# Patient Record
Sex: FEMALE | Race: WHITE | NOT HISPANIC OR LATINO | Employment: OTHER | URBAN - METROPOLITAN AREA
[De-identification: names, ages, dates, MRNs, and addresses within clinical notes are randomized per-mention and may not be internally consistent; named-entity substitution may affect disease eponyms.]

---

## 2021-02-23 ENCOUNTER — COMMUNICATION - HEALTHEAST (OUTPATIENT)
Dept: SCHEDULING | Facility: CLINIC | Age: 66
End: 2021-02-23

## 2021-03-03 ENCOUNTER — OFFICE VISIT - HEALTHEAST (OUTPATIENT)
Dept: ONCOLOGY | Facility: HOSPITAL | Age: 66
End: 2021-03-03

## 2021-03-03 DIAGNOSIS — Z17.0 MALIGNANT NEOPLASM OF LOWER-OUTER QUADRANT OF RIGHT BREAST OF FEMALE, ESTROGEN RECEPTOR POSITIVE (H): ICD-10-CM

## 2021-03-03 DIAGNOSIS — C50.511 MALIGNANT NEOPLASM OF LOWER-OUTER QUADRANT OF RIGHT BREAST OF FEMALE, ESTROGEN RECEPTOR POSITIVE (H): ICD-10-CM

## 2021-03-08 ENCOUNTER — AMBULATORY - HEALTHEAST (OUTPATIENT)
Dept: CT IMAGING | Facility: HOSPITAL | Age: 66
End: 2021-03-08

## 2021-03-08 ENCOUNTER — COMMUNICATION - HEALTHEAST (OUTPATIENT)
Dept: ONCOLOGY | Facility: HOSPITAL | Age: 66
End: 2021-03-08

## 2021-03-08 DIAGNOSIS — Z11.59 ENCOUNTER FOR SCREENING FOR OTHER VIRAL DISEASES: ICD-10-CM

## 2021-03-15 ENCOUNTER — HOSPITAL ENCOUNTER (OUTPATIENT)
Dept: PET IMAGING | Facility: HOSPITAL | Age: 66
Discharge: HOME | End: 2021-03-15
Attending: INTERNAL MEDICINE

## 2021-03-15 DIAGNOSIS — C50.511 MALIGNANT NEOPLASM OF LOWER-OUTER QUADRANT OF RIGHT BREAST OF FEMALE, ESTROGEN RECEPTOR POSITIVE (H): ICD-10-CM

## 2021-03-15 DIAGNOSIS — Z17.0 MALIGNANT NEOPLASM OF LOWER-OUTER QUADRANT OF RIGHT BREAST OF FEMALE, ESTROGEN RECEPTOR POSITIVE (H): ICD-10-CM

## 2021-03-15 LAB — GLUCOSE BLDC GLUCOMTR-MCNC: 97 MG/DL (ref 70–139)

## 2021-03-16 ENCOUNTER — COMMUNICATION - HEALTHEAST (OUTPATIENT)
Dept: ONCOLOGY | Facility: HOSPITAL | Age: 66
End: 2021-03-16

## 2021-03-17 ENCOUNTER — COMMUNICATION - HEALTHEAST (OUTPATIENT)
Dept: ONCOLOGY | Facility: HOSPITAL | Age: 66
End: 2021-03-17

## 2021-03-18 ENCOUNTER — HOSPITAL ENCOUNTER (OUTPATIENT)
Dept: CT IMAGING | Facility: HOSPITAL | Age: 66
Discharge: HOME | End: 2021-03-18
Attending: INTERNAL MEDICINE

## 2021-03-18 ENCOUNTER — OFFICE VISIT - HEALTHEAST (OUTPATIENT)
Dept: ONCOLOGY | Facility: HOSPITAL | Age: 66
End: 2021-03-18

## 2021-03-18 DIAGNOSIS — Z17.0 MALIGNANT NEOPLASM OF LOWER-OUTER QUADRANT OF RIGHT BREAST OF FEMALE, ESTROGEN RECEPTOR POSITIVE (H): ICD-10-CM

## 2021-03-18 DIAGNOSIS — C50.511 MALIGNANT NEOPLASM OF LOWER-OUTER QUADRANT OF RIGHT BREAST OF FEMALE, ESTROGEN RECEPTOR POSITIVE (H): ICD-10-CM

## 2021-03-18 RX ORDER — DILTIAZEM HYDROCHLORIDE 60 MG/1
2 TABLET, FILM COATED ORAL 2 TIMES DAILY
Status: SHIPPED | COMMUNITY
Start: 2021-03-16

## 2021-03-18 RX ORDER — ZALEPLON 5 MG/1
5 CAPSULE ORAL
Status: SHIPPED | COMMUNITY
Start: 2021-03-05 | End: 2021-07-25

## 2021-03-19 ENCOUNTER — AMBULATORY - HEALTHEAST (OUTPATIENT)
Dept: ONCOLOGY | Facility: HOSPITAL | Age: 66
End: 2021-03-19

## 2021-03-21 ENCOUNTER — COMMUNICATION - HEALTHEAST (OUTPATIENT)
Dept: RADIATION ONCOLOGY | Facility: HOSPITAL | Age: 66
End: 2021-03-21

## 2021-03-22 ENCOUNTER — COMMUNICATION - HEALTHEAST (OUTPATIENT)
Dept: ONCOLOGY | Facility: HOSPITAL | Age: 66
End: 2021-03-22

## 2021-03-29 ENCOUNTER — RECORDS - HEALTHEAST (OUTPATIENT)
Dept: ADMINISTRATIVE | Facility: OTHER | Age: 66
End: 2021-03-29

## 2021-03-30 ENCOUNTER — AMBULATORY - HEALTHEAST (OUTPATIENT)
Dept: LAB | Facility: CLINIC | Age: 66
End: 2021-03-30

## 2021-03-30 DIAGNOSIS — Z11.59 ENCOUNTER FOR SCREENING FOR OTHER VIRAL DISEASES: ICD-10-CM

## 2021-03-31 LAB
SARS-COV-2 PCR COMMENT: NORMAL
SARS-COV-2 RNA SPEC QL NAA+PROBE: NEGATIVE
SARS-COV-2 VIRUS SPECIMEN SOURCE: NORMAL

## 2021-04-01 ENCOUNTER — HOSPITAL ENCOUNTER (OUTPATIENT)
Dept: CT IMAGING | Facility: HOSPITAL | Age: 66
Discharge: HOME | End: 2021-04-01
Attending: INTERNAL MEDICINE | Admitting: RADIOLOGY

## 2021-04-01 ENCOUNTER — COMMUNICATION - HEALTHEAST (OUTPATIENT)
Dept: SCHEDULING | Facility: CLINIC | Age: 66
End: 2021-04-01

## 2021-04-01 DIAGNOSIS — C50.919 BREAST CANCER (H): ICD-10-CM

## 2021-04-01 ASSESSMENT — MIFFLIN-ST. JEOR: SCORE: 1205.86

## 2021-04-02 LAB
CAP COMMENT: ABNORMAL
LAB AP CHARGES (HE HISTORICAL CONVERSION): ABNORMAL
LAB AP INITIAL CYTO EVAL (HE HISTORICAL CONVERSION): ABNORMAL
LAB MED GENERAL PATH INTERP (HE HISTORICAL CONVERSION): ABNORMAL
PATH REPORT.COMMENTS IMP SPEC: ABNORMAL
PATH REPORT.COMMENTS IMP SPEC: ABNORMAL
PATH REPORT.FINAL DX SPEC: ABNORMAL
PATH REPORT.MICROSCOPIC SPEC OTHER STN: ABNORMAL
PATH REPORT.MICROSCOPIC SPEC OTHER STN: ABNORMAL
PATH REPORT.RELEVANT HX SPEC: ABNORMAL
SPECIMEN DESCRIPTION: ABNORMAL

## 2021-06-05 VITALS — WEIGHT: 155 LBS | BODY MASS INDEX: 26.61 KG/M2

## 2021-06-05 VITALS — WEIGHT: 149 LBS | HEIGHT: 64 IN | BODY MASS INDEX: 25.44 KG/M2

## 2021-06-11 ENCOUNTER — RECORDS - HEALTHEAST (OUTPATIENT)
Dept: ADMINISTRATIVE | Facility: OTHER | Age: 66
End: 2021-06-11

## 2021-06-11 ENCOUNTER — RECORDS - HEALTHEAST (OUTPATIENT)
Dept: SCHEDULING | Facility: CLINIC | Age: 66
End: 2021-06-11

## 2021-06-11 DIAGNOSIS — C50.919 BREAST CANCER (H): ICD-10-CM

## 2021-06-14 ENCOUNTER — RECORDS - HEALTHEAST (OUTPATIENT)
Dept: SCHEDULING | Facility: CLINIC | Age: 66
End: 2021-06-14

## 2021-06-14 DIAGNOSIS — C50.919 BREAST CANCER (H): ICD-10-CM

## 2021-06-14 DIAGNOSIS — C50.111 MALIGNANT NEOPLASM OF CENTRAL PORTION OF RIGHT FEMALE BREAST (H): ICD-10-CM

## 2021-06-15 NOTE — PROGRESS NOTES
Jarrell Nicole is a 66 y.o. female who is being evaluated via a billable video visit.      How would you like to obtain your AVS? E-Mail (Inform patient AVS not encrypted with this option).  If dropped from the video visit, the video invitation should be resent by: Text to cell phone: [phone]  Will anyone else be joining your video visit? No

## 2021-06-15 NOTE — PROGRESS NOTES
"The patient has been notified of following:     \"This video visit will be conducted via a call between you and your physician/provider. We have found that certain health care needs can be provided without the need for an in-person physical exam.  This service lets us provide the care you need with a video conversation.  If a prescription is necessary we can send it directly to your pharmacy.  If lab work is needed we can place an order for that and you can then stop by our lab to have the test done at a later time.    Video visits are billed at different rates depending on your insurance coverage. Please reach out to your insurance provider with any questions.    If during the course of the call the physician/provider feels a video visit is not appropriate, you will not be charged for this service.\"    Patient has given verbal consent to a Video visit? Yes    Patient would like to receive their AVS by AVS Preference: Ismael.      Will anyone else be joining your video visit? No          Video-Visit Details    Start: 03/03/2021 01:27 pm  Stop: 03/03/2021 01:54 pm    Total visit Time: 50 minutes    Originating Location (pt. Location): [facility] Progress Note    Patient: Jarrell Nicole  MRN: [id number]  Date of Service: 3/3/2021        Reason for visit      1. Malignant neoplasm of lower-outer quadrant of right breast of female, estrogen receptor positive (H)        Assessment     1.  A very pleasant 66 years old woman postmenopausal with a new diagnosis of right-sided breast cancer.  This is ER positive WA positive HER-2/sung negative.  2.  Unclear stage as far as involvement of the bones are concerned.  This needs further clarification.  3.  Chronic pain syndrome/fibromyalgia.  4.  Stated history of multiple trauma to the bones as a child.  5.  History of some sort of accessory breast tissue in the right axillary or left axillary area which was surgically excised many years ago.    Plan     1.  " We need to get a PET scan to see if the bone lesions are hypermetabolic or not.  2.  If there are some lesions that are hypermetabolic and suitable for bone biopsy we will need to do that to confirm the presence of metastatic disease.  3.  If she has metastatic disease then we will start her treatment with ribociclib and letrozole.  If she does not have metastatic disease then we will have her see breast surgery and get her surgical excision done.  4.  Discussed with the patient in detail that at this point we do not have all the information that we need to give her the stage, prognosis, treatment plan etc.  5.  Follow-up with your regular doctor for other medical issues.    Clinical stage      Cancer Staging  Malignant neoplasm of lower-outer quadrant of right breast of female, estrogen receptor positive (H)  Staging form: Breast, AJCC 8th Edition  - Clinical: Stage IV (cTX, cM1, ER+, HI+, HER2-) - Signed by Obie Foster MD on 3/3/2021      History     Jarrell Nicole is a very pleasant 66 y.o. old female with a history of newly diagnosed breast cancer located on the right side measuring approximately 1 cm in size presenting with some chest pain type of an issue and last week of February 2021.  Patient came into the emergency room with a left precordial pain.  She had a CT scan of the chest done which actually showed a small lump in her right breast in the medial half of the breast as well as some axillary neuropathy.  She had a biopsy of the axillary neuropathy done which came back positive for adenocarcinoma of the breast ER positive HI positive HER-2/sung negative.    She also had a bone scan done which actually showed scattered osseous metastases.  The CT scan done during the same hospitalization showed subtle patchy osteosclerosis throughout the visualized bones which is new from 2016.  Quite suspicious for metastatic disease.    The patient currently is recuperating at home.  Still having some aches and  pains.  She is going be following up with her regular doctor.  We did a virtual visit today.      Past Medical History     Past Medical History:   Diagnosis Date     Breast cancer (H)    Hypertension      Review of Systems   Constitutional  Constitutional (WDL): Exceptions to WDL  Fatigue: Fatigue relieved by rest  Neurosensory  Neurosensory (WDL): All neurosensory elements are within defined limits  Cardiovascular  Cardiovascular (WDL): Exceptions to WDL  Pulmonary  Respiratory (WDL): Exceptions to WDL  Cough: Mild symptoms, nonprescription intervention indicated(grey phlegm)  Dyspnea: Shortness of breath with minimal exertion, limiting instrumental ADL(COVID)  Gastrointestinal  Gastrointestinal (WDL): Exceptions to WDL  Dry Mouth: Symptomatic (e.g., dry or thick saliva) without significant dietary alteration, unstimulated saliva flow >0.2 ml/min  Genitourinary  Genitourinary (WDL): All genitourinary elements are within defined limits  Integumentary  Integumentary (WDL): All integumentary elements are within defined limits  Patient Coping  Patient Coping: Accepting  Accompanied by       ECOG performance status and Distress Assessment      ECOG Performance:    ECOG Performance Status: 1    Distress Assessment  Distress Assessment Score: 6:     Pain Status  Currently in Pain: Yes        Vital Signs     There were no vitals filed for this visit.    Physical Exam   GENERAL: no acute distress. Cooperative in conversation.   HEENT: Facial symmetry preserved.  Oral mucosa is moist and intact.  NECK: No visible thyromegaly.  No deformity.  RESP: Regular respiratory rate. No stridor.  No coughing.  EXTREMITIES: No visible upper extremity edema.   NEURO: non focal. Alert and oriented x3.  Cranial nerves II through XI appear intact clinically.  PSYCH: within normal limits.   SKIN: Facial skin appears warm dry intact       Lab Results     Results for orders placed or performed during the hospital encounter of 02/22/21    Comprehensive Metabolic Panel   Result Value Ref Range    Sodium 141 136 - 145 mmol/L    Potassium 3.8 3.5 - 5.0 mmol/L    Chloride 111 (H) 98 - 107 mmol/L    CO2 20 (L) 22 - 31 mmol/L    Anion Gap, Calculation 10 5 - 18 mmol/L    Glucose 122 70 - 125 mg/dL    BUN 14 8 - 22 mg/dL    Creatinine 0.70 0.60 - 1.10 mg/dL    GFR MDRD Af Amer >60 >60 mL/min/1.73m2    GFR MDRD Non Af Amer >60 >60 mL/min/1.73m2    Bilirubin, Total 0.1 0.0 - 1.0 mg/dL    Calcium 8.2 (L) 8.5 - 10.5 mg/dL    Protein, Total 6.3 6.0 - 8.0 g/dL    Albumin 3.3 (L) 3.5 - 5.0 g/dL    Alkaline Phosphatase 95 45 - 120 U/L    AST 33 0 - 40 U/L    ALT 29 0 - 45 U/L   Lipase   Result Value Ref Range    Lipase 33 0 - 52 U/L   Troponin I   Result Value Ref Range    Troponin I <0.01 0.00 - 0.29 ng/mL   Magnesium   Result Value Ref Range    Magnesium 1.8 1.8 - 2.6 mg/dL   BNP(B-type Natriuretic Peptide)   Result Value Ref Range    BNP 39 0 - 106 pg/mL   D-dimer, Quantitative   Result Value Ref Range    D-Dimer, Quant 0.45 <=0.50 FEU ug/mL   HM1 (CBC with Diff)   Result Value Ref Range    WBC 7.8 4.0 - 11.0 thou/uL    RBC 3.61 (L) 3.80 - 5.40 mill/uL    Hemoglobin 11.0 (L) 12.0 - 16.0 g/dL    Hematocrit 32.5 (L) 35.0 - 47.0 %    MCV 90 80 - 100 fL    MCH 30.5 27.0 - 34.0 pg    MCHC 33.8 32.0 - 36.0 g/dL    RDW 13.2 11.0 - 14.5 %    Platelets 376 140 - 440 thou/uL    MPV 9.0 8.5 - 12.5 fL    Neutrophils % 48 (L) 50 - 70 %    Lymphocytes % 41 (H) 20 - 40 %    Monocytes % 8 2 - 10 %    Eosinophils % 2 0 - 6 %    Basophils % 1 0 - 2 %    Immature Granulocyte % 1 (H) <=0 %    Neutrophils Absolute 3.8 2.0 - 7.7 thou/uL    Lymphocytes Absolute 3.2 0.8 - 4.4 thou/uL    Monocytes Absolute 0.6 0.0 - 0.9 thou/uL    Eosinophils Absolute 0.1 0.0 - 0.4 thou/uL    Basophils Absolute 0.1 0.0 - 0.2 thou/uL    Immature Granulocyte Absolute 0.1 (H) <=0.0 thou/uL   Influenza A/B and SARS-CoV2 PCR Symptomatic    Specimen: Respiratory   Result Value Ref Range    SARS-CoV-2 PCR  Result Negative Negative, Invalid    Influenza A Negative Negative, Invalid    Influenza B Negative Negative, Invalid   Troponin I   Result Value Ref Range    Troponin I <0.01 0.00 - 0.29 ng/mL   Troponin I   Result Value Ref Range    Troponin I <0.01 0.00 - 0.29 ng/mL   CA 27.29, Breast Tumor Marker()   Result Value Ref Range    CA 27.29, Serum 167 (H) 0 - 39 U/mL   ECG 12 lead nursing unit performed   Result Value Ref Range    SYSTOLIC BLOOD PRESSURE      DIASTOLIC BLOOD PRESSURE      VENTRICULAR RATE 60 BPM    ATRIAL RATE 60 BPM    P-R INTERVAL 156 ms    QRS DURATION 76 ms    Q-T INTERVAL 468 ms    QTC CALCULATION (BEZET) 468 ms    P Axis 34 degrees    R AXIS 18 degrees    T AXIS 74 degrees    MUSE DIAGNOSIS       Normal sinus rhythm  Normal ECG  When compared with ECG of 22-FEB-2021 12:28,  No significant change was found  Confirmed by SEE ED PROVIDER NOTE FOR, ECG INTERPRETATION ([id number]),  SOULEYMANE CURRY ([id number]) on 2/23/2021 7:19:14 AM     ECG 12 lead nursing unit performed   Result Value Ref Range    SYSTOLIC BLOOD PRESSURE      DIASTOLIC BLOOD PRESSURE      VENTRICULAR RATE 75 BPM    ATRIAL RATE 75 BPM    P-R INTERVAL 144 ms    QRS DURATION 70 ms    Q-T INTERVAL 396 ms    QTC CALCULATION (BEZET) 442 ms    P Axis 64 degrees    R AXIS 73 degrees    T AXIS 95 degrees    MUSE DIAGNOSIS       Normal sinus rhythm  Nonspecific ST abnormality  Abnormal ECG    Confirmed by SEE ED PROVIDER NOTE FOR, ECG INTERPRETATION ([id number]),  SOULEYMANE CURRY ([id number]) on 2/23/2021 7:21:35 AM     Echo Complete   Result Value Ref Range    LV volume diastolic 54.7 46.0 - 106.0 cm3    LV volume systolic 16.3 14.0 - 42.0 cm3    HR 62 bpm    IVSd 1.3 (!) 0.6 - 0.9 cm    LVIDd 3.59 (!) 3.8 - 5.2 cm    LVIDs 2.39 2.2 - 3.5 cm    LVOT diam 2.1 cm    LV PWd 1.14 (!) 0.6 - 0.9 cm    MV E' lat aram 10.8 cm/s    MV E' med aram 8.27 cm/s    AO root 2.6 cm    LA size 2.5 cm    LA/AO root ratio 0.962 no units    MV decel time 218 ms    MV  peak A aram 71.3 cm/s    MV peak E aram 76.6 cm/s    BSA 1.66 m2    Hieght 64 in    Weight 2,160 lbs    /61 mmHg    IVS/PW ratio 1.1     LV FS 33.4 28.0 - 44.0 %    Echo LVEF calculated 70 55 - 75 %    LA volume 31.8 mL    LV mass 144.5 g    MV E/A Ratio 1.1     LVOT area 3.46 cm2    LV systolic volume index 9.8 8.0 - 24.0 cm3/m2    LV diastolic volume index 33.0 29.0 - 61.0 cm3/m2    LA volume index 19.2 mL/m2    LV mass index 87.1 g/m2    TAPSE 2.3 cm    MV med E/e' ratio 9.3     MV lat E/e' ratio 7.1     LA area 2 13.6 cm2    LA area 1 11.0 cm2    Height 64.0 in    Weight 135 lbs    MV Avg E/e' Ratio 8.0 cm/s    LA length 4.0 cm   Medical cytology   Result Value Ref Range    Case Report       Medical Cytology                                  Case: [id number]                                   Authorizing Provider:  Payton Dye, Collected:           02/24/2021 0930                                     DO                                                                           Ordering Location:     [facility]      Received:            02/24/2021 [address] [place] [address]                                                           Pathologist:           Travis Goldsmith MD                                                        Specimen:    Lymph Node, right axillary                                                                 Final Diagnosis       RIGHT AXILLARY LYMPH NODE, NEEDLE CORE BIOPSIES:      - MODERATELY DIFFERENTIATED ADENOCARCINOMA CONSISTENT WITH BREAST PRIMARY     - ESTROGEN RECEPTOR: POSITIVE (3+, GREATER THAN 90%)     - PROGRESTERONE RECEPTOR:  POSITIVE (3+, 80%)     - HER2/TRICE: NEGATIVE (0+)     MCRS    Comment       The clinical history of a right breast lesion and right axillary lymph adenopathy is reviewed. Cytology and histology demonstrate a moderately differentiated adenocarcinoma consistent with breast primary (invasive ductal  carcinoma, grade 2 of 3, score 6 of 9). The tumor cells are positive for CK7 (foregut), GATA3 (breast differentiation) and E-cadherin (ductal differentiation). Clinical correlation is suggested.     All controls stain appropriately.    Clinical Information       RIGHT BREAST LESION, RIGHT AXILLARY LYMPHADENOPATHY    Specimen Description       Biopsy was performed under Ultrasound guidance by Dr. Carlitos Pierson with 5 pass(es) from which:                 5 Air-dried smear(s)   1 cell/tissue block slides are prepared sent to [facility] for processing.     Assisted by:  JOSÉ MIGUEL Glynn    Specimen Processing      Initial Cytologic Evaluation       Site #1, Episode #1, # Passes 5: Diagnostic.  2/24/21    Special Stains       Note - Estrogen and Progesterone Receptor Immunoperoxidase Stains:  These stains were done using the following criteria:    Specimen fixative: Formalin-fixed paraffin-embedded sections    Detection system: Biotin-free multimer-based technology detection system ([employer])    Retrieval method: CC1 pretreatment; a adama-based buffer with a slightly basic PH used at an elevated                                       temperature (95+5 degrees C)    Clone:  Estrogen receptor-SP1, Rabbit monoclonal ([place])     Progesterone receptor-1E2, Rabbit monoclonal ([place])        Scoring method (CAP/ASCO guidelines):                                       Intensity of nuclear stain: strong vs weak vs absent                                       Percentage of cells stained:                                         Indeterminate (Internal control cells present; no immunoreactivity of either                                         tumor cells or internal controls)                                          Negative (Percentage of cells with nuclear positivity is less than 1%)                                         Positive (Percentage of cells with nuclear positivity is equal to or greater than  1%)    REFERENCES:  1) [place] QUIROGA, Hector DELGADO, Chris PRYOR, et al. Estrogen and progesterone receptor testing in breast cancer:      ASCO/CAP guideline update. Arch Pathol Lab Med doi: [email or web address]/arpa.[email or web address]-[email or web address].      * This assay has not been validated on decalcified tissues. Results should be interpreted with caution given       the possibility of false negativity on decalcified specimens.    Note - HER2/sung Immunoperoxidase Stain:  This stain was done using the following criteria:    Specimen fixative: Formalin-fixed paraffin-embedded sections    Detection system: Biotin-free multimer-based technology detection system ([employer])    Retrieval method: CC1 pretreatment; a adama-based buffer with a slightly basic PH used at an elevated     temperature (95 plus or minus 5 degrees C)    Clone:  4B5,  Rabbit monoclonal ([employer])    Scoring method (CAP/ASCO guidelines):     IHC 3+ - Positive (Circumferential membrane staining that is complete, intense, and     within greater than 10% of tumor cells)       IHC 2+ - Equivocal (Circumferential Membrane staining that is incomplete and/or     weak/moderate and within greater than 10% of tumor cells or complete and     circumferential membrane staining that is intense and less than or equal to 10% of tumor     cells)       IHC1+ - Negative (Incomplete membrane staining that is faint/barely perceptible     and within greater than 10% of tumor cells       IHC 0 - Negative (No staining is Observed or Membrane staining that is incomplete and     is faint/barely perceptible and within less than or equal to 10% of tumor cells)    REFERENCES:  1) Alva MANCUSO, Hector DELGADO, [place] QUIROGA, et al. HER2 testing in breast cancer: American Society of Clinical      Oncology/College of American Pathologists clinical practice guideline focused update.  Arch Pathol Lab      Med. 2018;142(11):3804-4759.     * HER2/sung stain performed using the [employer]'s FDA approved methodology.      **  This assay has not been validated on decalcified tissues. Results should be interpreted with caution given       the possibility of false negativity on decalcified specimens.    Charges       CPT: 53976, 26324, 77438, 12427 ×2, 89464 ×3  ICD-10: C50.919    cc:   Carlitos Pierson MD    General Path Interpretation Positive for malignant cells (!) Negative for malignant cells, Non-Diagnostic         Imaging Results     Echo Complete    Result Date: 2/23/2021    Normal left ventricular size with mild hypertrophy.   Left ventricle ejection fraction is normal. The calculated left ventricular ejection fraction is 70%.   Normal right ventricular size and systolic function.   No hemodynamically significant valvular heart abnormalities.   No previous study for comparison.      Ct Abdomen Pelvis Without Oral Without Iv Contrast    Result Date: 2/23/2021  EXAM: CT ABDOMEN PELVIS WO ORAL WO IV CONTRAST LOCATION: [facility] DATE/TIME: 2/23/2021 4:00 PM INDICATION: Right breast lesion, right axillary lymphadenopathy and rib lesions at today's earlier noncontrast CT chest 02/23/2021. COMPARISON: CT chest 02/23/2021. CTs abdomen pelvis 07/20/2020, 7/20/2020 and 10/27/2016 TECHNIQUE: CT scan of the abdomen and pelvis was performed without oral or IV contrast. Multiplanar reformats were obtained. Dose reduction techniques were used. CONTRAST: None. FINDINGS: LOWER CHEST: Visualized lungs are clear. No pleural effusion. Heart size normal with no pericardial effusion. Coronary artery calcification. HEPATOBILIARY: Liver is normal. Stable bile duct dilatation with no radiodense stone or mass consistent with reservoir effect from prior cholecystectomy. PANCREAS: Normal. SPLEEN: Relatively small spleen has decreased in size from 7 cm in 2016 to 5 cm today. ADRENAL GLANDS: Normal. KIDNEY/BLADDER: Kidneys, ureters and bladder are normal. BOWEL: Mild nonobstructive distention of the stomach with food. Normal appendix.  Colonic diverticulosis. Bowel is otherwise normal with no obstruction or inflammatory change. LYMPH NODES: No lymphadenopathy. VASCULATURE: Normal caliber abdominal aorta with moderate calcified atheromatous plaque.  PELVIC ORGANS: Hysterectomy. Pelvis otherwise unremarkable. MUSCULOSKELETAL: Subtle patchy osteosclerosis throughout the visualized bones in retrospect stable to slightly increased compared with 07/28/2020 but new compared with 10/27/2016. For example, a focus of osteosclerosis in the L4 vertebral body 2.0 x 2.2 x 1.5 cm (series 4 image 111 and sagittal image 70).     1.  Subtle patchy osteosclerosis throughout the visualized bones in retrospect is stable to slightly increased compared with 07/28/2020 but new compared with 10/27/2016. Findings are nonspecific and could be associated with metabolic bone disease (or its  treatment) and neoplasia. 2.  Relatively small spleen has decreased in size from 7 cm in 2016 to 5 cm today. 3.  Mild nonobstructive distention of the stomach with food. This could relate to recent meal or associated with gastroparesis. 4.  Abdomen and pelvis otherwise unremarkable.    Xr Chest 1 View Portable    Result Date: 2/22/2021  EXAM: XR CHEST 1 VIEW PORTABLE LOCATION: [facility] DATE/TIME: 2/22/2021 2:00 PM INDICATION: chest pain COMPARISON: 01/15/2010     The heart and pulmonary vasculature are normal, the lungs are clear    Xr Chest 2 Views    Result Date: 2/23/2021  EXAM: XR CHEST 2 VIEWS LOCATION: [facility] DATE/TIME: 2/23/2021 11:47 AM INDICATION: Dyspnea on exertion PE suspected, intermediate prob, neg D-dimer PE suspected, intermediate prob, neg D-dimer COMPARISON: 2/22/2021     The lungs are clear. There is no pleural effusion or pneumothorax. The cardiomediastinal silhouette is normal. The limited visualized portions of the upper abdomen are grossly normal.    Ct Chest Without Contrast    Result Date: 2/23/2021  EXAM: CT  CHEST WO CONTRAST LOCATION: [facility] DATE/TIME: 2/23/2021 11:55 AM INDICATION: Chest pain or SOB, pleurisy or effusion suspected Chest pain, recent Covid COMPARISON: PA and lateral views of the chest 02/23/2021; CT of the abdomen and pelvis which includes the lung bases 10/27/2016, 07/20/2020, 07/28/2020 TECHNIQUE: CT chest without IV contrast. Multiplanar reformats were obtained. Dose reduction techniques were used. CONTRAST: None. FINDINGS: LUNGS AND PLEURA: Mild mixed centrilobular and paraseptal distribution emphysema without a clear apical or basal zonal predominance. 2 to 3 mm peripheral nodule in the lateral right upper lobe (series 4, image 48). There are too peribronchovascular distribution nodules in the right lower lobe the larger of which measures 6 x 9 mm (series 4, image 137) and the smaller of which is 6 mm (series 4, image 147 which most likely represent reactive intrapulmonary lymph nodes. There is an additional enlarged lymph node in the right hilum measuring 12 mm (series 4, image 115). No pleural space abnormality. MEDIASTINUM: Cardiac chambers are normal in size. No pericardial effusion. Main pulmonary artery is normal caliber. Normal caliber thoracic aorta. A few sparse atheromatous calcifications are present in the arch, proximal great vessels and descending thoracic aorta. No enlarged mediastinal or left hilar lymph nodes. Esophagus is decompressed. CORONARY ARTERY CALCIFICATION: Severe left, moderate right. UPPER ABDOMEN: Prior cholecystectomy. No actionable findings in the imaged upper abdomen. MUSCULOSKELETAL: Small diffuse thoracic spine degenerative osteophytes. There are several subtle medullary space lucent lesions in the ribs the most conspicuous of which is in the left anterior fourth rib (series 4, image 87) which thins the overlying cortex. Another similar lesion is present within the right posterior sixth rib (series 4, image 71). There is an 11 mm  nodule in the right breast) series 4, image 81) multiple enlarged nodes in the left axilla the largest of which is 1 cm (series 4, image 55) there is also mild soft tissue stranding in the right axilla.     1.  Nodule in the right breast, enlarged right axillary lymph nodes and surrounding soft tissue stranding. The pattern is concerning for a breast cancer with regional rosa spread. Correlation with formal breast imaging/oncology assessment including mammogram and right breast/axilla ultrasound with biopsy is suggested. 2.  A few medullary space lucent lesions are present most notably the left anterior fourth and right posterior sixth ribs which are suspect for bone metastases. NOTE: ABNORMAL REPORT THE DICTATION ABOVE DESCRIBES AN ABNORMALITY FOR WHICH FOLLOW-UP IS NEEDED.     Nm Bone Scan Whole Body    Result Date: 2/24/2021  EXAM: NM BONE SCAN WHOLE BODY LOCATION: [facility] DATE/TIME: 2/24/2021 11:16 AM INDICATION: Bone lesions seen on CT. Follow-up CT abnormality. Question breast cancer. COMPARISON: CT chest and CT abdomen pelvis 02/23/2021 reviewed. TECHNIQUE: 24.5 mCi technetium-99m MDP, IV. Anterior and posterior delayed whole-body images at 3 hours with additional spot images of the skull. FINDINGS: Numerous scattered foci of abnormal uptake throughout the axial and proximal appendicular skeleton consistent with osseous metastases. Sites include calvarium, multiple sites in the spine, bilateral ribs, both scapulae, multiple sites in the bony pelvis and both proximal femurs. Additional degenerative type uptake both shoulders, wrists, knees and feet.     Multiple scattered osseous metastases.    Nm Lung Perfusion Scan    Result Date: 2/23/2021  EXAM: NM LUNG PERFUSION SCAN LOCATION: [facility] DATE/TIME: 2/23/2021 11:43 AM INDICATION: Dyspnea on exertion PE suspected, intermediate prob, neg D-dimer COMPARISON: Portable chest radiography to 02/22/2021  TECHNIQUE: 7.82 mCi technetium-99m MAA, IV. Standard lung perfusion imaging. FINDINGS: Normal perfusion to both lungs. No segmental perfusion defects. There are attenuation artifacts on the lateral views secondary to the patient's arms/chest wall     Normal lung perfusion scan.    Us Lymph Node Biopsy    Result Date: 2/24/2021  EXAM: 1. PERCUTANEOUS CORE BIOPSY RIGHT AXILLARY LYMPH NODE 2. ULTRASOUND GUIDANCE LOCATION: [facility] DATE/TIME: 2/24/2021 9:54 AM INDICATION: axillary lymphadenopathy. Breast nodule, indeterminant bone lesions. PROCEDURE: Informed consent obtained. Time out performed. The site was prepped and draped in sterile fashion. 10 mL of 1% lidocaine was infused into the local soft tissues. Under direct ultrasound guidance, multiple 18-gauge core biopsies were obtained. The tissue was felt to be adequate by pathology. RADIOLOGIC SUPERVISION AND INTERPRETATION: ULTRASOUND GUIDANCE: Images demonstrate the needle within the nodule.     Status post ultrasound-guided core biopsy of a mildly enlarged right axillary lymph node.    Total time spent was 40 minutes, more than half of it was in face-to-face counseling regarding disease state, treatment, side effects and management.      Obie Foster MD

## 2021-06-15 NOTE — TELEPHONE ENCOUNTER
Patient calls with concerns about getting a PET scan as she is allergic to contrast media when she had her CT scan.  Per Fely MENJIVAR, patient is OK to get the scan as it is a different kind of dye used for a PET than a CT scan.  Patient appreciative of the information.

## 2021-06-16 PROBLEM — R07.9 CHEST PAIN: Status: ACTIVE | Noted: 2021-02-22

## 2021-06-16 PROBLEM — C50.511 MALIGNANT NEOPLASM OF LOWER-OUTER QUADRANT OF RIGHT BREAST OF FEMALE, ESTROGEN RECEPTOR POSITIVE (H): Status: ACTIVE | Noted: 2021-03-03

## 2021-06-16 PROBLEM — Z17.0 MALIGNANT NEOPLASM OF LOWER-OUTER QUADRANT OF RIGHT BREAST OF FEMALE, ESTROGEN RECEPTOR POSITIVE (H): Status: ACTIVE | Noted: 2021-03-03

## 2021-06-16 NOTE — TELEPHONE ENCOUNTER
Patient called in to get the names of the medications that Dr Foster was referring too when they met virtually last week.  I let her know that I would call her back since he did not specifically name them in his note.      Per Dr Foster, he would have prescribed Ibrance, letrozole as well as zometa.  When I tried calling her back on 2 different occasions, her phone never rang and went to voicemail.  Her voicemail box was full so I could not leave a message.  Will attempt to try to reach her again today as well as tomorrow.    Rebekah Damico RN

## 2021-06-16 NOTE — H&P (VIEW-ONLY)
"The patient has been notified of following:     \"This telephone visit will be conducted via a call between you and your physician/provider. We have found that certain health care needs can be provided without the need for an in-person physical exam.  This service lets us provide the care you need with a telephone  conversation.  If a prescription is necessary we can send it directly to your pharmacy.  If lab work is needed we can place an order for that and you can then stop by our lab to have the test done at a later time.     telephone  visits are billed at different rates depending on your insurance coverage. Please reach out to your insurance provider with any questions.    If during the course of the call the physician/provider feels a  telephone  visit is not appropriate, you will not be charged for this service.\"    Patient has given verbal consent to a  telephone  visit? Yes    Patient would like to receive their AVS by AVS Preference: Ismael.      Will anyone else be joining your  telephone  visit? No          Telephone -Visit Details    Start: 03/03/2021 01:27 pm  Stop: 03/03/2021 01:54 pm    Total visit Time: 50 minutes    Originating Location (pt. Location): [facility] Progress Note    Patient: Jarrell Nicole  MRN: [id number]  Date of Service: 3/18/2021        Reason for visit      1. Malignant neoplasm of lower-outer quadrant of right breast of female, estrogen receptor positive (H)        Assessment     1.  A very pleasant 66 y.o.  woman postmenopausal with a new diagnosis of right-sided breast cancer.  This is ER positive MN positive HER-2/sung negative.  This diagnosis was shared with the patient on 3 March 2021.  The patient had no recollection of that event.  She actually denies that she ever had  axillary lymph node biopsy.  2.  PET scan clearly showing bony metastases.  3.  Chronic pain syndrome/fibromyalgia.  4.  Stated history of multiple trauma to the bones as a child.  5.  " History of some sort of accessory breast tissue in the right axillary or left axillary area which was surgically excised many years ago.    Plan     I spent over 40 minutes on the phone and then another 20 minutes on the chart to help take care of this patient.  I explained to the patient that she has a confirmed diagnosis of breast cancer.  Her PET scan is very much compatible with stage IV metastatic disease with bony metastases.  To be 100% certain she will need a biopsy of 1 of those bony lesions.  We had ordered a bone biopsy but the patient did not want to do that.    She seems to have some issues with memory and she is having some memory lapse.  It is quite possible that she may have a brain metastases.  She would need a brain MRI to confirm that.    The patient is unwilling to follow up with me or anybody in our clinic.  I would try to get in touch with her primary care physician Dr. Radha Wisdom at [facility].  I left a message with the answering service to have her call me back.    Clinical stage      Cancer Staging  Malignant neoplasm of lower-outer quadrant of right breast of female, estrogen receptor positive (H)  Staging form: Breast, AJCC 8th Edition  - Clinical: Stage IV (cTX, cM1, ER+, LA+, HER2-) - Signed by Obie Foster MD on 3/3/2021      History     Jarrell Nicole is a very pleasant 66 y.o. old female with a history of newly diagnosed breast cancer located on the right side measuring approximately 1 cm in size presenting with some chest pain type of an issue and last week of February 2021.  Patient came into the emergency room with a left precordial pain.  She had a CT scan of the chest done which actually showed a small lump in her right breast in the medial half of the breast as well as some axillary neuropathy.  She had a biopsy of the axillary neuropathy done which came back positive for adenocarcinoma of the breast ER positive LA positive HER-2/sung negative.    She also  had a bone scan done which actually showed scattered osseous metastases.  The CT scan done during the same hospitalization showed subtle patchy osteosclerosis throughout the visualized bones which is new from 2016.  Quite suspicious for metastatic disease.    I had a virtual visit with her on 3 March 2021.  I reviewed with her her right axillary lymph node biopsy which is confirmatory of breast cancer ER positive UT positive HER-2/sung negative.  I also had reviewed with her her bone scan finding as well as her CT scan finding.  I even tried to share the images of those things on that video platform.    The patient had been calling our clinic after the PET scan because she did not want to get a bone biopsy done.  In fact she has had some erratic behavior with the clinic staff and did not seem to remember any conversation that she had with me and for that matter with some other nurses in our clinic.    We did a phone visit today.      Past Medical History     Past Medical History:   Diagnosis Date     Breast cancer (H)    Hypertension      Review of Systems   Constitutional  Constitutional (WDL): Exceptions to WDL  Fatigue: Fatigue relieved by rest(sleep issues)  Neurosensory     Cardiovascular  Cardiovascular (WDL): Exceptions to WDL  Palpitations: Definition: A disorder characterized by inflammation of the muscle tissue of the heart.  Pulmonary  Respiratory (WDL): Exceptions to WDL(asthma)  Dyspnea: Shortness of breath with minimal exertion, limiting instrumental ADL  Gastrointestinal  Gastrointestinal (WDL): Exceptions to WDL  Dry Mouth: Symptomatic (e.g., dry or thick saliva) without significant dietary alteration, unstimulated saliva flow >0.2 ml/min  Genitourinary  Genitourinary (WDL): All genitourinary elements are within defined limits  Integumentary  Integumentary (WDL): All integumentary elements are within defined limits  Patient Coping  Patient Coping: Anger  Accompanied by       ECOG performance status  and Distress Assessment      ECOG Performance:    ECOG Performance Status: 1    Distress Assessment  Distress Assessment Score: Extreme distress:     Pain Status  Currently in Pain: Yes        Vital Signs     There were no vitals filed for this visit.    Physical Exam       Lab Results     Results for orders placed or performed during the hospital encounter of 03/15/21   POCT Glucose    Specimen: Capillary; Blood   Result Value Ref Range    Glucose 97 70 - 139 mg/dL         Imaging Results     Echo Complete    Result Date: 2/23/2021    Normal left ventricular size with mild hypertrophy.   Left ventricle ejection fraction is normal. The calculated left ventricular ejection fraction is 70%.   Normal right ventricular size and systolic function.   No hemodynamically significant valvular heart abnormalities.   No previous study for comparison.      Ct Abdomen Pelvis Without Oral Without Iv Contrast    Result Date: 2/23/2021  EXAM: CT ABDOMEN PELVIS WO ORAL WO IV CONTRAST LOCATION: [facility] DATE/TIME: 2/23/2021 4:00 PM INDICATION: Right breast lesion, right axillary lymphadenopathy and rib lesions at today's earlier noncontrast CT chest 02/23/2021. COMPARISON: CT chest 02/23/2021. CTs abdomen pelvis 07/20/2020, 7/20/2020 and 10/27/2016 TECHNIQUE: CT scan of the abdomen and pelvis was performed without oral or IV contrast. Multiplanar reformats were obtained. Dose reduction techniques were used. CONTRAST: None. FINDINGS: LOWER CHEST: Visualized lungs are clear. No pleural effusion. Heart size normal with no pericardial effusion. Coronary artery calcification. HEPATOBILIARY: Liver is normal. Stable bile duct dilatation with no radiodense stone or mass consistent with reservoir effect from prior cholecystectomy. PANCREAS: Normal. SPLEEN: Relatively small spleen has decreased in size from 7 cm in 2016 to 5 cm today. ADRENAL GLANDS: Normal. KIDNEY/BLADDER: Kidneys, ureters and bladder are normal.  BOWEL: Mild nonobstructive distention of the stomach with food. Normal appendix. Colonic diverticulosis. Bowel is otherwise normal with no obstruction or inflammatory change. LYMPH NODES: No lymphadenopathy. VASCULATURE: Normal caliber abdominal aorta with moderate calcified atheromatous plaque.  PELVIC ORGANS: Hysterectomy. Pelvis otherwise unremarkable. MUSCULOSKELETAL: Subtle patchy osteosclerosis throughout the visualized bones in retrospect stable to slightly increased compared with 07/28/2020 but new compared with 10/27/2016. For example, a focus of osteosclerosis in the L4 vertebral body 2.0 x 2.2 x 1.5 cm (series 4 image 111 and sagittal image 70).     1.  Subtle patchy osteosclerosis throughout the visualized bones in retrospect is stable to slightly increased compared with 07/28/2020 but new compared with 10/27/2016. Findings are nonspecific and could be associated with metabolic bone disease (or its  treatment) and neoplasia. 2.  Relatively small spleen has decreased in size from 7 cm in 2016 to 5 cm today. 3.  Mild nonobstructive distention of the stomach with food. This could relate to recent meal or associated with gastroparesis. 4.  Abdomen and pelvis otherwise unremarkable.    Xr Chest 1 View Portable    Result Date: 2/22/2021  EXAM: XR CHEST 1 VIEW PORTABLE LOCATION: [facility] DATE/TIME: 2/22/2021 2:00 PM INDICATION: chest pain COMPARISON: 01/15/2010     The heart and pulmonary vasculature are normal, the lungs are clear    Xr Chest 2 Views    Result Date: 2/23/2021  EXAM: XR CHEST 2 VIEWS LOCATION: [facility] DATE/TIME: 2/23/2021 11:47 AM INDICATION: Dyspnea on exertion PE suspected, intermediate prob, neg D-dimer PE suspected, intermediate prob, neg D-dimer COMPARISON: 2/22/2021     The lungs are clear. There is no pleural effusion or pneumothorax. The cardiomediastinal silhouette is normal. The limited visualized portions of the upper abdomen are  grossly normal.    Ct Chest Without Contrast    Result Date: 2/23/2021  EXAM: CT CHEST WO CONTRAST LOCATION: [facility] DATE/TIME: 2/23/2021 11:55 AM INDICATION: Chest pain or SOB, pleurisy or effusion suspected Chest pain, recent Covid COMPARISON: PA and lateral views of the chest 02/23/2021; CT of the abdomen and pelvis which includes the lung bases 10/27/2016, 07/20/2020, 07/28/2020 TECHNIQUE: CT chest without IV contrast. Multiplanar reformats were obtained. Dose reduction techniques were used. CONTRAST: None. FINDINGS: LUNGS AND PLEURA: Mild mixed centrilobular and paraseptal distribution emphysema without a clear apical or basal zonal predominance. 2 to 3 mm peripheral nodule in the lateral right upper lobe (series 4, image 48). There are too peribronchovascular distribution nodules in the right lower lobe the larger of which measures 6 x 9 mm (series 4, image 137) and the smaller of which is 6 mm (series 4, image 147 which most likely represent reactive intrapulmonary lymph nodes. There is an additional enlarged lymph node in the right hilum measuring 12 mm (series 4, image 115). No pleural space abnormality. MEDIASTINUM: Cardiac chambers are normal in size. No pericardial effusion. Main pulmonary artery is normal caliber. Normal caliber thoracic aorta. A few sparse atheromatous calcifications are present in the arch, proximal great vessels and descending thoracic aorta. No enlarged mediastinal or left hilar lymph nodes. Esophagus is decompressed. CORONARY ARTERY CALCIFICATION: Severe left, moderate right. UPPER ABDOMEN: Prior cholecystectomy. No actionable findings in the imaged upper abdomen. MUSCULOSKELETAL: Small diffuse thoracic spine degenerative osteophytes. There are several subtle medullary space lucent lesions in the ribs the most conspicuous of which is in the left anterior fourth rib (series 4, image 87) which thins the overlying cortex. Another similar lesion is present  within the right posterior sixth rib (series 4, image 71). There is an 11 mm nodule in the right breast) series 4, image 81) multiple enlarged nodes in the left axilla the largest of which is 1 cm (series 4, image 55) there is also mild soft tissue stranding in the right axilla.     1.  Nodule in the right breast, enlarged right axillary lymph nodes and surrounding soft tissue stranding. The pattern is concerning for a breast cancer with regional rosa spread. Correlation with formal breast imaging/oncology assessment including mammogram and right breast/axilla ultrasound with biopsy is suggested. 2.  A few medullary space lucent lesions are present most notably the left anterior fourth and right posterior sixth ribs which are suspect for bone metastases. NOTE: ABNORMAL REPORT THE DICTATION ABOVE DESCRIBES AN ABNORMALITY FOR WHICH FOLLOW-UP IS NEEDED.     Nm Bone Scan Whole Body    Result Date: 2/24/2021  EXAM: NM BONE SCAN WHOLE BODY LOCATION: [facility] DATE/TIME: 2/24/2021 11:16 AM INDICATION: Bone lesions seen on CT. Follow-up CT abnormality. Question breast cancer. COMPARISON: CT chest and CT abdomen pelvis 02/23/2021 reviewed. TECHNIQUE: 24.5 mCi technetium-99m MDP, IV. Anterior and posterior delayed whole-body images at 3 hours with additional spot images of the skull. FINDINGS: Numerous scattered foci of abnormal uptake throughout the axial and proximal appendicular skeleton consistent with osseous metastases. Sites include calvarium, multiple sites in the spine, bilateral ribs, both scapulae, multiple sites in the bony pelvis and both proximal femurs. Additional degenerative type uptake both shoulders, wrists, knees and feet.     Multiple scattered osseous metastases.    Nm Lung Perfusion Scan    Result Date: 2/23/2021  EXAM: NM LUNG PERFUSION SCAN LOCATION: [facility] DATE/TIME: 2/23/2021 11:43 AM INDICATION: Dyspnea on exertion PE suspected, intermediate  prob, neg D-dimer COMPARISON: Portable chest radiography to 02/22/2021 TECHNIQUE: 7.82 mCi technetium-99m MAA, IV. Standard lung perfusion imaging. FINDINGS: Normal perfusion to both lungs. No segmental perfusion defects. There are attenuation artifacts on the lateral views secondary to the patient's arms/chest wall     Normal lung perfusion scan.    Nm Pet Ct Skull To Mid Thigh    Result Date: 3/15/2021  EXAM: NM PET CT SKULL TO MID THIGH LOCATION: [facility] DATE/TIME: 3/15/2021 1:17 PM INDICATION: Initial treatment strategy for staging malignant neoplasm of lower-outer quadrant of right female breast, estrogen receptor positive COMPARISON: Whole-body bone scan from 02/24/2021 and CTs from 02/23/2021 are reviewed. TECHNIQUE: Serum glucose level 97 mg/dL. One hour post intravenous administration of 10.8 mCi F-18 FDG, PET imaging was performed from the skull base to the mid thighs utilizing attenuation correction with concurrent axial CT and PET/CT image fusion. Dose reduction techniques were used. FINDINGS: 1.4 x 1.0 cm moderately FDG avid (SUVmax 4.2) soft tissue nodule in the upper, inner right breast. FDG avid right axillary lymphadenopathy measuring up to 1.8 x 1.1 cm (SUVmax 3.7). FDG avid right hilar, subcarinal, lower right paratracheal, left supraclavicular, and bilateral cervical lymphadenopathy. Innumerable FDG avid metastases throughout the axial and proximal appendicular skeleton, a few examples of which include in the right scapula (SUVmax 4.3), left iliac wing (SUVmax 4.5), and lesser trochanter of the left femur (SUVmax 4.3). Emphysema. Calcified atherosclerosis, including marked three-vessel coronary disease and/or stents. Cholecystectomy. Hysterectomy. Pelvic phleboliths. Scattered colonic diverticula. Calcified injection granuloma left buttock. Moderate degenerative change throughout  the spine.     Findings consistent with right breast cancer with lymph node metastases in  the right axilla, mediastinum, and neck and extensive skeletal metastases    Us Lymph Node Biopsy    Result Date: 2/24/2021  EXAM: 1. PERCUTANEOUS CORE BIOPSY RIGHT AXILLARY LYMPH NODE 2. ULTRASOUND GUIDANCE LOCATION: [facility] DATE/TIME: 2/24/2021 9:54 AM INDICATION: axillary lymphadenopathy. Breast nodule, indeterminant bone lesions. PROCEDURE: Informed consent obtained. Time out performed. The site was prepped and draped in sterile fashion. 10 mL of 1% lidocaine was infused into the local soft tissues. Under direct ultrasound guidance, multiple 18-gauge core biopsies were obtained. The tissue was felt to be adequate by pathology. RADIOLOGIC SUPERVISION AND INTERPRETATION: ULTRASOUND GUIDANCE: Images demonstrate the needle within the nodule.     Status post ultrasound-guided core biopsy of a mildly enlarged right axillary lymph node.    Total time spent was 50 minutes  regarding diagnosis, disease state, treatment, side effects and management.  I also spent fair bit of time trying to contact with Dr. Radha Omalley at [facility].  Left message with her.  Also would try to contact her emergency .      Obie Foster MD

## 2021-06-16 NOTE — SEDATION DOCUMENTATION
Unable to log into computer while in procedure at CT see down time charting for all pt intra procedure information.

## 2021-06-16 NOTE — TELEPHONE ENCOUNTER
Patient calls in today with many concerns.  She had an appt on 3/18/21 for a biopsy and then a follow-up with Dr Foster on 3/24/21.  She was emotional and showed anger and sadness throughout our conversation.  She had many questions about what type of biopsy would be done.  She had many concerns about her experience in the hospital yesterday when she was here for a PET scan.  I let her know that I would pass along her concerns to my manager.  I had a very long discussion with her about the orders that were in, the schedule that she currently has in the office note from Dr Foster.  She was very appreciative of the listening ear.  I did talk with her for around 45 minutes trying to reexplain things to her and where she is at in her care.  I let her know that I would further discuss this with Dr Foster today and get back to her.    Dr Foster reviewed the PET scan results and would like her to be seen in the clinic this week to review those.  She may not need an additional biopsy.  When I called the patient back, she was again very emotional and very talkative.  She did verbalize understanding for the appointment to see Dr Foster on 3/18/2021 has been made.  She will arrive at 9:00 AM as this is when her ride will be dropping her off.  Patient does have a , Jordana who attends appointments with her.  She will be attending the visit with the patient on Thursday.  Patient was again very appreciative and will call back if she needs anything further.  This conversation was around 30 minutes for a total time spent with this patient on the phone of 1 hour and 15 minutes.  By the end of the conversation, she seemed to be more clear on where she is at with her diagnosis and the she will be getting the PET scan results on 3/18/21 from Dr Foster and that a plan of treatment would then be determined.    Rebekah Damico RN

## 2021-06-16 NOTE — PRE-PROCEDURE
Procedure Name: Computed tomography guided left iliac bone lesion biopsy with conscious sedation  Date/Time: 4/1/2021 7:53 AM    Verbal consent obtained?: Yes  Written consent obtained?: Yes  Risks and benefits: Risks, benefits and alternatives were discussed  Consent given by: patient  Expected level of sedation: moderate  ASA Class: Class 2- mild systemic disease, no acute problems, no functional limitations  Mallampati: Grade 1- soft palate, uvula, tonsillar pillars, and posterior pharyngeal wall visible  Patient states understanding of procedure being performed: Yes  Patient's understanding of procedure matches consent: Yes  Procedure consent matches procedure scheduled: Yes  Appropriately NPO: yes  Lungs: lungs clear with good breath sounds bilaterally  Heart: normal heart sounds and rate  History & Physical reviewed: History and physical reviewed and no updates needed  Statement of review: I have reviewed the lab findings, diagnostic data, medications, and the plan for sedation

## 2021-06-16 NOTE — TELEPHONE ENCOUNTER
"Follow-up call placed on 3/17/21 to patient after I received a call from patient relations.  Patient relations told me that she (WV) reviewed the message that I put in the patient's chart on 3/16/2021.  WV states that the conversation she (WV) had with the patient this morning (3/17/2021) was very different than the message that I put in the chart.  She states that the patient did not seem to remember anything that the patient and I discussed yesterday.     My first attempt to call at 3:26 PM today rang once and went to [employer]; however the voicemail box was not set up, and I could not leave a message to confirm or cancel her appointment with Dr. Foster on 3/18/21, as WV stated the patient did not recall the upcoming appointment. I reached her on my second attempt I let her know that I was a nurse calling from Dr. Foster's office.  She responded, \"What do you want?\"  I replied that I was calling back today after speaking with her yesterday to confirm that she is able to keep the appointment with Dr Foster tomorrow in office arriving at 9:00 AM for an appointment at 9:30 AM.  I normally would not have had her arrive at 9 AM but she stated that she had this ride lined up because she was previously scheduled for a biopsy, so I wanted to accommodate her ride situation. I normally would have told her 9:15 AM arrival for a 9:30 AM appointment.  Patient expressed her frustration and anger. She states that she thought she had breast cancer and \"now they are telling me I am scheduled for a bone biopsy.\"  I had talked with her about this on 3/16/2021.  I explained that we had planned on doing a bone biopsy to see if the breast cancer has spread to her bone.  This is based off of the right axillary lymph node biopsy that she had on 2/24/2021 when she was in the hospital as well as the whole-body bone scan that she had on 2/24/2021..  She said, \"I have never had a biopsy and nobody stuck me with a needle when I was in the " "hospital.\"  \"I have no holes in my skin from needles, nothing under my right arm.\"  She said she \"was stuck with a needle on 2 different occasions for IVs but that's it.\"  I told her that I was getting this information from her medical record.  She stated, \"well I have never had a biopsy.\"  She stated that she was interrupted every 2 hours by nurses while she was in the hospital.  She also states \"what, did they take me to a different planet that I do not remember or sedate me because I do not remember any biopsy.\"     She seemed to have no recollection of our conversation on 3/16/2021 as she stated that she was receiving lies about her cancer and care.  She said she will be \"lawyered up  and we  all better watch out.\"  She stated, \"Dr Foster is a third-year resident and not even an oncologist.  Why would I let someone care for me who does not even have the correct education.  It is like giving a , college material to try to do.\"  She asked if I knew where he graduated medical school from and I told her that I did not know.  She stated, \"that is because it does not exist.\"  I told her that Dr Foster is the medical director in this clinic and has been here for over 12 years.  I explained numerous times that the point of my call today is to confirm whether or not she plans to keep the appointment for tomorrow with him.  She discussed the same information repeatedly, not seeming to hear what I was saying.  I told her the visit will consist of getting her PET scan results and, per Dr Foster, may not need the biopsy that was previously scheduled.  She stated that \"I am not stepping foot in [facility].\"  I offered a virtual visit as she has previously done with Dr Foster and she was agreeable.  She stated that she should have gone to the [facility] for her care.  I let her know that if this is the route that she wants to go, we can help assist with that referral. I reiterated that the " "reason for my call was to confirm the appointment so she can get information about her cancer and her care.  She then apologized and said \"I am sorry for being nasty with you.\"  I said there was no need to apologize and that she is going through something very tough right now and I am trying to help her through this.  I told her that Dr Foster will be calling her tomorrow.  She verbalized understanding and stated that the conversation \"will be taped.\"    Rebekah Damico RN      "

## 2021-06-16 NOTE — TELEPHONE ENCOUNTER
I was able to get through to her today.  She wrote down the names of the medications and does have an appointment with Dr Blackman at [facility] on Thursday 3/25/21.    Rebekah Damico RN

## 2021-06-16 NOTE — TELEPHONE ENCOUNTER
"Received  call back request for \"severe pain\".  Called back no answer. Chart reviewed and per Dr Foster last note, patient does not wish to follow up with any one in cancer care.      IN light of above.I left a message would not refill pain medication and if her pain bad enough to require more pain medication, should go to ER  for evaluation.        "

## 2021-06-16 NOTE — PROGRESS NOTES
Jarrell Nicole is a 66 y.o. female who is being evaluated via a billable video visit.      How would you like to obtain your AVS? MyChart.  If dropped from the video visit, the video invitation should be resent by: Text to cell phone: [phone]  Will anyone else be joining your video visit? No

## 2021-06-16 NOTE — PROCEDURES
[facility] [place]    Procedure: Imaging Procedure Note    Date/Time: 4/1/2021 9:30 AM  Performed by: Enrrique Conte MD  Authorized by: Enrrique Conte MD       Universal Protocol    Site marked: Yes    Prior images obtained and reviewed: Yes    Required items: required blood products, implants, devices, and special equipment available    Patient identity confirmed: verbally with patient    Reevaluation: Patient was reevaluated immediately before administering moderate or deep sedation or anesthesia    Confirmation checklist: patient's identity using two indicators, relevant allergies, procedure was appropriate and matched the consent or emergent situation and correct equipment/implants were available    Time out: Immediately prior to procedure a time out was called to verify the correct patient, procedure, equipment, support staff and site/side marked as required    Universal Protocol: Joint Commission Universal Protocol was followed    Preparation: Patient was prepped and draped in the usual sterile fashion    ESBL (mL): 5    Anesthesia    Local anesthesia used?: Yes    Anesthesia: local infiltration    Local anesthetic: lidocaine 1% without epinephrine    Anesthetic total (mL): 10    Sedation    Patient sedation: Yes    Sedation: fentanyl and midazolam    Vital signs: Vital signs monitored during sedation    Post-procedure    Description of procedure: Utilizing computed tomography guidance, a [place] bone biopsy needle was placed into a sclerotic lesion in the left iliac crest. Five samples were obtained.    Patient tolerance: Patient tolerated the procedure well with no immediate complications   Length of time physician present for 1:1 monitoring during sedation: 45

## 2021-06-16 NOTE — TELEPHONE ENCOUNTER
Patient calls in today and leaves message on nurse triage line stating that she was in the emergency room yesterday due to the pain she is having from her cancer.  She tells me that she has cracked ribs and a broken clavicle.  She wants to know where she should go from here.  This information was given to Dr Foster.  He tells me that since she has not started her cancer treatment and he is going to be out for  deployment, that we will refer her back to Dr. Oakley's office for a referral for medical oncology.  I have faxed over CT head and CT chest abdomen pelvis reports as well as the ER visit notes to Dr. Oakley's office.  I have placed a call to Dr Oakley's office and had to leave message with the above information.  Jarrell is very comfortable with this plan and has also left a message with Dr Oakley's office.      Of note, patient states that she apologizes for the way she treated Dr Foster and his office over the last several encounters.    Rebekah Damico RN

## 2021-06-16 NOTE — PROGRESS NOTES
PET scan and pathology report from 2/24/21 have been faxed to patient's PCP office at [phone] per Dr Foster's request from the patient.    Rebekah Damico RN

## 2021-06-16 NOTE — PROGRESS NOTES
"The patient has been notified of following:     \"This telephone visit will be conducted via a call between you and your physician/provider. We have found that certain health care needs can be provided without the need for an in-person physical exam.  This service lets us provide the care you need with a telephone  conversation.  If a prescription is necessary we can send it directly to your pharmacy.  If lab work is needed we can place an order for that and you can then stop by our lab to have the test done at a later time.     telephone  visits are billed at different rates depending on your insurance coverage. Please reach out to your insurance provider with any questions.    If during the course of the call the physician/provider feels a  telephone  visit is not appropriate, you will not be charged for this service.\"    Patient has given verbal consent to a  telephone  visit? Yes    Patient would like to receive their AVS by AVS Preference: Ismael.      Will anyone else be joining your  telephone  visit? No          Telephone -Visit Details    Start: 03/03/2021 01:27 pm  Stop: 03/03/2021 01:54 pm    Total visit Time: 50 minutes    Originating Location (pt. Location): [facility] Progress Note    Patient: Jarrell Nicole  MRN: [id number]  Date of Service: 3/18/2021        Reason for visit      1. Malignant neoplasm of lower-outer quadrant of right breast of female, estrogen receptor positive (H)        Assessment     1.  A very pleasant 66 y.o.  woman postmenopausal with a new diagnosis of right-sided breast cancer.  This is ER positive LA positive HER-2/sung negative.  This diagnosis was shared with the patient on 3 March 2021.  The patient had no recollection of that event.  She actually denies that she ever had  axillary lymph node biopsy.  2.  PET scan clearly showing bony metastases.  3.  Chronic pain syndrome/fibromyalgia.  4.  Stated history of multiple trauma to the bones as a child.  5.  " History of some sort of accessory breast tissue in the right axillary or left axillary area which was surgically excised many years ago.    Plan     I spent over 40 minutes on the phone and then another 20 minutes on the chart to help take care of this patient.  I explained to the patient that she has a confirmed diagnosis of breast cancer.  Her PET scan is very much compatible with stage IV metastatic disease with bony metastases.  To be 100% certain she will need a biopsy of 1 of those bony lesions.  We had ordered a bone biopsy but the patient did not want to do that.    She seems to have some issues with memory and she is having some memory lapse.  It is quite possible that she may have a brain metastases.  She would need a brain MRI to confirm that.    The patient is unwilling to follow up with me or anybody in our clinic.  I would try to get in touch with her primary care physician Dr. Radha Wisdom at [facility].  I left a message with the answering service to have her call me back.    Clinical stage      Cancer Staging  Malignant neoplasm of lower-outer quadrant of right breast of female, estrogen receptor positive (H)  Staging form: Breast, AJCC 8th Edition  - Clinical: Stage IV (cTX, cM1, ER+, NC+, HER2-) - Signed by Obie Foster MD on 3/3/2021      History     Jarrell Nicole is a very pleasant 66 y.o. old female with a history of newly diagnosed breast cancer located on the right side measuring approximately 1 cm in size presenting with some chest pain type of an issue and last week of February 2021.  Patient came into the emergency room with a left precordial pain.  She had a CT scan of the chest done which actually showed a small lump in her right breast in the medial half of the breast as well as some axillary neuropathy.  She had a biopsy of the axillary neuropathy done which came back positive for adenocarcinoma of the breast ER positive NC positive HER-2/sung negative.    She also  had a bone scan done which actually showed scattered osseous metastases.  The CT scan done during the same hospitalization showed subtle patchy osteosclerosis throughout the visualized bones which is new from 2016.  Quite suspicious for metastatic disease.    I had a virtual visit with her on 3 March 2021.  I reviewed with her her right axillary lymph node biopsy which is confirmatory of breast cancer ER positive LA positive HER-2/sung negative.  I also had reviewed with her her bone scan finding as well as her CT scan finding.  I even tried to share the images of those things on that video platform.    The patient had been calling our clinic after the PET scan because she did not want to get a bone biopsy done.  In fact she has had some erratic behavior with the clinic staff and did not seem to remember any conversation that she had with me and for that matter with some other nurses in our clinic.    We did a phone visit today.      Past Medical History     Past Medical History:   Diagnosis Date     Breast cancer (H)    Hypertension      Review of Systems   Constitutional  Constitutional (WDL): Exceptions to WDL  Fatigue: Fatigue relieved by rest(sleep issues)  Neurosensory     Cardiovascular  Cardiovascular (WDL): Exceptions to WDL  Palpitations: Definition: A disorder characterized by inflammation of the muscle tissue of the heart.  Pulmonary  Respiratory (WDL): Exceptions to WDL(asthma)  Dyspnea: Shortness of breath with minimal exertion, limiting instrumental ADL  Gastrointestinal  Gastrointestinal (WDL): Exceptions to WDL  Dry Mouth: Symptomatic (e.g., dry or thick saliva) without significant dietary alteration, unstimulated saliva flow >0.2 ml/min  Genitourinary  Genitourinary (WDL): All genitourinary elements are within defined limits  Integumentary  Integumentary (WDL): All integumentary elements are within defined limits  Patient Coping  Patient Coping: Anger  Accompanied by       ECOG performance status  and Distress Assessment      ECOG Performance:    ECOG Performance Status: 1    Distress Assessment  Distress Assessment Score: Extreme distress:     Pain Status  Currently in Pain: Yes        Vital Signs     There were no vitals filed for this visit.    Physical Exam       Lab Results     Results for orders placed or performed during the hospital encounter of 03/15/21   POCT Glucose    Specimen: Capillary; Blood   Result Value Ref Range    Glucose 97 70 - 139 mg/dL         Imaging Results     Echo Complete    Result Date: 2/23/2021    Normal left ventricular size with mild hypertrophy.   Left ventricle ejection fraction is normal. The calculated left ventricular ejection fraction is 70%.   Normal right ventricular size and systolic function.   No hemodynamically significant valvular heart abnormalities.   No previous study for comparison.      Ct Abdomen Pelvis Without Oral Without Iv Contrast    Result Date: 2/23/2021  EXAM: CT ABDOMEN PELVIS WO ORAL WO IV CONTRAST LOCATION: [facility] DATE/TIME: 2/23/2021 4:00 PM INDICATION: Right breast lesion, right axillary lymphadenopathy and rib lesions at today's earlier noncontrast CT chest 02/23/2021. COMPARISON: CT chest 02/23/2021. CTs abdomen pelvis 07/20/2020, 7/20/2020 and 10/27/2016 TECHNIQUE: CT scan of the abdomen and pelvis was performed without oral or IV contrast. Multiplanar reformats were obtained. Dose reduction techniques were used. CONTRAST: None. FINDINGS: LOWER CHEST: Visualized lungs are clear. No pleural effusion. Heart size normal with no pericardial effusion. Coronary artery calcification. HEPATOBILIARY: Liver is normal. Stable bile duct dilatation with no radiodense stone or mass consistent with reservoir effect from prior cholecystectomy. PANCREAS: Normal. SPLEEN: Relatively small spleen has decreased in size from 7 cm in 2016 to 5 cm today. ADRENAL GLANDS: Normal. KIDNEY/BLADDER: Kidneys, ureters and bladder are normal.  BOWEL: Mild nonobstructive distention of the stomach with food. Normal appendix. Colonic diverticulosis. Bowel is otherwise normal with no obstruction or inflammatory change. LYMPH NODES: No lymphadenopathy. VASCULATURE: Normal caliber abdominal aorta with moderate calcified atheromatous plaque.  PELVIC ORGANS: Hysterectomy. Pelvis otherwise unremarkable. MUSCULOSKELETAL: Subtle patchy osteosclerosis throughout the visualized bones in retrospect stable to slightly increased compared with 07/28/2020 but new compared with 10/27/2016. For example, a focus of osteosclerosis in the L4 vertebral body 2.0 x 2.2 x 1.5 cm (series 4 image 111 and sagittal image 70).     1.  Subtle patchy osteosclerosis throughout the visualized bones in retrospect is stable to slightly increased compared with 07/28/2020 but new compared with 10/27/2016. Findings are nonspecific and could be associated with metabolic bone disease (or its  treatment) and neoplasia. 2.  Relatively small spleen has decreased in size from 7 cm in 2016 to 5 cm today. 3.  Mild nonobstructive distention of the stomach with food. This could relate to recent meal or associated with gastroparesis. 4.  Abdomen and pelvis otherwise unremarkable.    Xr Chest 1 View Portable    Result Date: 2/22/2021  EXAM: XR CHEST 1 VIEW PORTABLE LOCATION: [facility] DATE/TIME: 2/22/2021 2:00 PM INDICATION: chest pain COMPARISON: 01/15/2010     The heart and pulmonary vasculature are normal, the lungs are clear    Xr Chest 2 Views    Result Date: 2/23/2021  EXAM: XR CHEST 2 VIEWS LOCATION: [facility] DATE/TIME: 2/23/2021 11:47 AM INDICATION: Dyspnea on exertion PE suspected, intermediate prob, neg D-dimer PE suspected, intermediate prob, neg D-dimer COMPARISON: 2/22/2021     The lungs are clear. There is no pleural effusion or pneumothorax. The cardiomediastinal silhouette is normal. The limited visualized portions of the upper abdomen are  grossly normal.    Ct Chest Without Contrast    Result Date: 2/23/2021  EXAM: CT CHEST WO CONTRAST LOCATION: [facility] DATE/TIME: 2/23/2021 11:55 AM INDICATION: Chest pain or SOB, pleurisy or effusion suspected Chest pain, recent Covid COMPARISON: PA and lateral views of the chest 02/23/2021; CT of the abdomen and pelvis which includes the lung bases 10/27/2016, 07/20/2020, 07/28/2020 TECHNIQUE: CT chest without IV contrast. Multiplanar reformats were obtained. Dose reduction techniques were used. CONTRAST: None. FINDINGS: LUNGS AND PLEURA: Mild mixed centrilobular and paraseptal distribution emphysema without a clear apical or basal zonal predominance. 2 to 3 mm peripheral nodule in the lateral right upper lobe (series 4, image 48). There are too peribronchovascular distribution nodules in the right lower lobe the larger of which measures 6 x 9 mm (series 4, image 137) and the smaller of which is 6 mm (series 4, image 147 which most likely represent reactive intrapulmonary lymph nodes. There is an additional enlarged lymph node in the right hilum measuring 12 mm (series 4, image 115). No pleural space abnormality. MEDIASTINUM: Cardiac chambers are normal in size. No pericardial effusion. Main pulmonary artery is normal caliber. Normal caliber thoracic aorta. A few sparse atheromatous calcifications are present in the arch, proximal great vessels and descending thoracic aorta. No enlarged mediastinal or left hilar lymph nodes. Esophagus is decompressed. CORONARY ARTERY CALCIFICATION: Severe left, moderate right. UPPER ABDOMEN: Prior cholecystectomy. No actionable findings in the imaged upper abdomen. MUSCULOSKELETAL: Small diffuse thoracic spine degenerative osteophytes. There are several subtle medullary space lucent lesions in the ribs the most conspicuous of which is in the left anterior fourth rib (series 4, image 87) which thins the overlying cortex. Another similar lesion is present  within the right posterior sixth rib (series 4, image 71). There is an 11 mm nodule in the right breast) series 4, image 81) multiple enlarged nodes in the left axilla the largest of which is 1 cm (series 4, image 55) there is also mild soft tissue stranding in the right axilla.     1.  Nodule in the right breast, enlarged right axillary lymph nodes and surrounding soft tissue stranding. The pattern is concerning for a breast cancer with regional rosa spread. Correlation with formal breast imaging/oncology assessment including mammogram and right breast/axilla ultrasound with biopsy is suggested. 2.  A few medullary space lucent lesions are present most notably the left anterior fourth and right posterior sixth ribs which are suspect for bone metastases. NOTE: ABNORMAL REPORT THE DICTATION ABOVE DESCRIBES AN ABNORMALITY FOR WHICH FOLLOW-UP IS NEEDED.     Nm Bone Scan Whole Body    Result Date: 2/24/2021  EXAM: NM BONE SCAN WHOLE BODY LOCATION: [facility] DATE/TIME: 2/24/2021 11:16 AM INDICATION: Bone lesions seen on CT. Follow-up CT abnormality. Question breast cancer. COMPARISON: CT chest and CT abdomen pelvis 02/23/2021 reviewed. TECHNIQUE: 24.5 mCi technetium-99m MDP, IV. Anterior and posterior delayed whole-body images at 3 hours with additional spot images of the skull. FINDINGS: Numerous scattered foci of abnormal uptake throughout the axial and proximal appendicular skeleton consistent with osseous metastases. Sites include calvarium, multiple sites in the spine, bilateral ribs, both scapulae, multiple sites in the bony pelvis and both proximal femurs. Additional degenerative type uptake both shoulders, wrists, knees and feet.     Multiple scattered osseous metastases.    Nm Lung Perfusion Scan    Result Date: 2/23/2021  EXAM: NM LUNG PERFUSION SCAN LOCATION: [facility] DATE/TIME: 2/23/2021 11:43 AM INDICATION: Dyspnea on exertion PE suspected, intermediate  prob, neg D-dimer COMPARISON: Portable chest radiography to 02/22/2021 TECHNIQUE: 7.82 mCi technetium-99m MAA, IV. Standard lung perfusion imaging. FINDINGS: Normal perfusion to both lungs. No segmental perfusion defects. There are attenuation artifacts on the lateral views secondary to the patient's arms/chest wall     Normal lung perfusion scan.    Nm Pet Ct Skull To Mid Thigh    Result Date: 3/15/2021  EXAM: NM PET CT SKULL TO MID THIGH LOCATION: [facility] DATE/TIME: 3/15/2021 1:17 PM INDICATION: Initial treatment strategy for staging malignant neoplasm of lower-outer quadrant of right female breast, estrogen receptor positive COMPARISON: Whole-body bone scan from 02/24/2021 and CTs from 02/23/2021 are reviewed. TECHNIQUE: Serum glucose level 97 mg/dL. One hour post intravenous administration of 10.8 mCi F-18 FDG, PET imaging was performed from the skull base to the mid thighs utilizing attenuation correction with concurrent axial CT and PET/CT image fusion. Dose reduction techniques were used. FINDINGS: 1.4 x 1.0 cm moderately FDG avid (SUVmax 4.2) soft tissue nodule in the upper, inner right breast. FDG avid right axillary lymphadenopathy measuring up to 1.8 x 1.1 cm (SUVmax 3.7). FDG avid right hilar, subcarinal, lower right paratracheal, left supraclavicular, and bilateral cervical lymphadenopathy. Innumerable FDG avid metastases throughout the axial and proximal appendicular skeleton, a few examples of which include in the right scapula (SUVmax 4.3), left iliac wing (SUVmax 4.5), and lesser trochanter of the left femur (SUVmax 4.3). Emphysema. Calcified atherosclerosis, including marked three-vessel coronary disease and/or stents. Cholecystectomy. Hysterectomy. Pelvic phleboliths. Scattered colonic diverticula. Calcified injection granuloma left buttock. Moderate degenerative change throughout  the spine.     Findings consistent with right breast cancer with lymph node metastases in  the right axilla, mediastinum, and neck and extensive skeletal metastases    Us Lymph Node Biopsy    Result Date: 2/24/2021  EXAM: 1. PERCUTANEOUS CORE BIOPSY RIGHT AXILLARY LYMPH NODE 2. ULTRASOUND GUIDANCE LOCATION: [facility] DATE/TIME: 2/24/2021 9:54 AM INDICATION: axillary lymphadenopathy. Breast nodule, indeterminant bone lesions. PROCEDURE: Informed consent obtained. Time out performed. The site was prepped and draped in sterile fashion. 10 mL of 1% lidocaine was infused into the local soft tissues. Under direct ultrasound guidance, multiple 18-gauge core biopsies were obtained. The tissue was felt to be adequate by pathology. RADIOLOGIC SUPERVISION AND INTERPRETATION: ULTRASOUND GUIDANCE: Images demonstrate the needle within the nodule.     Status post ultrasound-guided core biopsy of a mildly enlarged right axillary lymph node.    Total time spent was 50 minutes  regarding diagnosis, disease state, treatment, side effects and management.  I also spent fair bit of time trying to contact with Dr. Radha Omalley at [facility].  Left message with her.  Also would try to contact her emergency .      Obie Foster MD

## 2021-07-04 ENCOUNTER — HEALTH MAINTENANCE LETTER (OUTPATIENT)
Age: 66
End: 2021-07-04

## 2021-07-04 NOTE — ADDENDUM NOTE
Addendum Note by Obie Price MD at 3/3/2021 12:45 PM     Author: Obie Price MD Service: -- Author Type: Physician    Filed: 3/3/2021  2:02 PM Encounter Date: 3/3/2021 Status: Signed    : Obie Price MD (Physician)    Addended by: OBIE PRICE on: 3/3/2021 02:02 PM        Modules accepted: Orders

## 2021-07-06 ENCOUNTER — RECORDS - HEALTHEAST (OUTPATIENT)
Dept: ADMINISTRATIVE | Facility: OTHER | Age: 66
End: 2021-07-06

## 2021-07-12 ENCOUNTER — HOSPITAL ENCOUNTER (EMERGENCY)
Dept: RADIOLOGY | Facility: HOSPITAL | Age: 66
DRG: 917 | End: 2021-07-12
Attending: EMERGENCY MEDICINE
Payer: COMMERCIAL

## 2021-07-12 ENCOUNTER — HOSPITAL ENCOUNTER (INPATIENT)
Facility: HOSPITAL | Age: 66
LOS: 6 days | Discharge: HOME | DRG: 917 | End: 2021-07-18
Attending: EMERGENCY MEDICINE | Admitting: INTERNAL MEDICINE
Payer: COMMERCIAL

## 2021-07-12 ENCOUNTER — HOSPITAL ENCOUNTER (EMERGENCY)
Dept: CT IMAGING | Facility: HOSPITAL | Age: 66
DRG: 917 | End: 2021-07-12
Attending: EMERGENCY MEDICINE
Payer: COMMERCIAL

## 2021-07-12 DIAGNOSIS — N17.9 ACUTE KIDNEY INJURY (H): ICD-10-CM

## 2021-07-12 DIAGNOSIS — K57.32 DIVERTICULITIS OF COLON: ICD-10-CM

## 2021-07-12 DIAGNOSIS — Z79.899 CHRONIC PRESCRIPTION BENZODIAZEPINE USE: ICD-10-CM

## 2021-07-12 DIAGNOSIS — C50.911 PRIMARY CANCER OF RIGHT BREAST (H): ICD-10-CM

## 2021-07-12 DIAGNOSIS — F11.221 OPIOID DEPENDENCE WITH INTOXICATION DELIRIUM (H): ICD-10-CM

## 2021-07-12 DIAGNOSIS — I25.10 CAD (CORONARY ARTERY DISEASE): ICD-10-CM

## 2021-07-12 DIAGNOSIS — R79.89 ELEVATED TROPONIN: ICD-10-CM

## 2021-07-12 DIAGNOSIS — E86.0 DEHYDRATION: ICD-10-CM

## 2021-07-12 DIAGNOSIS — K57.32 DIVERTICULITIS OF LARGE INTESTINE WITHOUT PERFORATION OR ABSCESS WITHOUT BLEEDING: ICD-10-CM

## 2021-07-12 DIAGNOSIS — G93.40 ENCEPHALOPATHY: Primary | ICD-10-CM

## 2021-07-12 PROBLEM — H25.811 COMBINED FORMS OF AGE-RELATED CATARACT OF RIGHT EYE: Status: ACTIVE | Noted: 2018-04-25

## 2021-07-12 PROBLEM — J45.909 ASTHMA: Status: ACTIVE | Noted: 2021-07-12

## 2021-07-12 LAB
ALBUMIN SERPL-MCNC: 4.4 G/DL (ref 3.5–5)
ALBUMIN UR-MCNC: 100 MG/DL
ALP SERPL-CCNC: 160 U/L (ref 45–120)
ALT SERPL W P-5'-P-CCNC: 21 U/L (ref 0–45)
AMMONIA PLAS-SCNC: 32 UMOL/L (ref 11–35)
AMPHETAMINES UR QL SCN: ABNORMAL
ANION GAP SERPL CALCULATED.3IONS-SCNC: 20 MMOL/L (ref 5–18)
APAP SERPL-MCNC: <3 UG/ML (ref 10–20)
APPEARANCE UR: ABNORMAL
AST SERPL W P-5'-P-CCNC: 31 U/L (ref 0–40)
BACTERIA #/AREA URNS HPF: ABNORMAL /HPF
BARBITURATES UR QL: ABNORMAL
BASOPHILS # BLD MANUAL: 0 10E3/UL (ref 0–0.2)
BASOPHILS NFR BLD MANUAL: 0 %
BENZODIAZ UR QL: ABNORMAL
BILIRUB SERPL-MCNC: 0.7 MG/DL (ref 0–1)
BILIRUB UR QL STRIP: NEGATIVE
BUN SERPL-MCNC: 39 MG/DL (ref 8–22)
CALCIUM SERPL-MCNC: 9.5 MG/DL (ref 8.5–10.5)
CANNABINOIDS UR QL SCN: ABNORMAL
CHLORIDE BLD-SCNC: 106 MMOL/L (ref 98–107)
CK SERPL-CCNC: 390 U/L (ref 30–190)
CO2 SERPL-SCNC: 17 MMOL/L (ref 22–31)
COCAINE UR QL: ABNORMAL
COLOR UR AUTO: YELLOW
CREAT SERPL-MCNC: 2.45 MG/DL (ref 0.6–1.1)
CREAT UR-MCNC: 323 MG/DL
EOSINOPHIL # BLD MANUAL: 0 10E3/UL (ref 0–0.7)
EOSINOPHIL NFR BLD MANUAL: 0 %
ERYTHROCYTE [DISTWIDTH] IN BLOOD BY AUTOMATED COUNT: 20.5 % (ref 10–15)
ETHANOL SERPL-MCNC: <10 MG/DL
GFR SERPL CREATININE-BSD FRML MDRD: 20 ML/MIN/1.73M2
GLUCOSE BLD-MCNC: 132 MG/DL (ref 70–125)
GLUCOSE UR STRIP-MCNC: 30 MG/DL
HCT VFR BLD AUTO: 37.4 % (ref 35–47)
HGB BLD-MCNC: 12.9 G/DL (ref 11.7–15.7)
HGB UR QL STRIP: ABNORMAL
HYALINE CASTS: 163 /LPF
KETONES UR STRIP-MCNC: ABNORMAL MG/DL
LACTATE SERPL-SCNC: 5 MMOL/L (ref 0.7–2)
LEUKOCYTE ESTERASE UR QL STRIP: NEGATIVE
LIPASE SERPL-CCNC: <9 U/L (ref 0–52)
LYMPHOCYTES # BLD MANUAL: 1.5 10E3/UL (ref 0.8–5.3)
LYMPHOCYTES NFR BLD MANUAL: 17 %
MAGNESIUM SERPL-MCNC: 2 MG/DL (ref 1.8–2.6)
MCH RBC QN AUTO: 33.1 PG (ref 26.5–33)
MCHC RBC AUTO-ENTMCNC: 34.5 G/DL (ref 31.5–36.5)
MCV RBC AUTO: 96 FL (ref 78–100)
METHADONE UR QL SCN: ABNORMAL
MONOCYTES # BLD MANUAL: 0.2 10E3/UL (ref 0–1.3)
MONOCYTES NFR BLD MANUAL: 2 %
MUCOUS THREADS #/AREA URNS LPF: PRESENT /LPF
NEUTROPHILS # BLD MANUAL: 7 10E3/UL (ref 1.6–8.3)
NEUTROPHILS NFR BLD MANUAL: 81 %
NITRATE UR QL: NEGATIVE
OPIATES UR QL SCN: ABNORMAL
OXYCODONE UR QL: ABNORMAL
PCP UR QL SCN: ABNORMAL
PH UR STRIP: 5.5 [PH] (ref 5–7)
PLAT MORPH BLD: NORMAL
PLATELET # BLD AUTO: 565 10E3/UL (ref 150–450)
POTASSIUM BLD-SCNC: 3.7 MMOL/L (ref 3.5–5)
PROT SERPL-MCNC: 8 G/DL (ref 6–8)
RBC # BLD AUTO: 3.9 10E6/UL (ref 3.8–5.2)
RBC MORPH BLD: NORMAL
RBC URINE: 4 /HPF
SALICYLATES SERPL-MCNC: <8 MG/DL (ref 2–25)
SARS-COV-2 RNA RESP QL NAA+PROBE: NEGATIVE
SODIUM SERPL-SCNC: 143 MMOL/L (ref 136–145)
SP GR UR STRIP: 1.02 (ref 1–1.03)
TROPONIN I SERPL-MCNC: 1.42 NG/ML (ref 0–0.29)
TSH SERPL DL<=0.005 MIU/L-ACNC: 0.96 UIU/ML (ref 0.3–5)
UROBILINOGEN UR STRIP-MCNC: <2 MG/DL
WBC # BLD AUTO: 8.7 10E3/UL (ref 4–11)
WBC URINE: 7 /HPF

## 2021-07-12 PROCEDURE — 80053 COMPREHEN METABOLIC PANEL: CPT | Performed by: EMERGENCY MEDICINE

## 2021-07-12 PROCEDURE — 80179 DRUG ASSAY SALICYLATE: CPT | Performed by: EMERGENCY MEDICINE

## 2021-07-12 PROCEDURE — 85027 COMPLETE CBC AUTOMATED: CPT | Performed by: EMERGENCY MEDICINE

## 2021-07-12 PROCEDURE — C9803 HOPD COVID-19 SPEC COLLECT: HCPCS

## 2021-07-12 PROCEDURE — 250N000013 HC RX MED GY IP 250 OP 250 PS 637: Performed by: EMERGENCY MEDICINE

## 2021-07-12 PROCEDURE — 84484 ASSAY OF TROPONIN QUANT: CPT | Performed by: EMERGENCY MEDICINE

## 2021-07-12 PROCEDURE — 83690 ASSAY OF LIPASE: CPT | Performed by: EMERGENCY MEDICINE

## 2021-07-12 PROCEDURE — 96374 THER/PROPH/DIAG INJ IV PUSH: CPT

## 2021-07-12 PROCEDURE — 83735 ASSAY OF MAGNESIUM: CPT | Performed by: EMERGENCY MEDICINE

## 2021-07-12 PROCEDURE — 82140 ASSAY OF AMMONIA: CPT | Performed by: EMERGENCY MEDICINE

## 2021-07-12 PROCEDURE — 74176 CT ABD & PELVIS W/O CONTRAST: CPT

## 2021-07-12 PROCEDURE — 83605 ASSAY OF LACTIC ACID: CPT | Performed by: EMERGENCY MEDICINE

## 2021-07-12 PROCEDURE — 36592 COLLECT BLOOD FROM PICC: CPT | Performed by: EMERGENCY MEDICINE

## 2021-07-12 PROCEDURE — 70450 CT HEAD/BRAIN W/O DYE: CPT

## 2021-07-12 PROCEDURE — 96376 TX/PRO/DX INJ SAME DRUG ADON: CPT

## 2021-07-12 PROCEDURE — 87635 SARS-COV-2 COVID-19 AMP PRB: CPT | Performed by: EMERGENCY MEDICINE

## 2021-07-12 PROCEDURE — 80143 DRUG ASSAY ACETAMINOPHEN: CPT | Performed by: EMERGENCY MEDICINE

## 2021-07-12 PROCEDURE — 96361 HYDRATE IV INFUSION ADD-ON: CPT

## 2021-07-12 PROCEDURE — 82550 ASSAY OF CK (CPK): CPT | Performed by: EMERGENCY MEDICINE

## 2021-07-12 PROCEDURE — 84443 ASSAY THYROID STIM HORMONE: CPT | Performed by: EMERGENCY MEDICINE

## 2021-07-12 PROCEDURE — 93005 ELECTROCARDIOGRAM TRACING: CPT | Mod: 76 | Performed by: EMERGENCY MEDICINE

## 2021-07-12 PROCEDURE — 258N000003 HC RX IP 258 OP 636: Performed by: EMERGENCY MEDICINE

## 2021-07-12 PROCEDURE — 71045 X-RAY EXAM CHEST 1 VIEW: CPT

## 2021-07-12 PROCEDURE — 210N000001 HC R&B IMCU HEART CARE

## 2021-07-12 PROCEDURE — 250N000011 HC RX IP 250 OP 636: Performed by: EMERGENCY MEDICINE

## 2021-07-12 PROCEDURE — 82077 ASSAY SPEC XCP UR&BREATH IA: CPT | Performed by: EMERGENCY MEDICINE

## 2021-07-12 PROCEDURE — 81001 URINALYSIS AUTO W/SCOPE: CPT | Performed by: EMERGENCY MEDICINE

## 2021-07-12 PROCEDURE — 87040 BLOOD CULTURE FOR BACTERIA: CPT | Performed by: EMERGENCY MEDICINE

## 2021-07-12 PROCEDURE — 80307 DRUG TEST PRSMV CHEM ANLYZR: CPT | Performed by: EMERGENCY MEDICINE

## 2021-07-12 PROCEDURE — 99285 EMERGENCY DEPT VISIT HI MDM: CPT | Mod: 25

## 2021-07-12 RX ORDER — FERROUS SULFATE 325(65) MG
325 TABLET ORAL
COMMUNITY
End: 2022-01-05

## 2021-07-12 RX ORDER — HEPARIN SODIUM 10000 [USP'U]/100ML
0-5000 INJECTION, SOLUTION INTRAVENOUS CONTINUOUS
Status: DISCONTINUED | OUTPATIENT
Start: 2021-07-12 | End: 2021-07-13

## 2021-07-12 RX ORDER — SODIUM CHLORIDE, SODIUM LACTATE, POTASSIUM CHLORIDE, CALCIUM CHLORIDE 600; 310; 30; 20 MG/100ML; MG/100ML; MG/100ML; MG/100ML
1000 INJECTION, SOLUTION INTRAVENOUS CONTINUOUS
Status: DISCONTINUED | OUTPATIENT
Start: 2021-07-12 | End: 2021-07-13

## 2021-07-12 RX ORDER — ASPIRIN 81 MG/1
162 TABLET, CHEWABLE ORAL ONCE
Status: COMPLETED | OUTPATIENT
Start: 2021-07-12 | End: 2021-07-12

## 2021-07-12 RX ORDER — CIPROFLOXACIN 500 MG/1
500 TABLET, FILM COATED ORAL ONCE
Status: COMPLETED | OUTPATIENT
Start: 2021-07-12 | End: 2021-07-12

## 2021-07-12 RX ORDER — METRONIDAZOLE 500 MG/1
500 TABLET ORAL ONCE
Status: COMPLETED | OUTPATIENT
Start: 2021-07-12 | End: 2021-07-12

## 2021-07-12 RX ORDER — CIPROFLOXACIN 2 MG/ML
400 INJECTION, SOLUTION INTRAVENOUS ONCE
Status: DISCONTINUED | OUTPATIENT
Start: 2021-07-12 | End: 2021-07-12

## 2021-07-12 RX ADMIN — METRONIDAZOLE 500 MG: 500 TABLET ORAL at 23:50

## 2021-07-12 RX ADMIN — CIPROFLOXACIN 500 MG: 500 TABLET, COATED ORAL at 23:50

## 2021-07-12 RX ADMIN — SODIUM CHLORIDE 1000 ML: 9 INJECTION, SOLUTION INTRAVENOUS at 19:35

## 2021-07-12 RX ADMIN — SODIUM CHLORIDE, POTASSIUM CHLORIDE, SODIUM LACTATE AND CALCIUM CHLORIDE 1000 ML: 600; 310; 30; 20 INJECTION, SOLUTION INTRAVENOUS at 23:50

## 2021-07-12 RX ADMIN — SODIUM CHLORIDE, POTASSIUM CHLORIDE, SODIUM LACTATE AND CALCIUM CHLORIDE 1000 ML: 600; 310; 30; 20 INJECTION, SOLUTION INTRAVENOUS at 23:01

## 2021-07-12 RX ADMIN — ASPIRIN 162 MG: 81 TABLET, CHEWABLE ORAL at 22:58

## 2021-07-12 RX ADMIN — HEPARIN SODIUM 800 UNITS/HR: 10000 INJECTION, SOLUTION INTRAVENOUS at 23:58

## 2021-07-13 ENCOUNTER — APPOINTMENT (OUTPATIENT)
Dept: CARDIOLOGY | Facility: HOSPITAL | Age: 66
DRG: 917 | End: 2021-07-13
Attending: INTERNAL MEDICINE
Payer: COMMERCIAL

## 2021-07-13 PROBLEM — G89.3 CHRONIC PAIN DUE TO NEOPLASM: Chronic | Status: ACTIVE | Noted: 2021-07-13

## 2021-07-13 PROBLEM — C50.511 MALIGNANT NEOPLASM OF LOWER-OUTER QUADRANT OF RIGHT BREAST OF FEMALE, ESTROGEN RECEPTOR POSITIVE (H): Chronic | Status: ACTIVE | Noted: 2021-03-03

## 2021-07-13 PROBLEM — C50.911 PRIMARY CANCER OF RIGHT BREAST (H): Chronic | Status: RESOLVED | Noted: 2021-03-05 | Resolved: 2021-07-13

## 2021-07-13 PROBLEM — H25.811 COMBINED FORMS OF AGE-RELATED CATARACT OF RIGHT EYE: Chronic | Status: ACTIVE | Noted: 2018-04-25

## 2021-07-13 PROBLEM — K57.32 DIVERTICULITIS OF COLON: Status: RESOLVED | Noted: 2021-07-12 | Resolved: 2021-07-13

## 2021-07-13 PROBLEM — F11.221: Status: ACTIVE | Noted: 2021-07-13

## 2021-07-13 PROBLEM — C50.911 PRIMARY CANCER OF RIGHT BREAST (H): Chronic | Status: ACTIVE | Noted: 2021-03-05

## 2021-07-13 PROBLEM — Z17.0 MALIGNANT NEOPLASM OF LOWER-OUTER QUADRANT OF RIGHT BREAST OF FEMALE, ESTROGEN RECEPTOR POSITIVE (H): Chronic | Status: ACTIVE | Noted: 2021-03-03

## 2021-07-13 PROBLEM — J45.909 ASTHMA: Chronic | Status: ACTIVE | Noted: 2021-07-12

## 2021-07-13 PROBLEM — F11.221: Chronic | Status: ACTIVE | Noted: 2021-07-13

## 2021-07-13 PROBLEM — M62.82 NON-TRAUMATIC RHABDOMYOLYSIS: Status: ACTIVE | Noted: 2021-07-13

## 2021-07-13 PROBLEM — G89.3 CHRONIC PAIN DUE TO NEOPLASM: Status: ACTIVE | Noted: 2021-07-13

## 2021-07-13 LAB
ALBUMIN SERPL-MCNC: 3.9 G/DL (ref 3.5–5)
ALP SERPL-CCNC: 119 U/L (ref 45–120)
ALT SERPL W P-5'-P-CCNC: 20 U/L (ref 0–45)
ANION GAP SERPL CALCULATED.3IONS-SCNC: 13 MMOL/L (ref 5–18)
AST SERPL W P-5'-P-CCNC: 31 U/L (ref 0–40)
ATRIAL RATE - MUSE: 72 BPM
ATRIAL RATE - MUSE: 89 BPM
ATRIAL RATE - MUSE: 94 BPM
BILIRUB SERPL-MCNC: 0.6 MG/DL (ref 0–1)
BNP SERPL-MCNC: 364 PG/ML (ref 0–106)
BUN SERPL-MCNC: 37 MG/DL (ref 8–22)
CALCIUM SERPL-MCNC: 8.1 MG/DL (ref 8.5–10.5)
CHLORIDE BLD-SCNC: 109 MMOL/L (ref 98–107)
CK SERPL-CCNC: 433 U/L (ref 30–190)
CO2 SERPL-SCNC: 18 MMOL/L (ref 22–31)
CREAT SERPL-MCNC: 1.16 MG/DL (ref 0.6–1.1)
DIASTOLIC BLOOD PRESSURE - MUSE: NORMAL MMHG
GFR SERPL CREATININE-BSD FRML MDRD: 50 ML/MIN/1.73M2
GLUCOSE BLD-MCNC: 143 MG/DL (ref 70–125)
INTERPRETATION ECG - MUSE: NORMAL
LACTATE SERPL-SCNC: 1.4 MMOL/L (ref 0.7–2)
MAGNESIUM SERPL-MCNC: 1.9 MG/DL (ref 1.8–2.6)
P AXIS - MUSE: 69 DEGREES
P AXIS - MUSE: 71 DEGREES
P AXIS - MUSE: NORMAL DEGREES
POTASSIUM BLD-SCNC: 2.9 MMOL/L (ref 3.5–5)
POTASSIUM BLD-SCNC: 2.9 MMOL/L (ref 3.5–5)
PR INTERVAL - MUSE: 132 MS
PR INTERVAL - MUSE: 134 MS
PR INTERVAL - MUSE: 86 MS
PROCALCITONIN SERPL-MCNC: 0.06 NG/ML (ref 0–0.49)
PROT SERPL-MCNC: 6.8 G/DL (ref 6–8)
QRS DURATION - MUSE: 66 MS
QRS DURATION - MUSE: 68 MS
QRS DURATION - MUSE: 70 MS
QT - MUSE: 376 MS
QT - MUSE: 394 MS
QT - MUSE: 450 MS
QTC - MUSE: 470 MS
QTC - MUSE: 476 MS
QTC - MUSE: 493 MS
R AXIS - MUSE: -23 DEGREES
R AXIS - MUSE: -23 DEGREES
R AXIS - MUSE: -40 DEGREES
SODIUM SERPL-SCNC: 140 MMOL/L (ref 136–145)
SYSTOLIC BLOOD PRESSURE - MUSE: NORMAL MMHG
T AXIS - MUSE: 12 DEGREES
T AXIS - MUSE: 30 DEGREES
T AXIS - MUSE: 48 DEGREES
TROPONIN I SERPL-MCNC: 0.42 NG/ML (ref 0–0.29)
TROPONIN I SERPL-MCNC: 0.66 NG/ML (ref 0–0.29)
UFH PPP CHRO-ACNC: 0.24 IU/ML
UFH PPP CHRO-ACNC: 0.29 IU/ML
VENTRICULAR RATE- MUSE: 72 BPM
VENTRICULAR RATE- MUSE: 88 BPM
VENTRICULAR RATE- MUSE: 94 BPM
VIT B12 SERPL-MCNC: 374 PG/ML (ref 213–816)

## 2021-07-13 PROCEDURE — 250N000013 HC RX MED GY IP 250 OP 250 PS 637: Performed by: INTERNAL MEDICINE

## 2021-07-13 PROCEDURE — 36592 COLLECT BLOOD FROM PICC: CPT | Performed by: EMERGENCY MEDICINE

## 2021-07-13 PROCEDURE — 84484 ASSAY OF TROPONIN QUANT: CPT | Performed by: INTERNAL MEDICINE

## 2021-07-13 PROCEDURE — 250N000011 HC RX IP 250 OP 636: Performed by: INTERNAL MEDICINE

## 2021-07-13 PROCEDURE — 36415 COLL VENOUS BLD VENIPUNCTURE: CPT | Performed by: INTERNAL MEDICINE

## 2021-07-13 PROCEDURE — 93010 ELECTROCARDIOGRAM REPORT: CPT | Performed by: GENERAL ACUTE CARE HOSPITAL

## 2021-07-13 PROCEDURE — 99221 1ST HOSP IP/OBS SF/LOW 40: CPT | Mod: 25 | Performed by: INTERNAL MEDICINE

## 2021-07-13 PROCEDURE — 93306 TTE W/DOPPLER COMPLETE: CPT | Mod: 26 | Performed by: INTERNAL MEDICINE

## 2021-07-13 PROCEDURE — 250N000011 HC RX IP 250 OP 636: Performed by: STUDENT IN AN ORGANIZED HEALTH CARE EDUCATION/TRAINING PROGRAM

## 2021-07-13 PROCEDURE — 82550 ASSAY OF CK (CPK): CPT | Performed by: INTERNAL MEDICINE

## 2021-07-13 PROCEDURE — 80053 COMPREHEN METABOLIC PANEL: CPT | Performed by: INTERNAL MEDICINE

## 2021-07-13 PROCEDURE — 250N000013 HC RX MED GY IP 250 OP 250 PS 637: Performed by: PAIN MEDICINE

## 2021-07-13 PROCEDURE — 250N000011 HC RX IP 250 OP 636: Performed by: PAIN MEDICINE

## 2021-07-13 PROCEDURE — 85520 HEPARIN ASSAY: CPT | Performed by: INTERNAL MEDICINE

## 2021-07-13 PROCEDURE — 255N000002 HC RX 255 OP 636: Performed by: INTERNAL MEDICINE

## 2021-07-13 PROCEDURE — 85520 HEPARIN ASSAY: CPT | Performed by: EMERGENCY MEDICINE

## 2021-07-13 PROCEDURE — 83880 ASSAY OF NATRIURETIC PEPTIDE: CPT | Performed by: INTERNAL MEDICINE

## 2021-07-13 PROCEDURE — 83605 ASSAY OF LACTIC ACID: CPT | Performed by: INTERNAL MEDICINE

## 2021-07-13 PROCEDURE — 82607 VITAMIN B-12: CPT | Performed by: INTERNAL MEDICINE

## 2021-07-13 PROCEDURE — 83735 ASSAY OF MAGNESIUM: CPT | Performed by: INTERNAL MEDICINE

## 2021-07-13 PROCEDURE — 210N000001 HC R&B IMCU HEART CARE

## 2021-07-13 PROCEDURE — 84132 ASSAY OF SERUM POTASSIUM: CPT | Performed by: INTERNAL MEDICINE

## 2021-07-13 PROCEDURE — 99223 1ST HOSP IP/OBS HIGH 75: CPT | Performed by: INTERNAL MEDICINE

## 2021-07-13 PROCEDURE — 84145 PROCALCITONIN (PCT): CPT | Performed by: INTERNAL MEDICINE

## 2021-07-13 RX ORDER — VENLAFAXINE 37.5 MG/1
37.5 TABLET ORAL EVERY MORNING
Status: DISCONTINUED | OUTPATIENT
Start: 2021-07-13 | End: 2021-07-18 | Stop reason: HOSPADM

## 2021-07-13 RX ORDER — NALOXONE HYDROCHLORIDE 0.4 MG/ML
0.2 INJECTION, SOLUTION INTRAMUSCULAR; INTRAVENOUS; SUBCUTANEOUS
Status: DISCONTINUED | OUTPATIENT
Start: 2021-07-13 | End: 2021-07-18 | Stop reason: HOSPADM

## 2021-07-13 RX ORDER — SODIUM CHLORIDE 9 MG/ML
INJECTION, SOLUTION INTRAVENOUS CONTINUOUS
Status: DISCONTINUED | OUTPATIENT
Start: 2021-07-13 | End: 2021-07-13

## 2021-07-13 RX ORDER — CYCLOBENZAPRINE HCL 10 MG
10 TABLET ORAL 3 TIMES DAILY PRN
Status: DISCONTINUED | OUTPATIENT
Start: 2021-07-13 | End: 2021-07-13

## 2021-07-13 RX ORDER — POTASSIUM CHLORIDE 1500 MG/1
40 TABLET, EXTENDED RELEASE ORAL ONCE
Status: COMPLETED | OUTPATIENT
Start: 2021-07-13 | End: 2021-07-13

## 2021-07-13 RX ORDER — AMLODIPINE BESYLATE 5 MG/1
5 TABLET ORAL DAILY
Status: DISCONTINUED | OUTPATIENT
Start: 2021-07-13 | End: 2021-07-18 | Stop reason: HOSPADM

## 2021-07-13 RX ORDER — HYDRALAZINE HYDROCHLORIDE 20 MG/ML
10 INJECTION INTRAMUSCULAR; INTRAVENOUS EVERY 4 HOURS PRN
Status: DISCONTINUED | OUTPATIENT
Start: 2021-07-13 | End: 2021-07-18 | Stop reason: HOSPADM

## 2021-07-13 RX ORDER — POLYETHYLENE GLYCOL 3350 17 G/17G
17 POWDER, FOR SOLUTION ORAL DAILY PRN
Status: DISCONTINUED | OUTPATIENT
Start: 2021-07-13 | End: 2021-07-18 | Stop reason: HOSPADM

## 2021-07-13 RX ORDER — HYDROMORPHONE HYDROCHLORIDE 1 MG/ML
0.5 INJECTION, SOLUTION INTRAMUSCULAR; INTRAVENOUS; SUBCUTANEOUS EVERY 6 HOURS PRN
Status: DISCONTINUED | OUTPATIENT
Start: 2021-07-13 | End: 2021-07-13 | Stop reason: SINTOL

## 2021-07-13 RX ORDER — ONDANSETRON 2 MG/ML
4 INJECTION INTRAMUSCULAR; INTRAVENOUS EVERY 6 HOURS PRN
Status: DISCONTINUED | OUTPATIENT
Start: 2021-07-13 | End: 2021-07-18 | Stop reason: HOSPADM

## 2021-07-13 RX ORDER — CLONAZEPAM 0.5 MG/1
1 TABLET ORAL 2 TIMES DAILY PRN
Status: DISCONTINUED | OUTPATIENT
Start: 2021-07-13 | End: 2021-07-14

## 2021-07-13 RX ORDER — NITROGLYCERIN 0.4 MG/1
0.4 TABLET SUBLINGUAL EVERY 5 MIN PRN
Status: DISCONTINUED | OUTPATIENT
Start: 2021-07-13 | End: 2021-07-18 | Stop reason: HOSPADM

## 2021-07-13 RX ORDER — HYDROMORPHONE HYDROCHLORIDE 1 MG/ML
0.5 INJECTION, SOLUTION INTRAMUSCULAR; INTRAVENOUS; SUBCUTANEOUS EVERY 4 HOURS PRN
Status: DISCONTINUED | OUTPATIENT
Start: 2021-07-13 | End: 2021-07-13

## 2021-07-13 RX ORDER — LANOLIN ALCOHOL/MO/W.PET/CERES
3 CREAM (GRAM) TOPICAL
Status: DISCONTINUED | OUTPATIENT
Start: 2021-07-13 | End: 2021-07-18 | Stop reason: HOSPADM

## 2021-07-13 RX ORDER — NALOXONE HYDROCHLORIDE 0.4 MG/ML
0.4 INJECTION, SOLUTION INTRAMUSCULAR; INTRAVENOUS; SUBCUTANEOUS
Status: DISCONTINUED | OUTPATIENT
Start: 2021-07-13 | End: 2021-07-18 | Stop reason: HOSPADM

## 2021-07-13 RX ORDER — CIPROFLOXACIN 2 MG/ML
400 INJECTION, SOLUTION INTRAVENOUS EVERY 24 HOURS
Status: DISCONTINUED | OUTPATIENT
Start: 2021-07-13 | End: 2021-07-14

## 2021-07-13 RX ORDER — PROCHLORPERAZINE 25 MG
12.5 SUPPOSITORY, RECTAL RECTAL EVERY 12 HOURS PRN
Status: DISCONTINUED | OUTPATIENT
Start: 2021-07-13 | End: 2021-07-14

## 2021-07-13 RX ORDER — LANOLIN ALCOHOL/MO/W.PET/CERES
3 CREAM (GRAM) TOPICAL
Status: DISCONTINUED | OUTPATIENT
Start: 2021-07-13 | End: 2021-07-13

## 2021-07-13 RX ORDER — BUDESONIDE AND FORMOTEROL FUMARATE DIHYDRATE 80; 4.5 UG/1; UG/1
2 AEROSOL RESPIRATORY (INHALATION) 2 TIMES DAILY
Status: DISCONTINUED | OUTPATIENT
Start: 2021-07-13 | End: 2021-07-18 | Stop reason: HOSPADM

## 2021-07-13 RX ORDER — PROCHLORPERAZINE MALEATE 5 MG
5 TABLET ORAL EVERY 6 HOURS PRN
Status: DISCONTINUED | OUTPATIENT
Start: 2021-07-13 | End: 2021-07-14

## 2021-07-13 RX ORDER — ONDANSETRON 4 MG/1
4 TABLET, ORALLY DISINTEGRATING ORAL EVERY 6 HOURS PRN
Status: DISCONTINUED | OUTPATIENT
Start: 2021-07-13 | End: 2021-07-18 | Stop reason: HOSPADM

## 2021-07-13 RX ORDER — CETIRIZINE HYDROCHLORIDE 10 MG/1
10 TABLET ORAL DAILY
Status: DISCONTINUED | OUTPATIENT
Start: 2021-07-13 | End: 2021-07-13

## 2021-07-13 RX ORDER — ALBUTEROL SULFATE 90 UG/1
1-2 AEROSOL, METERED RESPIRATORY (INHALATION) EVERY 4 HOURS PRN
Status: DISCONTINUED | OUTPATIENT
Start: 2021-07-13 | End: 2021-07-18 | Stop reason: HOSPADM

## 2021-07-13 RX ORDER — HYDROMORPHONE HYDROCHLORIDE 1 MG/ML
0.5 INJECTION, SOLUTION INTRAMUSCULAR; INTRAVENOUS; SUBCUTANEOUS ONCE
Status: COMPLETED | OUTPATIENT
Start: 2021-07-13 | End: 2021-07-13

## 2021-07-13 RX ORDER — LIDOCAINE 40 MG/G
CREAM TOPICAL
Status: DISCONTINUED | OUTPATIENT
Start: 2021-07-13 | End: 2021-07-18 | Stop reason: HOSPADM

## 2021-07-13 RX ORDER — BUPRENORPHINE 2 MG/1
2 TABLET SUBLINGUAL ONCE
Status: COMPLETED | OUTPATIENT
Start: 2021-07-13 | End: 2021-07-13

## 2021-07-13 RX ORDER — HYDROMORPHONE HYDROCHLORIDE 1 MG/ML
0.5 SOLUTION ORAL EVERY 6 HOURS PRN
Status: DISCONTINUED | OUTPATIENT
Start: 2021-07-13 | End: 2021-07-13

## 2021-07-13 RX ORDER — OLANZAPINE 10 MG/2ML
5 INJECTION, POWDER, FOR SOLUTION INTRAMUSCULAR DAILY PRN
Status: DISCONTINUED | OUTPATIENT
Start: 2021-07-13 | End: 2021-07-18 | Stop reason: HOSPADM

## 2021-07-13 RX ORDER — PROPRANOLOL HYDROCHLORIDE 10 MG/1
10 TABLET ORAL 3 TIMES DAILY PRN
Status: DISCONTINUED | OUTPATIENT
Start: 2021-07-13 | End: 2021-07-18 | Stop reason: HOSPADM

## 2021-07-13 RX ADMIN — HYDROMORPHONE HYDROCHLORIDE 0.5 MG: 1 INJECTION, SOLUTION INTRAMUSCULAR; INTRAVENOUS; SUBCUTANEOUS at 13:32

## 2021-07-13 RX ADMIN — POTASSIUM CHLORIDE 40 MEQ: 20 TABLET, EXTENDED RELEASE ORAL at 22:49

## 2021-07-13 RX ADMIN — BUPRENORPHINE 2 MG: 2 TABLET SUBLINGUAL at 21:14

## 2021-07-13 RX ADMIN — CLONAZEPAM 1 MG: 0.5 TABLET ORAL at 17:11

## 2021-07-13 RX ADMIN — POTASSIUM CHLORIDE 40 MEQ: 20 TABLET, EXTENDED RELEASE ORAL at 18:44

## 2021-07-13 RX ADMIN — ALBUTEROL SULFATE 2 PUFF: 90 AEROSOL, METERED RESPIRATORY (INHALATION) at 21:16

## 2021-07-13 RX ADMIN — BUDESONIDE AND FORMOTEROL FUMARATE DIHYDRATE 2 PUFF: 80; 4.5 AEROSOL RESPIRATORY (INHALATION) at 17:12

## 2021-07-13 RX ADMIN — VENLAFAXINE 37.5 MG: 37.5 TABLET ORAL at 17:11

## 2021-07-13 RX ADMIN — AMLODIPINE BESYLATE 5 MG: 5 TABLET ORAL at 17:12

## 2021-07-13 RX ADMIN — HYDRALAZINE HYDROCHLORIDE 10 MG: 20 INJECTION INTRAMUSCULAR; INTRAVENOUS at 18:44

## 2021-07-13 RX ADMIN — HUMAN ALBUMIN MICROSPHERES AND PERFLUTREN 2 ML: 10; .22 INJECTION, SOLUTION INTRAVENOUS at 14:45

## 2021-07-13 RX ADMIN — HYDROMORPHONE HYDROCHLORIDE 0.5 MG: 1 INJECTION, SOLUTION INTRAMUSCULAR; INTRAVENOUS; SUBCUTANEOUS at 23:06

## 2021-07-13 ASSESSMENT — ACTIVITIES OF DAILY LIVING (ADL)
TOILETING_ISSUES: NO
WHICH_OF_THE_ABOVE_FUNCTIONAL_RISKS_HAD_A_RECENT_ONSET_OR_CHANGE?: COGNITION
FALL_HISTORY_WITHIN_LAST_SIX_MONTHS: NO
WEAR_GLASSES_OR_BLIND: YES
DRESSING/BATHING_DIFFICULTY: NO
HEARING_DIFFICULTY_OR_DEAF: NO
DIFFICULTY_EATING/SWALLOWING: NO
WALKING_OR_CLIMBING_STAIRS_DIFFICULTY: YES
INTERPRETER_SERVICES_OFFERED_TO_THE_PATIENT: NO
DIFFICULTY_COMMUNICATING: YES
PATIENT_/_FAMILY_COMMUNICATION_STYLE: SPOKEN LANGUAGE (ENGLISH OR BILINGUAL)
CONCENTRATING,_REMEMBERING_OR_MAKING_DECISIONS_DIFFICULTY: YES

## 2021-07-13 NOTE — CONSULTS
Thank you, Dr. Reyna Nielson, for asking the [facility] team to see Jarrell TAD Nicole in consultation at [facility] to evaluate an elevated troponin level.    Assessment:  Principal Problem:    Encephalopathy  Active Problems:    Diverticulitis of large intestine without perforation or abscess without bleeding    Opioid dependence with intoxication delirium (H)    Malignant neoplasm of lower-outer quadrant of right breast of female, estrogen receptor positive (H)    Dehydration    Acute kidney injury (H)    Chronic pain due to neoplasm    Hypokalemia    Non-traumatic rhabdomyolysis    Pain medication agreement    Panic disorder without agoraphobia    Post traumatic stress disorder    Elevated troponin level not due to acute coronary syndrome      Plan:    1. Reasonable to continue amlodipine for hypertension.  It is not clear to me why she is on high-dose spironolactone.  2. The significant CPK elevation with minimal troponin elevation supports a diagnosis of rhabdomyolysis as noted by the hospital medicine team.  Given the unremarkable EKG and the normal echocardiogram I do not feel there is evidence of acute coronary syndrome.  Therefore, I recommend discontinuing the heparin infusion.  3. Initiate potassium and magnesium replacement, done.  Please note that her potassium was markedly depressed and she had U waves on her EKG.  4. I would not continue aspirin given the lack of evidence of atherosclerosis and her risk for bleeding complications from anticoagulation given her encephalopathy, history of mental health disorders, and drug abuse.    Current History:    The patient was brought to the emergency room after family found her in a confused state at her home.  She was said to be disheveled and there was urine and feces throughout the home.  She is felt to be encephalopathic per the evaluation of the emergency room and hospital medicine physicians.    Currently, Nia states  that she does not recall having having any chest, throat, jaw or arm discomfort and she has not been short of breath.    She is adopted and therefore does not know her previous family history.  She states her 4 children are in good health.    She has been diagnosed with breast cancer and is under the care of an unspecified oncologist.  She reports that the cancer has spread to her bones.    Her drug screen was positive for cannabinoids and opiates.    Past Medical History:  Past Medical History:   Diagnosis Date     Asthma      Breast cancer (H) 2021     Panic disorder without agoraphobia 2012     Post traumatic stress disorder 2010     Vitamin D deficiency 2010     No past medical history pertinent negatives.    Past Surgical History:  Past Surgical History:   Procedure Laterality Date     CT BIOPSY BONE  2021     US LYMPH NODE BIOPSY  2021       Family History:  History reviewed. No pertinent family history.    Social History:   reports that she quit smoking about 19 months ago. Her smoking use included cigarettes. She has a 15.00 pack-year smoking history. She has never used smokeless tobacco. She reports current alcohol use. She reports current drug use. Drug: Marijuana.  Social History     Socioeconomic History     Marital status:      Spouse name: Not on file     Number of children: Not on file     Years of education: Not on file     Highest education level: Not on file   Occupational History     Not on file   Tobacco Use     Smoking status: Former Smoker     Packs/day: 0.50     Years: 30.00     Pack years: 15.00     Types: Cigarettes     Quit date: 2019     Years since quittin.5     Smokeless tobacco: Never Used   Substance and Sexual Activity     Alcohol use: Yes     Drug use: Yes     Types: Marijuana     Sexual activity: Not on file   Other Topics Concern     Not on file   Social History Narrative     Not on file     Social Determinants of Health      Financial Resource Strain:      Difficulty of Paying Living Expenses:    Food Insecurity:      Worried About Running Out of Food in the Last Year:      Ran Out of Food in the Last Year:    Transportation Needs:      Lack of Transportation (Medical):      Lack of Transportation (Non-Medical):    Physical Activity:      Days of Exercise per Week:      Minutes of Exercise per Session:    Stress:      Feeling of Stress :    Social Connections:      Frequency of Communication with Friends and Family:      Frequency of Social Gatherings with Friends and Family:      Attends Judaism Services:      Active Member of Clubs or Organizations:      Attends Club or Organization Meetings:      Marital Status:    Intimate Partner Violence:      Fear of Current or Ex-Partner:      Emotionally Abused:      Physically Abused:      Sexually Abused:        Meds:    Current Facility-Administered Medications:      albuterol (PROAIR HFA/PROVENTIL HFA/VENTOLIN HFA) 108 (90 Base) MCG/ACT inhaler 1-2 puff, 1-2 puff, Inhalation, Q4H PRN, Bret Luque MD, MD     amLODIPine (NORVASC) tablet 5 mg, 5 mg, Oral, Daily, Bret Luque MD, MD, 5 mg at 07/13/21 1712     budesonide-formoterol (SYMBICORT) 80-4.5 MCG/ACT Inhaler 2 puff, 2 puff, Inhalation, BID, Bret Luque MD, MD, 2 puff at 07/13/21 1712     buprenorphine (SUBUTEX) sublingual tablet 2 mg, 2 mg, Sublingual, Once, Christina Padron NP     [Held by provider] cetirizine (zyrTEC) tablet 10 mg, 10 mg, Oral, Daily, Bret Luque MD, MD     ciprofloxacin (CIPRO) infusion 400 mg, 400 mg, Intravenous, Q24H, Bret Luque MD, MD     clonazePAM (klonoPIN) tablet 1 mg, 1 mg, Oral, BID PRN, Bret Luque MD, MD, 1 mg at 07/13/21 1711     heparin - BOLUS DOSE from infusion, 30 Units/kg, Intravenous, Once, Reyna Nielson MD     heparin 25,000 units in 0.45% NaCl 250 mL ANTICOAGULANT infusion, 0-5,000 Units/hr, Intravenous, Continuous, Bret Luque MD, MD, Last Rate:  9.5 mL/hr at 07/13/21 1620, 950 Units/hr at 07/13/21 1620     hydrALAZINE (APRESOLINE) injection 10 mg, 10 mg, Intravenous, Q4H PRN, Reyna Nielson MD     lidocaine (LMX4) kit, , Topical, Q1H PRN, Bret Luque MD, MD     lidocaine 1 % 0.1-1 mL, 0.1-1 mL, Other, Q1H PRN, Bret Luque MD, MD     melatonin tablet 3 mg, 3 mg, Oral, At Bedtime PRN, Reyna Nielson MD     metroNIDAZOLE (FLAGYL) infusion 500 mg, 500 mg, Intravenous, Q12H, Bret Luque MD, MD     naloxone (NARCAN) injection 0.2 mg, 0.2 mg, Intravenous, Q2 Min PRN **OR** naloxone (NARCAN) injection 0.4 mg, 0.4 mg, Intravenous, Q2 Min PRN **OR** naloxone (NARCAN) injection 0.2 mg, 0.2 mg, Intramuscular, Q2 Min PRN **OR** naloxone (NARCAN) injection 0.4 mg, 0.4 mg, Intramuscular, Q2 Min PRN, Bret Luque MD, MD     nitroGLYcerin (NITROSTAT) sublingual tablet 0.4 mg, 0.4 mg, Sublingual, Q5 Min PRN, Reyna Nielson MD     OLANZapine (zyPREXA) injection 5 mg, 5 mg, Intramuscular, Daily PRN, Reyna Nielson MD     ondansetron (ZOFRAN-ODT) ODT tab 4 mg, 4 mg, Oral, Q6H PRN **OR** ondansetron (ZOFRAN) injection 4 mg, 4 mg, Intravenous, Q6H PRN, Bret Luque MD, MD     Patient is already receiving anticoagulation with heparin, enoxaparin (LOVENOX), warfarin (COUMADIN)  or other anticoagulant medication, , Does not apply, Continuous PRN, Bret Luque MD, MD     polyethylene glycol (MIRALAX) powder 17 g, 17 g, Oral, Daily PRN, Bret Luque MD, MD     prochlorperazine (COMPAZINE) injection 5 mg, 5 mg, Intravenous, Q6H PRN **OR** prochlorperazine (COMPAZINE) tablet 5 mg, 5 mg, Oral, Q6H PRN **OR** prochlorperazine (COMPAZINE) suppository 12.5 mg, 12.5 mg, Rectal, Q12H PRN, Bret Luque MD, MD     propranolol (INDERAL) tablet 10 mg, 10 mg, Oral, TID PRN, Bret Luque MD, MD     sodium chloride (PF) 0.9% PF flush 3 mL, 3 mL, Intracatheter, Q8H, Bret Luque MD, MD     sodium chloride (PF) 0.9% PF flush 3 mL, 3 mL,  Intracatheter, q1 min prn, Brian Hernandez MD, MD     sodium chloride 0.9% infusion, , Intravenous, Continuous, Reyna Nielson MD     venlafaxine (EFFEXOR) tablet 37.5 mg, 37.5 mg, Oral, QAM, Brian Hernandez MD, MD, 37.5 mg at 21 1711    Allergies:  Codeine, Iodinated contrast media [diagnostic x-ray materials], Penicillins, and Tylenol [acetaminophen]    Review of Systems:  A 12 point comprehensive review of systems was negative except as noted in the current history.    Objective:    Vitals:    21 2145 21 0953   Weight: 68 kg (150 lb) 63.8 kg (140 lb 11.2 oz)      Weight change:    Body mass index is 24.15 kg/m .  BP (!) 199/91 (BP Location: Left arm)   Pulse 57   Temp 97.7  F (36.5  C) (Oral)   Resp 18   Wt 63.8 kg (140 lb 11.2 oz)   SpO2 99%   BMI 24.15 kg/m      Physical Exam:    The physical examination was interrupted by the patient's need to use the portable toilet.  I will return to reexamine the patient tomorrow.    General Appearance: Alert and not in distress   Chest: The spine is straight and the chest is symmetric   Skin: No xanthelasma   Neurologic: Mood and affect are appropriate; she has a poor recall of her medical history, for example, she cannot tell me the name or office address of her oncologist       Cardiographics:    EKG: Sinus rhythm with nonspecific ST segment abnormality and prominent U wave consistent with hypokalemia per my personal review.    Telemetry monitoring demonstrates sinus rhythm per my personal review.    Imaging:    Echocardiogram Complete    Result Date: 2021  [id number] [address]  Name: DANI HARRISON MRN: [id number] : 1955 Study Date: 2021 02:25 PM Age: 66 yrs Gender: Female Patient Location: [facility] Reason For Study: CAD Ordering Physician: BRIAN HERNANDEZ Referring Physician: SUN, XIAOGUANG Performed By: Leeanne Black  BSA: 1.7 m2 Height:  64 in Weight: 140 lb HR: 74 ______________________________________________________________________________ ______________________________________________________________________________ Interpretation Summary  1. Normal left ventricular size and systolic performance with a visually estimated ejection fraction of 60-65%. 2. No significant valvular heart disease is identified on this study. 3. Normal right ventricular size and systolic performance. ______________________________________________________________________________ Left ventricle: Normal left ventricular size and systolic performance with a visually estimated ejection fraction of 60-65%. There is normal regional wall motion. Left ventricular wall thickness is normal.  Assessment of LV Diastolic Function: The cumulative findings suggest normal diastolic filling [The septal e' velocity is > 7 cm/s & lateral e' velocity is > 10 cm/s. The average E/e' is < 14. The TR velocity cannot be determined due to insufficient tricuspid insufficiency signal. Left atrial volume index is greater than 34 mL/mÂ ].  Right ventricle: Normal right ventricular size and systolic performance.  Left atrium: The left atrium is of normal size.  Right atrium: The right atrium is of normal size.  IVC: The IVC is of normal caliber.  Aortic valve: The aortic valve is not well visualized, but suspected to be comprised of three cusps. No significant aortic stenosis or aortic insufficiency is detected on this study.  Mitral valve: The mitral valve appears morphologically normal. There is mild mitral insufficiency.  Tricuspid valve: The tricuspid valve is grossly morphologically normal. There is trace tricuspid insufficiency.  Pulmonic valve: The pulmonic valve is grossly morphologically normal.  Thoracic aorta: The aortic root and proximal ascending aorta are of normal dimension.  Pericardium: There is no significant pericardial effusion.   ______________________________________________________________________________ ______________________________________________________________________________ MMode/2D Measurements & Calculations IVSd: 0.96 cm LVIDd: 4.3 cm LVIDs: 2.8 cm LVPWd: 0.90 cm FS: 34.8 % LV mass(C)d: 125.8 grams LV mass(C)dI: 74.8 grams/m2 Ao root diam: 2.7 cm LA dimension: 3.1 cm asc Aorta Diam: 3.1 cm LA/Ao: 1.1 LVOT diam: 2.0 cm LVOT area: 3.1 cm2 LA Volume Indexed (AL/bp): 24.3 ml/m2  Time Measurements MM HR: 73.0 BPM  Doppler Measurements & Calculations MV E max aram: 60.0 cm/sec MV A max aram: 83.9 cm/sec MV E/A: 0.72 MV dec slope: 200.0 cm/sec2 MV dec time: 0.30 sec Ao V2 max: 142.0 cm/sec Ao max P.0 mmHg Ao V2 mean: 91.9 cm/sec Ao mean P.0 mmHg Ao V2 VTI: 27.1 cm ANITA(I,D): 3.0 cm2 ANITA(V,D): 2.9 cm2 LV V1 max P.7 mmHg LV V1 max: 129.0 cm/sec LV V1 VTI: 26.1 cm SV(LVOT): 82.0 ml SI(LVOT): 48.8 ml/m2 PA acc time: 0.13 sec ANITA Index (cm2/m2): 1.8 E/E' av.6 Lateral E/e': 5.8 Medial E/e': 7.5  ______________________________________________________________________________ Report approved by: Kaia Doyle 2021 04:04 PM       XR Chest Port 1 View    Result Date: 2021  EXAM: XR CHEST PORT 1 VIEW LOCATION: [facility] DATE/TIME: 2021 8:14 PM INDICATION: sob COMPARISON: Chest radiograph dated 2021     IMPRESSION: Negative chest.    CT Abdomen Pelvis w/o Contrast    Result Date: 2021  EXAM: CT ABDOMEN PELVIS W/O CONTRAST LOCATION: [facility] DATE/TIME: 2021 10:13 PM INDICATION: Abdominal pain. COMPARISON: 3/21/2021 TECHNIQUE: CT scan of the abdomen and pelvis was performed without IV contrast. Multiplanar reformats were obtained. Dose reduction techniques were used. CONTRAST: None. FINDINGS: LOWER CHEST: Normal. HEPATOBILIARY: Cholecystectomy. PANCREAS: Normal. SPLEEN: Normal. ADRENAL GLANDS: Normal. KIDNEYS/BLADDER: Calcifications near renal phill. The vascular and  unchanged. No hydronephrosis. BOWEL: Diverticulosis of the colon. There is very mild pericolonic soft tissue stranding adjacent to the low sigmoid colon that appears new suggests mild acute diverticulitis. There is no abscess, free perforation or obstruction. LYMPH NODES: Normal. VASCULATURE: Moderate atherosclerotic plaque. PELVIC ORGANS: Hysterectomy. No adnexal lesions. MUSCULOSKELETAL: Mixed sclerotic bone lesions most pronounced within the L4 metastases.     IMPRESSION: 1.  Minimal new soft tissue stranding about the distal sigmoid colon consistent with mild diverticulitis. 2.  Sclerotic bony metastases unchanged.     CT Head w/o Contrast    Result Date: 7/12/2021  EXAM: CT HEAD W/O CONTRAST LOCATION: [facility] DATE/TIME: 7/12/2021 10:13 PM INDICATION: Confusion COMPARISON: 03/21/2021 TECHNIQUE: Routine CT Head without IV contrast. Multiplanar reformats. Dose reduction techniques were used. FINDINGS: INTRACRANIAL CONTENTS: No intracranial hemorrhage, extraaxial collection, or mass effect.  No CT evidence of acute infarct. Normal parenchymal attenuation. Normal ventricles and sulci. VISUALIZED ORBITS/SINUSES/MASTOIDS: No intraorbital abnormality. No paranasal sinus mucosal disease. No middle ear or mastoid effusion. BONES/SOFT TISSUES: No acute abnormality.     IMPRESSION: 1.  Stable exam. No acute intracranial abnormality.       Lab Review:    Troponin I (ng/mL)   Date Value   07/13/2021 0.66 (HH)   07/12/2021 1.42 (HH)   03/21/2021 0.02     BNP (pg/mL)   Date Value   07/13/2021 364 (H)   02/22/2021 39     Hemoglobin (g/dL)   Date Value   07/12/2021 12.9   03/21/2021 12.2   02/22/2021 11.0 (L)     INR (no units)   Date Value   03/21/2021 1.05       Recent Results (from the past 24 hour(s))   SARS-COV2 (COVID-19) Virus RT-PCR    Collection Time: 07/12/21  7:44 PM    Specimen: Nasopharyngeal; Swab   Result Value Ref Range    SARS CoV2 PCR Negative Negative   ECG 12-LEAD WITH MUSE ([facility])     Collection Time: 07/12/21  7:46 PM   Result Value Ref Range    Systolic Blood Pressure  mmHg    Diastolic Blood Pressure  mmHg    Ventricular Rate 94 BPM    Atrial Rate 94 BPM    AL Interval 86 ms    QRS Duration 66 ms     ms    QTc 470 ms    P Axis  degrees    R AXIS -23 degrees    T Axis 30 degrees    Interpretation ECG Click View Image link to view waveform and result    Comprehensive metabolic panel    Collection Time: 07/12/21  7:59 PM   Result Value Ref Range    Sodium 143 136 - 145 mmol/L    Potassium 3.7 3.5 - 5.0 mmol/L    Chloride 106 98 - 107 mmol/L    Carbon Dioxide (CO2) 17 (L) 22 - 31 mmol/L    Anion Gap 20 (H) 5 - 18 mmol/L    Urea Nitrogen 39 (H) 8 - 22 mg/dL    Creatinine 2.45 (H) 0.60 - 1.10 mg/dL    Calcium 9.5 8.5 - 10.5 mg/dL    Glucose 132 (H) 70 - 125 mg/dL    Alkaline Phosphatase 160 (H) 45 - 120 U/L    AST 31 0 - 40 U/L    ALT 21 0 - 45 U/L    Protein Total 8.0 6.0 - 8.0 g/dL    Albumin 4.4 3.5 - 5.0 g/dL    Bilirubin Total 0.7 0.0 - 1.0 mg/dL    GFR Estimate 20 (L) >60 mL/min/1.73m2   Troponin I    Collection Time: 07/12/21  7:59 PM   Result Value Ref Range    Troponin I 1.42 (HH) 0.00 - 0.29 ng/mL   Lipase    Collection Time: 07/12/21  7:59 PM   Result Value Ref Range    Lipase <9 0 - 52 U/L   Magnesium    Collection Time: 07/12/21  7:59 PM   Result Value Ref Range    Magnesium 2.0 1.8 - 2.6 mg/dL   Ammonia    Collection Time: 07/12/21  7:59 PM   Result Value Ref Range    Ammonia 32 11 - 35 umol/L   TSH with free T4 reflex    Collection Time: 07/12/21  7:59 PM   Result Value Ref Range    TSH 0.96 0.30 - 5.00 uIU/mL   Ethyl Alcohol Level    Collection Time: 07/12/21  7:59 PM   Result Value Ref Range    Alcohol, Blood <10 None detected mg/dL   Acetaminophen level    Collection Time: 07/12/21  7:59 PM   Result Value Ref Range    Acetaminophen <3.0 (L) 10.0 - 20.0 ug/mL   Salicylate level    Collection Time: 07/12/21  7:59 PM   Result Value Ref Range    Salicylate <8 2 - 25 mg/dL    Lactic acid whole blood    Collection Time: 07/12/21  7:59 PM   Result Value Ref Range    Lactic Acid 5.0 (HH) 0.7 - 2.0 mmol/L   CBC with platelets and differential    Collection Time: 07/12/21  7:59 PM   Result Value Ref Range    WBC Count 8.7 4.0 - 11.0 10e3/uL    RBC Count 3.90 3.80 - 5.20 10e6/uL    Hemoglobin 12.9 11.7 - 15.7 g/dL    Hematocrit 37.4 35.0 - 47.0 %    MCV 96 78 - 100 fL    MCH 33.1 (H) 26.5 - 33.0 pg    MCHC 34.5 31.5 - 36.5 g/dL    RDW 20.5 (H) 10.0 - 15.0 %    Platelet Count 565 (H) 150 - 450 10e3/uL   Manual Differential    Collection Time: 07/12/21  7:59 PM   Result Value Ref Range    % Neutrophils 81 %    % Lymphocytes 17 %    % Monocytes 2 %    % Eosinophils 0 %    % Basophils 0 %    Absolute Neutrophils 7.0 1.6 - 8.3 10e3/uL    Absolute Lymphocytes 1.5 0.8 - 5.3 10e3/uL    Absolute Monocytes 0.2 0.0 - 1.3 10e3/uL    Absolute Eosinophils 0.0 0.0 - 0.7 10e3/uL    Absolute Basophils 0.0 0.0 - 0.2 10e3/uL    RBC Morphology Confirmed RBC Indices     Platelet Assessment  Automated Count Confirmed. Platelet morphology is normal.     Automated Count Confirmed. Platelet morphology is normal.   CK total    Collection Time: 07/12/21  7:59 PM   Result Value Ref Range     (H) 30 - 190 U/L   UA with Microscopic reflex to Culture    Collection Time: 07/12/21  8:06 PM    Specimen: Urine, Catheter   Result Value Ref Range    Color Urine Yellow Colorless, Straw, Light Yellow, Yellow    Appearance Urine Slightly Cloudy (A) Clear    Glucose Urine 30  (A) Negative mg/dL    Bilirubin Urine Negative Negative    Ketones Urine Trace (A) Negative mg/dL    Specific Gravity Urine 1.025 1.001 - 1.030    Blood Urine 0.2 mg/dL (A) Negative    pH Urine 5.5 5.0 - 7.0    Protein Albumin Urine 100  (A) Negative mg/dL    Urobilinogen Urine <2.0 <2.0 mg/dL    Nitrite Urine Negative Negative    Leukocyte Esterase Urine Negative Negative    Bacteria Urine Few (A) None Seen /HPF    Mucus Urine Present (A) None Seen /LPF     RBC Urine 4 (H) <=2 /HPF    WBC Urine 7 (H) <=5 /HPF    Hyaline Casts Urine 163 (H) <=2 /LPF   ECG 12-LEAD WITH MUSE ([facility])    Collection Time: 07/12/21 10:52 PM   Result Value Ref Range    Systolic Blood Pressure  mmHg    Diastolic Blood Pressure  mmHg    Ventricular Rate 88 BPM    Atrial Rate 89 BPM    WI Interval 132 ms    QRS Duration 68 ms     ms    QTc 476 ms    P Axis 69 degrees    R AXIS -40 degrees    T Axis 48 degrees    Interpretation ECG Click View Image link to view waveform and result    Drugs of Abuse 1+ Panel, Urine ([facility] Only)    Collection Time: 07/12/21 11:06 PM   Result Value Ref Range    Amphetamines Urine Screen Negative Screen Negative    Benzodiazepines Urine Screen Negative Screen Negative    Opiates Urine Screen Positive (A) Screen Negative    PCP Urine Screen Negative Screen Negative    Cannabinoids Urine Screen Positive (A) Screen Negative    Barbiturates Urine Screen Negative Screen Negative    Cocaine Urine Screen Negative Screen Negative    Methadone Urine Screen Negative Screen Negative    Oxycodone Urine Screen Negative Screen Negative    Creatinine Urine mg/dL 323 mg/dL   Heparin Unfractionated Anti Xa Level    Collection Time: 07/13/21  6:58 AM   Result Value Ref Range    Anti Xa Unfractionated Heparin 0.29 For Reference Range, See Comment IU/mL   Troponin I    Collection Time: 07/13/21 10:33 AM   Result Value Ref Range    Troponin I 0.66 (HH) 0.00 - 0.29 ng/mL   Comprehensive metabolic panel    Collection Time: 07/13/21 10:33 AM   Result Value Ref Range    Sodium 140 136 - 145 mmol/L    Potassium 2.9 (L) 3.5 - 5.0 mmol/L    Chloride 109 (H) 98 - 107 mmol/L    Carbon Dioxide (CO2) 18 (L) 22 - 31 mmol/L    Anion Gap 13 5 - 18 mmol/L    Urea Nitrogen 37 (H) 8 - 22 mg/dL    Creatinine 1.16 (H) 0.60 - 1.10 mg/dL    Calcium 8.1 (L) 8.5 - 10.5 mg/dL    Glucose 143 (H) 70 - 125 mg/dL    Alkaline Phosphatase 119 45 - 120 U/L    AST 31 0 - 40 U/L    ALT 20 0 - 45 U/L    Protein  Total 6.8 6.0 - 8.0 g/dL    Albumin 3.9 3.5 - 5.0 g/dL    Bilirubin Total 0.6 0.0 - 1.0 mg/dL    GFR Estimate 50 (L) >60 mL/min/1.73m2   CK total    Collection Time: 07/13/21 10:33 AM   Result Value Ref Range     (H) 30 - 190 U/L   Procalcitonin    Collection Time: 07/13/21 10:33 AM   Result Value Ref Range    Procalcitonin 0.06 0.00 - 0.49 ng/mL   Lactic acid whole blood    Collection Time: 07/13/21 10:33 AM   Result Value Ref Range    Lactic Acid 1.4 0.7 - 2.0 mmol/L   B-Type Natriuretic Peptide ([facility] Only)    Collection Time: 07/13/21 10:34 AM   Result Value Ref Range     (H) 0 - 106 pg/mL   ECG 12-LEAD WITH MUSE ([facility])    Collection Time: 07/13/21 11:32 AM   Result Value Ref Range    Systolic Blood Pressure  mmHg    Diastolic Blood Pressure  mmHg    Ventricular Rate 72 BPM    Atrial Rate 72 BPM    SC Interval 134 ms    QRS Duration 70 ms     ms    QTc 493 ms    P Axis 71 degrees    R AXIS -23 degrees    T Axis 12 degrees    Interpretation ECG Click View Image link to view waveform and result    Heparin Unfractionated Anti Xa Level    Collection Time: 07/13/21  2:54 PM   Result Value Ref Range    Anti Xa Unfractionated Heparin 0.24 For Reference Range, See Comment IU/mL           Much or all of the text in this note was generated through the use of the Dragon Dictate voice-to-text software. Errors in spelling or words which seem out of context are unintentional. Sound alike errors, in particular, may have escaped editing.

## 2021-07-13 NOTE — PHARMACY
Pharmacy Note - Admission Medication History    Pertinent Provider Information: Medication information was obtained from family and patient's preferred pharmacy as patient was quite confused while in the emergency department and registration process has not been completed. Last doses unknown. Recommend review of medications with patient at a later time if able.     ______________________________________________________________________    Prior To Admission (PTA) med list completed and updated in EMR.       Prior to Admission Medications   Prescriptions Last Dose Informant Patient Reported? Taking?   B,C/folic/zinc/copper ox/vit E (STRESS B-COMPLEX ORAL)   Yes Yes   Sig: [B,C/FOLIC/ZINC/COPPER OX/VIT E (STRESS B-COMPLEX ORAL)] Take 1 tablet by mouth daily. Stress supplement   HYDROmorphone (DILAUDID) 2 MG tablet   No Yes   Sig: [HYDROMORPHONE (DILAUDID) 2 MG TABLET] 0.5 tabs PO q6hr PRN pain. Do not drive.  Do not mix wih alcohol.   Patient taking differently: Take 0.5 mg by mouth every 6 hours as needed for pain    SYMBICORT 80-4.5 mcg/actuation inhaler   Yes Yes   Sig: Inhale 2 puffs into the lungs 2 times daily    albuterol (PROAIR HFA;PROVENTIL HFA;VENTOLIN HFA) 90 mcg/actuation inhaler   Yes Yes   Sig: Inhale 1-2 puffs into the lungs every 4 hours as needed for shortness of breath / dyspnea or wheezing    amLODIPine (NORVASC) 5 MG tablet   Yes Yes   Sig: [AMLODIPINE (NORVASC) 5 MG TABLET] Take 5 mg by mouth daily.   ascorbic acid, vitamin C, (VITAMIN C) 1000 MG tablet   Yes Yes   Sig: [ASCORBIC ACID, VITAMIN C, (VITAMIN C) 1000 MG TABLET] Take 1,000 mg by mouth daily.   cetirizine (ZYRTEC) 10 MG tablet   Yes Yes   Sig: [CETIRIZINE (ZYRTEC) 10 MG TABLET] Take 10 mg by mouth daily.   cholecalciferol, vitamin D3, 125 mcg (5,000 unit) capsule   Yes Yes   Sig: [CHOLECALCIFEROL, VITAMIN D3, 125 MCG (5,000 UNIT) CAPSULE] Take 5,000 Units by mouth daily.   clonazePAM (KLONOPIN) 1 MG tablet   Yes Yes   Sig: Take 1 mg by  mouth 2 times daily as needed for anxiety    cyclobenzaprine (FLEXERIL) 10 MG tablet   Yes Yes   Sig: Take 10 mg by mouth 3 times daily as needed for muscle spasms    ferrous sulfate (FEROSUL) 325 (65 Fe) MG tablet   Yes Yes   Sig: Take 325 mg by mouth daily (with breakfast)   fluticasone propionate (FLONASE) 50 mcg/actuation nasal spray   Yes Yes   Sig: [FLUTICASONE PROPIONATE (FLONASE) 50 MCG/ACTUATION NASAL SPRAY] 1 spray into each nostril daily.    ibuprofen (ADVIL,MOTRIN) 200 MG tablet   Yes Yes   Sig: Take 200 mg by mouth every 6 hours as needed for pain    melatonin 10 mg Tab   Yes Yes   Sig: Take 10 mg by mouth nightly as needed for sleep    polyethylene glycol (GLYCOLAX) 17 gram/dose powder   Yes Yes   Sig: Take 17 g by mouth daily as needed for constipation    propranoloL (INDERAL) 10 MG tablet   Yes Yes   Sig: Take 10 mg by mouth 3 times daily as needed (anxiety or palpitations)    spironolactone (ALDACTONE) 100 MG tablet   Yes Yes   Sig: [SPIRONOLACTONE (ALDACTONE) 100 MG TABLET] Take 100 mg by mouth daily.   venlafaxine (EFFEXOR) 37.5 MG tablet   Yes Yes   Sig: [VENLAFAXINE (EFFEXOR) 37.5 MG TABLET] Take 37.5 mg by mouth every morning.   vitamin E 100 UNIT capsule   Yes Yes   Sig: [VITAMIN E 100 UNIT CAPSULE] Take 500 Units by mouth 2 (two) times a week.   zaleplon (SONATA) 5 MG capsule   Yes Yes   Sig: Take 5 mg by mouth nightly as needed for sleep       Facility-Administered Medications: None       Information source(s): Family member and Patient's pharmacy  Method of interview communication: phone    Summary of Changes to PTA Med List  New: iron  Discontinued: celecoxib  Changed: none    Patient was asked about OTC/herbal products specifically.  PTA med list reflects this.    In the past week, patient estimated taking medication this percent of the time: unknown    Allergies were reviewed, assessed, and updated with the patient.      Patient did not bring any medications to the hospital and can't  retrieve from home. No multi-dose medications are available for use during hospital stay.     The information provided in this note is only as accurate as the sources available at the time of the update(s).    Thank you for the opportunity to participate in the care of this patient.    Kiley Jones JADEN  7/12/2021 9:46 PM

## 2021-07-13 NOTE — ED TRIAGE NOTES
"Patient presents via [facility], they were called by family because they could not reach pt for \"several days\". Family member arrived, house smelled strongly of urine and pt was moving around house without purpose, not redirectable.  Pt has multiple, various c/o's upon admit to ED.  Is oriented to self only.  "

## 2021-07-13 NOTE — PHARMACY-CONSULT NOTE
Fall Risk Consult with Recommendations    Meds evaluated for increasing risk of falls in this patient.  At this time, the patient is receiving the following mediations that may increase the patient's risk for falls hydromorphone.   hydromorphone dose adjusted.    Will continue to monitor as new medications are added.      Felicia Tolbert RPH  7/13/2021 1:56 PM

## 2021-07-13 NOTE — ED NOTES
Bed: JNED-10  Expected date: 7/12/21  Expected time:   Means of arrival: Ambulance  Comments:  66 F  Sera MART

## 2021-07-13 NOTE — TREATMENT PLAN
Called Lab regarding 1300 antix-a. Lab states they are short staffed and unable to draw  Lab. They will get to it as soon as possible

## 2021-07-13 NOTE — ED NOTES
Assisted pt. Up and to the commode. Pt had a bm. Changed her gown and depends and assisted her to clean herself up and to get back into bed. Pt. Seems somewhat confused. Not following all commands.

## 2021-07-13 NOTE — PROGRESS NOTES
[facility] ACUTE PAIN SERVICE    ([facility], [facility], [facility])   Consult Note    Date of Admission:  7/12/2021  Date of Consult: 07/13/21  Physician requesting consult: Dr. Nielson   Reason for consult: chronic pain, opioid dependence, unintentional overdose.      Assessment/Plan:     Jarrell Nicole is a 66 year old female who was admitted on 7/12/2021.   . I was asked to see the patient for chronic pain, opioid dependence, unintentional overdose last week.. Admitted for Acute encephalopathy, toxic metabolic, due to unintentional overdose of opiates, benzodiazepines, in the setting of infection/acute diverticulitis and RASHAWN . History of primary cancer of right breast, asthma, HTN, increased lipids. Pain began initially with breast cancer diagnosis, but patient is having a very difficult time with pain history discussion. The patient does no longer smoke and denies chemical dependency history.   Patient has intolerances to acetaminophen and codeine, per charting.   Is current on MS Contin and Dilaudid at home for pain from cancer. Has previously tried oxycodone. Utilizes gabapentin and clonazepam regularly.   Consult for psychiatry and palliative care too.     Per hospitalist note: Cannabis use.  Positive UDS on 7/13.  Patient was counseled on refraining from using cannabis, while taking prescription all opiates/benzos.    Patient seen laying in bed. Does not initially appear confused, but upon further questioning, has a very difficult time answering question. She does not remember that she is on Dilaudid. She initially tells me that she does NOT want to be on morphine, and two minutes later tells me that she needs her morphine and nothing else will help.   1:1 sitter present during interview.    Patient not sedated. Reports pain is 'getting up there'. Would recommend NOT restarting at MS contin at this time. Crcl currently 48. Unsure if patient is appropriate for long acting opioid,  specifically long actin morphine, or if it could possibly be accumulating chronically. Re: Dilaudid: med rec says she utilizes 1mg at a time, though chart review shows that she utilizes 2mg. Will keep at 1mg for now, and assess patient's mental status and physical symptoms after multiple doses.     Patient would like to restart gabapentin, says this is for hot flashes from menopause at 37 years old   shows that patient utilzes gabapentin 300mg BID, last filled 6/29/21, can see that she picked this up from pharmacy. However, gabapentin not on hospital admission med rec. Patient at first tells me the above (would like to restart), then in the next sentence tells me 'no I don't take that anymore'. Will hold off on this for now due to confusion (both by her and in what she has been taking).     Will start Subutex as pain management option for patient with history of overdose and chem dep history. Will give one dose today, at bedtime, since patient given IV Dilaudid this afternoon. Will ask chem dep to see her.     NO OPIOIDS to be started (other than one dose of Subutex ordered for tonight.)    PLAN:   1) Pain is consistent with chronic oncology pain. The patient's home MME was 108 mg daily.  2)Multimodal Medication Therapy  Topical: none, patient not interested in trying at this point  NSAID'S: Crcl = 48ml/min, none  Muscle Relaxants: none, does not describe any spasms.  Adjuvants: would like to start scheduled apap but I do see remote history of intolerance, but also does appear patient has received recently without adverse effect. Patient unable to give me more history.  Antidepressants/anxiolytics:Effexor  375mg qam  Opioids: Dilaudid oral 1mg q4hprn for  severe pain: discontinue  Initiate Subutex 2mg at bedtime tonight. PMT will reassess tomorrow and re-dose.    IV Pain medication: discontinue  3)Non-medication interventions  Pharmacy consult- appreciate recommendations   Acupuncture consult- as available Mon  and Friday     Integrative consult - called referral to [phone]   4)Constipation Prophylaxis  5) Follow up   -Opioid prescriber has been Nallely De Los Santos  -Discharge Recommendations - We recommend prescribing the following at the time of discharge: TBD  6/30 MSER 30mg 60 fo r30 days  6/29 gabapentin  6/29 Dilaudid 2mg 120 for 20 days   Chronic clonazepam         History of Present Illness (HPI):       Jarrell Nicole is a 66 year old old female .  The pain is reported to be acute on, chronic, located in the abdomen, and does not radiate.   Per [place]  review. The patient has a  opioid tolerance.  Review of medical record/Summary of labs and care everywhere. Looked at clinic note from 4/14. This indicated that  Radha Oakley MD   .    Past pain treatments have included pain clinic: no,      [place]  pulled from system on 07/13/21. Last refill on 6/30. This indicated chronic opioid use. 7* total number of prescribing providers noted. Most common prescriber isNallely De Los Santos MD.       Medical History  Patient Active Problem List    Diagnosis Date Noted     Chronic pain due to neoplasm 07/13/2021     Priority: Medium     Opioid dependence with intoxication delirium (H) 07/13/2021     Priority: Medium     Asthma 07/12/2021     Priority: Medium     Dehydration 07/12/2021     Priority: Medium     Encephalopathy 07/12/2021     Priority: Medium     Elevated troponin 07/12/2021     Priority: Medium     Acute kidney injury (H) 07/12/2021     Priority: Medium     Primary cancer of right breast (H) 03/05/2021     Priority: Medium     Malignant neoplasm of lower-outer quadrant of right breast of female, estrogen receptor positive (H) 03/03/2021     Priority: Medium     Breast mass      Priority: Medium     Axillary lymphadenopathy      Priority: Medium     Combined forms of age-related cataract of right eye 04/25/2018     Priority: Medium     Diverticulitis of large intestine without perforation or abscess without bleeding  10/27/2016     Priority: Medium     Hyperlipidemia 12/22/2014     Priority: Medium     Pain medication agreement 03/18/2014     Priority: Medium     Formatting of this note might be different from the original.  Discussed on 3/18/2014. Percocet 5-325 mg tablets - 60 tablets to last for 30 days. For severe right ankle pain.  Follows with Dr. Ermias regan. Irais Steele MD       Episodic mood disorder (H) 01/12/2012     Priority: Medium     Panic disorder without agoraphobia 01/12/2012     Priority: Medium     Backache 01/02/2011     Priority: Medium     Post traumatic stress disorder 12/30/2010     Priority: Medium     Hypertension 12/29/2010     Priority: Medium     Formatting of this note might be different from the original.  Post drug overdose       Vitamin D deficiency 03/06/2010     Priority: Medium     Routine general medical examination at a health care facility 06/24/2009     Priority: Medium     Formatting of this note might be different from the original.  Per admission screen, patient received appropriate Health Care Directive information and prefers to complete that document independently.  No follow up by HCD Specialist unless requested.          Surgical History  She  has a past surgical history that includes Us Lymph Node Biopsy (2/24/2021) and Ct Biopsy Bone (4/1/2021).     Past Surgical History:   Procedure Laterality Date     CT BIOPSY BONE  4/1/2021     US LYMPH NODE BIOPSY  2/24/2021       Allergies  Allergies   Allergen Reactions     Codeine Hives     Iodinated Contrast Media [Diagnostic X-Ray Materials] Unknown     Heart stopped and sick for multiple days after     Penicillins Swelling     Throat swelling as an infant     Tylenol [Acetaminophen] Other (See Comments)     Pt states gives her upset stomach for days.       Prior to Admission Medications   Medications Prior to Admission   Medication Sig Dispense Refill Last Dose     albuterol (PROAIR HFA;PROVENTIL HFA;VENTOLIN HFA) 90  mcg/actuation inhaler Inhale 1-2 puffs into the lungs every 4 hours as needed for shortness of breath / dyspnea or wheezing         amLODIPine (NORVASC) 5 MG tablet [AMLODIPINE (NORVASC) 5 MG TABLET] Take 5 mg by mouth daily.        ascorbic acid, vitamin C, (VITAMIN C) 1000 MG tablet [ASCORBIC ACID, VITAMIN C, (VITAMIN C) 1000 MG TABLET] Take 1,000 mg by mouth daily.        B,C/folic/zinc/copper ox/vit E (STRESS B-COMPLEX ORAL) [B,C/FOLIC/ZINC/COPPER OX/VIT E (STRESS B-COMPLEX ORAL)] Take 1 tablet by mouth daily. Stress supplement        cetirizine (ZYRTEC) 10 MG tablet [CETIRIZINE (ZYRTEC) 10 MG TABLET] Take 10 mg by mouth daily.        cholecalciferol, vitamin D3, 125 mcg (5,000 unit) capsule [CHOLECALCIFEROL, VITAMIN D3, 125 MCG (5,000 UNIT) CAPSULE] Take 5,000 Units by mouth daily.        clonazePAM (KLONOPIN) 1 MG tablet Take 1 mg by mouth 2 times daily as needed for anxiety         cyclobenzaprine (FLEXERIL) 10 MG tablet Take 10 mg by mouth 3 times daily as needed for muscle spasms         ferrous sulfate (FEROSUL) 325 (65 Fe) MG tablet Take 325 mg by mouth daily (with breakfast)        fluticasone propionate (FLONASE) 50 mcg/actuation nasal spray [FLUTICASONE PROPIONATE (FLONASE) 50 MCG/ACTUATION NASAL SPRAY] 1 spray into each nostril daily.         HYDROmorphone (DILAUDID) 2 MG tablet [HYDROMORPHONE (DILAUDID) 2 MG TABLET] 0.5 tabs PO q6hr PRN pain. Do not drive.  Do not mix wih alcohol. (Patient taking differently: Take 0.5 mg by mouth every 6 hours as needed for pain ) 10 tablet 0      ibuprofen (ADVIL,MOTRIN) 200 MG tablet Take 200 mg by mouth every 6 hours as needed for pain         melatonin 10 mg Tab Take 10 mg by mouth nightly as needed for sleep         polyethylene glycol (GLYCOLAX) 17 gram/dose powder Take 17 g by mouth daily as needed for constipation         propranoloL (INDERAL) 10 MG tablet Take 10 mg by mouth 3 times daily as needed (anxiety or palpitations)         spironolactone (ALDACTONE)  100 MG tablet [SPIRONOLACTONE (ALDACTONE) 100 MG TABLET] Take 100 mg by mouth daily.        SYMBICORT 80-4.5 mcg/actuation inhaler Inhale 2 puffs into the lungs 2 times daily         venlafaxine (EFFEXOR) 37.5 MG tablet [VENLAFAXINE (EFFEXOR) 37.5 MG TABLET] Take 37.5 mg by mouth every morning.        vitamin E 100 UNIT capsule [VITAMIN E 100 UNIT CAPSULE] Take 500 Units by mouth 2 (two) times a week.        zaleplon (SONATA) 5 MG capsule Take 5 mg by mouth nightly as needed for sleep           Social History  Reviewed, and she  reports that she quit smoking about 19 months ago. Her smoking use included cigarettes. She has a 15.00 pack-year smoking history. She has never used smokeless tobacco. She reports current alcohol use. She reports that she does not use drugs.  Social History     Tobacco Use     Smoking status: Former Smoker     Packs/day: 0.50     Years: 30.00     Pack years: 15.00     Types: Cigarettes     Quit date: 2019     Years since quittin.5     Smokeless tobacco: Never Used   Substance Use Topics     Alcohol use: Yes     Physical Exam:  BP (!) 187/86 (BP Location: Left arm)   Pulse 77   Temp 99  F (37.2  C) (Oral)   Resp 18   Wt 63.8 kg (140 lb 11.2 oz)   SpO2 98%   BMI 24.15 kg/m    Weight:   Weight change:   Body mass index is 24.15 kg/m .    Autumn Clarke, PharmD  Acute Care Pain Management Program  [facility] ([place], [place], [place])   With questions call [phone]  Preference if for [employer] Paging

## 2021-07-13 NOTE — ED PROVIDER NOTES
Emergency Department Patient Sign-out       Brief HPI:  This is a 66 year old female signed out to me by Dr. Sanchez .  See initial ED Provider note for details of the presentation.         Jarrell Nicole is a 66 year old female with a pertinent history of breast cancer, hyperlipidemia, hypertension, vitamin D deficiency, episodic mood disorder who presents to this ED via EMS for evaluation of altered mental status.    Family told nursing staff they had not heard from patient in 2 days, and patient could not recall who had come to her house after her daughter came. The patient's home smelled of urine, and patient appeared to be running around without purpose.    Patient found to have NSTEMI.  Started on heparin.  Head CT normal.    Exam:   Patient Vitals for the past 24 hrs:   BP Temp Temp src Pulse Resp SpO2 Weight   07/12/21 2230 (!) 167/85 -- -- 92 20 98 % --   07/12/21 2146 (!) 183/87 -- -- -- -- -- --   07/12/21 2145 -- -- -- 96 22 100 % 68 kg (150 lb)   07/12/21 2130 (!) 174/81 -- -- -- -- -- --   07/12/21 2100 (!) 195/86 -- -- 89 26 98 % --   07/12/21 2000 (!) 163/77 -- -- 87 18 99 % --   07/12/21 1921 (!) 180/87 98.7  F (37.1  C) Oral 97 16 100 % --           ED RESULTS:   Results for orders placed or performed during the hospital encounter of 07/12/21 (from the past 24 hour(s))   Asymptomatic COVID-19 Virus (Coronavirus) by PCR Nasopharyngeal     Status: Normal    Collection Time: 07/12/21  7:44 PM    Specimen: Nasopharyngeal; Swab    Narrative    The following orders were created for panel order Asymptomatic COVID-19 Virus (Coronavirus) by PCR Nasopharyngeal.  Procedure                               Abnormality         Status                     ---------                               -----------         ------                     SARS-COV2 (COVID-19) Vir...[[id number]]  Normal              Final result                 Please view results for these tests on the individual orders.   SARS-COV2  (COVID-19) Virus RT-PCR     Status: Normal    Collection Time: 07/12/21  7:44 PM    Specimen: Nasopharyngeal; Swab   Result Value Ref Range    SARS CoV2 PCR Negative Negative    Narrative    Testing was performed using the nina  SARS-CoV-2 & Influenza A/B Assay on the nina  [employer]  System.  This test should be ordered for the detection of SARS-COV-2 in individuals who meet SARS-CoV-2 clinical and/or epidemiological criteria. Test performance is unknown in asymptomatic patients.  This test is for in vitro diagnostic use under the FDA EUA for laboratories certified under CLIA to perform moderate and/or high complexity testing. This test has not been FDA cleared or approved.  A negative test does not rule out the presence of PCR inhibitors in the specimen or target RNA in concentration below the limit of detection for the assay. The possibility of a false negative should be considered if the patient's recent exposure or clinical presentation suggests COVID-19.  [facility] are certified under the Clinical Laboratory Improvement Amendments of 1988 (CLIA-88) as qualified to perform moderate and/or high complexity laboratory testing.   Comprehensive metabolic panel     Status: Abnormal    Collection Time: 07/12/21  7:59 PM   Result Value Ref Range    Sodium 143 136 - 145 mmol/L    Potassium 3.7 3.5 - 5.0 mmol/L    Chloride 106 98 - 107 mmol/L    Carbon Dioxide (CO2) 17 (L) 22 - 31 mmol/L    Anion Gap 20 (H) 5 - 18 mmol/L    Urea Nitrogen 39 (H) 8 - 22 mg/dL    Creatinine 2.45 (H) 0.60 - 1.10 mg/dL    Calcium 9.5 8.5 - 10.5 mg/dL    Glucose 132 (H) 70 - 125 mg/dL    Alkaline Phosphatase 160 (H) 45 - 120 U/L    AST 31 0 - 40 U/L    ALT 21 0 - 45 U/L    Protein Total 8.0 6.0 - 8.0 g/dL    Albumin 4.4 3.5 - 5.0 g/dL    Bilirubin Total 0.7 0.0 - 1.0 mg/dL    GFR Estimate 20 (L) >60 mL/min/1.73m2   Troponin I     Status: Abnormal    Collection Time: 07/12/21  7:59 PM   Result Value Ref Range    Troponin I  1.42 (HH) 0.00 - 0.29 ng/mL   Lipase     Status: Normal    Collection Time: 07/12/21  7:59 PM   Result Value Ref Range    Lipase <9 0 - 52 U/L   Magnesium     Status: Normal    Collection Time: 07/12/21  7:59 PM   Result Value Ref Range    Magnesium 2.0 1.8 - 2.6 mg/dL   Ammonia     Status: Normal    Collection Time: 07/12/21  7:59 PM   Result Value Ref Range    Ammonia 32 11 - 35 umol/L   TSH with free T4 reflex     Status: Normal    Collection Time: 07/12/21  7:59 PM   Result Value Ref Range    TSH 0.96 0.30 - 5.00 uIU/mL   Ethyl Alcohol Level     Status: Normal    Collection Time: 07/12/21  7:59 PM   Result Value Ref Range    Alcohol, Blood <10 None detected mg/dL   CBC with platelets differential     Status: Abnormal    Collection Time: 07/12/21  7:59 PM    Narrative    The following orders were created for panel order CBC with platelets differential.  Procedure                               Abnormality         Status                     ---------                               -----------         ------                     CBC with platelets and d...[[id number]]  Abnormal            Final result               Manual Differential[[id number]]                              Final result                 Please view results for these tests on the individual orders.   Acetaminophen level     Status: Abnormal    Collection Time: 07/12/21  7:59 PM   Result Value Ref Range    Acetaminophen <3.0 (L) 10.0 - 20.0 ug/mL   Salicylate level     Status: Normal    Collection Time: 07/12/21  7:59 PM   Result Value Ref Range    Salicylate <8 2 - 25 mg/dL   Lactic acid whole blood     Status: Abnormal    Collection Time: 07/12/21  7:59 PM   Result Value Ref Range    Lactic Acid 5.0 (HH) 0.7 - 2.0 mmol/L   CBC with platelets and differential     Status: Abnormal    Collection Time: 07/12/21  7:59 PM   Result Value Ref Range    WBC Count 8.7 4.0 - 11.0 10e3/uL    RBC Count 3.90 3.80 - 5.20 10e6/uL    Hemoglobin 12.9 11.7 - 15.7 g/dL     Hematocrit 37.4 35.0 - 47.0 %    MCV 96 78 - 100 fL    MCH 33.1 (H) 26.5 - 33.0 pg    MCHC 34.5 31.5 - 36.5 g/dL    RDW 20.5 (H) 10.0 - 15.0 %    Platelet Count 565 (H) 150 - 450 10e3/uL   Manual Differential     Status: None    Collection Time: 07/12/21  7:59 PM   Result Value Ref Range    % Neutrophils 81 %    % Lymphocytes 17 %    % Monocytes 2 %    % Eosinophils 0 %    % Basophils 0 %    Absolute Neutrophils 7.0 1.6 - 8.3 10e3/uL    Absolute Lymphocytes 1.5 0.8 - 5.3 10e3/uL    Absolute Monocytes 0.2 0.0 - 1.3 10e3/uL    Absolute Eosinophils 0.0 0.0 - 0.7 10e3/uL    Absolute Basophils 0.0 0.0 - 0.2 10e3/uL    RBC Morphology Confirmed RBC Indices     Platelet Assessment  Automated Count Confirmed. Platelet morphology is normal.     Automated Count Confirmed. Platelet morphology is normal.   CK total     Status: Abnormal    Collection Time: 07/12/21  7:59 PM   Result Value Ref Range     (H) 30 - 190 U/L   UA with Microscopic reflex to Culture     Status: Abnormal    Collection Time: 07/12/21  8:06 PM    Specimen: Urine, Catheter   Result Value Ref Range    Color Urine Yellow Colorless, Straw, Light Yellow, Yellow    Appearance Urine Slightly Cloudy (A) Clear    Glucose Urine 30  (A) Negative mg/dL    Bilirubin Urine Negative Negative    Ketones Urine Trace (A) Negative mg/dL    Specific Gravity Urine 1.025 1.001 - 1.030    Blood Urine 0.2 mg/dL (A) Negative    pH Urine 5.5 5.0 - 7.0    Protein Albumin Urine 100  (A) Negative mg/dL    Urobilinogen Urine <2.0 <2.0 mg/dL    Nitrite Urine Negative Negative    Leukocyte Esterase Urine Negative Negative    Bacteria Urine Few (A) None Seen /HPF    Mucus Urine Present (A) None Seen /LPF    RBC Urine 4 (H) <=2 /HPF    WBC Urine 7 (H) <=5 /HPF    Hyaline Casts Urine 163 (H) <=2 /LPF    Narrative    Urine Culture not indicated   XR Chest Port 1 View     Status: None    Collection Time: 07/12/21  8:29 PM    Narrative    EXAM: XR CHEST PORT 1 VIEW  LOCATION: [facility]  DATE/TIME: 7/12/2021 8:14 PM    INDICATION: sob  COMPARISON: Chest radiograph dated 2/23/2021      Impression    IMPRESSION: Negative chest.   CT Head w/o Contrast     Status: None    Collection Time: 07/12/21 10:14 PM    Narrative    EXAM: CT HEAD W/O CONTRAST  LOCATION: [facility]  DATE/TIME: 7/12/2021 10:13 PM    INDICATION: Confusion  COMPARISON: 03/21/2021  TECHNIQUE: Routine CT Head without IV contrast. Multiplanar reformats. Dose reduction techniques were used.    FINDINGS:  INTRACRANIAL CONTENTS: No intracranial hemorrhage, extraaxial collection, or mass effect.  No CT evidence of acute infarct. Normal parenchymal attenuation. Normal ventricles and sulci.     VISUALIZED ORBITS/SINUSES/MASTOIDS: No intraorbital abnormality. No paranasal sinus mucosal disease. No middle ear or mastoid effusion.    BONES/SOFT TISSUES: No acute abnormality.      Impression    IMPRESSION:  1.  Stable exam. No acute intracranial abnormality.   CT Abdomen Pelvis w/o Contrast     Status: None    Collection Time: 07/12/21 10:14 PM    Narrative    EXAM: CT ABDOMEN PELVIS W/O CONTRAST  LOCATION: [facility]  DATE/TIME: 7/12/2021 10:13 PM    INDICATION: Abdominal pain.  COMPARISON: 3/21/2021  TECHNIQUE: CT scan of the abdomen and pelvis was performed without IV contrast. Multiplanar reformats were obtained. Dose reduction techniques were used.  CONTRAST: None.    FINDINGS:   LOWER CHEST: Normal.    HEPATOBILIARY: Cholecystectomy.    PANCREAS: Normal.    SPLEEN: Normal.    ADRENAL GLANDS: Normal.    KIDNEYS/BLADDER: Calcifications near renal phill. The vascular and unchanged. No hydronephrosis.    BOWEL: Diverticulosis of the colon. There is very mild pericolonic soft tissue stranding adjacent to the low sigmoid colon that appears new suggests mild acute diverticulitis. There is no abscess, free perforation or obstruction.    LYMPH NODES: Normal.    VASCULATURE: Moderate atherosclerotic  plaque.    PELVIC ORGANS: Hysterectomy. No adnexal lesions.    MUSCULOSKELETAL: Mixed sclerotic bone lesions most pronounced within the L4 metastases.      Impression    IMPRESSION:   1.  Minimal new soft tissue stranding about the distal sigmoid colon consistent with mild diverticulitis.  2.  Sclerotic bony metastases unchanged.     Drugs of Abuse 1+ Panel, Urine ([facility] Only)     Status: Abnormal    Collection Time: 07/12/21 11:06 PM   Result Value Ref Range    Amphetamines Urine Screen Negative Screen Negative    Benzodiazepines Urine Screen Negative Screen Negative    Opiates Urine Screen Positive (A) Screen Negative    PCP Urine Screen Negative Screen Negative    Cannabinoids Urine Screen Positive (A) Screen Negative    Barbiturates Urine Screen Negative Screen Negative    Cocaine Urine Screen Negative Screen Negative    Methadone Urine Screen Negative Screen Negative    Oxycodone Urine Screen Negative Screen Negative    Creatinine Urine mg/dL 323 mg/dL    Narrative    Drug                           Screening Threshold    Amphetamines                    1000 ng/mL  Benzodiazepine                   200 ng/mL  Opiates                          300 ng/mL  Phencyclidine                     25 ng/mL  THC Metabolite                    50 ng/mL  Barbiturates                     200 ng/mL  Cocaine Metabolite               150 ng/mL  Methadone                        300 ng/mL  Oxycodone                        100 ng/mL    Screening results are to be used only for medical purposes.  Unconfirmed screening results are not to be used for non-  medical purposes.       ED MEDICATIONS:   Medications   lactated ringers BOLUS 1,000 mL (1,000 mLs Intravenous New Bag 7/12/21 2305)   lactated ringers infusion (has no administration in time range)   heparin loading dose for LOW INTENSITY TREATMENT * Give BEFORE starting heparin infusion (has no administration in time range)   heparin 25,000 units in 0.45% NaCl 250 mL ANTICOAGULANT  infusion (has no administration in time range)   ciprofloxacin (CIPRO) tablet 500 mg (has no administration in time range)   metroNIDAZOLE (FLAGYL) tablet 500 mg (has no administration in time range)   0.9% sodium chloride BOLUS (0 mLs Intravenous Stopped 7/12/21 2030)   aspirin (ASA) chewable tablet 162 mg (162 mg Oral Given 7/12/21 2258)         Impression:    ICD-10-CM    1. Encephalopathy  G93.40    2. Acute kidney injury (H)  N17.9    3. Dehydration  E86.0    4. Elevated troponin  R77.8    5. Diverticulitis of colon  K57.32      :          Scribe Disclosure:   I, Anselmo Ugalde, am serving as a scribe; to document services personally performed by Dr. Isidro Hester based on data collection and the provider's statements to me.     Provider Disclosure:  I agree with above History, Review of Systems, Physical exam and Plan.  I have reviewed the content of the documentation and have edited it as needed. I have personally performed the services documented here and the documentation accurately represents those services and the decisions I have made.      Electronically signed by:    Isidro Peres MD  07/13/21 0101       Isidro Hester MD  07/13/21 0726

## 2021-07-13 NOTE — ED PROVIDER NOTES
"  Emergency Department Encounter     Evaluation Date & Time:   7/12/2021  7:18 PM    CHIEF COMPLAINT:  Altered Mental Status      Triage Note:No notes on file      Impression and Plan   DDX:  encephalopathy, UTI, electrolyte abnormality, seizure, metastatic cancer, RASHAWN, dehydration, substance abuse, medication side effect    ED COURSE & MEDICAL DECISION MAKING:    Pt presenting with AMS after family had not heard from her in a couple days and called 911, went to home.  They found her running around home, not behaving like her normal self. Here she is clearly confused, unable to state appropriate date or where she is. She does have fluent speech, able to follow commands and answer questions appropriately with no lateralizing deficits.  She does appear anxious.  Pt has metastatic breast cancer, currently being treated.  She is afebrile, nontoxic, but clearly altered and a very unreliable historian as she says \"yes\" to every ROS question. Will get labs, CT head, CXR and reassess.  Pt will need hospitalization.    7:22 PM I met with the patient for the initial interview and physical examination. Discussed plan for treatment and workup in the ED.   8:48 PM CXR (independent interpretation): no acute cardiopulmonary process   10:44 PM I discussed the case with the the patient's daughter.  She believes that her mother is mismanaging her medications, in particular her clonopin and pain meds.  She states house was in disarray today, urine on the floor.    10:50 PM Trop elevated to 1.4.  This could be type 2 from RASHAWN and other pathology, as we still don't know entirely what's driving everything. Nevertheless, will give aspirin, discuss with cardiology.  10:56 PM CT head negative. CT abd/pelvis showing diverticulitis. Will start on antibx.  11:01 PM I spoke with cardiologist, Dr. Hoffman, who recommends starting heparin if CK is not elevated.  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    The patient reported an very good response to the last injection with Dr. Horne.    Comments: This patient has been receiving injections with Dr. Horne.  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  He also wants his injections for jaw opening and closing dystonia and right facial dystonia.    Complications: None    Botulinum toxin effect: Right orbicularis oris    Prior to the start of the procedure and with procedural staff participation, I verbally confirmed the patient s identity using two indicators, relevant allergies, that the procedure was appropriate and matched the consent or emergent situation, and that the correct equipment/implants were available. Immediately prior to starting the procedure I conducted the Time Out with the procedural staff and re-confirmed the patient s name, procedure, and site/side. (The Joint Commission universal protocol was followed.)  Yes    TOTAL DOSE ADMINISTERED:  Dose Administered:  78 units Botox    Diluent Used:  Preservative Free Normal Saline  Total Volume of Diluent Used:  2 ml  Lot #[id number]  with Expiration Date:  7/21       Medication guide was offered to patient and was declined.    CONSENT:  The risks, benefits, and treatment options were discussed with Ayad Moreno and she agreed to proceed.      Written consent was obtained byYes    EQUIPMENT USED:  Needle-37mm stimulating/recording    SKIN PREPARATION:  Skin preparation was performed using an alcohol wipe.    GUIDANCE DESCRIPTION:  EMG guidance:Yes  Ultrasound guidance:No    AREA/MUSCLE INJECTED:          Muscles Injected Units Injected Number of  Injections   Right masseter 20 2   Left masseter 20 2   Right orbicularis oris 10 4   Right lateral pterygoid 10 1   Left lateral pterygoid 10 1   Right sternohyoid 2 1   Left sternohyoid 2 1   Right omohyoid 2 1   Left omohyoid 2 1                  Total Units Injected: 78    Unavoidable Waste: 22    Total Units Billed 100      The patient tolerated the injections without difficulty.      Summary:    The patient was injected today with  78 units of Onabotulinumtoxin A (Botox) under EMG  guidance as treatment of d.  The patient tolerated the procedure well.  Plan  Follow-up in 3  months' time to consider repeat injections                 and reassessing in the morning with echo and repeat trops.  11:19 PM CK is 390, which is mildly elevated.  Will start heparin for now and can trend CK and Trop to make further decisions per cardiology.  11:21 PM Pt remains hemodynamically well, sleeping in room currently.  Pt will be signed out to Dr. Hester pending hospitalization as we currently have no beds here or within the system as of now.    At the conclusion of the encounter I discussed the results of all the tests and the disposition. The questions were answered. The patient or family acknowledged understanding and was agreeable with the care plan.    PPE: Provider wore gloves, N95 mask, eye protection, surgical cap, and paper mask.  FINAL IMPRESSION:    ICD-10-CM    1. Encephalopathy  G93.40    2. Acute kidney injury (H)  N17.9    3. Dehydration  E86.0    4. Elevated troponin  R77.8    5. Diverticulitis of colon  K57.32          Reassessments & Consults   Cardiology consult    MEDICATIONS GIVEN IN THE EMERGENCY DEPARTMENT:  Medications   lactated ringers BOLUS 1,000 mL (1,000 mLs Intravenous New Bag 7/12/21 2301)   lactated ringers infusion (has no administration in time range)   ciprofloxacin (CIPRO) infusion 400 mg (has no administration in time range)   metroNIDAZOLE (FLAGYL) infusion 500 mg (has no administration in time range)   heparin loading dose for LOW INTENSITY TREATMENT * Give BEFORE starting heparin infusion (has no administration in time range)   heparin 25,000 units in 0.45% NaCl 250 mL ANTICOAGULANT infusion (has no administration in time range)   0.9% sodium chloride BOLUS (0 mLs Intravenous Stopped 7/12/21 2030)   aspirin (ASA) chewable tablet 162 mg (162 mg Oral Given 7/12/21 2258)       NEW PRESCRIPTIONS STARTED AT TODAY'S ED VISIT:  New Prescriptions    No medications on file       HPI   HPI LIMITED due to altered mental status     Jarrell Nicloe is a 66 year old female with a pertinent history of breast cancer, hyperlipidemia,  "hypertension, vitamin D deficiency, episodic mood disorder who presents to this ED via EMS for evaluation of altered mental status.     Per nursing staff, patient presented to the ED after family members had not heard from her in ~2 days. Patient could not recall who it was that went to her house but staff reported that it was her daughter. The house reportedly smelled of urine and the patient seemed to be running around \"sporadically\", or without purpose. Her daughter then called EMS, who transported her to the ED. It was reported that she has \"strong medications\" that she manages by herself.     Of note, history from the patient seems unreliable as she endorses every symptom that was asked.     ScHx: Patient reports that she lives alone and denies any use of alcohol or illegal drugs.     REVIEW OF SYSTEMS:  Review of Systems   Unable to perform ROS: Mental status change         Medical History     Past Medical History:   Diagnosis Date     Breast cancer (H)        Past Surgical History:   Procedure Laterality Date     CT BIOPSY BONE  2021      LYMPH NODE BIOPSY  2021       History reviewed. No pertinent family history.    Social History     Tobacco Use     Smoking status: Former Smoker     Packs/day: 0.50     Years: 30.00     Pack years: 15.00     Types: Cigarettes     Quit date: 2019     Years since quittin.5     Smokeless tobacco: Never Used   Substance Use Topics     Alcohol use: Yes     Drug use: Never       albuterol (PROAIR HFA;PROVENTIL HFA;VENTOLIN HFA) 90 mcg/actuation inhaler  amLODIPine (NORVASC) 5 MG tablet  ascorbic acid, vitamin C, (VITAMIN C) 1000 MG tablet  B,C/folic/zinc/copper ox/vit E (STRESS B-COMPLEX ORAL)  cetirizine (ZYRTEC) 10 MG tablet  cholecalciferol, vitamin D3, 125 mcg (5,000 unit) capsule  clonazePAM (KLONOPIN) 1 MG tablet  cyclobenzaprine (FLEXERIL) 10 MG tablet  ferrous sulfate (FEROSUL) 325 (65 Fe) MG tablet  fluticasone propionate (FLONASE) 50 mcg/actuation " nasal spray  HYDROmorphone (DILAUDID) 2 MG tablet  ibuprofen (ADVIL,MOTRIN) 200 MG tablet  melatonin 10 mg Tab  polyethylene glycol (GLYCOLAX) 17 gram/dose powder  propranoloL (INDERAL) 10 MG tablet  spironolactone (ALDACTONE) 100 MG tablet  SYMBICORT 80-4.5 mcg/actuation inhaler  venlafaxine (EFFEXOR) 37.5 MG tablet  vitamin E 100 UNIT capsule  zaleplon (SONATA) 5 MG capsule        Physical Exam     First Vitals:  Patient Vitals for the past 24 hrs:   BP Temp Temp src Pulse Resp SpO2 Weight   07/12/21 2230 (!) 167/85 -- -- 92 20 98 % --   07/12/21 2146 (!) 183/87 -- -- -- -- -- --   07/12/21 2145 -- -- -- 96 22 100 % 68 kg (150 lb)   07/12/21 2130 (!) 174/81 -- -- -- -- -- --   07/12/21 2100 (!) 195/86 -- -- 89 26 98 % --   07/12/21 2000 (!) 163/77 -- -- 87 18 99 % --   07/12/21 1921 (!) 180/87 98.7  F (37.1  C) Oral 97 16 100 % --       PHYSICAL EXAM:   Physical Exam  Vitals and nursing note reviewed.   Constitutional:       General: She is not in acute distress.     Appearance: Normal appearance.   HENT:      Head:      Comments: No facial asymmetry. Tip of tongue has small injury.      Nose: Nose normal.      Mouth/Throat:      Mouth: Mucous membranes are moist.   Eyes:      Pupils: Pupils are equal, round, and reactive to light.   Cardiovascular:      Rate and Rhythm: Normal rate and regular rhythm.      Pulses: Normal pulses.           Radial pulses are 2+ on the right side and 2+ on the left side.        Dorsalis pedis pulses are 2+ on the right side and 2+ on the left side.   Pulmonary:      Effort: Pulmonary effort is normal. No respiratory distress.      Breath sounds: Normal breath sounds.   Abdominal:      Palpations: Abdomen is soft.      Tenderness: There is abdominal tenderness.      Comments: Abdominal tenderness diffusely without distension or rigidity.   Musculoskeletal:      Cervical back: Full passive range of motion without pain and neck supple.      Comments: No calf tenderness or swelling b/l    Skin:     General: Skin is warm.      Findings: No rash.   Neurological:      General: No focal deficit present.      Mental Status: She is alert. Mental status is at baseline.      Comments: Fluent speech. Word finding difficulty. 5/5 strength to the upper and lower extremities bilaterally.    Psychiatric:         Mood and Affect: Mood is anxious.      Comments: Confused and anxious.         Results     LAB:  All pertinent labs reviewed and interpreted  Labs Ordered and Resulted from Time of ED Arrival Up to the Time of Departure from the ED   COMPREHENSIVE METABOLIC PANEL - Abnormal; Notable for the following components:       Result Value    Carbon Dioxide (CO2) 17 (*)     Anion Gap 20 (*)     Urea Nitrogen 39 (*)     Creatinine 2.45 (*)     Glucose 132 (*)     Alkaline Phosphatase 160 (*)     GFR Estimate 20 (*)     All other components within normal limits   TROPONIN I - Abnormal; Notable for the following components:    Troponin I 1.42 (*)     All other components within normal limits   ROUTINE UA WITH MICROSCOPIC REFLEX TO CULTURE - Abnormal; Notable for the following components:    Appearance Urine Slightly Cloudy (*)     Glucose Urine 30  (*)     Ketones Urine Trace (*)     Blood Urine 0.2 mg/dL (*)     Protein Albumin Urine 100  (*)     Bacteria Urine Few (*)     Mucus Urine Present (*)     RBC Urine 4 (*)     WBC Urine 7 (*)     Hyaline Casts Urine 163 (*)     All other components within normal limits    Narrative:     Urine Culture not indicated   ACETAMINOPHEN LEVEL - Abnormal; Notable for the following components:    Acetaminophen <3.0 (*)     All other components within normal limits   LACTIC ACID WHOLE BLOOD - Abnormal; Notable for the following components:    Lactic Acid 5.0 (*)     All other components within normal limits   CBC WITH PLATELETS AND DIFFERENTIAL - Abnormal; Notable for the following components:    MCH 33.1 (*)     RDW 20.5 (*)     Platelet Count 565 (*)     All other components  within normal limits   CK TOTAL - Abnormal; Notable for the following components:     (*)     All other components within normal limits   LIPASE - Normal   MAGNESIUM - Normal   AMMONIA - Normal   TSH WITH FREE T4 REFLEX - Normal   ETHYL ALCOHOL LEVEL - Normal   SALICYLATE LEVEL - Normal   SARS-COV2 (COVID-19) VIRUS RT-PCR - Normal    Narrative:     Testing was performed using the nina  SARS-CoV-2 & Influenza A/B Assay on the nina  [employer]  System.  This test should be ordered for the detection of SARS-COV-2 in individuals who meet SARS-CoV-2 clinical and/or epidemiological criteria. Test performance is unknown in asymptomatic patients.  This test is for in vitro diagnostic use under the FDA EUA for laboratories certified under CLIA to perform moderate and/or high complexity testing. This test has not been FDA cleared or approved.  A negative test does not rule out the presence of PCR inhibitors in the specimen or target RNA in concentration below the limit of detection for the assay. The possibility of a false negative should be considered if the patient's recent exposure or clinical presentation suggests COVID-19.  [facility] are certified under the Clinical Laboratory Improvement Amendments of 1988 (CLIA-88) as qualified to perform moderate and/or high complexity laboratory testing.   DIFFERENTIAL   DRUGS OF ABUSE 1+ PANEL, URINE ([facility] ONLY)   PERIPHERAL IV CATHETER   CALL   MEASURE WEIGHT   NOTIFY PHYSICIAN   NOTIFY PHYSICIAN   COVID-19 VIRUS (CORONAVIRUS) BY PCR    Narrative:     The following orders were created for panel order Asymptomatic COVID-19 Virus (Coronavirus) by PCR Nasopharyngeal.  Procedure                               Abnormality         Status                     ---------                               -----------         ------                     SARS-COV2 (COVID-19) Vir...[[id number]]  Normal              Final result                 Please view results for these  tests on the individual orders.   BLOOD CULTURE   BLOOD CULTURE   CBC WITH PLATELETS & DIFFERENTIAL    Narrative:     The following orders were created for panel order CBC with platelets differential.  Procedure                               Abnormality         Status                     ---------                               -----------         ------                     CBC with platelets and d...[[id number]]  Abnormal            Final result               Manual Differential[[id number]]                              Final result                 Please view results for these tests on the individual orders.   URINE DRUGS OF ABUSE SCREEN       RADIOLOGY:  CT Abdomen Pelvis w/o Contrast   Final Result   IMPRESSION:    1.  Minimal new soft tissue stranding about the distal sigmoid colon consistent with mild diverticulitis.   2.  Sclerotic bony metastases unchanged.         CT Head w/o Contrast   Final Result   IMPRESSION:   1.  Stable exam. No acute intracranial abnormality.      XR Chest Port 1 View   Final Result   IMPRESSION: Negative chest.                 ECG:  EKG 1: NSR, rate 94, possible subtle ST depression V3-V5  EKG 2: NSR, rate 88, normal intervals, ST depression V3-V6, no ST elevation    I have independently reviewed and interpreted the EKS(s) documented above    PROCEDURES:  None    [facility] Documentation         I, Bakari Izaguirre, am serving as a scribe to document services personally performed by Dr. Sanchez, based on my observation and the provider's statements to me. I, Dr. Sanchez attest that Bakari Izaguirre is acting in a scribe capacity, has observed my performance of the services and has documented them in accordance with my direction.     Dr. Sanchez  Emergency Medicine  [facility] EMERGENCY DEPARTMENT       Javier Sanchez MD  07/12/21 7944

## 2021-07-13 NOTE — H&P
[facility]    History and Physical - Hospitalist Service       Date of Admission:  7/12/2021    Assessment & Plan      Jarrell Nicole is a 66 year old female with PMH of stage 4 breast cancer, HTN, HLD, mood disorder, chronic pain syndrome, MOHINDER/agoraphobia, admitted on 7/12/2021.     # Acute encephalopathy, toxic metabolic, due to unintentional overdose of opiates, benzodiazepines, in the setting of infection/acute diverticulitis and RASHAWN.  Patient daughter believes that patient unintentionally took extra doses of Rx medications, after forgetting that she took doses prior.  CT head negative for brain metastasis or other acute intracranial pathology.  Patient denies illicits use or alcohol use.    Checked UDS for coingestions-positive for opiates, cannabinoids, but no benzos; patient on Rx Dilaudid and clonazepam..  Encephalopathy improving.  Patient still confused and somewhat impulsive.  Nonfocal neuro exam.  -Treating acute diverticulitis, RASHAWN  -Telemetry monitoring.  -One-to-one sitter on family is not around, currently patient's daughter Rebekah at the bedside is aware to notify nurse when she will be leaving.    #Acute sigmoid diverticulitis.  #Lactic acid acidosis at 5 on admission, due to dehydration/RASHAWN.  Normalized to 1.4 with IVF and first doses of antibiotics.  CT abdomen C/W distal sigmoid mild diverticulitis, no abscess or perforation.  Procalcitonin pending.  -On ciprofloxacin and Flagyl, first dose is given in ED.  -Symptomatic treatment with analgesics, antiemetics, antipyretics.  -N.p.o. for now, till evaluated by cardiology for STEMI, will start clear liquid diet when okay from cardiology standpoint to start p.o.    # Essential hypertension.  Elevated BP, SBP max 204 in ED.  Unknown last dose of amlodipine.  Patient is asymptomatic.  -Resume PTA amlodipine, as needed IV hydralazine.    #Elevated troponin, 1.42 at 8 PM on 7/12.  This is in the setting of mild rhabdomyolysis  with CPK of 390.  No interval troponin overnight.  Obtain stat troponin this a.m., results pending.  Patient is chest pain-free.  No history of CAD.  Nonischemic EKG on admission.  -Echo  -Cardiology consulted    # RASHAWN/high anion gap metabolic acidosis, resolved hyperkalemia.  Creatinine 2.45, BUN 39.  Present normal renal function on 3/21/2021 creatinine 0.82.  Urine not infected.  S/p 2 L NS in ED, now maintenance IVF.  Awaiting AM chemistry panel.  -C/W IVF while NPO.  Monitor renal function closely.  Avoid hypotension/nephrotoxins, renally dose medications.    # Cannabis use.  Positive UDS on 7/13.  Patient was counseled on refraining from using cannabis, while taking prescription all opiates/benzos.    # Rhabdomyolysis, mild.  Unknown if/when/how long downtime.  No bruises.  Afebrile.  -IVF, monitor renal function.    #Right-sided breast cancer, with bone metastasis, chronic pain.  Stage IV (cTX, cM1, ER+, RI+, HER2-).  Diagnosed on 3/3/2021; incidental finding of the right breast mass during ED evaluation for chest pain.  Pt of Dr. Foster/last seen on 3/18/2021, and requested to be transferred to the care of [facility]. Now follows with Dr. Blackman.  -Pain team consulted to optimize pain regimen    #Suspected cognitive impairment.  Most recent note from oncology indicates that during clinic visit patient did not recall having axillary lymph node biopsy.  Also documented erratic behavior with the clinic staff.   -OT for evaluation  -Check TSH, B12, folate.  -Psychiatry evaluation.    # History of opiate dependence and abuse.  Collateral information from patient's daughter Rebekah (named by patient in the presence of care RN Valery as primary contact): Patient was longstanding history of opiate abuse and dependence, s/p chemical dependency treatment, methadone treatment.  Rebekah believes that patient was clear from opioid use for long time, prior to her diagnosis of breast cancer.  Now she is receiving  prescription for p.o. Dilaudid for cancer related pain, and actually suspect that patient misusing Rx Dilaudid.  D/W results of UDS, being negative for benzos, and positive for cannabis.  Rebekah's note aware of patient using nonprescription substances.    #Self-neglect, in the setting of opiate overuse.  Patient daughter Rebekah, who needed the wellness check on the patient yesterday, discovered patient's house in disarray, with furniture/carpeted covered in feces and urine.  -PT/OT/case management/social work/psychiatry evaluation as above.    COVID-19 tested negative.     Diet: NPO for Medical/Clinical Reasons Except for: Meds    DVT Prophylaxis: Heparin drip  Richard Catheter: Not present  Central Lines: None  Code Status: Full Code      Risk Factors Present on Admission                   Disposition Plan    Expected discharge: 2-3 days recommended to TCU likely, once antibiotic plan established, mental status at baseline, renal function improved and SIRS/Sepsis treated.     The patient's care was discussed with the Bedside Nurse and Patient's daughter Rebekah at bedside..    Reyna Nielson MD  [facility]  Securely message with the Vocera Web Console (learn more here)  Text page via [employer] Paging/Directory      ______________________________________________________________________    Chief Complaint   Altered mental status    History is obtained from ED provider and partially from from the patient, is a poor historian.  Collateral information from patient's daughter Rebekah.    History of Present Illness   Jarrell Nicole is a 66 year old female PMH of stage 4 breast cancer, HTN, HLD, mood disorder, chronic pain syndrome, MOHINDER/agoraphobia, 89 to ED via EMS after patient's daughter called 911.  Daughter Rebekah, who did not hear from patient for couple days, came for wellness check, found patient very confused, walking around the house naked, and patient's house being in disarray with feces and  urine over furniture and carpet.  In ED she was confused, was clear speech, and nonfocal neuro exam.  She still appears that way to me.  Patient is somewhat anxious.  Work-up in ED showed acute diverticulitis, acute renal failure, lactic acid acidosis, elevated BP, elevated troponin without ischemic EKG changes.  S/p dose of ciprofloxacin, Flagyl, 2 L NS.  She denies fever, chills, abdominal pain, nausea, vomiting, cough, headache, focal weakness, diaphoresis, arthralgia, myalgia.  She does not recall falling at home.    Review of Systems    The 10 point Review of Systems is negative other than noted in the HPI or here.     Past Medical History    I have reviewed this patient's medical history and updated it with pertinent information if needed.   Past Medical History:   Diagnosis Date     Breast cancer (H)        Past Surgical History   I have reviewed this patient's surgical history and updated it with pertinent information if needed.  Past Surgical History:   Procedure Laterality Date     CT BIOPSY BONE  2021     US LYMPH NODE BIOPSY  2021       Social History   I have reviewed this patient's social history and updated it with pertinent information if needed.  Social History     Tobacco Use     Smoking status: Former Smoker     Packs/day: 0.50     Years: 30.00     Pack years: 15.00     Types: Cigarettes     Quit date: 2019     Years since quittin.5     Smokeless tobacco: Never Used   Substance Use Topics     Alcohol use: Yes     Drug use: Never       Family History         Prior to Admission Medications   Prior to Admission Medications   Prescriptions Last Dose Informant Patient Reported? Taking?   B,C/folic/zinc/copper ox/vit E (STRESS B-COMPLEX ORAL)   Yes Yes   Sig: [B,C/FOLIC/ZINC/COPPER OX/VIT E (STRESS B-COMPLEX ORAL)] Take 1 tablet by mouth daily. Stress supplement   HYDROmorphone (DILAUDID) 2 MG tablet   No Yes   Sig: [HYDROMORPHONE (DILAUDID) 2 MG TABLET] 0.5 tabs PO q6hr PRN pain. Do  not drive.  Do not mix wih alcohol.   Patient taking differently: Take 0.5 mg by mouth every 6 hours as needed for pain    SYMBICORT 80-4.5 mcg/actuation inhaler   Yes Yes   Sig: Inhale 2 puffs into the lungs 2 times daily    albuterol (PROAIR HFA;PROVENTIL HFA;VENTOLIN HFA) 90 mcg/actuation inhaler   Yes Yes   Sig: Inhale 1-2 puffs into the lungs every 4 hours as needed for shortness of breath / dyspnea or wheezing    amLODIPine (NORVASC) 5 MG tablet   Yes Yes   Sig: [AMLODIPINE (NORVASC) 5 MG TABLET] Take 5 mg by mouth daily.   ascorbic acid, vitamin C, (VITAMIN C) 1000 MG tablet   Yes Yes   Sig: [ASCORBIC ACID, VITAMIN C, (VITAMIN C) 1000 MG TABLET] Take 1,000 mg by mouth daily.   cetirizine (ZYRTEC) 10 MG tablet   Yes Yes   Sig: [CETIRIZINE (ZYRTEC) 10 MG TABLET] Take 10 mg by mouth daily.   cholecalciferol, vitamin D3, 125 mcg (5,000 unit) capsule   Yes Yes   Sig: [CHOLECALCIFEROL, VITAMIN D3, 125 MCG (5,000 UNIT) CAPSULE] Take 5,000 Units by mouth daily.   clonazePAM (KLONOPIN) 1 MG tablet   Yes Yes   Sig: Take 1 mg by mouth 2 times daily as needed for anxiety    cyclobenzaprine (FLEXERIL) 10 MG tablet   Yes Yes   Sig: Take 10 mg by mouth 3 times daily as needed for muscle spasms    ferrous sulfate (FEROSUL) 325 (65 Fe) MG tablet   Yes Yes   Sig: Take 325 mg by mouth daily (with breakfast)   fluticasone propionate (FLONASE) 50 mcg/actuation nasal spray   Yes Yes   Sig: [FLUTICASONE PROPIONATE (FLONASE) 50 MCG/ACTUATION NASAL SPRAY] 1 spray into each nostril daily.    ibuprofen (ADVIL,MOTRIN) 200 MG tablet   Yes Yes   Sig: Take 200 mg by mouth every 6 hours as needed for pain    melatonin 10 mg Tab   Yes Yes   Sig: Take 10 mg by mouth nightly as needed for sleep    polyethylene glycol (GLYCOLAX) 17 gram/dose powder   Yes Yes   Sig: Take 17 g by mouth daily as needed for constipation    propranoloL (INDERAL) 10 MG tablet   Yes Yes   Sig: Take 10 mg by mouth 3 times daily as needed (anxiety or palpitations)     spironolactone (ALDACTONE) 100 MG tablet   Yes Yes   Sig: [SPIRONOLACTONE (ALDACTONE) 100 MG TABLET] Take 100 mg by mouth daily.   venlafaxine (EFFEXOR) 37.5 MG tablet   Yes Yes   Sig: [VENLAFAXINE (EFFEXOR) 37.5 MG TABLET] Take 37.5 mg by mouth every morning.   vitamin E 100 UNIT capsule   Yes Yes   Sig: [VITAMIN E 100 UNIT CAPSULE] Take 500 Units by mouth 2 (two) times a week.   zaleplon (SONATA) 5 MG capsule   Yes Yes   Sig: Take 5 mg by mouth nightly as needed for sleep       Facility-Administered Medications: None     Allergies   Allergies   Allergen Reactions     Codeine Hives     Iodinated Contrast Media [Diagnostic X-Ray Materials] Unknown     Heart stopped and sick for multiple days after     Penicillins Swelling     Throat swelling as an infant     Tylenol [Acetaminophen] Other (See Comments)     Pt states gives her upset stomach for days.     Physical Exam   Vital Signs: Temp: 99.3  F (37.4  C) Temp src: Oral BP: (!) 157/77 Pulse: 93   Resp: 20 SpO2: 100 % O2 Device: None (Room air)    Weight: 140 lbs 11.2 oz    General Appearance: Alert and oriented to self, place, and consistent due to events.  Eyes: No scleral icterus, pupils equal reactive to light and accommodation  HEENT: Head normocephalic, atraumatic, moist mucous membranes, neck supple, no cervical LAD or thyromegaly.  Respiratory: Clear to auscultation bilaterally, no rhonchi or wheezes.  Cardiovascular: Rhythmic and regular S1-S2, no murmurs  GI: Soft, mild tenderness in left lower quadrant, audible bowel sounds.  Lymph/Hematologic: No peripheral lymphadenopathy.  Skin:, Dry, no rash  Musculoskeletal: Muscle bulk and tone  Neurologic: No focal neuro exam  Psychiatric: Impulsive, redirectable    Data   Data reviewed today: I reviewed all medications, new labs and imaging results over the last 24 hours. I personally reviewed the EKG tracing showing NSR, the abdominal CT image(s) showing Distal sigmoid diverticulitis and the head CT image(s)  showing No intracranial metastasis, stroke, hemorrhage..    Recent Labs   Lab 07/12/21 1959   WBC 8.7   HGB 12.9   MCV 96   *      POTASSIUM 3.7   CHLORIDE 106   CO2 17*   BUN 39*   CR 2.45*   ANIONGAP 20*   PADDY 9.5   *   ALBUMIN 4.4   PROTTOTAL 8.0   BILITOTAL 0.7   ALKPHOS 160*   ALT 21   AST 31   LIPASE <9     Recent Results (from the past 24 hour(s))   XR Chest Port 1 View    Narrative    EXAM: XR CHEST PORT 1 VIEW  LOCATION: [facility]  DATE/TIME: 7/12/2021 8:14 PM    INDICATION: sob  COMPARISON: Chest radiograph dated 2/23/2021      Impression    IMPRESSION: Negative chest.   CT Head w/o Contrast    Narrative    EXAM: CT HEAD W/O CONTRAST  LOCATION: [facility]  DATE/TIME: 7/12/2021 10:13 PM    INDICATION: Confusion  COMPARISON: 03/21/2021  TECHNIQUE: Routine CT Head without IV contrast. Multiplanar reformats. Dose reduction techniques were used.    FINDINGS:  INTRACRANIAL CONTENTS: No intracranial hemorrhage, extraaxial collection, or mass effect.  No CT evidence of acute infarct. Normal parenchymal attenuation. Normal ventricles and sulci.     VISUALIZED ORBITS/SINUSES/MASTOIDS: No intraorbital abnormality. No paranasal sinus mucosal disease. No middle ear or mastoid effusion.    BONES/SOFT TISSUES: No acute abnormality.      Impression    IMPRESSION:  1.  Stable exam. No acute intracranial abnormality.   CT Abdomen Pelvis w/o Contrast    Narrative    EXAM: CT ABDOMEN PELVIS W/O CONTRAST  LOCATION: [facility]  DATE/TIME: 7/12/2021 10:13 PM    INDICATION: Abdominal pain.  COMPARISON: 3/21/2021  TECHNIQUE: CT scan of the abdomen and pelvis was performed without IV contrast. Multiplanar reformats were obtained. Dose reduction techniques were used.  CONTRAST: None.    FINDINGS:   LOWER CHEST: Normal.    HEPATOBILIARY: Cholecystectomy.    PANCREAS: Normal.    SPLEEN: Normal.    ADRENAL GLANDS: Normal.    KIDNEYS/BLADDER: Calcifications near renal phill.  The vascular and unchanged. No hydronephrosis.    BOWEL: Diverticulosis of the colon. There is very mild pericolonic soft tissue stranding adjacent to the low sigmoid colon that appears new suggests mild acute diverticulitis. There is no abscess, free perforation or obstruction.    LYMPH NODES: Normal.    VASCULATURE: Moderate atherosclerotic plaque.    PELVIC ORGANS: Hysterectomy. No adnexal lesions.    MUSCULOSKELETAL: Mixed sclerotic bone lesions most pronounced within the L4 metastases.      Impression    IMPRESSION:   1.  Minimal new soft tissue stranding about the distal sigmoid colon consistent with mild diverticulitis.  2.  Sclerotic bony metastases unchanged.

## 2021-07-14 ENCOUNTER — APPOINTMENT (OUTPATIENT)
Dept: OCCUPATIONAL THERAPY | Facility: HOSPITAL | Age: 66
DRG: 917 | End: 2021-07-14
Attending: INTERNAL MEDICINE
Payer: COMMERCIAL

## 2021-07-14 PROBLEM — D64.9 ANEMIA: Status: ACTIVE | Noted: 2021-07-14

## 2021-07-14 LAB
ALBUMIN SERPL-MCNC: 3.2 G/DL (ref 3.5–5)
ALP SERPL-CCNC: 91 U/L (ref 45–120)
ALT SERPL W P-5'-P-CCNC: 18 U/L (ref 0–45)
ANION GAP SERPL CALCULATED.3IONS-SCNC: 10 MMOL/L (ref 5–18)
AST SERPL W P-5'-P-CCNC: 26 U/L (ref 0–40)
BASOPHILS # BLD AUTO: 0 10E3/UL (ref 0–0.2)
BASOPHILS NFR BLD AUTO: 0 %
BILIRUB SERPL-MCNC: 0.5 MG/DL (ref 0–1)
BUN SERPL-MCNC: 21 MG/DL (ref 8–22)
CALCIUM SERPL-MCNC: 7.1 MG/DL (ref 8.5–10.5)
CHLORIDE BLD-SCNC: 109 MMOL/L (ref 98–107)
CK SERPL-CCNC: 370 U/L (ref 30–190)
CO2 SERPL-SCNC: 16 MMOL/L (ref 22–31)
CREAT SERPL-MCNC: 0.74 MG/DL (ref 0.6–1.1)
DEPRECATED CALCIDIOL+CALCIFEROL SERPL-MC: 37 UG/L (ref 30–80)
EOSINOPHIL # BLD AUTO: 0 10E3/UL (ref 0–0.7)
EOSINOPHIL NFR BLD AUTO: 0 %
ERYTHROCYTE [DISTWIDTH] IN BLOOD BY AUTOMATED COUNT: 20.7 % (ref 10–15)
FOLATE SERPL-MCNC: 16 NG/ML
GFR SERPL CREATININE-BSD FRML MDRD: 85 ML/MIN/1.73M2
GLUCOSE BLD-MCNC: 104 MG/DL (ref 70–125)
HCT VFR BLD AUTO: 27.7 % (ref 35–47)
HGB BLD-MCNC: 9.3 G/DL (ref 11.7–15.7)
IMM GRANULOCYTES # BLD: 0 10E3/UL
IMM GRANULOCYTES NFR BLD: 1 %
LYMPHOCYTES # BLD AUTO: 2.3 10E3/UL (ref 0.8–5.3)
LYMPHOCYTES NFR BLD AUTO: 36 %
MAGNESIUM SERPL-MCNC: 1.9 MG/DL (ref 1.8–2.6)
MCH RBC QN AUTO: 33.3 PG (ref 26.5–33)
MCHC RBC AUTO-ENTMCNC: 33.6 G/DL (ref 31.5–36.5)
MCV RBC AUTO: 99 FL (ref 78–100)
MONOCYTES # BLD AUTO: 0.6 10E3/UL (ref 0–1.3)
MONOCYTES NFR BLD AUTO: 9 %
NEUTROPHILS # BLD AUTO: 3.6 10E3/UL (ref 1.6–8.3)
NEUTROPHILS NFR BLD AUTO: 54 %
NRBC # BLD AUTO: 0 10E3/UL
NRBC BLD AUTO-RTO: 0 /100
PLATELET # BLD AUTO: 399 10E3/UL (ref 150–450)
POTASSIUM BLD-SCNC: 3.4 MMOL/L (ref 3.5–5)
POTASSIUM BLD-SCNC: 3.4 MMOL/L (ref 3.5–5)
POTASSIUM BLD-SCNC: 3.8 MMOL/L (ref 3.5–5)
PROT SERPL-MCNC: 5.6 G/DL (ref 6–8)
RBC # BLD AUTO: 2.79 10E6/UL (ref 3.8–5.2)
SODIUM SERPL-SCNC: 135 MMOL/L (ref 136–145)
TROPONIN I SERPL-MCNC: 0.22 NG/ML (ref 0–0.29)
WBC # BLD AUTO: 6.5 10E3/UL (ref 4–11)

## 2021-07-14 PROCEDURE — 120N000001 HC R&B MED SURG/OB

## 2021-07-14 PROCEDURE — 82550 ASSAY OF CK (CPK): CPT | Performed by: INTERNAL MEDICINE

## 2021-07-14 PROCEDURE — 99231 SBSQ HOSP IP/OBS SF/LOW 25: CPT | Performed by: NURSE PRACTITIONER

## 2021-07-14 PROCEDURE — 250N000013 HC RX MED GY IP 250 OP 250 PS 637: Performed by: INTERNAL MEDICINE

## 2021-07-14 PROCEDURE — 99223 1ST HOSP IP/OBS HIGH 75: CPT | Performed by: NURSE PRACTITIONER

## 2021-07-14 PROCEDURE — 83735 ASSAY OF MAGNESIUM: CPT | Performed by: INTERNAL MEDICINE

## 2021-07-14 PROCEDURE — 97535 SELF CARE MNGMENT TRAINING: CPT | Mod: GO

## 2021-07-14 PROCEDURE — 99233 SBSQ HOSP IP/OBS HIGH 50: CPT | Performed by: INTERNAL MEDICINE

## 2021-07-14 PROCEDURE — 85025 COMPLETE CBC W/AUTO DIFF WBC: CPT | Performed by: INTERNAL MEDICINE

## 2021-07-14 PROCEDURE — 250N000011 HC RX IP 250 OP 636: Performed by: INTERNAL MEDICINE

## 2021-07-14 PROCEDURE — 82306 VITAMIN D 25 HYDROXY: CPT | Performed by: INTERNAL MEDICINE

## 2021-07-14 PROCEDURE — 250N000013 HC RX MED GY IP 250 OP 250 PS 637: Performed by: PAIN MEDICINE

## 2021-07-14 PROCEDURE — 80053 COMPREHEN METABOLIC PANEL: CPT | Performed by: INTERNAL MEDICINE

## 2021-07-14 PROCEDURE — 82746 ASSAY OF FOLIC ACID SERUM: CPT | Performed by: INTERNAL MEDICINE

## 2021-07-14 PROCEDURE — 84484 ASSAY OF TROPONIN QUANT: CPT | Performed by: INTERNAL MEDICINE

## 2021-07-14 PROCEDURE — 99232 SBSQ HOSP IP/OBS MODERATE 35: CPT | Performed by: INTERNAL MEDICINE

## 2021-07-14 PROCEDURE — 97165 OT EVAL LOW COMPLEX 30 MIN: CPT | Mod: GO

## 2021-07-14 PROCEDURE — 99233 SBSQ HOSP IP/OBS HIGH 50: CPT | Performed by: PAIN MEDICINE

## 2021-07-14 PROCEDURE — 36415 COLL VENOUS BLD VENIPUNCTURE: CPT | Performed by: INTERNAL MEDICINE

## 2021-07-14 PROCEDURE — 99232 SBSQ HOSP IP/OBS MODERATE 35: CPT | Mod: 95 | Performed by: INTERNAL MEDICINE

## 2021-07-14 PROCEDURE — 84132 ASSAY OF SERUM POTASSIUM: CPT | Performed by: INTERNAL MEDICINE

## 2021-07-14 RX ORDER — METRONIDAZOLE 500 MG/1
500 TABLET ORAL 3 TIMES DAILY
Status: DISCONTINUED | OUTPATIENT
Start: 2021-07-14 | End: 2021-07-18 | Stop reason: HOSPADM

## 2021-07-14 RX ORDER — POTASSIUM CHLORIDE 1500 MG/1
40 TABLET, EXTENDED RELEASE ORAL ONCE
Status: COMPLETED | OUTPATIENT
Start: 2021-07-14 | End: 2021-07-14

## 2021-07-14 RX ORDER — HYDROXYZINE HYDROCHLORIDE 10 MG/1
10 TABLET, FILM COATED ORAL EVERY 4 HOURS PRN
Status: DISCONTINUED | OUTPATIENT
Start: 2021-07-14 | End: 2021-07-18 | Stop reason: HOSPADM

## 2021-07-14 RX ORDER — BUPRENORPHINE 2 MG/1
2 TABLET SUBLINGUAL 3 TIMES DAILY
Status: DISCONTINUED | OUTPATIENT
Start: 2021-07-14 | End: 2021-07-15

## 2021-07-14 RX ORDER — CIPROFLOXACIN 500 MG/1
500 TABLET, FILM COATED ORAL EVERY 12 HOURS SCHEDULED
Status: DISCONTINUED | OUTPATIENT
Start: 2021-07-14 | End: 2021-07-18 | Stop reason: HOSPADM

## 2021-07-14 RX ORDER — PHENOBARBITAL 32.4 MG/1
32.4 TABLET ORAL 2 TIMES DAILY
Status: COMPLETED | OUTPATIENT
Start: 2021-07-14 | End: 2021-07-16

## 2021-07-14 RX ORDER — GABAPENTIN 300 MG/1
300 CAPSULE ORAL 2 TIMES DAILY
Status: DISCONTINUED | OUTPATIENT
Start: 2021-07-14 | End: 2021-07-18 | Stop reason: HOSPADM

## 2021-07-14 RX ORDER — CIPROFLOXACIN 2 MG/ML
400 INJECTION, SOLUTION INTRAVENOUS EVERY 12 HOURS
Status: DISCONTINUED | OUTPATIENT
Start: 2021-07-14 | End: 2021-07-14

## 2021-07-14 RX ORDER — BUPRENORPHINE 2 MG/1
2 TABLET SUBLINGUAL 2 TIMES DAILY
Status: DISCONTINUED | OUTPATIENT
Start: 2021-07-14 | End: 2021-07-14

## 2021-07-14 RX ADMIN — MELATONIN TAB 3 MG 3 MG: 3 TAB at 00:06

## 2021-07-14 RX ADMIN — BUPRENORPHINE 2 MG: 2 TABLET SUBLINGUAL at 10:47

## 2021-07-14 RX ADMIN — METRONIDAZOLE 500 MG: 500 INJECTION, SOLUTION INTRAVENOUS at 13:42

## 2021-07-14 RX ADMIN — POTASSIUM CHLORIDE 40 MEQ: 20 TABLET, EXTENDED RELEASE ORAL at 10:47

## 2021-07-14 RX ADMIN — CIPROFLOXACIN 400 MG: 2 INJECTION, SOLUTION INTRAVENOUS at 10:51

## 2021-07-14 RX ADMIN — HYDROXYZINE HYDROCHLORIDE 10 MG: 10 TABLET ORAL at 16:13

## 2021-07-14 RX ADMIN — BUDESONIDE AND FORMOTEROL FUMARATE DIHYDRATE 2 PUFF: 80; 4.5 AEROSOL RESPIRATORY (INHALATION) at 09:29

## 2021-07-14 RX ADMIN — METRONIDAZOLE 500 MG: 500 INJECTION, SOLUTION INTRAVENOUS at 00:11

## 2021-07-14 RX ADMIN — GABAPENTIN 300 MG: 300 CAPSULE ORAL at 10:48

## 2021-07-14 RX ADMIN — ALBUTEROL SULFATE 2 PUFF: 90 AEROSOL, METERED RESPIRATORY (INHALATION) at 09:28

## 2021-07-14 RX ADMIN — ONDANSETRON 4 MG: 2 INJECTION INTRAMUSCULAR; INTRAVENOUS at 08:52

## 2021-07-14 RX ADMIN — AMLODIPINE BESYLATE 5 MG: 5 TABLET ORAL at 10:48

## 2021-07-14 RX ADMIN — CIPROFLOXACIN 500 MG: 500 TABLET, COATED ORAL at 20:13

## 2021-07-14 RX ADMIN — BUPRENORPHINE 2 MG: 2 TABLET SUBLINGUAL at 20:13

## 2021-07-14 RX ADMIN — VENLAFAXINE 37.5 MG: 37.5 TABLET ORAL at 10:46

## 2021-07-14 RX ADMIN — ONDANSETRON 4 MG: 2 INJECTION INTRAMUSCULAR; INTRAVENOUS at 19:12

## 2021-07-14 RX ADMIN — ENOXAPARIN SODIUM 40 MG: 100 INJECTION SUBCUTANEOUS at 17:37

## 2021-07-14 RX ADMIN — BUPRENORPHINE 2 MG: 2 TABLET SUBLINGUAL at 16:31

## 2021-07-14 RX ADMIN — GABAPENTIN 300 MG: 300 CAPSULE ORAL at 20:13

## 2021-07-14 RX ADMIN — METRONIDAZOLE 500 MG: 500 TABLET, FILM COATED ORAL at 20:13

## 2021-07-14 RX ADMIN — BUDESONIDE AND FORMOTEROL FUMARATE DIHYDRATE 2 PUFF: 80; 4.5 AEROSOL RESPIRATORY (INHALATION) at 20:13

## 2021-07-14 RX ADMIN — HYDROXYZINE HYDROCHLORIDE 10 MG: 10 TABLET ORAL at 10:04

## 2021-07-14 RX ADMIN — PHENOBARBITAL 32.4 MG: 32.4 TABLET ORAL at 23:37

## 2021-07-14 RX ADMIN — METRONIDAZOLE 500 MG: 500 TABLET, FILM COATED ORAL at 16:13

## 2021-07-14 ASSESSMENT — ACTIVITIES OF DAILY LIVING (ADL): PREVIOUS_RESPONSIBILITIES: MEAL PREP;HOUSEKEEPING;LAUNDRY;SHOPPING

## 2021-07-14 ASSESSMENT — MIFFLIN-ST. JEOR: SCORE: 1196.79

## 2021-07-14 NOTE — CONSULTS
INITIAL PSYCHIATRIC CONSULTATION  This note was created with the help of Dragon dictation system. Grammatical and typing errors are not intentional.                  REASON FOR REQUEST: Medical management of generalized anxiety      ASSESSMENT/RECOMMENDATIONS/PLAN :     Metabolic encephalopathy, likely in the context of opiates, medications, medical condition: Resolving.  Cognitive and behavioral changes due to above: Resolving.  Adjustment disorder with depression and anxiety in the setting of cancer diagnosis, stressors.  Mood disorder due to medical condition, chronic pain, opiate use.    Recommendations:  Patient is stable to discharge from psychiatric standpoint.  Patient does not need a one-to-one from psychiatric standpoint.   Gabapentin 300 mg twice a day  Venlafaxine 37.5 mg in the morning.  Clonazepam 1 mg twice a day as needed for anxiety and panic symptoms.  Patient is stable to discharge from psychiatric standpoint.  Follow-up with psychotherapy on an outpatient basis which she does on a regular basis.      MENTAL STATUS EXAMINATION:   General Appearance: Not in acute distress, resting comfortably in bed.    Behavior: Good eye contact, no bizarre ideations  Speech: Coherent.  Thought Process: Increased latency, clear.  Thought content: No evidence of hallucinations, delusions or paranoia.    Thought Formation: Associations are connected  Judgment: Fair  Insight : Fair  Attention : Adequate  Memory: Depressed, short-term memory recall is impaired compared to long-term memory recall  Fund Of Knowledge: Depressed  Affect: Neutral  Mood: Congruent  Alert : Awake  Suicidal ideation: Absent  Homicidal ideation: Absent  Orientation: X 2-3  Comprehension: Sufficient pertaining to current medical needs  Generative thought content: Adequate.  Spontaneous conversation  Language: Intact  Gait and Ambulation: Gait and ambulation at baseline.    Musculoskeletal: No tonal abnormalities      /58 (BP Location:  Left arm)   Pulse 71   Temp 98.3  F (36.8  C) (Oral)   Resp 18   Wt 65.1 kg (143 lb 9.6 oz)   SpO2 98%   BMI 24.65 kg/m        HISTORY OF PRESENT ILLNESS:   Presenting history to include: Per [facility] [place]/Specialists:   Jarrell Nicole is a 66 year old female PMH of stage 4 breast cancer, HTN, HLD, mood disorder, chronic pain syndrome, MOHINDER/agoraphobia, 89 to ED via EMS after patient's daughter called 911.  Daughter Rebekah, who did not hear from patient for couple days, came for wellness check, found patient very confused, walking around the house naked, and patient's house being in disarray with feces and urine over furniture and carpet.  In ED she was confused, was clear speech, and nonfocal neuro exam.  She still appears that way to me.  Patient is somewhat anxious.  Work-up in ED showed acute diverticulitis, acute renal failure, lactic acid acidosis, elevated BP, elevated troponin without ischemic EKG changes.  S/p dose of ciprofloxacin, Flagyl, 2 L NS.  She denies fever, chills, abdominal pain, nausea, vomiting, cough, headache, focal weakness, diaphoresis, arthralgia, myalgia.  She does not recall falling at home.     Upon assessment, patient was noted to be resting comfortably in bed, engaged in a coherent and spontaneous conversation.  She was aware of her hospital environment, but had difficulty recalling events precipitating this hospitalization.  She was able to tell me what medication she took at home to include but not limited to venlafaxine 37.5 mg in the morning which she takes for anxiety and clonazepam 1 mg twice daily and at x3 times a day for anxiety and panic symptoms.  She has been struggling with the anxiety, and coping since the diagnosis of cancer, now at stage IV.  She is followed in the cancer clinic.  And reports care compliance.  She has chronic pain which often is debilitating affecting her functionality.  She denies any hallucinations, delusions or paranoia.  She denies beth.  She denies  any thoughts of self-harm or suicidality.  Denies any suicidal intent behind unintentional overdose on medications.      Patient is marcell for safety.  She is future oriented, talking about her family.  She would like her pain to be managed effectively while in hospital.  Review of Systems:As per HPI. Remainders of 12 point review of systems negative.  Psychiatric ROS:  Change of Interest/Anhedonia:  No  Appetite/Weight Changes: No  Concentration Changes:No  Impaired Energy:No  Impaired Sleep:No  Anxiety/Panic:No  Tearfulness:No  Depression:No  Psychosis: No  Irritability:No  SI/VI/HI: No,No,No  Sherita: No            PFSH reviewed  and not pertinent to chief complaint/reason for visit  /58 (BP Location: Left arm)   Pulse 71   Temp 98.3  F (36.8  C) (Oral)   Resp 18   Wt 65.1 kg (143 lb 9.6 oz)   SpO2 98%   BMI 24.65 kg/m    Alcohol, Blood (mg/dL)   Date Value   07/12/2021 <10     @24HOURRESULTS@  Recent Results (from the past 72 hour(s))   SARS-COV2 (COVID-19) Virus RT-PCR    Collection Time: 07/12/21  7:44 PM    Specimen: Nasopharyngeal; Swab   Result Value Ref Range    SARS CoV2 PCR Negative Negative   ECG 12-LEAD WITH MUSE ([facility])    Collection Time: 07/12/21  7:46 PM   Result Value Ref Range    Systolic Blood Pressure  mmHg    Diastolic Blood Pressure  mmHg    Ventricular Rate 94 BPM    Atrial Rate 94 BPM    NY Interval 86 ms    QRS Duration 66 ms     ms    QTc 470 ms    P Axis  degrees    R AXIS -23 degrees    T Axis 30 degrees    Interpretation ECG Click View Image link to view waveform and result    Comprehensive metabolic panel    Collection Time: 07/12/21  7:59 PM   Result Value Ref Range    Sodium 143 136 - 145 mmol/L    Potassium 3.7 3.5 - 5.0 mmol/L    Chloride 106 98 - 107 mmol/L    Carbon Dioxide (CO2) 17 (L) 22 - 31 mmol/L    Anion Gap 20 (H) 5 - 18 mmol/L    Urea Nitrogen 39 (H) 8 - 22 mg/dL    Creatinine 2.45 (H) 0.60 - 1.10 mg/dL    Calcium 9.5 8.5 - 10.5 mg/dL    Glucose 132  (H) 70 - 125 mg/dL    Alkaline Phosphatase 160 (H) 45 - 120 U/L    AST 31 0 - 40 U/L    ALT 21 0 - 45 U/L    Protein Total 8.0 6.0 - 8.0 g/dL    Albumin 4.4 3.5 - 5.0 g/dL    Bilirubin Total 0.7 0.0 - 1.0 mg/dL    GFR Estimate 20 (L) >60 mL/min/1.73m2   Troponin I    Collection Time: 07/12/21  7:59 PM   Result Value Ref Range    Troponin I 1.42 (HH) 0.00 - 0.29 ng/mL   Lipase    Collection Time: 07/12/21  7:59 PM   Result Value Ref Range    Lipase <9 0 - 52 U/L   Magnesium    Collection Time: 07/12/21  7:59 PM   Result Value Ref Range    Magnesium 2.0 1.8 - 2.6 mg/dL   Ammonia    Collection Time: 07/12/21  7:59 PM   Result Value Ref Range    Ammonia 32 11 - 35 umol/L   TSH with free T4 reflex    Collection Time: 07/12/21  7:59 PM   Result Value Ref Range    TSH 0.96 0.30 - 5.00 uIU/mL   Ethyl Alcohol Level    Collection Time: 07/12/21  7:59 PM   Result Value Ref Range    Alcohol, Blood <10 None detected mg/dL   Acetaminophen level    Collection Time: 07/12/21  7:59 PM   Result Value Ref Range    Acetaminophen <3.0 (L) 10.0 - 20.0 ug/mL   Salicylate level    Collection Time: 07/12/21  7:59 PM   Result Value Ref Range    Salicylate <8 2 - 25 mg/dL   Blood Culture Arm, Left    Collection Time: 07/12/21  7:59 PM    Specimen: Arm, Left; Blood   Result Value Ref Range    Culture No growth after 12 hours    Blood Culture Arm, Left    Collection Time: 07/12/21  7:59 PM    Specimen: Arm, Left; Blood   Result Value Ref Range    Culture No growth after 12 hours    Lactic acid whole blood    Collection Time: 07/12/21  7:59 PM   Result Value Ref Range    Lactic Acid 5.0 (HH) 0.7 - 2.0 mmol/L   CBC with platelets and differential    Collection Time: 07/12/21  7:59 PM   Result Value Ref Range    WBC Count 8.7 4.0 - 11.0 10e3/uL    RBC Count 3.90 3.80 - 5.20 10e6/uL    Hemoglobin 12.9 11.7 - 15.7 g/dL    Hematocrit 37.4 35.0 - 47.0 %    MCV 96 78 - 100 fL    MCH 33.1 (H) 26.5 - 33.0 pg    MCHC 34.5 31.5 - 36.5 g/dL    RDW 20.5 (H)  10.0 - 15.0 %    Platelet Count 565 (H) 150 - 450 10e3/uL   Manual Differential    Collection Time: 07/12/21  7:59 PM   Result Value Ref Range    % Neutrophils 81 %    % Lymphocytes 17 %    % Monocytes 2 %    % Eosinophils 0 %    % Basophils 0 %    Absolute Neutrophils 7.0 1.6 - 8.3 10e3/uL    Absolute Lymphocytes 1.5 0.8 - 5.3 10e3/uL    Absolute Monocytes 0.2 0.0 - 1.3 10e3/uL    Absolute Eosinophils 0.0 0.0 - 0.7 10e3/uL    Absolute Basophils 0.0 0.0 - 0.2 10e3/uL    RBC Morphology Confirmed RBC Indices     Platelet Assessment  Automated Count Confirmed. Platelet morphology is normal.     Automated Count Confirmed. Platelet morphology is normal.   CK total    Collection Time: 07/12/21  7:59 PM   Result Value Ref Range     (H) 30 - 190 U/L   UA with Microscopic reflex to Culture    Collection Time: 07/12/21  8:06 PM    Specimen: Urine, Catheter   Result Value Ref Range    Color Urine Yellow Colorless, Straw, Light Yellow, Yellow    Appearance Urine Slightly Cloudy (A) Clear    Glucose Urine 30  (A) Negative mg/dL    Bilirubin Urine Negative Negative    Ketones Urine Trace (A) Negative mg/dL    Specific Gravity Urine 1.025 1.001 - 1.030    Blood Urine 0.2 mg/dL (A) Negative    pH Urine 5.5 5.0 - 7.0    Protein Albumin Urine 100  (A) Negative mg/dL    Urobilinogen Urine <2.0 <2.0 mg/dL    Nitrite Urine Negative Negative    Leukocyte Esterase Urine Negative Negative    Bacteria Urine Few (A) None Seen /HPF    Mucus Urine Present (A) None Seen /LPF    RBC Urine 4 (H) <=2 /HPF    WBC Urine 7 (H) <=5 /HPF    Hyaline Casts Urine 163 (H) <=2 /LPF   ECG 12-LEAD WITH MUSE ([facility])    Collection Time: 07/12/21 10:52 PM   Result Value Ref Range    Systolic Blood Pressure  mmHg    Diastolic Blood Pressure  mmHg    Ventricular Rate 88 BPM    Atrial Rate 89 BPM    HI Interval 132 ms    QRS Duration 68 ms     ms    QTc 476 ms    P Axis 69 degrees    R AXIS -40 degrees    T Axis 48 degrees    Interpretation ECG Click View  Image link to view waveform and result    Drugs of Abuse 1+ Panel, Urine ( [place] Only)    Collection Time: 07/12/21 11:06 PM   Result Value Ref Range    Amphetamines Urine Screen Negative Screen Negative    Benzodiazepines Urine Screen Negative Screen Negative    Opiates Urine Screen Positive (A) Screen Negative    PCP Urine Screen Negative Screen Negative    Cannabinoids Urine Screen Positive (A) Screen Negative    Barbiturates Urine Screen Negative Screen Negative    Cocaine Urine Screen Negative Screen Negative    Methadone Urine Screen Negative Screen Negative    Oxycodone Urine Screen Negative Screen Negative    Creatinine Urine mg/dL 323 mg/dL   Heparin Unfractionated Anti Xa Level    Collection Time: 07/13/21  6:58 AM   Result Value Ref Range    Anti Xa Unfractionated Heparin 0.29 For Reference Range, See Comment IU/mL   Troponin I    Collection Time: 07/13/21 10:33 AM   Result Value Ref Range    Troponin I 0.66 (HH) 0.00 - 0.29 ng/mL   Comprehensive metabolic panel    Collection Time: 07/13/21 10:33 AM   Result Value Ref Range    Sodium 140 136 - 145 mmol/L    Potassium 2.9 (L) 3.5 - 5.0 mmol/L    Chloride 109 (H) 98 - 107 mmol/L    Carbon Dioxide (CO2) 18 (L) 22 - 31 mmol/L    Anion Gap 13 5 - 18 mmol/L    Urea Nitrogen 37 (H) 8 - 22 mg/dL    Creatinine 1.16 (H) 0.60 - 1.10 mg/dL    Calcium 8.1 (L) 8.5 - 10.5 mg/dL    Glucose 143 (H) 70 - 125 mg/dL    Alkaline Phosphatase 119 45 - 120 U/L    AST 31 0 - 40 U/L    ALT 20 0 - 45 U/L    Protein Total 6.8 6.0 - 8.0 g/dL    Albumin 3.9 3.5 - 5.0 g/dL    Bilirubin Total 0.6 0.0 - 1.0 mg/dL    GFR Estimate 50 (L) >60 mL/min/1.73m2   CK total    Collection Time: 07/13/21 10:33 AM   Result Value Ref Range     (H) 30 - 190 U/L   Procalcitonin    Collection Time: 07/13/21 10:33 AM   Result Value Ref Range    Procalcitonin 0.06 0.00 - 0.49 ng/mL   Lactic acid whole blood    Collection Time: 07/13/21 10:33 AM   Result Value Ref Range    Lactic Acid 1.4 0.7 - 2.0  mmol/L   Vitamin B12    Collection Time: 07/13/21 10:33 AM   Result Value Ref Range    Vitamin B12 374 213 - 816 pg/mL   B-Type Natriuretic Peptide ([facility] Only)    Collection Time: 07/13/21 10:34 AM   Result Value Ref Range     (H) 0 - 106 pg/mL   ECG 12-LEAD WITH MUSE ([facility])    Collection Time: 07/13/21 11:32 AM   Result Value Ref Range    Systolic Blood Pressure  mmHg    Diastolic Blood Pressure  mmHg    Ventricular Rate 72 BPM    Atrial Rate 72 BPM    UT Interval 134 ms    QRS Duration 70 ms     ms    QTc 493 ms    P Axis 71 degrees    R AXIS -23 degrees    T Axis 12 degrees    Interpretation ECG Click View Image link to view waveform and result    Heparin Unfractionated Anti Xa Level    Collection Time: 07/13/21  2:54 PM   Result Value Ref Range    Anti Xa Unfractionated Heparin 0.24 For Reference Range, See Comment IU/mL   Troponin I    Collection Time: 07/13/21  4:38 PM   Result Value Ref Range    Troponin I 0.42 (HH) 0.00 - 0.29 ng/mL   Magnesium    Collection Time: 07/13/21  4:38 PM   Result Value Ref Range    Magnesium 1.9 1.8 - 2.6 mg/dL   Potassium    Collection Time: 07/13/21  7:31 PM   Result Value Ref Range    Potassium 2.9 (L) 3.5 - 5.0 mmol/L   CBC with platelets and differential    Collection Time: 07/14/21  3:11 AM   Result Value Ref Range    WBC Count 6.5 4.0 - 11.0 10e3/uL    RBC Count 2.79 (L) 3.80 - 5.20 10e6/uL    Hemoglobin 9.3 (L) 11.7 - 15.7 g/dL    Hematocrit 27.7 (L) 35.0 - 47.0 %    MCV 99 78 - 100 fL    MCH 33.3 (H) 26.5 - 33.0 pg    MCHC 33.6 31.5 - 36.5 g/dL    RDW 20.7 (H) 10.0 - 15.0 %    Platelet Count 399 150 - 450 10e3/uL    % Neutrophils 54 %    % Lymphocytes 36 %    % Monocytes 9 %    % Eosinophils 0 %    % Basophils 0 %    % Immature Granulocytes 1 %    NRBCs per 100 WBC 0 <1 /100    Absolute Neutrophils 3.6 1.6 - 8.3 10e3/uL    Absolute Lymphocytes 2.3 0.8 - 5.3 10e3/uL    Absolute Monocytes 0.6 0.0 - 1.3 10e3/uL    Absolute Eosinophils 0.0 0.0 - 0.7 10e3/uL     Absolute Basophils 0.0 0.0 - 0.2 10e3/uL    Absolute Immature Granulocytes 0.0 <=0.0 10e3/uL    Absolute NRBCs 0.0 10e3/uL   Potassium    Collection Time: 07/14/21  3:11 AM   Result Value Ref Range    Potassium 3.4 (L) 3.5 - 5.0 mmol/L   Comprehensive metabolic panel    Collection Time: 07/14/21  3:11 AM   Result Value Ref Range    Sodium 135 (L) 136 - 145 mmol/L    Potassium 3.4 (L) 3.5 - 5.0 mmol/L    Chloride 109 (H) 98 - 107 mmol/L    Carbon Dioxide (CO2) 16 (L) 22 - 31 mmol/L    Anion Gap 10 5 - 18 mmol/L    Urea Nitrogen 21 8 - 22 mg/dL    Creatinine 0.74 0.60 - 1.10 mg/dL    Calcium 7.1 (L) 8.5 - 10.5 mg/dL    Glucose 104 70 - 125 mg/dL    Alkaline Phosphatase 91 45 - 120 U/L    AST 26 0 - 40 U/L    ALT 18 0 - 45 U/L    Protein Total 5.6 (L) 6.0 - 8.0 g/dL    Albumin 3.2 (L) 3.5 - 5.0 g/dL    Bilirubin Total 0.5 0.0 - 1.0 mg/dL    GFR Estimate 85 >60 mL/min/1.73m2   CK total    Collection Time: 07/14/21  3:11 AM   Result Value Ref Range     (H) 30 - 190 U/L   Magnesium    Collection Time: 07/14/21  3:11 AM   Result Value Ref Range    Magnesium 1.9 1.8 - 2.6 mg/dL       PMH:   Past Medical History:   Diagnosis Date     Asthma      Breast cancer (H) 02/24/2021    mets to bone, Stage 4     Essential hypertension      Panic disorder without agoraphobia 01/12/2012     Post traumatic stress disorder 12/30/2010     Vitamin D deficiency 03/06/2010           Current Medications:Scheduled Meds:    amLODIPine  5 mg Oral Daily     budesonide-formoterol  2 puff Inhalation BID     buprenorphine  2 mg Sublingual BID     ciprofloxacin  400 mg Intravenous Q24H     gabapentin  300 mg Oral BID     metroNIDAZOLE  500 mg Intravenous Q12H     potassium chloride  40 mEq Oral Once     sodium chloride (PF)  3 mL Intracatheter Q8H     venlafaxine  37.5 mg Oral QAM     Continuous Infusions:  PRN Meds:.albuterol, clonazePAM, hydrALAZINE, hydrOXYzine, lidocaine 4%, lidocaine (buffered or not buffered), melatonin, naloxone **OR**  naloxone **OR** naloxone **OR** naloxone, nitroGLYcerin, OLANZapine, ondansetron **OR** ondansetron, polyethylene glycol, prochlorperazine **OR** prochlorperazine **OR** prochlorperazine, propranolol, sodium chloride (PF)                Family History: PERSONALLY REVIEWED.  Family History   Adopted: Yes   Problem Relation Age of Onset     No Known Problems Daughter      No Known Problems Daughter      No Known Problems Daughter      No Known Problems Son      Pertinent Family hx not pertinent to Chief Complaint or reason for visit.     Social History:  PERSONALLY REVIEWED.  Social History     Socioeconomic History     Marital status:      Spouse name: Not on file     Number of children: 4     Years of education: Not on file     Highest education level: Not on file   Occupational History     Not on file   Tobacco Use     Smoking status: Former Smoker     Packs/day: 0.50     Years: 30.00     Pack years: 15.00     Types: Cigarettes     Quit date: 2019     Years since quittin.5     Smokeless tobacco: Never Used   Substance and Sexual Activity     Alcohol use: Yes     Drug use: Yes     Types: Marijuana     Sexual activity: Not on file   Other Topics Concern     Not on file   Social History Narrative     Not on file     Social Determinants of Health     Financial Resource Strain:      Difficulty of Paying Living Expenses:    Food Insecurity:      Worried About Running Out of Food in the Last Year:      Ran Out of Food in the Last Year:    Transportation Needs:      Lack of Transportation (Medical):      Lack of Transportation (Non-Medical):    Physical Activity:      Days of Exercise per Week:      Minutes of Exercise per Session:    Stress:      Feeling of Stress :    Social Connections:      Frequency of Communication with Friends and Family:      Frequency of Social Gatherings with Friends and Family:      Attends Zoroastrianism Services:      Active Member of Clubs or Organizations:      Attends Club or  Organization Meetings:      Marital Status:    Intimate Partner Violence:      Fear of Current or Ex-Partner:      Emotionally Abused:      Physically Abused:      Sexually Abused:     not pertinent to Chief Complaint or reason for visit.             Allergies as of 06/01/2014 Reviewed     Review of Systems:As per HPI. Remainders of 12 point review of systems negative.    Review of Pertinent Laboratory:      PERSONALLY REVIEWED.    Physical Exam: Temp:  [97.7  F (36.5  C)-99.3  F (37.4  C)] 98.3  F (36.8  C)  Pulse:  [57-98] 71  Resp:  [18-20] 18  BP: (121-199)/(58-91) 121/58  SpO2:  [98 %-100 %] 98 %   Vitals: reviewed in chart     Physical exam as per medical team: reviewed in chart      diagnoses, risk and benefits of medications discussed with staff. Care coordination with care management team.   Thank you for this consultation.       Erika Cobian; NP  [facility]        This note was created with the help of Dragon dictation system. Grammatical and typing errors are not intentional.

## 2021-07-14 NOTE — PLAN OF CARE
Problem: Pain Chronic (Persistent)  Goal: Acceptable Pain Control and Functional Ability  Outcome: No Change\    Pt c/o pain in bones and back and Buprenorphine not effective.  Talked to hospitalist who said to just use the buprenorphine.  Paged resident who came to talk to the patient and ordered dilaudid one time dose.  K+ 2.9 give one dose and recheck at 1300. Pt has a lot of anxiety and concern about her pain level.  Clonopin was given and helpful for a couple of hours.

## 2021-07-14 NOTE — SIGNIFICANT EVENT
Significant Event Note    Time of event: 10:00 PM July 13, 2021    Description of event:  Pt wants her pain medications for her chronic pain. Pt was started on buprenorphine, and she does not think this is effective. She is adamant that she gets her pain meds, as she has stage 4 cancer. She called the on call clinic oncologist from her hospital bed for assistance with the matter, and he had told her that she should discuss with the team caring for her at the hospital. She is quite agitated and anxious, complaining of 10/10 pain. At about 1pm today 0.5 of dilaudid was given for pain, and she does not have any other PRNs     Plan:  Chronic pain  1 time dose of 0.5mg IV dilaudid ordered.      Opioid dependence  Likely contributing to confusion that brought her into the hospital initially. Would likely benefit from pain management team.   -Continue buprenorphine    Discussed with: bedside nurse    Albaro Martin, DO

## 2021-07-14 NOTE — CONSULTS
Care Management Follow Up    Length of Stay (days): 2    Expected Discharge Date:       Concerns to be Addressed:       Patient plan of care discussed at interdisciplinary rounds: Yes    Anticipated Discharge Disposition:  Return home          Additional Information:  Chart Reviewed. Spoke with EW worker Emperatriz. Pt has no formal services right now. Daughters assist daily as needed. If patient is will to have RN help with med mgmt, worker would advise order.     Pt is from home alone with supportive family members. Family helps with IADLs primarily. Pt would not want to live in LTC. Goal to return home with family transport. CM following .      SKY Martinez

## 2021-07-14 NOTE — PROGRESS NOTES
Assessment:    Principal Problem:    Encephalopathy  Active Problems:    Diverticulitis of large intestine without perforation or abscess without bleeding    Opioid dependence with intoxication delirium (H)    Malignant neoplasm of lower-outer quadrant of right breast of female, estrogen receptor positive (H)    Dehydration    Acute kidney injury (H)    Chronic pain due to neoplasm    Hypokalemia    Non-traumatic rhabdomyolysis    Essential hypertension    Pain medication agreement    Panic disorder without agoraphobia    Post traumatic stress disorder    Elevated troponin level not due to acute coronary syndrome     LOS: 2 days     Recommendations:    1. Continue potassium and magnesium replacement.  2. I will defer to the hospitalist with regard to the evaluation of her newly diagnosed anemia.  3. Discontinue telemetry monitor and transfer to general medical [facility].    Discharge planning:    [facility] is signing off; please call with any questions.    No follow up is planned at our clinic.    Subjective:    Jarrell feels well today.  She states she is never troubled by chest discomfort, orthopnea, palpitations, syncope or lower extremity edema.  She does become breathless from time to time due to chronic bronchitis.    We discussed the reason for the consultation request and the findings of her cardiovascular examination, EKG, and echocardiogram.  She agreed with my advice that no further evaluation was warranted at this time.    Objective:     Vital signs in last 24 hours:  Temp:  [97.7  F (36.5  C)-98.3  F (36.8  C)] 98.3  F (36.8  C)  Pulse:  [57-98] 69  Resp:  [18-20] 18  BP: (121-199)/(58-91) 139/65  SpO2:  [98 %-99 %] 98 %  Vitals:    07/12/21 2145 07/13/21 0953 07/14/21 0323   Weight: 68 kg (150 lb) 63.8 kg (140 lb 11.2 oz) 65.1 kg (143 lb 9.6 oz)      Weight change: -4.218 kg (-9 lb 4.8 oz)     Physical Exam:    General: alert, comfortable  Neck: jugular venous pressure is less than 5  cm; there are no cervical bruits  Chest: clear to auscultation  Cor: no murmurs, rubs, or gallops; the carotid, femoral and pedal pulses are intact  Ext: no edema    Cardiographics:    Telemetry monitoring demonstrates sinus rhythm per my personal review.    Imaging:    Echocardiogram Complete    Result Date: 2021  [id number]^MARY^[place] [address]  Name: DANI HARRISON MRN: [id number] : 1955 Study Date: 2021 02:25 PM Age: 66 yrs Gender: Female Patient Location: [facility] Reason For Study: CAD Ordering Physician: BRIAN HERNANDEZ Referring Physician: BRIAN HERNANDEZ Performed By: Leeanne Black  BSA: 1.7 m2 Height: 64 in Weight: 140 lb HR: 74 ______________________________________________________________________________ ______________________________________________________________________________ Interpretation Summary  1. Normal left ventricular size and systolic performance with a visually estimated ejection fraction of 60-65%. 2. No significant valvular heart disease is identified on this study. 3. Normal right ventricular size and systolic performance. ______________________________________________________________________________ Left ventricle: Normal left ventricular size and systolic performance with a visually estimated ejection fraction of 60-65%. There is normal regional wall motion. Left ventricular wall thickness is normal.  Assessment of LV Diastolic Function: The cumulative findings suggest normal diastolic filling [The septal e' velocity is > 7 cm/s & lateral e' velocity is > 10 cm/s. The average E/e' is < 14. The TR velocity cannot be determined due to insufficient tricuspid insufficiency signal. Left atrial volume index is greater than 34 mL/mÂ ].  Right ventricle: Normal right ventricular size and systolic performance.  Left atrium: The left atrium is of normal size.  Right atrium: The right atrium is of normal  size.  IVC: The IVC is of normal caliber.  Aortic valve: The aortic valve is not well visualized, but suspected to be comprised of three cusps. No significant aortic stenosis or aortic insufficiency is detected on this study.  Mitral valve: The mitral valve appears morphologically normal. There is mild mitral insufficiency.  Tricuspid valve: The tricuspid valve is grossly morphologically normal. There is trace tricuspid insufficiency.  Pulmonic valve: The pulmonic valve is grossly morphologically normal.  Thoracic aorta: The aortic root and proximal ascending aorta are of normal dimension.  Pericardium: There is no significant pericardial effusion.  ______________________________________________________________________________ ______________________________________________________________________________ MMode/2D Measurements & Calculations IVSd: 0.96 cm LVIDd: 4.3 cm LVIDs: 2.8 cm LVPWd: 0.90 cm FS: 34.8 % LV mass(C)d: 125.8 grams LV mass(C)dI: 74.8 grams/m2 Ao root diam: 2.7 cm LA dimension: 3.1 cm asc Aorta Diam: 3.1 cm LA/Ao: 1.1 LVOT diam: 2.0 cm LVOT area: 3.1 cm2 LA Volume Indexed (AL/bp): 24.3 ml/m2  Time Measurements MM HR: 73.0 BPM  Doppler Measurements & Calculations MV E max aram: 60.0 cm/sec MV A max aram: 83.9 cm/sec MV E/A: 0.72 MV dec slope: 200.0 cm/sec2 MV dec time: 0.30 sec Ao V2 max: 142.0 cm/sec Ao max P.0 mmHg Ao V2 mean: 91.9 cm/sec Ao mean P.0 mmHg Ao V2 VTI: 27.1 cm ANITA(I,D): 3.0 cm2 ANITA(V,D): 2.9 cm2 LV V1 max P.7 mmHg LV V1 max: 129.0 cm/sec LV V1 VTI: 26.1 cm SV(LVOT): 82.0 ml SI(LVOT): 48.8 ml/m2 PA acc time: 0.13 sec ANITA Index (cm2/m2): 1.8 E/E' av.6 Lateral E/e': 5.8 Medial E/e': 7.5  ______________________________________________________________________________ Report approved by: Kaia Doyle 2021 04:04 PM       XR Chest Port 1 View    Result Date: 2021  EXAM: XR CHEST PORT 1 VIEW LOCATION: [facility] DATE/TIME: 2021 8:14 PM  INDICATION: sob COMPARISON: Chest radiograph dated 2/23/2021     IMPRESSION: Negative chest.    CT Abdomen Pelvis w/o Contrast    Result Date: 7/12/2021  EXAM: CT ABDOMEN PELVIS W/O CONTRAST LOCATION: [facility] DATE/TIME: 7/12/2021 10:13 PM INDICATION: Abdominal pain. COMPARISON: 3/21/2021 TECHNIQUE: CT scan of the abdomen and pelvis was performed without IV contrast. Multiplanar reformats were obtained. Dose reduction techniques were used. CONTRAST: None. FINDINGS: LOWER CHEST: Normal. HEPATOBILIARY: Cholecystectomy. PANCREAS: Normal. SPLEEN: Normal. ADRENAL GLANDS: Normal. KIDNEYS/BLADDER: Calcifications near renal phill. The vascular and unchanged. No hydronephrosis. BOWEL: Diverticulosis of the colon. There is very mild pericolonic soft tissue stranding adjacent to the low sigmoid colon that appears new suggests mild acute diverticulitis. There is no abscess, free perforation or obstruction. LYMPH NODES: Normal. VASCULATURE: Moderate atherosclerotic plaque. PELVIC ORGANS: Hysterectomy. No adnexal lesions. MUSCULOSKELETAL: Mixed sclerotic bone lesions most pronounced within the L4 metastases.     IMPRESSION: 1.  Minimal new soft tissue stranding about the distal sigmoid colon consistent with mild diverticulitis. 2.  Sclerotic bony metastases unchanged.     CT Head w/o Contrast    Result Date: 7/12/2021  EXAM: CT HEAD W/O CONTRAST LOCATION: [facility] DATE/TIME: 7/12/2021 10:13 PM INDICATION: Confusion COMPARISON: 03/21/2021 TECHNIQUE: Routine CT Head without IV contrast. Multiplanar reformats. Dose reduction techniques were used. FINDINGS: INTRACRANIAL CONTENTS: No intracranial hemorrhage, extraaxial collection, or mass effect.  No CT evidence of acute infarct. Normal parenchymal attenuation. Normal ventricles and sulci. VISUALIZED ORBITS/SINUSES/MASTOIDS: No intraorbital abnormality. No paranasal sinus mucosal disease. No middle ear or mastoid effusion. BONES/SOFT TISSUES: No acute  abnormality.     IMPRESSION: 1.  Stable exam. No acute intracranial abnormality.       Lab Results:    Troponin I (ng/mL)   Date Value   07/14/2021 0.22   07/13/2021 0.42 (HH)   07/13/2021 0.66 (HH)     BNP (pg/mL)   Date Value   07/13/2021 364 (H)   02/22/2021 39     Hemoglobin (g/dL)   Date Value   07/14/2021 9.3 (L)   07/12/2021 12.9   03/21/2021 12.2   02/22/2021 11.0 (L)     INR (no units)   Date Value   03/21/2021 1.05       Recent Results (from the past 24 hour(s))   Heparin Unfractionated Anti Xa Level    Collection Time: 07/13/21  2:54 PM   Result Value Ref Range    Anti Xa Unfractionated Heparin 0.24 For Reference Range, See Comment IU/mL   Troponin I    Collection Time: 07/13/21  4:38 PM   Result Value Ref Range    Troponin I 0.42 (HH) 0.00 - 0.29 ng/mL   Magnesium    Collection Time: 07/13/21  4:38 PM   Result Value Ref Range    Magnesium 1.9 1.8 - 2.6 mg/dL   Potassium    Collection Time: 07/13/21  7:31 PM   Result Value Ref Range    Potassium 2.9 (L) 3.5 - 5.0 mmol/L   CBC with platelets and differential    Collection Time: 07/14/21  3:11 AM   Result Value Ref Range    WBC Count 6.5 4.0 - 11.0 10e3/uL    RBC Count 2.79 (L) 3.80 - 5.20 10e6/uL    Hemoglobin 9.3 (L) 11.7 - 15.7 g/dL    Hematocrit 27.7 (L) 35.0 - 47.0 %    MCV 99 78 - 100 fL    MCH 33.3 (H) 26.5 - 33.0 pg    MCHC 33.6 31.5 - 36.5 g/dL    RDW 20.7 (H) 10.0 - 15.0 %    Platelet Count 399 150 - 450 10e3/uL    % Neutrophils 54 %    % Lymphocytes 36 %    % Monocytes 9 %    % Eosinophils 0 %    % Basophils 0 %    % Immature Granulocytes 1 %    NRBCs per 100 WBC 0 <1 /100    Absolute Neutrophils 3.6 1.6 - 8.3 10e3/uL    Absolute Lymphocytes 2.3 0.8 - 5.3 10e3/uL    Absolute Monocytes 0.6 0.0 - 1.3 10e3/uL    Absolute Eosinophils 0.0 0.0 - 0.7 10e3/uL    Absolute Basophils 0.0 0.0 - 0.2 10e3/uL    Absolute Immature Granulocytes 0.0 <=0.0 10e3/uL    Absolute NRBCs 0.0 10e3/uL   Potassium    Collection Time: 07/14/21  3:11 AM   Result Value Ref  Range    Potassium 3.4 (L) 3.5 - 5.0 mmol/L   Comprehensive metabolic panel    Collection Time: 07/14/21  3:11 AM   Result Value Ref Range    Sodium 135 (L) 136 - 145 mmol/L    Potassium 3.4 (L) 3.5 - 5.0 mmol/L    Chloride 109 (H) 98 - 107 mmol/L    Carbon Dioxide (CO2) 16 (L) 22 - 31 mmol/L    Anion Gap 10 5 - 18 mmol/L    Urea Nitrogen 21 8 - 22 mg/dL    Creatinine 0.74 0.60 - 1.10 mg/dL    Calcium 7.1 (L) 8.5 - 10.5 mg/dL    Glucose 104 70 - 125 mg/dL    Alkaline Phosphatase 91 45 - 120 U/L    AST 26 0 - 40 U/L    ALT 18 0 - 45 U/L    Protein Total 5.6 (L) 6.0 - 8.0 g/dL    Albumin 3.2 (L) 3.5 - 5.0 g/dL    Bilirubin Total 0.5 0.0 - 1.0 mg/dL    GFR Estimate 85 >60 mL/min/1.73m2   CK total    Collection Time: 07/14/21  3:11 AM   Result Value Ref Range     (H) 30 - 190 U/L   Magnesium    Collection Time: 07/14/21  3:11 AM   Result Value Ref Range    Magnesium 1.9 1.8 - 2.6 mg/dL   Troponin I    Collection Time: 07/14/21  3:11 AM   Result Value Ref Range    Troponin I 0.22 0.00 - 0.29 ng/mL           Much or all of the text in this note was generated through the use of the Dragon Dictate voice-to-text software. Errors in spelling or words which seem out of context are unintentional. Sound alike errors, in particular, may have escaped editing.

## 2021-07-14 NOTE — SIGNIFICANT EVENT
Significant Event Note    Asked by RN to remove 1;1 sitter   Pt chart reviewed - admitted for confusion   Primary team to decide this in am     Asked by RN to order pain meds   Pt chart reviewed- already seen by pain team for this   And they ordered subutex for tonight - RN to give this   Pt Allergic to tylenol and cant use nsaids due to renal dx

## 2021-07-14 NOTE — CONSULTS
PALLIATIVE CARE CONSULT NOTE     Patient Name: Jarrell Nicole  Date of Admission: 7/12/2021   Requesting Clinician / Team: Dr. Nielson  Reason for consult: Stage 4 Breast Cancer      Impressions & Recommendations:  Goals of care per patient    Continue current treatments/therapies.  Restorative    Continue her PTA chemo treatments when able    To discharge home - she would never want to end up in a nursing home/LTC    Code Status: Full Code - discussed today and she is considering changing this    Symptom management    Encephalopathy, toxic metabolic d/t unintentional opiate/benzo overdose in setting of infection and RASHAWN.  CT head neg brain mets.  TSH & Vit B12 neg.  Improving.    o OT eval - rec home with family & PCA support.  Needs assistance with meds  o Pysch consulted  o Folate pending     Pain, chronic pain syndrome, cancer related, known past addiction  o Subutex, gabapentin per pain team     Nausea w/o emesis  o PRN Zofran and Compazine.  Patient notes Zofran works well for her    Anxiety/Depression  o PTA clonazepam and Venlafaxine    Psychosocial/spiritual support    Declines spiritual support    Patient welcomes Palliative SW providing emotional support      Advanced Care Planning    Patient has a completed Health Care Directive: NO    Surrogate decision maker per patient: kathy Flores([phone])    Patient interested in filling out a health care directive - I will give her packet today    ----------------------------------------------------------------------------------------------------------------  Admitting Diagnosis: altered mental status      History of Present Illness:  66 yoF with PMH of stage 4 breast cancer(dx 3/2021) with bony metastases, HTN, HLD, mood disorder, chronic pain syndrome, MOHINDER/agoraphobia admitted 7/12 with AMS.  ED work-up showed acute diverticulitis, RASHAWN, hypertension, mild rhabdo.  Cardiology consulted for elevated troponin, nonischemic EKG.    Previously followed by  Dr. Foster from Oncology, now follows with Dr. Blackman    Consult specialties this admission: Addiction medicine, Pain management, Palliative, Cardiology, Psychiatry      ----------------------------------------------------------------------------------------------------------------  Summary of Palliative Encounter:  I visited with patient at bedside.  I discussed the reason for Palliative Care Referral and our role in symptom management, patient family communication, understanding their choices for medical treatment, and providing  guidance in making difficult decisions in the framework of focusing on patient comfort and quality of life.  She is feeling overwhelmed and frustrated.  She wants to make sure she can restart her PTA chemo treatments.  She is hoping to discharge back home soon.  We talked about dilaudid and buprenorphine and the differences of those meds.  Explained how pain clinic will continue to follow her as an outpatient.  We talked about her goals and values.  She knows her cancer is terminal and is hoping her current treatments will buy her more time.  We talked about what quality of life is for her and she was clear that living in a nursing home or LTC would not be QOL.  We discussed code status.  She mentioned she would not want a breathing tube.  When talking about cardiac resuscitation she was not sure.  We then talked about filling out a health care directive which she was willing to do.  I explained I will give her the paperwork today so she can think about these questions a little more.  We agreed to leave her full code for now but will discuss more tomorrow.  She was clear that her surrogate decision maker would be her daughter Sandra.      I spoke with daughter Sandra via telephone and provided an update.    I spoke with kathy Welch via telephone at length and provided an update.  Rebekah notes Jarrell has been very depressed since her cancer diagnosis and that the oncologists gave her a  prognosis of 3-5yrs provided treatment is successful.      Key Palliative Symptom data:  Pain: moderate  Dyspnea: denies  Nausea: mild-moderate  Anxiety: moderate      Patient's decision making preferences: with family    I have concerns about the patient/family's health literacy today: no    Prognosis, Goals, and/or Advance Care Planning were addressed today: yes    Mood, coping, and/or meaning in the context of serious illness were addressed today: yes    Functional Status:  Current PPS% (100% normal, 0% death): 80    Capacity evaluation:    Patient does have decision making capacity based on the following:     Ability to understand relevant information about his/her condition? yes    Ability to demonstrate understanding of her illness and care needs? yes    Ability to communicate her options for treatment? yes    Social:   Birthplace: [place]    Living situation: home independently with her dog  Baseline function: ambulatory  Occupation: [employer].  Stopped working over 5 years ago  Marital status:  x3  Number of children: 4.  1 son and 3 daughters.  Susanna and Rebekah live in [facility].  Sandra lives in [place].  Son lives in [facility].       Smoking history: former smoker, quit 2019  Alcohol use: stopped when dx with cancer, prior to that it was occasional   Recreational drug use: previous cocaine and heroin addiction.  Stopped a couple years ago per daughter.  Stopped 40yr ago per patient.   Hobbies: loves reading, makes jewelery    Spiritual:  Are you a spiritual person: no  Would you like to see ? no     ROS  A full 14 point review of systems was otherwise completed and is negative aside from that mentioned above    -----------------------------------------------------------------------------------------------------------------  I have reviewed and supplemented the documentation in this patient's medical record listed below regarding past medical history, social history, active  medical problems, allergies and medications.     Current Problem List:   Principal Problem:    Encephalopathy  Active Problems:    Malignant neoplasm of lower-outer quadrant of right breast of female, estrogen receptor positive (H)    Diverticulitis of large intestine without perforation or abscess without bleeding    Pain medication agreement    Panic disorder without agoraphobia    Post traumatic stress disorder    Dehydration    Elevated troponin level not due to acute coronary syndrome    Acute kidney injury (H)    Chronic pain due to neoplasm    Opioid dependence with intoxication delirium (H)    Hypokalemia    Non-traumatic rhabdomyolysis    Essential hypertension      Medical/Surgical History :  Past Medical History:   Diagnosis Date     Asthma      Breast cancer (H) 02/24/2021    mets to bone, Stage 4     Essential hypertension      Panic disorder without agoraphobia 01/12/2012     Post traumatic stress disorder 12/30/2010     Vitamin D deficiency 03/06/2010     Past Surgical History:   Procedure Laterality Date     CT BIOPSY BONE  4/1/2021      LYMPH NODE BIOPSY  2/24/2021     Relevant Family History  Family History   Adopted: Yes   Problem Relation Age of Onset     No Known Problems Daughter      No Known Problems Daughter      No Known Problems Daughter      No Known Problems Son      Family Status   Relation Name Status     Lucy  Alive     Lucy  Alive     Lucy  Alive     Son  Alive     Social History:    she  reports that she quit smoking about 19 months ago. Her smoking use included cigarettes. She has a 15.00 pack-year smoking history. She has never used smokeless tobacco. She reports current alcohol use. She reports current drug use. Drug: Marijuana.  Medication History:  Medications Prior to Admission   Medication Sig Dispense Refill Last Dose     albuterol (PROAIR HFA;PROVENTIL HFA;VENTOLIN HFA) 90 mcg/actuation inhaler Inhale 1-2 puffs into the lungs every 4 hours as needed for shortness of breath /  dyspnea or wheezing         amLODIPine (NORVASC) 5 MG tablet [AMLODIPINE (NORVASC) 5 MG TABLET] Take 5 mg by mouth daily.        ascorbic acid, vitamin C, (VITAMIN C) 1000 MG tablet [ASCORBIC ACID, VITAMIN C, (VITAMIN C) 1000 MG TABLET] Take 1,000 mg by mouth daily.        B,C/folic/zinc/copper ox/vit E (STRESS B-COMPLEX ORAL) [B,C/FOLIC/ZINC/COPPER OX/VIT E (STRESS B-COMPLEX ORAL)] Take 1 tablet by mouth daily. Stress supplement        cetirizine (ZYRTEC) 10 MG tablet [CETIRIZINE (ZYRTEC) 10 MG TABLET] Take 10 mg by mouth daily.        cholecalciferol, vitamin D3, 125 mcg (5,000 unit) capsule [CHOLECALCIFEROL, VITAMIN D3, 125 MCG (5,000 UNIT) CAPSULE] Take 5,000 Units by mouth daily.        clonazePAM (KLONOPIN) 1 MG tablet Take 1 mg by mouth 2 times daily as needed for anxiety         cyclobenzaprine (FLEXERIL) 10 MG tablet Take 10 mg by mouth 3 times daily as needed for muscle spasms         ferrous sulfate (FEROSUL) 325 (65 Fe) MG tablet Take 325 mg by mouth daily (with breakfast)        fluticasone propionate (FLONASE) 50 mcg/actuation nasal spray [FLUTICASONE PROPIONATE (FLONASE) 50 MCG/ACTUATION NASAL SPRAY] 1 spray into each nostril daily.         HYDROmorphone (DILAUDID) 2 MG tablet [HYDROMORPHONE (DILAUDID) 2 MG TABLET] 0.5 tabs PO q6hr PRN pain. Do not drive.  Do not mix wih alcohol. (Patient taking differently: Take 0.5 mg by mouth every 6 hours as needed for pain ) 10 tablet 0      ibuprofen (ADVIL,MOTRIN) 200 MG tablet Take 200 mg by mouth every 6 hours as needed for pain         melatonin 10 mg Tab Take 10 mg by mouth nightly as needed for sleep         polyethylene glycol (GLYCOLAX) 17 gram/dose powder Take 17 g by mouth daily as needed for constipation         propranoloL (INDERAL) 10 MG tablet Take 10 mg by mouth 3 times daily as needed (anxiety or palpitations)         spironolactone (ALDACTONE) 100 MG tablet [SPIRONOLACTONE (ALDACTONE) 100 MG TABLET] Take 100 mg by mouth daily.        SYMBICORT  80-4.5 mcg/actuation inhaler Inhale 2 puffs into the lungs 2 times daily         venlafaxine (EFFEXOR) 37.5 MG tablet [VENLAFAXINE (EFFEXOR) 37.5 MG TABLET] Take 37.5 mg by mouth every morning.        vitamin E 100 UNIT capsule [VITAMIN E 100 UNIT CAPSULE] Take 500 Units by mouth 2 (two) times a week.        zaleplon (SONATA) 5 MG capsule Take 5 mg by mouth nightly as needed for sleep         Allergies:  Allergies   Allergen Reactions     Codeine Hives     Iodinated Contrast Media [Diagnostic X-Ray Materials] Unknown     Heart stopped and sick for multiple days after     Penicillins Swelling     Throat swelling as an infant     Tylenol [Acetaminophen] Other (See Comments)     Pt states gives her upset stomach for days.     Emergency Contact Info (Pulled from chart)  Extended Emergency Contact Information  Primary Emergency Contact: AUTUMN HSU  Mobile Phone: [phone]  Relation: Daughter  Secondary Emergency Contact: CHINYERE HOLDEN   [facility]  Home Phone: [phone]  Relation: Daughter    PERTINENT PHYSICAL EXAMINATION:  Vital Signs: Blood pressure 121/58, pulse 71, temperature 98.3  F (36.8  C), temperature source Oral, resp. rate 18, weight 65.1 kg (143 lb 9.6 oz), SpO2 98 %.   GENERAL: lying in bed in NAD    SKIN: Warm and dry   HEENT: Normocephalic, anicteric sclera, moist mucous membranes  LUNGS: Clear to auscultation anterolaterally; non-labored   CARDIAC: RRR, normal s1/s2, w/o m/r/g   ABDOMINAL: BS (+), soft, non distended, non tender  EXTREMITIES: No edema or cyanosis, pulses 2+ and symmetrical  NEUROLOGIC: alert and oriented x4  PSYCH: calm, pleasant     All labs/imaging reviewed in [facility]   Echo 7/13/21 Interpretation Summary     1. Normal left ventricular size and systolic performance with a visually  estimated ejection fraction of 60-65%.  2. No significant valvular heart disease is identified on this study.  3. Normal right ventricular size and systolic  performance.  ====================================================  TT: I have personally spent a total of 110 minutes on the unit in review of medical record, consultation with the medical providers and assessment of patient today, with more than 50% of this time spent in counseling, coordination of care, and discussion with Sandra Welch, patient re: diagnostic results, prognosis, symptom management, risks and benefits of management options, and development of plan of care as noted above.  ====================================================    [facility]  Palliative Medicine  Office: [phone]

## 2021-07-14 NOTE — PHARMACY-ADMISSION MEDICATION HISTORY
Medication history completed 7/12/21. See note from that date for additional details.    Payton Sutton, [facility] 7/14/2021 6:34 AM

## 2021-07-14 NOTE — PHARMACY
"Pharmacist consulted to evaluate potential need to adjust metronidazole dose with start of phenobarbital.    Per [employer], \"Monitor carefully the clinical response to metronidazole. If the infection is not cleared or recurs after stopping metronidazole, higher doses may be necessary.\"    No dose adjustments necessary at this time. Continue to monitor clinical improvement on metronidazole.    Kelly Ayon, PharmD  "

## 2021-07-14 NOTE — PLAN OF CARE
Usually oriented to place, somewhat oriented to reason why in hospital.  Disoriented about time, length of stay, events throughout day, etc.  Keeps contradicting self.  At start of shift, obsessing over wanting gabapentin to treat menopause, able to redirect and reassure that gabapentin would be addressed during the day tomorrow.  Reported pain had greatly improved since receiving Dilaudid.  Simply knowing that she was getting melatonin appeared to ease anxiety and help patient relax.  Slept most of shift.    Problem: Pain Chronic (Persistent)  Goal: Acceptable Pain Control and Functional Ability  7/14/2021 0545 by Mohsen Infante, RN  Outcome: Improving     Problem: Sleep Disturbance (Delirium)  Goal: Improved Sleep  Outcome: Improving

## 2021-07-14 NOTE — PROGRESS NOTES
"[facility] ACUTE PAIN SERVICE    ([facility], [facility], [facility])  Daily PAIN Progress Note    Assessment/Plan:  Jarrell Nicole is a 66 year old female who was admitted on 7/12/2021.   . I was asked to overdose of opiates and benzos in the setting of acute diverticulitis and RASHAWN. History of stage 4 breast cancer, HTN, HLD, mood disorder, borderline personality disorder, chronic pain syndrome, MOHINDER/agoraphobia. Pain is present \"all over\", diarrhea, anxiety.     PLAN:   1) Cancer pain in the setting of known past addiction and overdose. The patient's home MME was 108 mg daily. Would suggest subutex and would not recommend dilaudid. Suggesting suboxone outpatient.  Can resume gabapentin.   2)Multimodal Medication Therapy  Topical: none, patient not interested in trying at this point  NSAID'S: RASHAWN improved, hold  Muscle Relaxants: none, does not describe any spasms.  Adjuvants: tylenol, gabapentin bid 300mg can resume today   Antidepressants/anxiolytics:Effexor  375mg qam  Opioids:  Subutex 2mg BID and NO dilaudid   3)Non-medication interventions- acupuncture, ice, PT   4)Constipation Prophylaxis- none diarrhea present  5) Follow up/Discharge plan - addiction medicine or pain clinic (or both), prescriber has been Nallely De Los Santos NP            Subjective:    Pain is all over.   Nausea present   Diarrhea  Agitated   Admits to cocaine and heroin use many years ago  Discussed with addiction med MD    Called Dr. Oakley to develop discharge plan for prescribing of suboxone long term        Discussed with PCP.  Hold notes ongoing history of paranoia and undiagnosed mental health concerns.  There was a history of an overdose of venlafaxine.  Apparently the patient does follow-up with her home health psych provider and her name is Eden.  Currently there is no prescriber at [facility] who can offer Suboxone therapy.    Principal Problem:    Encephalopathy  Active Problems:    Malignant neoplasm of " lower-outer quadrant of right breast of female, estrogen receptor positive (H)    Diverticulitis of large intestine without perforation or abscess without bleeding    Pain medication agreement    Panic disorder without agoraphobia    Post traumatic stress disorder    Dehydration    Elevated troponin level not due to acute coronary syndrome    Acute kidney injury (H)    Chronic pain due to neoplasm    Opioid dependence with intoxication delirium (H)    Hypokalemia    Non-traumatic rhabdomyolysis    Essential hypertension     LOS: 2 days       amLODIPine  5 mg Oral Daily     budesonide-formoterol  2 puff Inhalation BID     buprenorphine  2 mg Sublingual BID     ciprofloxacin  400 mg Intravenous Q24H     gabapentin  300 mg Oral BID     metroNIDAZOLE  500 mg Intravenous Q12H     potassium chloride  40 mEq Oral Once     sodium chloride (PF)  3 mL Intracatheter Q8H     venlafaxine  37.5 mg Oral QAM       Objective:  Vital signs in last 24 hours:  Temp:  [97.7  F (36.5  C)-99.3  F (37.4  C)] 98.3  F (36.8  C)  Pulse:  [57-98] 71  Resp:  [18-20] 18  BP: (121-199)/(58-91) 121/58  SpO2:  [98 %-100 %] 98 %  Weight:   Weight:   @THISENCWEIGHTS(1)@  Weight change: -4.218 kg (-9 lb 4.8 oz)  Body mass index is 24.65 kg/m .    Intake/Output last 3 shifts:  I/O last 3 completed shifts:  In: 1480 [P.O.:480; I.V.:1000]  Out: -   Intake/Output this shift:  No intake/output data recorded.    Review of Systems:   As per subjective, all others negative.    Physical Exam:    General Appearance:  Alert, cooperative, no distress, appears stated age    Head:  Normocephalic, without obvious abnormality, atraumatic   Eyes:  PERRL, conjunctiva/corneas clear, EOM's intact   Nose: Nares normal, septum midline, mucosa normal, no drainage   Throat: Lips normal    Neck: Supple, symmetrical, trachea midline, no adenopathy, thyroid: not enlarged, symmetric    Back:   Symmetric, no curvature, ROM normal   Lungs:   Clear to auscultation bilaterally,  respirations unlabored   Chest Wall:  No tenderness or deformity   Heart:  Regular rate and rhythm, S1, S2 normal,no murmur, rub or gallop   Abdomen:   Soft, non-tender, bowel sounds active all four quadrants,  no masses, no organomegaly   Extremities: Extremities normal    Skin: Skin color normal    Neurologic: Alert and oriented X 3, Moves all 4 extremities        Lab Results:  Personally Reviewed.   Recent Labs   Lab 07/14/21 0311 07/12/21 1959   WBC 6.5 8.7   HGB 9.3* 12.9   HCT 27.7* 37.4    565*     Recent Labs   Lab 07/14/21 0311 07/13/21  1033 07/12/21 1959   * 140 143   CO2 16* 18* 17*   BUN 21 37* 39*   ALBUMIN 3.2* 3.9 4.4   ALKPHOS 91 119 160*   ALT 18 20 21   AST 26 31 31     No results for input(s): INR in the last 168 hours.        Total unit/floor time 35 minutes, time consisted of the following, examination of patient, reviewing the record and lab results, and completing documentation. Coordination of care time with addiction medicine.  Called PCP. Called oncology.       Christina MEMBRENO, CNS-BC, CNP, ACHPN  Acute Care Pain Management Program Hour 7a-1700  [facility] ([place], [place], [place])   Page via [email or web address]- Click HERE to page  or call [phone]

## 2021-07-14 NOTE — PLAN OF CARE
Physical Therapy: Orders received. Chart reviewed and discussed with care team.? Physical Therapy not indicated due to pt at baseline with mobility.? Defer discharge recommendations to OT.? Will complete orders. Jerrica Sifuentes,PT

## 2021-07-14 NOTE — PROGRESS NOTES
07/14/21 0845   Quick Adds   Type of Visit Initial Occupational Therapy Evaluation       Present no   Living Environment   People in home alone   Current Living Arrangements apartment   Home Accessibility stairs to enter home   Transportation Anticipated family or friend will provide   Living Environment Comments tub shower   Self-Care   Usual Activity Tolerance good   Current Activity Tolerance good   Equipment Currently Used at Home none   Activity/Exercise/Self-Care Comment Pt will have daily PCA assist.   Instrumental Activities of Daily Living (IADL)   Previous Responsibilities meal prep;housekeeping;laundry;shopping   IADL Comments Pt is independent with all ADLs and IADLs   Disability/Function   Fall history within last six months no   Change in Functional Status Since Onset of Current Illness/Injury yes   General Information   Onset of Illness/Injury or Date of Surgery 07/12/21   Referring Physician Nisreen   Patient/Family Therapy Goal Statement (OT) home   Cognitive Status Examination   Orientation Status orientation to person, place and time   Affect/Mental Status (Cognitive) WNL   Follows Commands WNL   Safety Deficit minimal deficit   Memory Deficit minimal deficit   Cognitive Status Comments Cognition apppears intact today.  Pt aware of mediation error and has a plan to manage meds for future.   Sensory   Sensory Quick Adds No deficits were identified   Range of Motion Comprehensive   General Range of Motion no range of motion deficits identified   Strength Comprehensive (MMT)   General Manual Muscle Testing (MMT) Assessment no strength deficits identified   Bed Mobility   Comment (Bed Mobility) Independent   Transfers   Transfer Comments SBA   Clinical Impression   Criteria for Skilled Therapeutic Interventions Met (OT) yes;skilled treatment is necessary   OT Diagnosis Impaired IADLs and ADLs   OT Problem List-Impairments impacting ADL cognition  (self care)   ADL  comments/analysis Pt is SBA with basic ADLs.  Recomend assist with medication management.   Assessment of Occupational Performance 1-3 Performance Deficits   Identified Performance Deficits activity tolerance   Planned Therapy Interventions (OT) ADL retraining;IADL retraining;cognition   Clinical Decision Making Complexity (OT) low complexity   Therapy Frequency (OT) Daily   Predicted Duration of Therapy 1 session   Risk & Benefits of therapy have been explained evaluation/treatment results reviewed;patient   OT Discharge Planning    OT Discharge Recommendation (DC Rec) Home with assist   OT Rationale for DC Rec Pt is SBA with basic ADLs and mobility.  Pt has daily PCA help.  Recommend assist with medication management as pt has many newer drugs and multiple pain medications.   Total Evaluation Time (Minutes)   Total Evaluation Time (Minutes) 10

## 2021-07-14 NOTE — PLAN OF CARE
Occupational Therapy Discharge Summary    Reason for therapy discharge:    Goals met    Progress towards therapy goal(s). See goals on Care Plan in [facility] electronic health record for goal details.      Therapy recommendation(s):    Recommend home with family and PCA support.  Recommend assist with medication management.  Pt states daughter will be able to assist with mediations.

## 2021-07-14 NOTE — CONSULTS
"Addiction Medicine Inpatient Consultation      Jarrell Nicole,  1955, MRN [id number]    Hospital Locations: [facility]  Dehydration [E86.0]  Diverticulitis of colon [K57.32]  Encephalopathy [G93.40]  Elevated troponin [R77.8]  Acute kidney injury (H) [N17.9]    PCP: Radha Oakley   [phone]   Code status:  Full Code       Extended Emergency Contact Information  Primary Emergency Contact: AUTUMN HSU  Mobile Phone: [phone]  Relation: Daughter  Secondary Emergency Contact: CHINYERE HOLDEN   [facility] Phone: [phone]  Relation: Daughter       Date of Admission: 21  Date of Consult: 2021    Tele-Visit Details    Type of service:  Video Visit    Time Service Began (time 1st connected with pt): 1204    Time Service Ended (time completely finished with pt): 1230    Originating Location (pt. Location): [facility], Patient Room    Distant Location (provider location): [facility] and Addiction Offices    Reason for Televisit: COVID 19    Mode of Communication:  Video Conference via [facility]    Physician has received verbal consent for a video visit from the patient? Yes        Reason for Consultation: \" Toxic encephalopathy\"         ASSESSMENT and RECOMMENDATIONS:   1.  Acute mental status changes -  This is much improved, but still with some poor understanding of her medications and events PTA.    She admits that she did not realize that the dilaudid was different than the morphine and although she says she was warned of the potential interactions between clonazepam and opiates, she did not realize that it could be that dangerous.         It is likely that she became confused/ oversedated / disoriented due to the combination of opiates and benzodiazepines (? ALONG WITH THC) , although more commonly this combination would lead to oversedation and respiratory depression.   Would also consider metastasis in the differential for her AMS, although CT head  " Negative.         2.  Chronic prescription benzodiazepine use - patient says she is willing to get off these and try something else for her anxiety.   Given the amount and length of use, I recommend a cross taper with phenobarbital.   Equivalent phenobarb to clonazepam   30 mg ~ 1 mg -   Will therefore give 30 mg bid with plan to taper rapidly after 7 days.   Monitor for oversedation, nystagmus, slurred speech.     3.   Prescription opiate use - likely using more than prescribed - but unclear if this was intentional or due to confusion or lack of understanding.   d  Does have past hx of heroin use disorder, which makes her at higher risk of developing a recurrence of opioid use disorder with prescription pain pills.   Furthermore, the combination of BDZ and opiates increasing the risk of overdose significantly.  Therefore, I agree with Acute Pain Team, that buprenorphine is better option.   My concern is whether the buprenrophine will have enough analgesic effect for her metastatic bone pain.   Will need to get her to a higher dose as quickly as possible.  However, this has been complicated by the fact that she has also been given intermittent doses of hydromorphone last night and this am.   There is risk that the buprenorphine can cause precipritated withdrawal, if given too soon after other opiates.           1.  Discussed need to avoid ALL opiates while we are inducing patient onto buprenorphine.            2.  Use gabapentin, topical analgescs, heat, ice and whatever else is need to reduce pain over the next 24 hrs as we increase the buprenorphine dose.           3.   Hopefully will be able to increase buprenorphine dose to at least 2 mg qid tomorrow.        4.   Watch for sedation, reduced O2 sats or respiratory rate.    Goal will be to have her off all benzodiazepines and on a dos of buprenorphine which controls her pain prior to discharge.            5.  Palliative care provider told patient that he could  manage her pain medications (including buprenoprine) but will need to confirm this prior to discontinue.      Discussed with hospitalist, Dr. Nielson.        Keara Rodriguez MD  Addiction Medicine Service  [facility]   Page me (click here for Pooja Rodriguez)              Admission Status:  Voluntary    Code Status:  CODE STATUS: Full Code      HPI:    Jarrell Nicole is a 66 year old old female with right breast cancer and positive bone biopsy with metastases.  She was brought to the emergency room on 712 by EMS after her daughter found her wandering around her apartment naked.  The apartment was in dissaray, which is unusual for her.   On arrival in the emergency room she was found to be confused and disoriented.  CT scan of her head was unremarkable. Labs did not show reason for her mental status changes.  UDS was positive for opiates and THC.      The patient has been prescribed opiates since April 2021 for pain, related to her cancer.   She also has a presciption for clonazepam, and gabapentin - all of which could have contributed to her mental status changes.   She admits that she may have gotten confused regarding her medications and taken too many, but she really does not remember what happened.   It is noted that she has a past hx of heroin and cocaine use, but says she has not used either of these for 40 yrs.      Today, her mental status is much improved, but she does have trouble with some comprehension, short-term memory and attention.      ETOH:  Type and method of use:  Denies use      Opioids:  Type and method of use:  History of intravenous heroin use   first use:  41 years ago   last use:  Sober x40 years.    Amount/frequency:  In the last 4 months patient has been prescribed opiate pain medications for metastatic bone pain.  She has been receiving morphine ER 30 mg twice daily as well as hydromorphone 2 mg 4 times daily as needed.  IV drug use: Not in 40 years  IVDU- related infections:  denies  Previous methadone therapy:  Denies  Previous buprenorphine therapy:  Denies  Previous naltrexone therapy: Denies     Stimulants/Cocaine/Amphetamines:  Type and method of use:  Cocaine use in the past   First use:   Last use:  40 years ago       Benzodiazepines:  Type and method of use: Reports she has been prescribed these for at least 7 to 8 years,  maybe more.  First use:   Last use:   Has been receiving while in hospital   amount/frequency:  Prescription for One Milligram clonazepam twice daily as needed but admits she sometimes takes 3 and uses at least 1 every day.      Cannabis:  Daily marijuana smoker in the past but now reports use about 1 time per month.    Tobacco: Quit tobacco in 2019    Treatment Hx:    3 times in her 20s     Past Medical History:    PAST MEDICAL HISTORY:   Past Medical History:   Diagnosis Date     Asthma      Breast cancer (H) 02/24/2021    mets to bone, Stage 4     Essential hypertension      Panic disorder without agoraphobia 01/12/2012     Post traumatic stress disorder 12/30/2010     Vitamin D deficiency 03/06/2010       Psychiatric History:  Anxiety disorder with panic attacks   Depression, related to chronic medical disease    Family History:   Substance Use History: NA  Mental illness: NA      Social History:  Lives alone.  Daughter in [facility] does check on her and her daughter in [place], who is a CNA, is her surrogate for healthcare decisions.  She does not have any in-home help.    PTA Meds:  Medications Prior to Admission   Medication Sig Dispense Refill Last Dose     albuterol (PROAIR HFA;PROVENTIL HFA;VENTOLIN HFA) 90 mcg/actuation inhaler Inhale 1-2 puffs into the lungs every 4 hours as needed for shortness of breath / dyspnea or wheezing         amLODIPine (NORVASC) 5 MG tablet [AMLODIPINE (NORVASC) 5 MG TABLET] Take 5 mg by mouth daily.        ascorbic acid, vitamin C, (VITAMIN C) 1000 MG tablet [ASCORBIC ACID, VITAMIN C, (VITAMIN C) 1000 MG TABLET] Take 1,000 mg  by mouth daily.        B,C/folic/zinc/copper ox/vit E (STRESS B-COMPLEX ORAL) [B,C/FOLIC/ZINC/COPPER OX/VIT E (STRESS B-COMPLEX ORAL)] Take 1 tablet by mouth daily. Stress supplement        cetirizine (ZYRTEC) 10 MG tablet [CETIRIZINE (ZYRTEC) 10 MG TABLET] Take 10 mg by mouth daily.        cholecalciferol, vitamin D3, 125 mcg (5,000 unit) capsule [CHOLECALCIFEROL, VITAMIN D3, 125 MCG (5,000 UNIT) CAPSULE] Take 5,000 Units by mouth daily.        clonazePAM (KLONOPIN) 1 MG tablet Take 1 mg by mouth 2 times daily as needed for anxiety         cyclobenzaprine (FLEXERIL) 10 MG tablet Take 10 mg by mouth 3 times daily as needed for muscle spasms         ferrous sulfate (FEROSUL) 325 (65 Fe) MG tablet Take 325 mg by mouth daily (with breakfast)        fluticasone propionate (FLONASE) 50 mcg/actuation nasal spray [FLUTICASONE PROPIONATE (FLONASE) 50 MCG/ACTUATION NASAL SPRAY] 1 spray into each nostril daily.         HYDROmorphone (DILAUDID) 2 MG tablet [HYDROMORPHONE (DILAUDID) 2 MG TABLET] 0.5 tabs PO q6hr PRN pain. Do not drive.  Do not mix wih alcohol. (Patient taking differently: Take 0.5 mg by mouth every 6 hours as needed for pain ) 10 tablet 0      ibuprofen (ADVIL,MOTRIN) 200 MG tablet Take 200 mg by mouth every 6 hours as needed for pain         melatonin 10 mg Tab Take 10 mg by mouth nightly as needed for sleep         polyethylene glycol (GLYCOLAX) 17 gram/dose powder Take 17 g by mouth daily as needed for constipation         propranoloL (INDERAL) 10 MG tablet Take 10 mg by mouth 3 times daily as needed (anxiety or palpitations)         spironolactone (ALDACTONE) 100 MG tablet [SPIRONOLACTONE (ALDACTONE) 100 MG TABLET] Take 100 mg by mouth daily.        SYMBICORT 80-4.5 mcg/actuation inhaler Inhale 2 puffs into the lungs 2 times daily         venlafaxine (EFFEXOR) 37.5 MG tablet [VENLAFAXINE (EFFEXOR) 37.5 MG TABLET] Take 37.5 mg by mouth every morning.        vitamin E 100 UNIT capsule [VITAMIN E 100 UNIT  "CAPSULE] Take 500 Units by mouth 2 (two) times a week.        zaleplon (SONATA) 5 MG capsule Take 5 mg by mouth nightly as needed for sleep           Allergies:  Allergies   Allergen Reactions     Codeine Hives     Iodinated Contrast Media [Diagnostic X-Ray Materials] Unknown     Heart stopped and sick for multiple days after     Penicillins Swelling     Throat swelling as an infant     Tylenol [Acetaminophen] Other (See Comments)     Pt states gives her upset stomach for days.       [place] Prescription Monitoring Program:      Review of Systems:    Constitutional               No fever or weight loss  Vision and Hearing:     Within normal limits   Respiratory:              No cough or shortness or breath  Cardiovascular   No anteerior chest pain at rest or with exertion.   Does have pain in both breasts and R flank/ chest, which is constant .     Gastrointestinal    Had nausea, which has improved.   Denies onstipation.    Urologic   Denies dysuria or change in frequency.  Neurologic   Denies headache, tremor, hx of seizure or focal weakness.     Psychiatric   Denies suicidal ideation, plan or intent.  Denies homicidal ideaton or hallucinations  Pain   Reports pain mainly on R side from chest all the way \"to her toes:\"  8/10 at worst - constant, boaring quality  Hematologic   Denies epistaxis or easy bruising.   Dermatalogic   No piloerection or diaphoresis.  No rash or itching.            Physical Exam:  /58 (BP Location: Left arm)   Pulse 71   Temp 98.3  F (36.8  C) (Oral)   Resp 18   Wt 65.1 kg (143 lb 9.6 oz)   SpO2 98%   BMI 24.65 kg/m      Physical Exam per hosptialist:  Heart, lungs and abdomen were unremarkable.     .General appearance   Lying in bed.  Appears stated age   Dermatologic              No piloerection or diaphoresis  Neurologic   Oriented to:  :person, place, and situation.  Confused about day and time.      Short - term memory:  Decreased.  Could not remember instructions from 10 " - 15 mins ago.    Poor understanding of her medications   Psychiatric Mental Status Examination               Alert     Cooperative     Mood:  reports anxiety and depression                Affect:  Not congruent - does not appear either depressed or anxious                Thought content:  Denies suicidal thoughts but reports she has had increasing thoughts about her potential death due to her recent cancer diagnosis.                  Thought processes:  doesn't answer questions, goes on tangents                Speech:  Normal                Motor:  Normal                Insight/judgement: poor,  fair    Pertinent Labs, Radiology, and EK/12      7/14  Hgb -    12.9       9.3  WBC, platelets   WNL  Na         143        135  K            3.7          3.4  Cr          2.45       0.74  Ca          9.5         7.1  Alb          4.4         3.2     CK          390       370     Urine     + protein, hyaline casts  SDrug Analysis =       + Cannabinoids ,    + opiates   - benzodiazepines    Neg salicylate, acetaminophen and alcohol in blood.      CT Abd - stranding low sigmoid colon, suggesting diverticulitis               =  Mixed Sclerotic bone lesions - c/w metastasis.       CT head - no bony or parenchymal abnormalities

## 2021-07-14 NOTE — PLAN OF CARE
Problem: Adult Inpatient Plan of Care  Goal: Optimal Comfort and Wellbeing  Outcome: Improving     Problem: Pain Chronic (Persistent)  Goal: Acceptable Pain Control and Functional Ability  Outcome: Improving     Problem: Adjustment to Illness (Delirium)  Goal: Optimal Coping  Outcome: Improving   Pt feeling improved today. Pt is alert and oriented . Pt has had no confusion. Pt verbalizes she took too much of pain medication. Pt is feeing very informed  regarding Palliative care. Offer emotional support to pt.  Pt cried  states she does not want to die.

## 2021-07-14 NOTE — PROGRESS NOTES
[facility]    Medicine Progress Note - Hospitalist Service       Date of Admission:  7/12/2021    Assessment & Plan              Jarrell Nicole is a 66 year old female with PMH of stage 4 breast cancer, HTN, HLD, mood disorder, chronic pain syndrome, MOHINDER/agoraphobia, admitted on 7/12/2021.     # Acute encephalopathy, toxic metabolic, due to unintentional overdose of opiates, benzodiazepines, in the setting of infection/acute diverticulitis and RASHAWN.  Patient daughter believes that patient unintentionally took extra doses of Rx medications, after forgetting that she took doses prior.  CT head negative for brain metastasis or other acute intracranial pathology.  Patient denies illicits use or alcohol use.    Checked UDS for coingestions-positive for opiates, cannabinoids, but no benzos; patient on Rx Dilaudid and clonazepam.  Encephalopathy improving.  She is pleasant, cooperative, apologetic,  oriented #3.Nonfocal neuro exam.  -Treating acute diverticulitis;  RASHAWN resolved.  -Telemetry monitoring.  -One-to-one sitter discontinued today.    #Acute sigmoid diverticulitis, without abscess or perforation, mild.  #Lactic acid acidosis at 5 on admission, due to dehydration/RASHAWN.  Normalized to 1.4 with IVF and first doses of antibiotics.  CT abdomen C/W distal sigmoid mild diverticulitis, no abscess or perforation.  Procalcitonin normal.  -On IV ciprofloxacin and Flagyl, transition to p.o. on 7/14.  -Symptomatic treatment with analgesics, antiemetics, antipyretics.  -Tolerating regular diet.    # Essential hypertension.  Elevated BP, SBP max 204 in ED.  Unknown last dose of amlodipine.  Patient is asymptomatic.  -Resume PTA amlodipine, as needed IV hydralazine.    #Elevated troponin, 1.42 at 8 PM on 7/12.  This is in the setting of mild rhabdomyolysis with CPK of 390.  Serial troponin trending downwards.  Patient is chest pain-free.  No history of CAD.  Nonischemic EKG on admission.  -Echo showed EF  60 to 65%, no WMA.  -Cardiology consulted, appreciate Dr. Baron's recommendations.    # RASHAWN/high anion gap metabolic acidosis, resolved hyperkalemia.  Creatinine 2.45, BUN 39.  Present normal renal function on 3/21/2021 creatinine 0.82.  Urine not infected.  S/p 2 L NS in ED, now maintenance IVF.  Awaiting AM chemistry panel.  -C/W IVF while NPO.  Monitor renal function closely.  Avoid hypotension/nephrotoxins, renally dose medications.    # Cannabis use.  Positive UDS on 7/13.  Patient was counseled on refraining from using cannabis, while taking prescription all opiates/benzos.    # Rhabdomyolysis, mild.  Unknown if/when/how long downtime.  No bruises.  Afebrile.  -IVF, monitor renal function.    #Right-sided breast cancer, with bone metastasis, chronic pain.  Stage IV (cTX, cM1, ER+, NY+, HER2-).  Diagnosed on 3/3/2021; incidental finding of the right breast mass during ED evaluation for chest pain.  Pt of Dr. Foster/last seen on 3/18/2021, and requested to be transferred to the care [facility] pablo. Now follows with Dr. Blackman.  -Pain team consulted to optimize pain regimen, D/W. Christina Dhillon.    #Suspected cognitive impairment.  Most recent note from oncology indicates that during clinic visit patient did not recall having axillary lymph node biopsy.  Also documented erratic behavior with the clinic staff.   -OT for evaluation  -Checked normal B12, ammonia, TSH, folate.  -Psychiatry evaluated, recommendations reviewed.    # History of opiate dependence and abuse.  Collateral information from patient's daughter Rebekah (named by patient in the presence of care RN Valery as primary contact): Patient was longstanding history of opiate abuse and dependence, s/p chemical dependency treatment, methadone treatment.  Rebekah believes that patient was clear from opioid use for long time, prior to her diagnosis of breast cancer.  Now she is receiving prescription for p.o. Dilaudid for cancer related pain, and actually  suspect that patient misusing Rx Dilaudid.  D/W results of UDS, being negative for benzos, and positive for cannabis.  Rebekah's not aware of patient using nonprescription substances.  Patient seen by chemical dependency specialist, D/W Dr. Gonzalez.    #Self-neglect, in the setting of opiate overuse.  Patient daughter Rebekah, who needed the wellness check on the patient yesterday, discovered patient's house in disarray, with furniture/carpeted covered in feces and urine.  -PT/OT/case management/social work/psychiatry evaluation for disposition needs.    COVID-19 tested negative.    POC discussed with Dr. Welch over the phone.     Diet: Low Saturated Fat Na <2400 mg    DVT Prophylaxis: Enoxaparin (Lovenox) SQ  Richard Catheter: Not present  Central Lines: None  Code Status: Full Code      Disposition Plan   Expected discharge: 07/15/2021 recommended to prior living arrangement once adequate pain management/ tolerating PO medications and antibiotic plan established.     The patient's care was discussed with the Bedside Nurse, Patient and Patient's Family.    Reyna Nielson MD  Hospitalist Service  [facility]  Securely message with the Vocera Web Console (learn more here)  Text page via [employer] Paging/Directory    Risk Factors Present on Admission             _____________________________________________________________________    Interval History   Patient feeling better today.  Left lower quadrant abdominal pain resolved.  Remains afebrile.  Vital stable.  She is much less confused today, cooperative, and apologetic for yesterday's behavior/agitation.    Data reviewed today: I reviewed all medications, new labs and imaging results over the last 24 hours.   Physical Exam   Vital Signs: Temp: 98.8  F (37.1  C) Temp src: Oral BP: 129/62 Pulse: 70   Resp: 18 SpO2: 98 % O2 Device: None (Room air)    Weight: 143 lbs 9.6 oz    General Appearance:  Pleasant, cooperative, fluent speech, alert and  oriented #3.  Eyes: No scleral icterus, pupils equal reactive to light and accommodation  HEENT: Head normocephalic, atraumatic, moist mucous membranes, neck supple, no cervical LAD or thyromegaly.  Respiratory: Clear to auscultation bilaterally, no rhonchi or wheezes.  Cardiovascular: Rhythmic and regular S1-S2, no murmurs  GI: Soft, mild tenderness in left lower quadrant, audible bowel sounds.  Lymph/Hematologic: No peripheral lymphadenopathy.  Skin:, Dry, no rash  Musculoskeletal: Muscle bulk and tone  Neurologic: No focal neuro exam  Psychiatric: Impulsive, redirectable    Data   Recent Labs   Lab 07/14/21  1556 07/14/21  0311 07/13/21  1931 07/13/21  1033 07/12/21 1959   WBC  --  6.5  --   --  8.7   HGB  --  9.3*  --   --  12.9   MCV  --  99  --   --  96   PLT  --  399  --   --  565*   NA  --  135*  --  140 143   POTASSIUM 3.8 3.4*  3.4* 2.9* 2.9* 3.7   CHLORIDE  --  109*  --  109* 106   CO2  --  16*  --  18* 17*   BUN  --  21  --  37* 39*   CR  --  0.74  --  1.16* 2.45*   ANIONGAP  --  10  --  13 20*   PADDY  --  7.1*  --  8.1* 9.5   GLC  --  104  --  143* 132*   ALBUMIN  --  3.2*  --  3.9 4.4   PROTTOTAL  --  5.6*  --  6.8 8.0   BILITOTAL  --  0.5  --  0.6 0.7   ALKPHOS  --  91  --  119 160*   ALT  --  18  --  20 21   AST  --  26  --  31 31   LIPASE  --   --   --   --  <9     No results found for this or any previous visit (from the past 24 hour(s)).

## 2021-07-14 NOTE — PLAN OF CARE
Problem: Pain Chronic (Persistent)  Goal: Acceptable Pain Control and Functional Ability  Outcome: Improving  Intervention: Develop Pain Management Plan  Recent Flowsheet Documentation  Taken 7/14/2021 1614 by Shobha Mantilla, RN  Pain Management Interventions: medication (see MAR)     Pt feels pain is adequately controlled today with PRN's and scheduled medications. Pt is alert and oriented x4, denies dyspnea. LS diminished. ABX switched to PO. VSS, no acute changes. Pt transferred to P2 at 2000. Report called to P2 RN.

## 2021-07-15 LAB
ANION GAP SERPL CALCULATED.3IONS-SCNC: 7 MMOL/L (ref 5–18)
BUN SERPL-MCNC: 9 MG/DL (ref 8–22)
CALCIUM SERPL-MCNC: 7.3 MG/DL (ref 8.5–10.5)
CHLORIDE BLD-SCNC: 106 MMOL/L (ref 98–107)
CO2 SERPL-SCNC: 21 MMOL/L (ref 22–31)
CREAT SERPL-MCNC: 0.69 MG/DL (ref 0.6–1.1)
GFR SERPL CREATININE-BSD FRML MDRD: >90 ML/MIN/1.73M2
GLUCOSE BLD-MCNC: 96 MG/DL (ref 70–125)
HOLD SPECIMEN: NORMAL
MAGNESIUM SERPL-MCNC: 2 MG/DL (ref 1.8–2.6)
POTASSIUM BLD-SCNC: 3.6 MMOL/L (ref 3.5–5)
SODIUM SERPL-SCNC: 134 MMOL/L (ref 136–145)

## 2021-07-15 PROCEDURE — 250N000013 HC RX MED GY IP 250 OP 250 PS 637: Performed by: INTERNAL MEDICINE

## 2021-07-15 PROCEDURE — 83735 ASSAY OF MAGNESIUM: CPT | Performed by: INTERNAL MEDICINE

## 2021-07-15 PROCEDURE — 120N000001 HC R&B MED SURG/OB

## 2021-07-15 PROCEDURE — 250N000011 HC RX IP 250 OP 636: Performed by: INTERNAL MEDICINE

## 2021-07-15 PROCEDURE — 36415 COLL VENOUS BLD VENIPUNCTURE: CPT | Performed by: INTERNAL MEDICINE

## 2021-07-15 PROCEDURE — 250N000013 HC RX MED GY IP 250 OP 250 PS 637: Performed by: PAIN MEDICINE

## 2021-07-15 PROCEDURE — 99233 SBSQ HOSP IP/OBS HIGH 50: CPT | Mod: GC | Performed by: INTERNAL MEDICINE

## 2021-07-15 PROCEDURE — 99232 SBSQ HOSP IP/OBS MODERATE 35: CPT | Performed by: FAMILY MEDICINE

## 2021-07-15 PROCEDURE — 99232 SBSQ HOSP IP/OBS MODERATE 35: CPT | Performed by: PAIN MEDICINE

## 2021-07-15 PROCEDURE — 80048 BASIC METABOLIC PNL TOTAL CA: CPT | Performed by: INTERNAL MEDICINE

## 2021-07-15 PROCEDURE — 99232 SBSQ HOSP IP/OBS MODERATE 35: CPT | Mod: 95 | Performed by: INTERNAL MEDICINE

## 2021-07-15 RX ORDER — LACTOBACILLUS RHAMNOSUS GG 10B CELL
1 CAPSULE ORAL
Status: DISCONTINUED | OUTPATIENT
Start: 2021-07-15 | End: 2021-07-18 | Stop reason: HOSPADM

## 2021-07-15 RX ORDER — BUPRENORPHINE 2 MG/1
2 TABLET SUBLINGUAL 4 TIMES DAILY
Status: COMPLETED | OUTPATIENT
Start: 2021-07-15 | End: 2021-07-16

## 2021-07-15 RX ADMIN — ONDANSETRON 4 MG: 2 INJECTION INTRAMUSCULAR; INTRAVENOUS at 10:10

## 2021-07-15 RX ADMIN — Medication 1 CAPSULE: at 16:25

## 2021-07-15 RX ADMIN — METRONIDAZOLE 500 MG: 500 TABLET, FILM COATED ORAL at 14:12

## 2021-07-15 RX ADMIN — BUPRENORPHINE 2 MG: 2 TABLET SUBLINGUAL at 14:12

## 2021-07-15 RX ADMIN — PHENOBARBITAL 32.4 MG: 32.4 TABLET ORAL at 20:46

## 2021-07-15 RX ADMIN — CIPROFLOXACIN 500 MG: 500 TABLET, COATED ORAL at 20:45

## 2021-07-15 RX ADMIN — CIPROFLOXACIN 500 MG: 500 TABLET, COATED ORAL at 09:25

## 2021-07-15 RX ADMIN — AMLODIPINE BESYLATE 5 MG: 5 TABLET ORAL at 09:26

## 2021-07-15 RX ADMIN — ONDANSETRON 4 MG: 2 INJECTION INTRAMUSCULAR; INTRAVENOUS at 16:25

## 2021-07-15 RX ADMIN — BUPRENORPHINE 2 MG: 2 TABLET SUBLINGUAL at 17:51

## 2021-07-15 RX ADMIN — PHENOBARBITAL 32.4 MG: 32.4 TABLET ORAL at 09:26

## 2021-07-15 RX ADMIN — PROCHLORPERAZINE EDISYLATE 5 MG: 5 INJECTION INTRAMUSCULAR; INTRAVENOUS at 12:08

## 2021-07-15 RX ADMIN — GABAPENTIN 300 MG: 300 CAPSULE ORAL at 20:46

## 2021-07-15 RX ADMIN — Medication 1 CAPSULE: at 11:31

## 2021-07-15 RX ADMIN — PROPRANOLOL HYDROCHLORIDE 10 MG: 10 TABLET ORAL at 11:31

## 2021-07-15 RX ADMIN — BUPRENORPHINE 2 MG: 2 TABLET SUBLINGUAL at 09:26

## 2021-07-15 RX ADMIN — METRONIDAZOLE 500 MG: 500 TABLET, FILM COATED ORAL at 20:47

## 2021-07-15 RX ADMIN — VENLAFAXINE 37.5 MG: 37.5 TABLET ORAL at 09:25

## 2021-07-15 RX ADMIN — BUPRENORPHINE 2 MG: 2 TABLET SUBLINGUAL at 20:46

## 2021-07-15 RX ADMIN — BUDESONIDE AND FORMOTEROL FUMARATE DIHYDRATE 2 PUFF: 80; 4.5 AEROSOL RESPIRATORY (INHALATION) at 20:45

## 2021-07-15 RX ADMIN — BUDESONIDE AND FORMOTEROL FUMARATE DIHYDRATE 2 PUFF: 80; 4.5 AEROSOL RESPIRATORY (INHALATION) at 09:26

## 2021-07-15 RX ADMIN — METRONIDAZOLE 500 MG: 500 TABLET, FILM COATED ORAL at 09:25

## 2021-07-15 RX ADMIN — ENOXAPARIN SODIUM 40 MG: 100 INJECTION SUBCUTANEOUS at 17:51

## 2021-07-15 RX ADMIN — GABAPENTIN 300 MG: 300 CAPSULE ORAL at 09:25

## 2021-07-15 ASSESSMENT — MIFFLIN-ST. JEOR: SCORE: 1188.17

## 2021-07-15 NOTE — PROGRESS NOTES
[facility] PALLIATIVE CARE PROGRESS NOTE        Chief Complaint:  Cancer related pain; diverticulitis, nausea;    Key Palliative Symptom Data:  # Pain severity the last 12 hours: moderate  # Dyspnea severity the last 12 hours: none  # Nausea severity the last 12 hours: low  # Anxiety severity the last 12 hours: low    Patient is on opioids: assessed and bowels ok/no needed changes to plan of care today.    Prognosis, Goals, or Advance Care Planning was addressed today with: Yes.    Mood, coping, and/or meaning in the context of serious illness were addressed today: Yes.    Chart Review/discussion with clinical unit members: I reviewed the Epic record thoroughly and conferred with the bedside nurse    Palliative Encounter Summary/Comments:  I checked in twice this morning with Nia along with  from the Family Medicine Residency who is shadowing me.    Initially Nia was feeling nauseous and was just getting an ondansetron injection and didn't want to talk too much.  She did report her pain 'wasn't too bad' and that the subutex was working pretty well.  I noted she had not had any prn dilaudid.  She thought the TID frequency was 'almost there.'    When I saw her later her stomach was somewhat settled and she was anticipating going home this evening.  I asked about her pain and she mentioned the pain team was going to increase the subutex to 2 mg QID.    I asked if she had a chance to get to the health care directive packet Monroe Reynolds left for her yesterday and if she had any questions.  She said she had not gotten to it (too  Many interruptions from the nursing team) and she hoped to complete soon after she gets home.  She was clear with me she wants her daughter from [place], Sandra, to be her HCA and that she has spoken about her wishes with Sandra so she would know what to do if Nia can't make her wishes known.  She also said that she doesn't want to have a lot of 'heroics'  If she is  flat line or the cancer has 'taken over.'     Otherwise, Nia said she is OK and looking forward to going home.  I told her I know that [facility] has palliative care providers embedded in their clinic and I would encourage her to ask her oncologist for a referral to them.  She said she would.    TTS: I have personally spent a total of 25  minutes  today on the unit in review of medical record, consultation with the medical providers and assessment of patient today, with more than 50% of this time spent in counseling, coordination of care,    re:  prognosis, symptom management, risks and benefits of management options, emotional support and development of plan of care.    Abel Powers MD MS FAAFP  [facility]  Office [phone]  Fax [phone]

## 2021-07-15 NOTE — PROGRESS NOTES
"Care Management Follow Up    Length of Stay (days): 3    Expected Discharge Date: 07/16/2021     Concerns to be Addressed:       Patient plan of care discussed at interdisciplinary rounds: Yes     Anticipated Discharge Disposition:  Return home w/ [facility] home care services (RN/PT/OT/SW).        Additional Information:  Dtr/pt approved Home Care. [facility].out until 8/2 for her area. [facility] Faxed manually (fax:[phone]) and accepted. Likely discharge tomorrow with Dtr Rebekah to transport.    EW Worker Emperatriz ([phone], fax discharge orders to [phone])    TRUPTI spoke at length with Dtr Rebekah who became tearful and stated being \"overwhelmed\" with cleaning her mom's house today. She works full-time, lives 4 blocks away from pt, and states that pt/family need extra support in the home. Discussed that options are private pay caregiving and family/friend support. Dtr shared struggles balancing work, home family, and mother's caregiving. [facility] provided emotional support and validation of complex feelings. Dtrs both ok with additional temporary home care support and state pt is in agreement. Referrals sent.    [facility] spoke with Dtr Susanna, who is not currently a PCA and hasn't been for 5 years. Daughters all ok with PCA from EW worker ([facility] updated  - Burt Castanongordon, [phone] - who is covering for Emperatriz for the next weeks)    [facility] called out to Dtpedro luis Welch for name of agency that Roselia Mullins work for. Unknown, she messaged Susanna to call [facility].  [facility] attempted to call Dtr C. Line busy for 15 min. Goal to set up home care (RN/PT/OT/SW) with agency that dtr works at. Will attempt again later.     Per EW worker, Pt has no formal services right now. Daughters assist daily as needed. Pt is from home alone with supportive family members. Family helps with IADLs primarily. Pt would not want to live in LTC. Goal to return home with family transport. CM following .     Isamar HURST " SKY Simons

## 2021-07-15 NOTE — PROGRESS NOTES
Pain follow up - paged re: nausea this afternoon for patient    Patient reporting worsening of nausea this morning, which she attribute to possibly taking medications on empty stomach, improved with antiemetics and taking food with medication. Patient with diverticulitis, and newly initiated MAT for OUD with suboxone started 7/13 evening, will be second day titrating dose daily per pain team. Pt with no pain, no other s/sx of withdrawal other than nausea. Patient also started on bzd taper with phenobarb started last night. Multiple contributing factors for etiology of nausea, however if nausea and vital signs worsen (tachycardia, HTN) and signs of w/drawal present (agitation, insomnia, anxiety, confusion, shaking, sweating, seizures) recommend notifying addiction medicine Dr. Rodriguez as benzodiazpine w/drawal is life threatening, opioid w/drawal is not. Can offer patient vistaril as well which is ordered for w/drawal symptoms and continue to give medications with food and offer antiemetics prn. Discussed with RN and pain provider TEMI Bonilla. Pain team will follow up with patient tomorrow morning to coordinate pain care plan, patient would like daughter to be there during visit.    Kirk Shi, PharmD, BCPS, CPE  Acute Pain Management Team

## 2021-07-15 NOTE — PLAN OF CARE
Problem: Pain Chronic (Persistent)  Goal: Acceptable Pain Control and Functional Ability  7/15/2021 0707 by Marichuy Werner, RN  Outcome: Improving   Patient alert and oriented x 4. No complain of pain.Vitals were stable. CIWA protocol scored 0. Pleasant and cooperative with cares. Slept good.

## 2021-07-15 NOTE — PROGRESS NOTES
"Addiction Medicine Follow Up    Tele-Visit Details    Type of service:  Video Visit    Time Service Began (time 1st connected with pt): 1527    Time Service Ended (time completely finished with pt): 1539    Originating Location (pt. Location): Patient Rm, [facility]    Distant Location (provider location): [facility]    Reason for Televisit: COVID 19    Mode of Communication:  Video Conference via [employer]    Physician has received verbal consent for a video visit from the patient? Yes        Principal Problem:    Encephalopathy  Active Problems:    Malignant neoplasm of lower-outer quadrant of right breast of female, estrogen receptor positive (H)    Diverticulitis of large intestine without perforation or abscess without bleeding    Pain medication agreement    Panic disorder without agoraphobia    Post traumatic stress disorder    Dehydration    Elevated troponin level not due to acute coronary syndrome    Acute kidney injury (H)    Chronic pain due to neoplasm    Opioid dependence with intoxication delirium (H)    Hypokalemia    Non-traumatic rhabdomyolysis    Essential hypertension    Anemia      Subjective  Chief complaint: Nausea and vomiting    HPI: Patient was prescribed ER morphine, dilaudi and clonzepam prior to admission.   Admitted due to acute mental status changes.   Appears that she may have become confused and took too many of some of them, but she is not really sure.   She is now off opioids and on buprenorphine.   Tolerating this without sedation.  Dose increased to 2 mg qid today.       I am also doing a cross-taper for the clonazepam by giving phenobarbital 32.4 mg bid and will then taper.       Today, her pain is mostly in her abdomen.   She thinks it is a \"flare\" of her diverticulitis.   She also complains of pain \"all over\" - not defined or described further.  But overall, thinks the buprenorphine is helping with her chronic " "pain.      ROS:   Resp:  Deneis dyspnea,   CV:  No chest pain or lightheadedness   GI:  As above.  Some diarrhea   Ext: No focal weakness   Psych:  The patient is scattered in her thoughts and sometimes contradicts herself    .  Medication List Choices:   Current Facility-Administered Medications   Medication     albuterol (PROAIR HFA/PROVENTIL HFA/VENTOLIN HFA) 108 (90 Base) MCG/ACT inhaler 1-2 puff     amLODIPine (NORVASC) tablet 5 mg     budesonide-formoterol (SYMBICORT) 80-4.5 MCG/ACT Inhaler 2 puff     buprenorphine (SUBUTEX) sublingual tablet 2 mg     ciprofloxacin (CIPRO) tablet 500 mg     enoxaparin ANTICOAGULANT (LOVENOX) injection 40 mg     gabapentin (NEURONTIN) capsule 300 mg     hydrALAZINE (APRESOLINE) injection 10 mg     hydrOXYzine (ATARAX) tablet 10 mg     lactobacillus rhamnosus (GG) (CULTURELL) capsule 1 capsule     lidocaine (LMX4) kit     lidocaine 1 % 0.1-1 mL     melatonin tablet 3 mg     metroNIDAZOLE (FLAGYL) tablet 500 mg     naloxone (NARCAN) injection 0.2 mg    Or     naloxone (NARCAN) injection 0.4 mg    Or     naloxone (NARCAN) injection 0.2 mg    Or     naloxone (NARCAN) injection 0.4 mg     nitroGLYcerin (NITROSTAT) sublingual tablet 0.4 mg     OLANZapine (zyPREXA) injection 5 mg     ondansetron (ZOFRAN-ODT) ODT tab 4 mg    Or     ondansetron (ZOFRAN) injection 4 mg     PHENobarbital (LUMINAL) tablet 32.4 mg     polyethylene glycol (MIRALAX) powder 17 g     prochlorperazine (COMPAZINE) injection 5 mg     promethazine (PHENERGAN) 12.5 mg in sodium chloride 0.9 % 50 mL intermittent infusion     propranolol (INDERAL) tablet 10 mg     sodium chloride (PF) 0.9% PF flush 3 mL     sodium chloride (PF) 0.9% PF flush 3 mL     venlafaxine (EFFEXOR) tablet 37.5 mg       Objective    /69 (BP Location: Left arm)   Pulse 52   Temp 98.4  F (36.9  C) (Oral)   Resp 18   Ht 1.626 m (5' 4\")   Wt 65.8 kg (145 lb 1.6 oz)   SpO2 97%   BMI 24.91 kg/m      Physical Exam per " "hospitalist:     Neuro: alert, Oriented to person place and situation.   Said it was 2020   Skin:  No sweating or gooseflesh   Psych:     Cooperative, pleasant     Mood:  dysthymic               Affect:  discongruent               Thought content:  No SI, HI or hallucinations               Thought processes:  Linear                Speech:  Normal                Motor:  Normal                Insight/judgement:  fair/ poor     Short term recall - 3/3    Results  24labs      Assessment and Plan:  1.  Acute mental status changes  -  Probably due to opiate and benzo overuse.   Sx are improved, but does have some \"lack of comprehension\" and reports memory problems.   Have switched to buprenorphine, which should be safer and less sedating.  Goal to get off of benzos are well.  She is on phenobarbital instead.  This will need to be continued for 5 - 7 days.   Would prefer not to send her home on this med.      She is willing to have some help with setting up her medications at home.      2.  Chronic opioid use disorder,   -  Have inducted her onto buprenorphine but still not sure how much she will need to keep from being in opiate withdrawal and to control her pain.  Her current pain sounds more like withdrawal at this time,  but also has bone metastasis which will likely  Increase over time.          Think that palliative care provider suggested that they could manage the patient's buprenorphine, but this is not clear.   Need to arrange follow up for buprenorphine before she discharges.       3.   Chronic benzodiazepine use -  As above.   Have started phenobarb 32.4 mg bid and have stopped the clonazepam.   Patient is tolerating this thus far.       Need to discuss discharge plans further with all teams involved before she discharges.         Keara Rodriguez MD  Addiction Medicine Service  [facility]   Page me (click here for Pooja Rodriguez)                 "

## 2021-07-15 NOTE — PLAN OF CARE
Problem: Adult Inpatient Plan of Care  Goal: Plan of Care Review  Outcome: Improving  Flowsheets (Taken 7/14/2021 2220)  Plan of Care Reviewed With: patient   Pt is a transfer from P3. Alert, coherent, pleasant and cooperative.  CIWA protocol scored=0.  K and magnesium protocol. Recheck in am. Keep monitoring.

## 2021-07-15 NOTE — PLAN OF CARE
Problem: Pain Chronic (Persistent)  Goal: Acceptable Pain Control and Functional Ability  Outcome: Improving   Patient alert and oriented x 4. Vitals were stable. CIWA 0. No complain of pain. Slept good.

## 2021-07-15 NOTE — PROGRESS NOTES
[facility]    Medicine Progress Note - Hospitalist Service       Date of Admission:  7/12/2021    Assessment & Plan              Jarrell Nicole is a 66 year old female with PMH of stage 4 breast cancer, HTN, HLD, mood disorder, chronic pain syndrome, MOHINDER/agoraphobia, admitted on 7/12/2021.     # Acute encephalopathy, toxic metabolic, due to unintentional overdose of opiates, benzodiazepines, in the setting of infection/acute diverticulitis and RASHAWN.  Patient daughter believes that patient unintentionally took extra doses of Rx medications, after forgetting that she took doses prior.  CT head negative for brain metastasis or other acute intracranial pathology.  Patient denies illicits use or alcohol use.    Checked UDS for coingestions-positive for opiates, cannabinoids, but no benzos; patient on Rx Dilaudid and clonazepam.  Encephalopathy improving.  She is pleasant, cooperative, apologetic,  oriented #3.  Nonfocal neuro exam.  -Treating acute diverticulitis now n.p.o. antibiotics;  RASHAWN resolved.  -Telemetry monitoring-discontinue.  -Required one-to-one sitter for safety, was discontinued on 7/14.    #Acute sigmoid diverticulitis, without abscess or perforation, mild.  #Lactic acid acidosis at 5 on admission, due to dehydration/RASHAWN.  Normalized to 1.4 with IVF and first doses of antibiotics.  CT abdomen C/W distal sigmoid mild diverticulitis, no abscess or perforation.  Procalcitonin normal.  -On IV ciprofloxacin and Flagyl, transition to p.o. to biotics on 7/14.  -Symptomatic treatment with analgesics, antiemetics, antipyretics.  -Tolerating regular diet.    # Essential hypertension.  Elevated BP, SBP max 204 in ED.  Unknown last dose of amlodipine.  Patient is asymptomatic.  -Resume PTA amlodipine, as needed IV hydralazine.    #Elevated troponin, 1.42 at 8 PM on 7/12.  This is in the setting of mild rhabdomyolysis with CPK of 390.  Serial troponin trending downwards.  Patient is chest  pain-free.  No history of CAD.  Nonischemic EKG on admission.  -Echo showed EF 60 to 65%, no WMA.  -Cardiology consulted, appreciate Dr. Baron's recommendations.    # RASHAWN/high anion gap metabolic acidosis, resolved hyperkalemia.  Creatinine 2.45, BUN 39.  Present normal renal function on 3/21/2021 creatinine 0.82.  Urine not infected.  S/p 2 L NS in ED, now maintenance IVF.  Awaiting AM chemistry panel.  -C/W IVF while NPO.  Monitor renal function closely.  Avoid hypotension/nephrotoxins, renally dose medications.    # Cannabis use.  Positive UDS on 7/13.  Patient was counseled on refraining from using cannabis, while taking prescription all opiates/benzos.    # Rhabdomyolysis, mild.  Unknown if/when/how long downtime.  No bruises.  Afebrile.  -IVF, monitor renal function.    #Right-sided breast cancer, with bone metastasis, chronic pain.  Stage IV (cTX, cM1, ER+, WY+, HER2-).  Diagnosed on 3/3/2021; incidental finding of the right breast mass during ED evaluation for chest pain.  Pt of Dr. Foster/last seen on 3/18/2021, and requested to be transferred to the care of [facility]. Now follows with Dr. Blackman.  -Pain team consulted to optimize pain regimen, D/W. Christina Dhillon.    #Suspected cognitive impairment.  Most recent note from oncology indicates that during clinic visit patient did not recall having axillary lymph node biopsy.  Also documented erratic behavior with the clinic staff.   -OT for evaluation  -Checked normal B12, ammonia, TSH, folate.  -Psychiatry evaluated, recommendations reviewed.    # History of opiate dependence and abuse.  Collateral information from patient's daughter Rebekah (named by patient in the presence of care RN Valery as primary contact): Patient was longstanding history of opiate abuse and dependence, s/p chemical dependency treatment, methadone treatment.  Rebekah believes that patient was clear from opioid use for long time, prior to her diagnosis of breast cancer.  Now she is  receiving prescription for p.o. Dilaudid for cancer related pain, and actually suspect that patient misusing Rx Dilaudid.  D/W results of UDS, being negative for benzos, and positive for cannabis.  Rebekah's not aware of patient using nonprescription substances.  Patient seen by chemical dependency specialist, D/W Dr. Joy.    # Nausea vomiting, development 7/15.  ? need to opiate withdrawal.  Symptomatic treatment.    #Self-neglect, in the setting of opiate overuse.  Patient daughter Rebekah, who needed the wellness check on the patient yesterday, discovered patient's house in disarray, with furniture/carpeted covered in feces and urine.  -PT/OT/case management/social work/psychiatry evaluation for disposition needs.    COVID-19 tested negative.    POC discussed with Dr. Welch over the phone.     Diet: Regular Diet Adult    DVT Prophylaxis: Enoxaparin (Lovenox) SQ  Richard Catheter: Not present  Central Lines: None  Code Status: Full Code      Disposition Plan   Expected discharge: 07/15/2021 recommended to prior living arrangement once adequate pain management/ tolerating PO medications and antibiotic plan established.  Likely on 7/16.     The patient's care was discussed with the Bedside Nurse, Patient and Patient's Family.    Reyna Nielson MD  Hospitalist Service  [facility]  Securely message with the Vocera Web Console (learn more here)  Text page via [employer] Paging/Directory    Risk Factors Present on Admission             _____________________________________________________________________    Interval History   Patient feeling better today.  Left lower quadrant abdominal pain resolved.  Remains afebrile.  Vital stable.  She is much less confused today, cooperative, and apologetic for yesterday's behavior/agitation.    Data reviewed today: I reviewed all medications, new labs and imaging results over the last 24 hours.   Physical Exam   Vital Signs: Temp: 98.5  F (36.9  C) Temp src:  Oral BP: 135/63 Pulse: 69   Resp: 20 SpO2: 99 % O2 Device: None (Room air)    Weight: 145 lbs 1.6 oz    General Appearance:  Pleasant, cooperative, fluent speech, alert and oriented #3.  Eyes: No scleral icterus, pupils equal reactive to light and accommodation  HEENT: Head normocephalic, atraumatic, moist mucous membranes, neck supple, no cervical LAD or thyromegaly.  Respiratory: Clear to auscultation bilaterally, no rhonchi or wheezes.  Cardiovascular: Rhythmic and regular S1-S2, no murmurs  GI: Soft, mild tenderness in left lower quadrant, audible bowel sounds.  Lymph/Hematologic: No peripheral lymphadenopathy.  Skin:, Dry, no rash  Musculoskeletal: Muscle bulk and tone  Neurologic: No focal neuro exam  Psychiatric: Impulsive, redirectable    Data   Recent Labs   Lab 07/15/21  0608 07/14/21  1556 07/14/21  0311 07/13/21  1033 07/12/21  1959   WBC  --   --  6.5  --  8.7   HGB  --   --  9.3*  --  12.9   MCV  --   --  99  --  96   PLT  --   --  399  --  565*   *  --  135* 140 143   POTASSIUM 3.6 3.8 3.4*  3.4* 2.9* 3.7   CHLORIDE 106  --  109* 109* 106   CO2 21*  --  16* 18* 17*   BUN 9  --  21 37* 39*   CR 0.69  --  0.74 1.16* 2.45*   ANIONGAP 7  --  10 13 20*   PADDY 7.3*  --  7.1* 8.1* 9.5   GLC 96  --  104 143* 132*   ALBUMIN  --   --  3.2* 3.9 4.4   PROTTOTAL  --   --  5.6* 6.8 8.0   BILITOTAL  --   --  0.5 0.6 0.7   ALKPHOS  --   --  91 119 160*   ALT  --   --  18 20 21   AST  --   --  26 31 31   LIPASE  --   --   --   --  <9     No results found for this or any previous visit (from the past 24 hour(s)).{MEDICINE AND PEDS

## 2021-07-16 LAB
ANION GAP SERPL CALCULATED.3IONS-SCNC: 8 MMOL/L (ref 5–18)
BUN SERPL-MCNC: 8 MG/DL (ref 8–22)
CALCIUM SERPL-MCNC: 7.4 MG/DL (ref 8.5–10.5)
CHLORIDE BLD-SCNC: 105 MMOL/L (ref 98–107)
CO2 SERPL-SCNC: 24 MMOL/L (ref 22–31)
CREAT SERPL-MCNC: 0.71 MG/DL (ref 0.6–1.1)
DEPRECATED CALCIDIOL+CALCIFEROL SERPL-MC: 37 UG/L (ref 30–80)
GFR SERPL CREATININE-BSD FRML MDRD: 90 ML/MIN/1.73M2
GLUCOSE BLD-MCNC: 98 MG/DL (ref 70–125)
MAGNESIUM SERPL-MCNC: 2.2 MG/DL (ref 1.8–2.6)
POTASSIUM BLD-SCNC: 3.5 MMOL/L (ref 3.5–5)
SODIUM SERPL-SCNC: 137 MMOL/L (ref 136–145)

## 2021-07-16 PROCEDURE — 99232 SBSQ HOSP IP/OBS MODERATE 35: CPT | Mod: GC | Performed by: FAMILY MEDICINE

## 2021-07-16 PROCEDURE — 250N000013 HC RX MED GY IP 250 OP 250 PS 637: Performed by: INTERNAL MEDICINE

## 2021-07-16 PROCEDURE — 250N000013 HC RX MED GY IP 250 OP 250 PS 637: Performed by: PAIN MEDICINE

## 2021-07-16 PROCEDURE — 83735 ASSAY OF MAGNESIUM: CPT | Performed by: INTERNAL MEDICINE

## 2021-07-16 PROCEDURE — 250N000011 HC RX IP 250 OP 636: Performed by: INTERNAL MEDICINE

## 2021-07-16 PROCEDURE — 80048 BASIC METABOLIC PNL TOTAL CA: CPT | Performed by: INTERNAL MEDICINE

## 2021-07-16 PROCEDURE — 120N000001 HC R&B MED SURG/OB

## 2021-07-16 PROCEDURE — 82306 VITAMIN D 25 HYDROXY: CPT | Performed by: INTERNAL MEDICINE

## 2021-07-16 PROCEDURE — 99233 SBSQ HOSP IP/OBS HIGH 50: CPT | Performed by: INTERNAL MEDICINE

## 2021-07-16 PROCEDURE — 99232 SBSQ HOSP IP/OBS MODERATE 35: CPT | Mod: 95 | Performed by: INTERNAL MEDICINE

## 2021-07-16 PROCEDURE — 36415 COLL VENOUS BLD VENIPUNCTURE: CPT | Performed by: INTERNAL MEDICINE

## 2021-07-16 RX ORDER — BUPRENORPHINE HYDROCHLORIDE AND NALOXONE HYDROCHLORIDE DIHYDRATE 2; .5 MG/1; MG/1
2 TABLET SUBLINGUAL 3 TIMES DAILY
Status: DISCONTINUED | OUTPATIENT
Start: 2021-07-17 | End: 2021-07-18 | Stop reason: HOSPADM

## 2021-07-16 RX ORDER — DOCUSATE SODIUM 100 MG/1
100 CAPSULE, LIQUID FILLED ORAL 2 TIMES DAILY
Qty: 60 CAPSULE | Refills: 0 | Status: SHIPPED | OUTPATIENT
Start: 2021-07-16 | End: 2021-08-30

## 2021-07-16 RX ORDER — POTASSIUM CHLORIDE 1500 MG/1
20 TABLET, EXTENDED RELEASE ORAL 2 TIMES DAILY
Status: DISCONTINUED | OUTPATIENT
Start: 2021-07-16 | End: 2021-07-18 | Stop reason: HOSPADM

## 2021-07-16 RX ORDER — BUPRENORPHINE AND NALOXONE 4; 1 MG/1; MG/1
1 FILM, SOLUBLE BUCCAL; SUBLINGUAL 3 TIMES DAILY
Qty: 21 FILM | Refills: 0 | Status: SHIPPED | OUTPATIENT
Start: 2021-07-16 | End: 2021-07-21

## 2021-07-16 RX ORDER — PHENOBARBITAL 16.2 MG/1
16.2 TABLET ORAL AT BEDTIME
Qty: 3 TABLET | Refills: 0 | Status: SHIPPED | OUTPATIENT
Start: 2021-07-16 | End: 2021-07-18

## 2021-07-16 RX ORDER — LIDOCAINE 4 G/G
2 PATCH TOPICAL EVERY 24 HOURS
Status: DISCONTINUED | OUTPATIENT
Start: 2021-07-16 | End: 2021-07-18 | Stop reason: HOSPADM

## 2021-07-16 RX ORDER — POLYETHYLENE GLYCOL 3350 17 G/17G
1 POWDER, FOR SOLUTION ORAL DAILY PRN
Qty: 850 G | Refills: 0 | Status: SHIPPED | OUTPATIENT
Start: 2021-07-16 | End: 2022-04-26

## 2021-07-16 RX ORDER — PHENOBARBITAL 16.2 MG/1
16.2 TABLET ORAL ONCE
Status: COMPLETED | OUTPATIENT
Start: 2021-07-17 | End: 2021-07-17

## 2021-07-16 RX ADMIN — BUPRENORPHINE 2 MG: 2 TABLET SUBLINGUAL at 16:44

## 2021-07-16 RX ADMIN — GABAPENTIN 300 MG: 300 CAPSULE ORAL at 08:00

## 2021-07-16 RX ADMIN — METRONIDAZOLE 500 MG: 500 TABLET, FILM COATED ORAL at 20:15

## 2021-07-16 RX ADMIN — Medication 1 CAPSULE: at 11:44

## 2021-07-16 RX ADMIN — ONDANSETRON 4 MG: 4 TABLET, ORALLY DISINTEGRATING ORAL at 16:45

## 2021-07-16 RX ADMIN — LIDOCAINE 2 PATCH: 246 PATCH TOPICAL at 13:09

## 2021-07-16 RX ADMIN — CIPROFLOXACIN 500 MG: 500 TABLET, COATED ORAL at 20:14

## 2021-07-16 RX ADMIN — ONDANSETRON 4 MG: 4 TABLET, ORALLY DISINTEGRATING ORAL at 07:41

## 2021-07-16 RX ADMIN — Medication 1 CAPSULE: at 16:02

## 2021-07-16 RX ADMIN — BUPRENORPHINE 2 MG: 2 TABLET SUBLINGUAL at 13:10

## 2021-07-16 RX ADMIN — VENLAFAXINE 37.5 MG: 37.5 TABLET ORAL at 07:43

## 2021-07-16 RX ADMIN — BUDESONIDE AND FORMOTEROL FUMARATE DIHYDRATE 2 PUFF: 80; 4.5 AEROSOL RESPIRATORY (INHALATION) at 20:14

## 2021-07-16 RX ADMIN — GABAPENTIN 300 MG: 300 CAPSULE ORAL at 20:15

## 2021-07-16 RX ADMIN — PROCHLORPERAZINE EDISYLATE 5 MG: 5 INJECTION INTRAMUSCULAR; INTRAVENOUS at 09:04

## 2021-07-16 RX ADMIN — METRONIDAZOLE 500 MG: 500 TABLET, FILM COATED ORAL at 08:00

## 2021-07-16 RX ADMIN — BUDESONIDE AND FORMOTEROL FUMARATE DIHYDRATE 2 PUFF: 80; 4.5 AEROSOL RESPIRATORY (INHALATION) at 07:42

## 2021-07-16 RX ADMIN — AMLODIPINE BESYLATE 5 MG: 5 TABLET ORAL at 08:00

## 2021-07-16 RX ADMIN — BUPRENORPHINE 2 MG: 2 TABLET SUBLINGUAL at 20:18

## 2021-07-16 RX ADMIN — MELATONIN TAB 3 MG 3 MG: 3 TAB at 22:21

## 2021-07-16 RX ADMIN — PHENOBARBITAL 32.4 MG: 32.4 TABLET ORAL at 08:00

## 2021-07-16 RX ADMIN — PHENOBARBITAL 32.4 MG: 32.4 TABLET ORAL at 20:17

## 2021-07-16 RX ADMIN — METRONIDAZOLE 500 MG: 500 TABLET, FILM COATED ORAL at 13:10

## 2021-07-16 RX ADMIN — BUPRENORPHINE 2 MG: 2 TABLET SUBLINGUAL at 08:00

## 2021-07-16 RX ADMIN — POTASSIUM CHLORIDE 20 MEQ: 20 TABLET, EXTENDED RELEASE ORAL at 11:45

## 2021-07-16 RX ADMIN — POTASSIUM CHLORIDE 20 MEQ: 20 TABLET, EXTENDED RELEASE ORAL at 20:15

## 2021-07-16 RX ADMIN — Medication 1 CAPSULE: at 07:38

## 2021-07-16 RX ADMIN — ENOXAPARIN SODIUM 40 MG: 100 INJECTION SUBCUTANEOUS at 16:45

## 2021-07-16 RX ADMIN — CIPROFLOXACIN 500 MG: 500 TABLET, COATED ORAL at 07:43

## 2021-07-16 NOTE — PLAN OF CARE
Shift from 0700 to 1930-    Problem: Nausea and Vomiting  Goal: Fluid and Electrolyte Balance  Outcome: Improving     Problem: Pain Chronic (Persistent)  Goal: Acceptable Pain Control and Functional Ability  Outcome: Improving  Intervention: Develop Pain Management Plan  Recent Flowsheet Documentation  Taken 7/16/2021 1644 by Shahnaz Silva RN  Pain Management Interventions:   medication (see MAR)   emotional support  Taken 7/16/2021 1310 by Shahnaz Silva RN  Pain Management Interventions:   medication (see MAR)   emotional support  Taken 7/16/2021 1210 by Shahnaz Silva RN  Pain Management Interventions:   emotional support   heat applied  Taken 7/16/2021 0747 by Shahnaz Silva RN  Pain Management Interventions:   medication (see MAR)   emotional support   Pain controlled with schedule subutex. See pain assessment and reassessment.   Given zofran before breakfast and dinner; And an additional compazine for breakfast. Patient had less nausea today. Tolerated meals better today. Given applesauce with meds.

## 2021-07-16 NOTE — DISCHARGE INSTRUCTIONS
Video appt with Dr. Keara Joy at [facility] on Weds 7/23 at 3:45pm; Office will call you the day before to set up. Or sign up for MY Chart.  Phone number for Dr. Joy is [phone]; located at [address] [place] [address].

## 2021-07-16 NOTE — PROGRESS NOTES
Addiction Medicine Follow Up    Tele-Visit Details    Type of service:  Video Visit    Time Service Began (time 1st connected with pt): 1630    Time Service Ended (time completely finished with pt): 1642    Originating Location (pt. Location): Patient Rm, [facility]    Distant Location (provider location): [facility]    Reason for Televisit: COVID 19    Mode of Communication:  Video Conference via [employer]    Physician has received verbal consent for a video visit from the patient? Yes        Principal Problem:    Encephalopathy  Active Problems:    Malignant neoplasm of lower-outer quadrant of right breast of female, estrogen receptor positive (H)    Diverticulitis of large intestine without perforation or abscess without bleeding    Pain medication agreement    Panic disorder without agoraphobia    Post traumatic stress disorder    Dehydration    Elevated troponin level not due to acute coronary syndrome    Acute kidney injury (H)    Chronic pain due to neoplasm    Opioid dependence with intoxication delirium (H)    Hypokalemia    Non-traumatic rhabdomyolysis    Essential hypertension    Anemia      Subjective  Chief complaint: some nausea remains    HPI:  Patient presented on 7/12 with acute mental status changes.   Appears to be related to toxiciiy from morphine ER,  Hydromorphone and clonazepam.    Now on buprenorphine and off all other opiates.   Also are in process of getting her off the clonazepam, by replacing it with phenobarb.      Patient was experiencing nausea and vomiting yesterday - it is better today and she has not vomited.   She also reports that her abdomenal pain and her musculoskeletal pain is improved.   She says she is able to get to bathroom and walk without assistance.  Mostly had pain during the night between 2100 and 0800 when she thinks the buprenorphine was wearing off.       She denies oversedation and took miralax this am and had a good  "BM.    ROS:   Gen:   No sweats or chills   Resp:  No dyspnea or cough   CV:  No chest pain   GI:  See above   Ext: no swelling, no tremor   Psych:  Denies anxiety, agitation or confusion    .  Medication List Choices:   Current Facility-Administered Medications   Medication     albuterol (PROAIR HFA/PROVENTIL HFA/VENTOLIN HFA) 108 (90 Base) MCG/ACT inhaler 1-2 puff     amLODIPine (NORVASC) tablet 5 mg     budesonide-formoterol (SYMBICORT) 80-4.5 MCG/ACT Inhaler 2 puff     buprenorphine (SUBUTEX) sublingual tablet 2 mg     ciprofloxacin (CIPRO) tablet 500 mg     enoxaparin ANTICOAGULANT (LOVENOX) injection 40 mg     gabapentin (NEURONTIN) capsule 300 mg     hydrALAZINE (APRESOLINE) injection 10 mg     hydrOXYzine (ATARAX) tablet 10 mg     lactobacillus rhamnosus (GG) (CULTURELL) capsule 1 capsule     Lidocaine (LIDOCARE) 4 % Patch 2 patch     lidocaine (LMX4) kit     lidocaine 1 % 0.1-1 mL     lidocaine patch in PLACE     melatonin tablet 3 mg     metroNIDAZOLE (FLAGYL) tablet 500 mg     naloxone (NARCAN) injection 0.2 mg    Or     naloxone (NARCAN) injection 0.4 mg    Or     naloxone (NARCAN) injection 0.2 mg    Or     naloxone (NARCAN) injection 0.4 mg     nitroGLYcerin (NITROSTAT) sublingual tablet 0.4 mg     OLANZapine (zyPREXA) injection 5 mg     ondansetron (ZOFRAN-ODT) ODT tab 4 mg    Or     ondansetron (ZOFRAN) injection 4 mg     PHENobarbital (LUMINAL) tablet 32.4 mg     polyethylene glycol (MIRALAX) powder 17 g     potassium chloride ER (KLOR-CON M) CR tablet 20 mEq     prochlorperazine (COMPAZINE) injection 5 mg     promethazine (PHENERGAN) 12.5 mg in sodium chloride 0.9 % 50 mL intermittent infusion     propranolol (INDERAL) tablet 10 mg     sodium chloride (PF) 0.9% PF flush 3 mL     sodium chloride (PF) 0.9% PF flush 3 mL     venlafaxine (EFFEXOR) tablet 37.5 mg       Objective    /54 (BP Location: Right arm)   Pulse 72   Temp 97.8  F (36.6  C) (Oral)   Resp 18   Ht 1.626 m (5' 4\")   Wt 65.8 " kg (145 lb 1.6 oz)   SpO2 98%   BMI 24.91 kg/m      Physical Exam per hospitalist:     Neuro:  Alert, oriented x 4.  No tremor or abnormal movements   Skin:  No diaphoresis or gooseflesh   Psych:     Cooperative, pleasant     Mood:  Euthymic               Affect:  Congruent               Thought content:  No SI, HI or hallucinations               Thought processes:   More Linear,  Less tangential                Speech:  Normal                Motor:  Normal                Insight/judgement:  Both  improving    Results:  BMP WNL.   Ca remains low, even when corrected for albumin.      Assessment and Plan:  1.   Acute mental status changes -  Resolved.   Likely related to accidental overdose with opates and benzodiazepines.    Will have home health aid and help with medications.      2.  Chronic prescription opioid use -    Now off morphine and diluadid and on buprenorphine 2 mg qid.   Noting pain recurrence when goes for longer interval between doses during night.   Will increase to 4 mg tid which will help duration of effect as well.    Will start with morning dose and discharge on that dose.       3.   Chronic benzodiazepine use -    Was taking clonazepam 1 mg bid or tid daily for last 7 yrs.   Have switched to phenobarbital 32.4 mg bid and plan was to taper starting in a week.   However, do not want to send her home with a lot of phenobarbital, so will start taper now.   She seems to be tolerating the switch without problems.   Taper as follows:      7/17    -   16. 4 mg in am and 32.4 mg at bedtime.     7/18    -    None in am  And   32.4 mg at bedtime.     7/19/21 -  16.2 mg at bedtime.     7/20/21  -   Discontinue phenobarb.       4.  Breast cancer with bone mets  - at this point, the buprenorphine seems to be adequately controlling pain.   Will need to follow up with oncologist as outpatient.  Going to dispose of any remaining opiates at home with daughter's help.        Get  SAM for Dr. Viraj SANCHEZ will  manage her buprenorphine for now - she has been scheduled for a video visit with me as follows:      Appointment with Dr. Rodriguez -  July 21, 2021 at 3:45 pm.    [facility]  [address] [place], [place]    This visit will be via video.     Clinic will call day before to help you set up the visit.    Questions?   Call [phone]      I signed the following RX for discharge:    Suboxone 4/1 mg film  1 film tid.   #21              Phenobarbital 16.2 mg    #5   Take as instructed.      She should also get prescriptions for colace and miralax prn.      Keara Rodriguez MD  Addiction Medicine Service  [facility]   Page me (click here for Pooja Rodriguze)         .

## 2021-07-16 NOTE — PLAN OF CARE
Problem: Pain Chronic (Persistent)  Goal: Acceptable Pain Control and Functional Ability  Outcome: Improving   Patient alert and conversant during cares, denies pain when asked. Independent in the room. Slept good.

## 2021-07-16 NOTE — PROGRESS NOTES
[facility]    Medicine Progress Note - Hospitalist Service       Date of Admission:  7/12/2021    Assessment & Plan              Jarrell Nicole is a 66 year old female with PMH of stage 4 breast cancer, HTN, HLD, mood disorder, chronic pain syndrome, MOHINDER/agoraphobia, admitted on 7/12/2021.     # Acute encephalopathy, toxic metabolic, due to unintentional overdose of opiates, benzodiazepines, in the setting of infection/acute diverticulitis and RASHAWN.  Encephalopathy rapidly improved and remains pleasant and cooperative throughout hospitalization.  Patient daughter believes that patient unintentionally took extra doses of Rx medications, after forgetting that she took doses prior of Dilaudid and clonazepam.  CT head negative for brain metastasis or other acute intracranial pathology.  Patient denies illicits use or alcohol use.    Checked UDS for coingestions-positive for opiates, cannabinoids, but no benzos; patient on Rx Dilaudid and clonazepam.  Nonfocal neuro exam.  -Treating acute diverticulitis now n.p.o. antibiotics;  RASHAWN resolved.  -Telemetry monitoring-discontinued.  -Required one-to-one sitter for safety overnight the first day of admission, was discontinued on 7/14.    #Acute sigmoid diverticulitis, mild, without abscess or perforation.  #Lactic acid acidosis at 5 on admission, due to dehydration/RASHAWN.  Normalized to 1.4 with IVF and first doses of antibiotics.  CT abdomen C/W distal sigmoid mild diverticulitis, no abscess or perforation.  Procalcitonin normal.  -On IV ciprofloxacin and Flagyl, transition to p.o. to biotics on 7/14.  -Symptomatic treatment with analgesics, antiemetics, antipyretics.  -Tolerating regular diet.    #Emesis on 7/15.  Suspect component of opiate withdrawal, as patient transition from Dilaudid to buprenorphine.  Dose of buprenorphine was increased to 4 times a day.  No recurrence of emesis.  No the symptoms of opiate/benzo withdrawal.  Very mild  diverticulitis.  Symptomatic treatment.    # Essential hypertension.  Elevated BP, SBP max 204 in ED.  Unknown last dose of amlodipine.  Patient is asymptomatic.  -Resumed PTA amlodipine, as needed IV hydralazine.    #Elevated troponin, 1.42 at 8 PM on 7/12.  This is in the setting of mild rhabdomyolysis with CPK of 390.  Troponin normalized within 48 hours from admission.  Patient is chest pain-free.  No history of CAD.  Nonischemic EKG on admission.  -Echo showed EF 60 to 65%, no WMA.  -Cardiology consulted, appreciate Dr. Baron's recommendations.    # RASHAWN/high anion gap metabolic acidosis, resolved hyperkalemia.  Creatinine 2.45, BUN 39.  Present normal renal function on 3/21/2021 creatinine 0.82.  Urine not infected.  S/p 2 L NS in ED, now maintenance IVF.  Awaiting AM chemistry panel.  -C/W IVF while NPO.  Monitor renal function closely.  Avoid hypotension/nephrotoxins, renally dose medications.    # Cannabis use.  Positive UDS on 7/13.  Patient was counseled on refraining from using cannabis, while taking prescription all opiates/benzos.    # Rhabdomyolysis, mild.  Unknown if/when/how long downtime.  No bruises.  Afebrile.  Mild CPK elevation, peaked at 433.  -IVF, monitor renal function.    #Right-sided breast cancer, with bone metastasis, chronic pain.  Stage IV (cTX, cM1, ER+, NE+, HER2-).  Diagnosed on 3/3/2021; incidental finding of the right breast mass during ED evaluation for chest pain.  Pt of Dr. Foster/last seen on 3/18/2021, and requested to be transferred to the care of [facility]. Now follows with Dr. Blackman.  -Pain team consulted to optimize pain regimen, D/W. Christina Dhillon.    #Suspected cognitive impairment.  Most recent note from oncology indicates that during clinic visit patient did not recall having axillary lymph node biopsy.  Also documented erratic behavior with the clinic staff.   -OT for evaluation  -Checked normal B12, ammonia, TSH, folate.  -Psychiatry evaluated,  recommendations reviewed.    # Chronic cancer related pain.  # History of opiate dependence and abuse.  Collateral information from patient's daughter Rebekah (named by patient in the presence of care RN Valery as primary contact): Patient was longstanding history of opiate abuse and dependence, s/p chemical dependency treatment, methadone treatment.  Rebekah believes that patient was clear from opioid use for long time, prior to her diagnosis of breast cancer.  Now she is receiving prescription for p.o. Dilaudid for cancer related pain, and actually suspect that patient misusing Rx Dilaudid.  D/W results of UDS, being negative for benzos, and positive for cannabis.  Rebekah's not aware of patient using nonprescription substances.  Patient seen by chemical dependency specialist, D/W Dr. Joy, transition to buprenorphine and phenobarbital.  Dr. Santos will see patient next week in his clinic.    #Self-neglect, in the setting of opiate overuse.  Patient daughter Rebekah, who needed the wellness check on the patient yesterday, discovered patient's house in disarray, with furniture/carpeted covered in feces and urine.  -PT/OT/case management/social work/psychiatry evaluation for disposition needs.  Patient to be discharged with [facility] for PT/OT/social work/PCA.    COVID-19 tested negative.    POC discussed with Dr. Welch over the phone.     Diet: Regular Diet Adult    DVT Prophylaxis: Enoxaparin (Lovenox) SQ  Richard Catheter: Not present  Central Lines: None  Code Status: No CPR- Do NOT Intubate      Disposition Plan   Expected discharge: 07/17/2021 recommended to prior living arrangement once adequate pain management/ tolerating PO medications and antibiotic plan established.  Likely on 7/17.  Barriers to discharge-nausea.     The patient's care was discussed with the Bedside Nurse, Patient and Patient's Family.    Reyna Nielson MD  Hospitalist Service  [facility]  Securely message with the  [facility] [employer] (learn more here)  Text page via [facility] Paging/Directory    Risk Factors Present on Admission              _____________________________________________________________________    Interval History   Patient had few episodes of nonbloody nonbilious emesis yesterday.  Zofran help with symptoms.  No associated abdominal pain.  Feeling nauseous today, but no vomiting.  Conditioning from Dilaudid to buprenorphine and from clonazepam to phenobarbital.  No signs of opiate/benzos withdrawal-tachycardia, hypertension, diaphoresis, agitation, seizures, anxiety, insomnia, confusion.  Patient brought up discussion on CODE STATUS.  Stated that she she wishes to be  DNR/DNI.  She assured me that her 3 daughters are aware of patient's resuscitation wishes.  I spoke with patient's daughter Rebekah, provided updates, and verified that family is aware of patient's resuscitation wishes.  Rebekah is aware of patient's wishes.    Data reviewed today: I reviewed all medications, new labs and imaging results over the last 24 hours.   Physical Exam   Vital Signs: Temp: 97.8  F (36.6  C) Temp src: Oral BP: 112/54 Pulse: 72   Resp: 18 SpO2: 98 % O2 Device: None (Room air)    Weight: 145 lbs 1.6 oz    General Appearance:  Pleasant, cooperative, fluent speech, alert and oriented #3.  Eyes: No scleral icterus, pupils equal reactive to light and accommodation  HEENT: Head normocephalic, atraumatic, moist mucous membranes, neck supple, no cervical LAD or thyromegaly.  Respiratory: Clear to auscultation bilaterally, no rhonchi or wheezes.  Cardiovascular: Rhythmic and regular S1-S2, no murmurs  GI: Soft, nontender, audible bowel sounds.  Lymph/Hematologic: No peripheral lymphadenopathy.  Skin:, Dry, no rash  Musculoskeletal: Muscle bulk and tone  Neurologic: No focal neuro exam  Psychiatric:  Somewhat anxious, pleasant and cooperative    Data   Recent Labs   Lab 07/16/21  0705 07/15/21  0608 07/14/21  1556 07/14/21  0311  07/13/21  1033 07/12/21 1959   WBC  --   --   --  6.5  --  8.7   HGB  --   --   --  9.3*  --  12.9   MCV  --   --   --  99  --  96   PLT  --   --   --  399  --  565*    134*  --  135* 140 143   POTASSIUM 3.5 3.6 3.8 3.4*  3.4* 2.9* 3.7   CHLORIDE 105 106  --  109* 109* 106   CO2 24 21*  --  16* 18* 17*   BUN 8 9  --  21 37* 39*   CR 0.71 0.69  --  0.74 1.16* 2.45*   ANIONGAP 8 7  --  10 13 20*   PADDY 7.4* 7.3*  --  7.1* 8.1* 9.5   GLC 98 96  --  104 143* 132*   ALBUMIN  --   --   --  3.2* 3.9 4.4   PROTTOTAL  --   --   --  5.6* 6.8 8.0   BILITOTAL  --   --   --  0.5 0.6 0.7   ALKPHOS  --   --   --  91 119 160*   ALT  --   --   --  18 20 21   AST  --   --   --  26 31 31   LIPASE  --   --   --   --   --  <9     No results found for this or any previous visit (from the past 24 hour(s)).

## 2021-07-16 NOTE — PROGRESS NOTES
Care Management Follow Up    Length of Stay (days): 4    Expected Discharge Date: 07/17/2021     Concerns to be Addressed: Medical mgmt of encephalopathy, palliative / addiction med / pain team / psych following    Patient plan of care discussed at interdisciplinary rounds: Yes     Anticipated Discharge Disposition:  Return home w/ [facility] home care services (RN/PT/OT/SW).        Additional Information:  Dtr/pt approved [facility]. Faxed manually (fax:[phone]) and accepted. Likely discharge tomorrow with Yolande Welch to transport.    EW Worker Emperatriz ([phone], fax discharge orders to [phone])     Burt Jacques, [phone] - is covering for Emperatriz for the next weeks. SW gave this number to yolande Welch and left VM regarding need for PCA services long-term.      Assessment Hx -- Addiction hx. Per EW worker, Pt has no formal services right now but is able to start PCA services through the [facility]. Daughters assist daily as needed. Pt is from home alone with supportive family members. Family helps with IADLs primarily. Pt would not want to live in LTC. Yolande Welch is primary contact. Goal to return home with home care (RN/PT/OT/SW) and family transport. CM following .     SKY Martinez

## 2021-07-16 NOTE — PROGRESS NOTES
[facility] ACUTE PAIN SERVICE    ([facility], [facility], [facility])  Daily PAIN Progress Note     Assessment/Plan:  Jarrell Nicole is a 66 year old female who was admitted on 7/12/2021.  I was asked to overdose of opiates and benzos in the setting of acute diverticulitis and RASHAWN. I was asked to see the patient for chronic pain, opioid dependence, unintentional overdose last week.. Admitted for Acute encephalopathy, toxic metabolic, due to unintentional overdose of opiates, benzodiazepines, in the setting of infection/acute diverticulitis and RASHAWN . History of primary cancer of right breast, asthma, HTN, increased lipids. Pain began initially with breast cancer diagnosis, but patient is having a very difficult time with pain history discussion. The patient does no longer smoke and denies chemical dependency history.      Patient reports improved abdominal pain which she reports d/t diverticulitis however still present, rating 6-7/10 during assessment. Nausea improved with medications, continue to give meds with food/applesauce. Declines apap. Will trial lidocaine patches and heat pad alternating, she reports relief with this in past. No s/sx of withdrawal other than nausea. No changes to suboxone today. Dr. Rodriguez will be prescribing suboxone and will see her in clinic next week. Discussed plan with Dr. Nielson and Rn Shahnaz. Also spoke with daughter on speaker phone while in room who lives 4 blocks from patientRebekah.       PLAN:   1) Cancer pain in the setting of known past addiction and overdose. The patient's home MME was 108 mg daily. Suboxone has been started for opioid use disorder. Need to arrange follow up for buprenorphine before she discharges, also on a 5-7 day phenobarb taper for benzodiazepine overuse and addiction medicine prefers she not go home on this either.   2)Multimodal Medication Therapy  Topical: none, patient not interested in trying at this point  NSAID'S: RASHAWN  improved, hold crcl = 73.7 ml/min  Muscle Relaxants: none, does not describe any spasms.  Adjuvants: gabapentin bid 300mg BID, vistaril 10 mg po q4h prn   Antidepressants/anxiolytics:Effexor 37.5mg qam, phenobarb taper per chem dep  Opioids:  Subutex 2mg SL QID and NO dilaudid   3)Non-medication interventions- acupuncture, ice, PT   4)Constipation Prophylaxis- none diarrhea present  5) Follow up/Discharge plan - addiction medicine or pain clinic (or both), prescriber has been Nallely De Los Santos NP       MN  pulled from system on 07/13/21. Last refill on 6/30. This indicated chronic opioid use. 7* total number of prescribing providers noted. Most common prescriber isNallely De Los Santos MD.       Kirk Shi, PharmD, BCPS, CPE  Acute Care Pain Management Program   [facility] ([facility], [place], [place])   Page via [facility]- Click HERE to page  or call [phone]

## 2021-07-16 NOTE — PROGRESS NOTES
[facility] PALLIATIVE CARE PROGRESS NOTE        Chief Complaint: abdominal pain 2/2 diverticulitis; neoplasm associated pain; stage 4 breast Ca;    Key Palliative Symptom Data:  # Pain severity the last 12 hours: low  # Dyspnea severity the last 12 hours: none  # Nausea severity the last 12 hours: low  # Anxiety severity the last 12 hours: low  Tylers pain is mainly in her LLQ.  She rates it 6-7/10; her nausea is much improved over yesterday and she was considering the lunch menu when I was in with her; she feels hungry and wants to see what happens when she eats;   Patient is on opioids: assessed and bowels ok/no needed changes to plan of care today.    Prognosis, Goals, or Advance Care Planning was addressed today with: No.    Mood, coping, and/or meaning in the context of serious illness were addressed today: Yes.    Chart Review/discussion with clinical unit members: I conferred with Dr. Joy by phone this morning    Palliative Encounter Summary/Comments:  I spoke with Dr. Joy at her request this morning about Nia and we decided that neither I nor any of my other palliative care colleagues would be Nia's outpatient opiate prescriber.  Dr. Joy seemed to feel it would be best if she and her team had that responsibility.  She intended to discuss it with Dr. Nielson.  I shared with Nia that I confirmed that [facility] does have embedded palliative care clinicians I their cancer clinics and she should request a referral to one of them from her oncologist.  She told me she is eager to set up this relationship.    When I saw Nia, her daughter Rebekah and 15 year old son (who is a big, tall young man) were visiting.  Nia told me that she is expecting to be discharged tomorrow if she an keep her munch and dinner down.  It sounds as if there has been some confusion with the family about where Nia will go.  Apparently some of her kids fear she is going to assisted living.   It's not clear if they are confusing that with TCU.  It sounds like some of this confusion is a part of ongoing family conflict.  The daughter present today has worked in a variety of settings as a nurses aide and she has a good grasp of the differences in various care settings.  Nia is anticipating being discharged home with a PCA and other home care.  I told her I would defer to Dr. Nielson about that.    I provided emotional support for Nia and her daughter through active, empathic listening and validating her feelings.  Nia expressed gratitude for our team's involvement in her care.    TTS: I have personally spent a total of 25 minutes  today on the unit in review of medical record, consultation with the medical providers and assessment of patient today, with more than 50% of this time spent in counseling, coordination of care, and conversation with daughter and grandson at the bedside re:  prognosis, symptom management, risks and benefits of management options, emotional support and development of plan of care.    Abel Powers MD MS FAAFP  [facility] Palliative Care Service  Office [phone]  Fax [phone]

## 2021-07-16 NOTE — PLAN OF CARE
Shift from 0700 to 1930-  Problem: Pain Chronic (Persistent)  Goal: Acceptable Pain Control and Functional Ability  Outcome: Improving  Intervention: Develop Pain Management Plan  Recent Flowsheet Documentation  Taken 7/15/2021 1751 by Shahnaz Silva RN  Pain Management Interventions: medication (see MAR)  Taken 7/15/2021 1412 by Shahnaz Silva RN  Pain Management Interventions: medication (see MAR)  Taken 7/15/2021 0926 by Shahnaz Silva RN  Pain Management Interventions: medication (see MAR)     Problem: Nausea and Vomiting  Goal: Fluid and Electrolyte Balance  Outcome: Improving  Patient had persistent nausea today. Ate breakfast then skipped lunch; Vomited; MD updated. Given zofran twice and compazine x1;  Nausea improved. Didn't want to discharge today.  Pain decreased with subutex. Seeing pain team. Updated pain team.

## 2021-07-17 LAB
ALBUMIN SERPL-MCNC: 3.3 G/DL (ref 3.5–5)
ALP SERPL-CCNC: 79 U/L (ref 45–120)
ALT SERPL W P-5'-P-CCNC: 17 U/L (ref 0–45)
ANION GAP SERPL CALCULATED.3IONS-SCNC: 10 MMOL/L (ref 5–18)
AST SERPL W P-5'-P-CCNC: 23 U/L (ref 0–40)
BILIRUB DIRECT SERPL-MCNC: 0.1 MG/DL
BILIRUB SERPL-MCNC: 0.3 MG/DL (ref 0–1)
BUN SERPL-MCNC: 7 MG/DL (ref 8–22)
CALCIUM SERPL-MCNC: 8.1 MG/DL (ref 8.5–10.5)
CHLORIDE BLD-SCNC: 103 MMOL/L (ref 98–107)
CO2 SERPL-SCNC: 23 MMOL/L (ref 22–31)
CREAT SERPL-MCNC: 0.74 MG/DL (ref 0.6–1.1)
ERYTHROCYTE [DISTWIDTH] IN BLOOD BY AUTOMATED COUNT: 20 % (ref 10–15)
GFR SERPL CREATININE-BSD FRML MDRD: 85 ML/MIN/1.73M2
GLUCOSE BLD-MCNC: 172 MG/DL (ref 70–125)
HCT VFR BLD AUTO: 26.8 % (ref 35–47)
HGB BLD-MCNC: 9.2 G/DL (ref 11.7–15.7)
MAGNESIUM SERPL-MCNC: 1.9 MG/DL (ref 1.8–2.6)
MCH RBC QN AUTO: 33.6 PG (ref 26.5–33)
MCHC RBC AUTO-ENTMCNC: 34.3 G/DL (ref 31.5–36.5)
MCV RBC AUTO: 98 FL (ref 78–100)
PLATELET # BLD AUTO: 315 10E3/UL (ref 150–450)
POTASSIUM BLD-SCNC: 3.7 MMOL/L (ref 3.5–5)
PROT SERPL-MCNC: 5.7 G/DL (ref 6–8)
RBC # BLD AUTO: 2.74 10E6/UL (ref 3.8–5.2)
SODIUM SERPL-SCNC: 136 MMOL/L (ref 136–145)
WBC # BLD AUTO: 4.9 10E3/UL (ref 4–11)

## 2021-07-17 PROCEDURE — 250N000013 HC RX MED GY IP 250 OP 250 PS 637: Performed by: INTERNAL MEDICINE

## 2021-07-17 PROCEDURE — 120N000001 HC R&B MED SURG/OB

## 2021-07-17 PROCEDURE — 99232 SBSQ HOSP IP/OBS MODERATE 35: CPT | Performed by: FAMILY MEDICINE

## 2021-07-17 PROCEDURE — 250N000011 HC RX IP 250 OP 636: Performed by: INTERNAL MEDICINE

## 2021-07-17 PROCEDURE — 83735 ASSAY OF MAGNESIUM: CPT | Performed by: INTERNAL MEDICINE

## 2021-07-17 PROCEDURE — 85014 HEMATOCRIT: CPT | Performed by: INTERNAL MEDICINE

## 2021-07-17 PROCEDURE — 250N000013 HC RX MED GY IP 250 OP 250 PS 637: Performed by: PAIN MEDICINE

## 2021-07-17 PROCEDURE — 36415 COLL VENOUS BLD VENIPUNCTURE: CPT | Performed by: INTERNAL MEDICINE

## 2021-07-17 PROCEDURE — 82248 BILIRUBIN DIRECT: CPT | Performed by: PAIN MEDICINE

## 2021-07-17 PROCEDURE — 80048 BASIC METABOLIC PNL TOTAL CA: CPT | Performed by: INTERNAL MEDICINE

## 2021-07-17 PROCEDURE — 99232 SBSQ HOSP IP/OBS MODERATE 35: CPT | Performed by: PAIN MEDICINE

## 2021-07-17 RX ADMIN — BUPRENORPHINE HYDROCHLORIDE AND NALOXONE HYDROCHLORIDE DIHYDRATE 2 TABLET: 2; .5 TABLET SUBLINGUAL at 08:33

## 2021-07-17 RX ADMIN — ENOXAPARIN SODIUM 40 MG: 100 INJECTION SUBCUTANEOUS at 17:24

## 2021-07-17 RX ADMIN — VENLAFAXINE 37.5 MG: 37.5 TABLET ORAL at 08:33

## 2021-07-17 RX ADMIN — METRONIDAZOLE 500 MG: 500 TABLET, FILM COATED ORAL at 08:33

## 2021-07-17 RX ADMIN — METRONIDAZOLE 500 MG: 500 TABLET, FILM COATED ORAL at 20:31

## 2021-07-17 RX ADMIN — AMLODIPINE BESYLATE 5 MG: 5 TABLET ORAL at 08:34

## 2021-07-17 RX ADMIN — Medication 1 CAPSULE: at 11:32

## 2021-07-17 RX ADMIN — HYDROXYZINE HYDROCHLORIDE 10 MG: 10 TABLET ORAL at 16:06

## 2021-07-17 RX ADMIN — ONDANSETRON 4 MG: 4 TABLET, ORALLY DISINTEGRATING ORAL at 08:33

## 2021-07-17 RX ADMIN — BUDESONIDE AND FORMOTEROL FUMARATE DIHYDRATE 2 PUFF: 80; 4.5 AEROSOL RESPIRATORY (INHALATION) at 08:32

## 2021-07-17 RX ADMIN — BUPRENORPHINE HYDROCHLORIDE AND NALOXONE HYDROCHLORIDE DIHYDRATE 2 TABLET: 2; .5 TABLET SUBLINGUAL at 21:29

## 2021-07-17 RX ADMIN — BUPRENORPHINE HYDROCHLORIDE AND NALOXONE HYDROCHLORIDE DIHYDRATE 2 TABLET: 2; .5 TABLET SUBLINGUAL at 14:08

## 2021-07-17 RX ADMIN — CIPROFLOXACIN 500 MG: 500 TABLET, COATED ORAL at 08:33

## 2021-07-17 RX ADMIN — POTASSIUM CHLORIDE 20 MEQ: 20 TABLET, EXTENDED RELEASE ORAL at 20:31

## 2021-07-17 RX ADMIN — Medication 1 CAPSULE: at 08:32

## 2021-07-17 RX ADMIN — Medication 16.2 MG: at 08:35

## 2021-07-17 RX ADMIN — CIPROFLOXACIN 500 MG: 500 TABLET, COATED ORAL at 20:31

## 2021-07-17 RX ADMIN — BUDESONIDE AND FORMOTEROL FUMARATE DIHYDRATE 2 PUFF: 80; 4.5 AEROSOL RESPIRATORY (INHALATION) at 20:32

## 2021-07-17 RX ADMIN — METRONIDAZOLE 500 MG: 500 TABLET, FILM COATED ORAL at 14:08

## 2021-07-17 RX ADMIN — Medication 1 CAPSULE: at 16:06

## 2021-07-17 RX ADMIN — POTASSIUM CHLORIDE 20 MEQ: 20 TABLET, EXTENDED RELEASE ORAL at 08:34

## 2021-07-17 RX ADMIN — PROPRANOLOL HYDROCHLORIDE 10 MG: 10 TABLET ORAL at 16:06

## 2021-07-17 RX ADMIN — MELATONIN TAB 3 MG 3 MG: 3 TAB at 21:33

## 2021-07-17 RX ADMIN — LIDOCAINE 2 PATCH: 246 PATCH TOPICAL at 11:42

## 2021-07-17 RX ADMIN — GABAPENTIN 300 MG: 300 CAPSULE ORAL at 08:34

## 2021-07-17 RX ADMIN — GABAPENTIN 300 MG: 300 CAPSULE ORAL at 20:31

## 2021-07-17 NOTE — PROGRESS NOTES
[facility] ACUTE PAIN SERVICE    ([facility], [facility], [facility])  Daily PAIN Progress Note    Assessment/Plan:  Jarrell Nicole is a 66 year old female who was admitted on 7/12/2021. I was asked to see the patient for cancer pain in the setting of overdose. Admitted for overdose of opioids and benzos. Appears to be related to toxiciiy from morphine ER,  Hydromorphone and clonazepam. History of stage 4 breast cancer, HTN, HLD, mood disorder, chronic pain syndrome, MOHINDER/agoraphobia, and past heroin abuse. Pain rated 4/10 and appears controlled on gabapentin and suboxone. Nausea at times. Dr. Rodriguez will be seeing this patient after discharge on the 21st for ongoing care.      PLAN:   1) Cancer pain in the setting of known past addiction and overdose. CT scan did indicate L4 metastasis as well as mild diverticulitis.  The patient's home MME was 108 mg daily. Suboxone has been started for opioid use disorder.  She is undergoing a 5 to 7-day phenobarbital taper for benzodiazepine overuse.  During this hospital stay she was transitioned onto Suboxone.  And she has a follow-up planned on the 21st with addiction medicine.  2)Multimodal Medication Therapy  Topical: none, patient not interested in trying at this point  NSAID'S: RASHAWN improved, hold crcl = 73.7 ml/min  Muscle Relaxants: none, does not describe any spasms.  Adjuvants: gabapentin bid 300mg BID, vistaril 10 mg po q4h prn   Antidepressants/anxiolytics:Effexor 37.5mg qam, phenobarb taper per chem dep-   Opioids:  suboxone per chem dep 4/1mg tid   3)Non-medication interventions- acupuncture, ice, PT   4)Constipation Prophylaxis- none diarrhea present  5) Follow up/Discharge plan - addiction medicine or pain clinic (or both), prescriber has been Nallely De Los Santos NP        Subjective:  Pain is diffuse throughout the abdomen.  Patient worries about why she is having abdominal pain.  We talked about her mild diverticulitis.  She does seem to be  getting relief from gabapentin as well as from Suboxone.  Both the patient and Shahnaz her nurse tell me that she has an appointment on July 21 at 3:45 PM in the addiction medicine clinic.  The patient acknowledges she no longer will be taking Dilaudid.  She verbalized understanding of the treatment plan.    Apparently she had some white mucus and tan-colored stools today.    Review phenobarb taper from MD-                           7/17    -   16. 4 mg in am and 32.4 mg at bedtime.                           7/18    -    None in am  And   32.4 mg at bedtime.                           7/19/21 -  16.2 mg at bedtime.                           7/20/21  -   Discontinue phenobarb.         Principal Problem:    Encephalopathy  Active Problems:    Malignant neoplasm of lower-outer quadrant of right breast of female, estrogen receptor positive (H)    Diverticulitis of large intestine without perforation or abscess without bleeding    Pain medication agreement    Panic disorder without agoraphobia    Post traumatic stress disorder    Dehydration    Elevated troponin level not due to acute coronary syndrome    Acute kidney injury (H)    Chronic pain due to neoplasm    Opioid dependence with intoxication delirium (H)    Hypokalemia    Non-traumatic rhabdomyolysis    Essential hypertension    Anemia     LOS: 5 days       amLODIPine  5 mg Oral Daily     budesonide-formoterol  2 puff Inhalation BID     buprenorphine-naloxone  2 tablet Sublingual TID     ciprofloxacin  500 mg Oral Q12H GARRETT     enoxaparin ANTICOAGULANT  40 mg Subcutaneous Q24H     gabapentin  300 mg Oral BID     lactobacillus rhamnosus (GG)  1 capsule Oral TID AC     Lidocaine  2 patch Transdermal Q24H     lidocaine   Transdermal Q8H     metroNIDAZOLE  500 mg Oral TID     potassium chloride  20 mEq Oral BID     sodium chloride (PF)  3 mL Intracatheter Q8H     venlafaxine  37.5 mg Oral QAM       Objective:  Vital signs in last 24 hours:  Temp:  [97.8  F (36.6  C)-98.7   F (37.1  C)] 98  F (36.7  C)  Pulse:  [62-72] 62  Resp:  [16-18] 16  BP: (112-128)/(54-62) 119/58  SpO2:  [97 %-99 %] 99 %  Weight:   Weight:   @THISENCWEIGHTS(1)@  Weight change:   Body mass index is 24.91 kg/m .    Intake/Output last 3 shifts:  I/O last 3 completed shifts:  In: 2420 [P.O.:2420]  Out: 1800 [Urine:1800]  Intake/Output this shift:  No intake/output data recorded.    Review of Systems:   As per subjective, all others negative.    Physical Exam:    General Appearance:  Alert, cooperative, no distress, appears stated age    Head:  Normocephalic, without obvious abnormality, atraumatic   Eyes:  PERRL, conjunctiva/corneas clear, EOM's intact   Nose: Nares normal, septum midline, mucosa normal, no drainage   Throat: Lips normal    Neck: Supple, symmetrical, trachea midline, no adenopathy, thyroid: not enlarged, symmetric    Back:   Symmetric, no curvature, ROM normal   Lungs:   Clear to auscultation bilaterally, respirations unlabored   Chest Wall:  No tenderness or deformity   Heart:  Regular rate and rhythm, S1, S2 normal,no murmur, rub or gallop   Abdomen:   Soft, non-tender, bowel sounds active all four quadrants,  no masses, no organomegaly   Extremities: Extremities normal, atraumatic, no cyanosis or edema   Skin: Skin color normal    Neurologic: Alert and oriented X 3, Moves all 4 extremities           Lab Results:  Personally Reviewed.   Recent Labs   Lab 07/17/21  0911 07/14/21  0311 07/12/21 1959   WBC 4.9 6.5 8.7   HGB 9.2* 9.3* 12.9   HCT 26.8* 27.7* 37.4    399 565*     Recent Labs   Lab 07/17/21  0911 07/16/21  0705 07/15/21  0608 07/14/21  0311 07/13/21  1033 07/12/21 1959    137 134* 135* 140 143   CO2 23 24 21* 16* 18* 17*   BUN 7* 8 9 21 37* 39*   ALBUMIN  --   --   --  3.2* 3.9 4.4   ALKPHOS  --   --   --  91 119 160*   ALT  --   --   --  18 20 21   AST  --   --   --  26 31 31     No results for input(s): INR in the last 168 hours.       Total unit/floor time 25  minutes,  time consisted of the following, examination of patient, reviewing the record and lab results, and completing documentation. Coordination of care time with nurse Shahnaz.       Christina MEMBRENO, CNS-BC, CNP, ACHPN  Acute Care Pain Management Program Hour 7a-1700  [facility] ([facility], [place], [place])   Page via [employer]- Click HERE to page  or call [phone]

## 2021-07-17 NOTE — PROGRESS NOTES
Care Management Follow Up Note    Length of Stay (days) 5    Patient plan of care discussed at Interdisciplinary Rounds: yes                Expected Discharge Date: 7/17/2021 or tomorrow (pending clearance for discharge and final orders).       Concerns to be Addressed / Barriers to Discharge: Alteration in mentation due to pain medications for cancer (pain improving, encephalopathy resolving).    Anticipated Discharge Disposition: Home with home care.   Anticipated Discharge Services:  Home PT, OT, Nursing and medical SW.   Anticipated Discharge DME: Per therapy.     Plan:  Patient lives alone and is independent with activities of daily living at baseline.  She has three daughters that she states are supportive (one lives in [place] however). A referral has been made to [facility] for home PT, OT, nursing and a medical SW. Daughter Rebekah plans to provide transportation for patient to home. CM will continue to monitor progression of care, review team recommendations and provide discharge planning assist as needed.      Gloria Sterling RN    Abbreviation  Code:  [facility] Wheelchair ([facility] WC), [facility] Stretcher ([facility] STR), Patient (pt), Transitional Care Unit (TCU), Skilled Nursing Facility (SNF), Long Term Care (LTC), Assisted Living (half-way or AL), Care Management (CM), Physical Therapy (PT), Occupational Therapy (OT), Speech Therapy (ST), Respiratory Therapy (RT).

## 2021-07-17 NOTE — PLAN OF CARE
Problem: Pain Chronic (Persistent)  Goal: Acceptable Pain Control and Functional Ability  Outcome: Improving  Intervention: Develop Pain Management Plan  Recent Flowsheet Documentation  Taken 7/16/2021 2018 by Tariq Grant RN  Pain Management Interventions:   medication (see MAR)   emotional support   rest     Problem: Sleep Disturbance (Delirium)  Goal: Improved Sleep  Outcome: Improving    Patient reports that she slept well overnight. Reports that pain is well controlled at this time.

## 2021-07-17 NOTE — PLAN OF CARE
Shift from 0700 to 1530-    Problem: Pain Chronic (Persistent)  Goal: Acceptable Pain Control and Functional Ability  Outcome: Improving  Intervention: Develop Pain Management Plan  Recent Flowsheet Documentation  Taken 7/17/2021 0842 by Shahnaz Silva, RN  Pain Management Interventions:   medication (see MAR)   emotional support   Pain appears to be improved today. Appetite is also improved. Less nausea today. Using scheduled suboxone for pain, lidocaine patches and heating pad.   Ambulating in hallway.

## 2021-07-17 NOTE — PROGRESS NOTES
[facility]    Medicine Progress Note - Hospitalist Service       Date of Admission:  7/12/2021    Assessment & Plan           Patient is a 66-year-old female with a history of stage IV breast cancer, hypertension, hyperlipidemia, disorder, chronic pain syndrome, MOHINDER/agoraphobia, history of opioid abuse disorder that was admitted on 7/12/2021 with acute toxic metabolic encephalopathy due to unintentional overdose of opioids and benzodiazepines in the setting of an acute mild diverticulitis and RASHAWN.    Acute toxic metabolic encephalopathy  -Symptoms of this have resolved  -Secondary to unintentional overdose on opiates and benzodiazepines also in the setting of acute diverticulitis and an RASHAWN  -Patient seen by chemical dependency, pain service, palliative and psychiatry  -She was switched over to Suboxone and gabapentin for pain    Acute sigmoid diverticulitis  -Mild without abscess or perforation, lactic acidosis 5 on admission due to dehydration/RASHAWN and has normalized with IV fluids and antibiotics  -Completed IV Cipro and Flagyl and transition to oral antibiotics  -Tolerating regular diet  -Discussed diet for the future    Opiate withdrawal  -Predominantly emesis which was most significant on 7/15 has improved over the last 2 days  -Was switched over from Dilaudid to buprenorphine  -See psychiatry note from   7/16 for titration and follow-up instructions    Essential hypertension  -BP quite elevated during this admission but asymptomatic  -Blood pressure controlled on her PTA amlodipine and as needed hydralazine    Elevated troponin  -This was in the setting of mild rhabdomyolysis with a CPK of 390.  Was pain-free no history of CAD and nonischemic EKG on admission.  Echo showed's LVEF 60 to 65% with no wall motion abnormalities  -Cardiology was consulted during this admission    RASHAWN with high anion gap metabolic acidosis and resolved hyperkalemia  -This improved with IV  fluids    Cannabis use  -This was evident on positive urine drug screen 7/13 and was counseled on refraining from this while taking prescription benzos opiates and any kind of narcotics.    Right-sided breast cancer stage IV with bone metastasis and chronic pain  Stage IV (cTX, cM1, ER+, CT+, HER2-).  Diagnosed on 3/3/2021; incidental finding of the right breast mass during ED evaluation for chest pain.  Pt of Dr. Foster/last seen on 3/18/2021, and requested to be transferred to the care of Prema shah. Now follows with Dr. Blackman.  -Pain team consulted to optimize pain regimen    Some level of cognitive impairment  -Mostly memory related with some erratic behaviors in the clinic and discussion with staff  -Psychiatry evaluated and will follow with patient    Cancer related pain  History of opiate dependence and abuse  Collateral information from patient's daughter Rebekah (named by patient in the presence of care RN Valery as primary contact): Patient was longstanding history of opiate abuse and dependence, s/p chemical dependency treatment, methadone treatment.  Rebekah believes that patient was clear from opioid use for long time, prior to her diagnosis of breast cancer.  Now she is receiving prescription for p.o. Dilaudid for cancer related pain, and actually suspect that patient misusing Rx Dilaudid.  D/W results of UDS, being negative for benzos, and positive for cannabis.  Rebekah's not aware of patient using nonprescription substances.  Patient seen by chemical dependency specialist, D/W Dr. Joy, transition to buprenorphine and phenobarbital.  Dr. Santos will see patient next week in his clinic.     #Self-neglect, in the setting of opiate overuse.  Patient daughter Rebekah, who needed the wellness check on the patient yesterday, discovered patient's house in disarray, with furniture/carpeted covered in feces and urine.  -PT/OT/case management/social work/psychiatry evaluation for disposition needs.  Patient  to be discharged with [facility] for PT/OT/social work/PCA.         Diet: Regular Diet Adult    DVT Prophylaxis: Enoxaparin (Lovenox) SQ  Richard Catheter: Not present  Central Lines: None  Code Status: No CPR- Do NOT Intubate      Disposition Plan   Expected discharge: 07/18/2021 recommended to home with home health     The patient's care was discussed with the Patient.    Gurvinder Gallagher MD  Hospitalist Service  [facility]  Securely message with the Vocera Web Console (learn more here)  Text page via [employer] Paging/Directory      Risk Factors Present on Admission                ______________________________________________________________________    Interval History   Patient seen today denying any nausea, vomiting.  Says she discussed with previous providers the plan was for discharge on Sunday due to medication availability, opioid withdrawal and monitoring.  Patient denies any abdominal pain any diarrhea or constipation at this time.  No chest pain, shortness of breath, headaches or dizziness.  Dispense significant amount time discussing diet for diverticulitis which patient perseverates on.  Patient became tearful when discussing her breast cancer diagnosis and treatment process.  Says she feels much more clear minded.    Data reviewed today: I reviewed all medications, new labs and imaging results over the last 24 hours.    Physical Exam   Vital Signs: Temp: 98.1  F (36.7  C) Temp src: Oral BP: 126/59 Pulse: 62   Resp: 18 SpO2: 97 % O2 Device: None (Room air)    Weight: 145 lbs 1.6 oz  General Appearance: Pleasant cooperative clear speech oriented  Respiratory: Lungs clear to auscultation bilaterally  Cardiovascular: Regular rate and rhythm without murmur  GI: Soft nontender nondistended with normal bowel sounds  Skin: Moist without any gross rash  Psychiatric: Anxious and tearful at times  HEENT normal cephalic atraumatic with moist mucous membranes      Data   Recent Labs   Lab  07/17/21  0911 07/16/21  0705 07/15/21  0608 07/14/21  0311 07/12/21  1959   WBC 4.9  --   --  6.5 8.7   HGB 9.2*  --   --  9.3* 12.9   MCV 98  --   --  99 96     --   --  399 565*    137 134* 135* 143   POTASSIUM 3.7 3.5 3.6 3.4*  3.4* 3.7   CHLORIDE 103 105 106 109* 106   CO2 23 24 21* 16* 17*   BUN 7* 8 9 21 39*   CR 0.74 0.71 0.69 0.74 2.45*   ANIONGAP 10 8 7 10 20*   PADDY 8.1* 7.4* 7.3* 7.1* 9.5   * 98 96 104 132*   ALBUMIN 3.3*  --   --  3.2* 4.4   PROTTOTAL 5.7*  --   --  5.6* 8.0   BILITOTAL 0.3  --   --  0.5 0.7   ALKPHOS 79  --   --  91 160*   ALT 17  --   --  18 21   AST 23  --   --  26 31   LIPASE  --   --   --   --  <9     No results found for this or any previous visit (from the past 24 hour(s)).  Medications       amLODIPine  5 mg Oral Daily     budesonide-formoterol  2 puff Inhalation BID     buprenorphine-naloxone  2 tablet Sublingual TID     ciprofloxacin  500 mg Oral Q12H GARRETT     enoxaparin ANTICOAGULANT  40 mg Subcutaneous Q24H     gabapentin  300 mg Oral BID     lactobacillus rhamnosus (GG)  1 capsule Oral TID AC     Lidocaine  2 patch Transdermal Q24H     lidocaine   Transdermal Q8H     metroNIDAZOLE  500 mg Oral TID     potassium chloride  20 mEq Oral BID     sodium chloride (PF)  3 mL Intracatheter Q8H     venlafaxine  37.5 mg Oral QAM

## 2021-07-18 VITALS
HEART RATE: 78 BPM | OXYGEN SATURATION: 98 % | SYSTOLIC BLOOD PRESSURE: 128 MMHG | DIASTOLIC BLOOD PRESSURE: 65 MMHG | TEMPERATURE: 98.2 F | HEIGHT: 64 IN | BODY MASS INDEX: 24.77 KG/M2 | WEIGHT: 145.1 LBS | RESPIRATION RATE: 19 BRPM

## 2021-07-18 LAB
BACTERIA BLD CULT: NO GROWTH
BACTERIA BLD CULT: NO GROWTH

## 2021-07-18 PROCEDURE — 250N000013 HC RX MED GY IP 250 OP 250 PS 637: Performed by: INTERNAL MEDICINE

## 2021-07-18 PROCEDURE — 250N000013 HC RX MED GY IP 250 OP 250 PS 637: Performed by: HOSPITALIST

## 2021-07-18 PROCEDURE — 99239 HOSP IP/OBS DSCHRG MGMT >30: CPT | Performed by: HOSPITALIST

## 2021-07-18 PROCEDURE — 250N000013 HC RX MED GY IP 250 OP 250 PS 637: Performed by: PAIN MEDICINE

## 2021-07-18 RX ORDER — PHENOBARBITAL 16.2 MG/1
16.2 TABLET ORAL ONCE
Status: COMPLETED | OUTPATIENT
Start: 2021-07-18 | End: 2021-07-18

## 2021-07-18 RX ORDER — GABAPENTIN 300 MG/1
300 CAPSULE ORAL 2 TIMES DAILY
Qty: 20 CAPSULE | Refills: 0 | Status: SHIPPED | OUTPATIENT
Start: 2021-07-18 | End: 2021-08-03

## 2021-07-18 RX ORDER — HYDROXYZINE HYDROCHLORIDE 10 MG/1
10 TABLET, FILM COATED ORAL EVERY 4 HOURS PRN
Qty: 30 TABLET | Refills: 0 | Status: SHIPPED | OUTPATIENT
Start: 2021-07-18 | End: 2021-09-15

## 2021-07-18 RX ORDER — METRONIDAZOLE 500 MG/1
500 TABLET ORAL 3 TIMES DAILY
Qty: 14 TABLET | Refills: 0 | Status: SHIPPED | OUTPATIENT
Start: 2021-07-18 | End: 2021-07-25

## 2021-07-18 RX ORDER — PHENOBARBITAL 16.2 MG/1
TABLET ORAL
Qty: 3 TABLET | Refills: 0 | Status: SHIPPED | OUTPATIENT
Start: 2021-07-18 | End: 2021-07-25

## 2021-07-18 RX ORDER — ONDANSETRON 4 MG/1
4 TABLET, ORALLY DISINTEGRATING ORAL EVERY 6 HOURS PRN
Qty: 12 TABLET | Refills: 0 | Status: SHIPPED | OUTPATIENT
Start: 2021-07-18 | End: 2022-04-26

## 2021-07-18 RX ORDER — CIPROFLOXACIN 500 MG/1
500 TABLET, FILM COATED ORAL 2 TIMES DAILY
Qty: 9 TABLET | Refills: 0 | Status: SHIPPED | OUTPATIENT
Start: 2021-07-18 | End: 2021-07-25

## 2021-07-18 RX ORDER — LIDOCAINE 4 G/G
2 PATCH TOPICAL EVERY 24 HOURS
Qty: 30 PATCH | Refills: 0 | Status: SHIPPED | OUTPATIENT
Start: 2021-07-18

## 2021-07-18 RX ADMIN — PROPRANOLOL HYDROCHLORIDE 10 MG: 10 TABLET ORAL at 10:13

## 2021-07-18 RX ADMIN — LIDOCAINE 2 PATCH: 246 PATCH TOPICAL at 10:07

## 2021-07-18 RX ADMIN — BUDESONIDE AND FORMOTEROL FUMARATE DIHYDRATE 2 PUFF: 80; 4.5 AEROSOL RESPIRATORY (INHALATION) at 09:00

## 2021-07-18 RX ADMIN — PHENOBARBITAL 16.2 MG: 16.2 TABLET ORAL at 13:30

## 2021-07-18 RX ADMIN — BUPRENORPHINE HYDROCHLORIDE AND NALOXONE HYDROCHLORIDE DIHYDRATE 2 TABLET: 2; .5 TABLET SUBLINGUAL at 08:58

## 2021-07-18 RX ADMIN — AMLODIPINE BESYLATE 5 MG: 5 TABLET ORAL at 08:59

## 2021-07-18 RX ADMIN — POTASSIUM CHLORIDE 20 MEQ: 20 TABLET, EXTENDED RELEASE ORAL at 08:59

## 2021-07-18 RX ADMIN — VENLAFAXINE 37.5 MG: 37.5 TABLET ORAL at 08:59

## 2021-07-18 RX ADMIN — GABAPENTIN 300 MG: 300 CAPSULE ORAL at 08:59

## 2021-07-18 RX ADMIN — Medication 1 CAPSULE: at 07:54

## 2021-07-18 RX ADMIN — Medication 1 CAPSULE: at 11:40

## 2021-07-18 RX ADMIN — METRONIDAZOLE 500 MG: 500 TABLET, FILM COATED ORAL at 08:58

## 2021-07-18 RX ADMIN — CIPROFLOXACIN 500 MG: 500 TABLET, COATED ORAL at 08:59

## 2021-07-18 NOTE — PLAN OF CARE
Problem: Pain Chronic (Persistent)  Goal: Acceptable Pain Control and Functional Ability  Outcome: No Change  Patient reports that pain is unchanged, Lidocaine patch removed at scheduled time and patient is using heat PRN     Problem: Sleep Disturbance (Delirium)  Goal: Improved Sleep  Outcome: Improving  Patient reports that she has been sleeping well throughout the night     Problem: Nausea and Vomiting  Goal: Fluid and Electrolyte Balance  Outcome: Improving  No nausea/vomiting noted this shift.

## 2021-07-18 NOTE — PLAN OF CARE
Problem: Adult Inpatient Plan of Care  Goal: Plan of Care Review  Outcome: Adequate for Discharge  Goal: Patient-Specific Goal (Individualized)  Outcome: Adequate for Discharge  Goal: Absence of Hospital-Acquired Illness or Injury  Outcome: Adequate for Discharge  Intervention: Identify and Manage Fall Risk  Recent Flowsheet Documentation  Taken 7/18/2021 0754 by Evon Posadas RN  Safety Promotion/Fall Prevention:   clutter free environment maintained   nonskid shoes/slippers when out of bed   patient and family education   sitter at bedside  Goal: Optimal Comfort and Wellbeing  Outcome: Adequate for Discharge  Goal: Readiness for Transition of Care  Outcome: Adequate for Discharge     Problem: Pain Chronic (Persistent)  Goal: Acceptable Pain Control and Functional Ability  Outcome: Adequate for Discharge     Problem: Adjustment to Illness (Delirium)  Goal: Optimal Coping  Outcome: Adequate for Discharge     Problem: Altered Behavior (Delirium)  Goal: Improved Behavioral Control  Outcome: Adequate for Discharge     Problem: Attention and Thought Clarity Impairment (Delirium)  Goal: Improved Attention and Thought Clarity  Outcome: Adequate for Discharge     Problem: Sleep Disturbance (Delirium)  Goal: Improved Sleep  Outcome: Adequate for Discharge     Problem: Nausea and Vomiting  Goal: Fluid and Electrolyte Balance  Outcome: Adequate for Discharge     Problem: Discharge Planning  Goal: Discharge Planning (Adult, OB, Behavioral, Peds)  Outcome: Adequate for Discharge      VSS. Pt states pain controlled with scheduled medications. States lidocaine patches helpful. Up independently in room. Adequate appetite. OK to discharge per MD. Discharge instructions and plan of care discussed with patient and her Daughter. All questions answered. Pt's daughter here to drive patient home. Patient down to the front of the hospital for discharge.     NOTE: Returned patient medications from pharmacy in a sealed bag. Inside  "the bag was home meds of Dilaudid 2mg. Pt broke open seal and stated she did not want to take the Dilaudid home. Writer contacted the pharmacy here at the hospital and was told that patient would need to destroy the meds, or give to a trusted family member to get rid of the medications. Writer informed patient of this and she took the bottle, opened it up and dumped all the pills in the toilet and then flushed it. Writer was unable to count medications before they were dumped. Pt stated \"There, now they are gone, I don't trust giving them to my family, and I don't want to have them on hand.\"  "

## 2021-07-18 NOTE — CONSULTS
Will not see today:  PAIN MANAGEMENT SERVICE CHART CHECK  This patient's chart has been reviewed by the Pain Service. It has been determined that no change is necessary to the pain management regimen at this time. Scripts per Dr. Rodriguez are in the chart, Dr. Rodriguez also planned outpatient follow up.  Pain team will sign off. If you would like the patient to be seen, please contact the service at [phone] and ask to have the patient seen.    Thank you!    [facility]-BC   Clinical Nurse Specialist & Nurse Practitioner   Page Nereida on [facility]- Click Here

## 2021-07-18 NOTE — PLAN OF CARE
Problem: Nausea and Vomiting  Goal: Fluid and Electrolyte Balance  Outcome: Improving   Denied nausea and vomiting    Problem: Pain Chronic (Persistent)  Goal: Acceptable Pain Control and Functional Ability  Outcome: No Change  Intervention: Develop Pain Management Plan  Recent Flowsheet Documentation  Taken 7/17/2021 2129 by Gloria June, RN  Pain Management Interventions: medication (see MAR)  Taken 7/17/2021 1612 by Gloria June, RN  Pain Management Interventions:   emotional support   distraction   Pt reporting pain in abdomen, and right shoulder/back. Lidocaine patch in place on abdomen. Scheduled suboxone given.    Problem: Adult Inpatient Plan of Care  Goal: Plan of Care Review  Outcome: No Change   Pt up independent in room. Tolerating regular diet. Call light within reach, calls appropriately. VSS, will continue to monitor.

## 2021-07-18 NOTE — PROGRESS NOTES
Care Management Follow Up Note    Length of Stay (days) 6    Patient plan of care discussed at Interdisciplinary Rounds: yes                Expected Discharge Date: 7/18/2021      Concerns to be Addressed / Barriers to Discharge: Pending clearance for discharge and final orders (MET).     Anticipated Discharge Disposition: Home with home care.   Anticipated Discharge Services:  Home PT, OT, Nursing and medical SW.   Anticipated Discharge DME: Per therapy.     Plan:  Patient lives alone and is independent with activities of daily living at baseline.  She has three daughters that she states are supportive (one lives in [place] however). A referral has been made to [facility] for home PT, OT, nursing and a medical SW. Daughter Rebekah plans to provide transportation for patient to home. CM will continue to monitor progression of care, review team recommendations and provide discharge planning assist as needed.    12:07 PM:  Note discharge orders have been written. Spoke with intake for [facility] who is accepting patient for start of care. Orders for home care faxed to [phone]. IATF also faxed via Epic.     Gloria Sterling RN    Abbreviation  Code:  [facility] Wheelchair ([facility] WC), [facility] Stretcher ([facility] STR), Patient (pt), Transitional Care Unit (TCU), Skilled Nursing Facility (SNF), Long Term Care (LTC), Assisted Living ([facility] or AL), Care Management (CM), Physical Therapy (PT), Occupational Therapy (OT), Speech Therapy (ST), Respiratory Therapy (RT).

## 2021-07-21 ENCOUNTER — VIRTUAL VISIT (OUTPATIENT)
Dept: BEHAVIORAL HEALTH | Facility: CLINIC | Age: 66
End: 2021-07-21
Payer: COMMERCIAL

## 2021-07-21 DIAGNOSIS — C50.911 MALIGNANT NEOPLASM OF RIGHT BREAST IN FEMALE, ESTROGEN RECEPTOR POSITIVE, UNSPECIFIED SITE OF BREAST (H): ICD-10-CM

## 2021-07-21 DIAGNOSIS — Z17.0 MALIGNANT NEOPLASM OF RIGHT BREAST IN FEMALE, ESTROGEN RECEPTOR POSITIVE, UNSPECIFIED SITE OF BREAST (H): ICD-10-CM

## 2021-07-21 DIAGNOSIS — F11.221 OPIOID DEPENDENCE WITH INTOXICATION DELIRIUM (H): ICD-10-CM

## 2021-07-21 DIAGNOSIS — G89.3 CHRONIC PAIN DUE TO NEOPLASM: Primary | ICD-10-CM

## 2021-07-21 PROCEDURE — 99215 OFFICE O/P EST HI 40 MIN: CPT | Mod: 95 | Performed by: INTERNAL MEDICINE

## 2021-07-21 NOTE — NURSING NOTE
This video/telephone visit will be conducted via a call between you and your physician/provider. We have found that certain health care needs can be provided without the need for an in-person physical exam. This service lets us provide the care you need with a video /telephone conversation. If a prescription is necessary we can send it directly to your pharmacy. If lab work is needed we can place an order for that and you can then stop by our lab to have the test done at a later time.    Just as we bill insurance for in-person visits, we also bill insurance for video/telephone visits. If you have questions about your insurance coverage, we recommend that you speak with your insurance company.    Patient has given verbal consent for video/Telephone visit? Yes  Patient would like the video visit invitation sent by: Text to cell phone: SUE Send to email: "[employer] or [employer] CALL to:  SUE LINK/LAURA: Burt CAZARES LPN  New pt. Reports having terminal CA.  Patient verified allergies, medications and pharmacy via phone.  Patient states she  is ready for visit.    ________________________________________  Medications Phoned  to Pharmacy [] yes [x]no  Name of Pharmacist:  List Medications, including dose, quantity and instructions    Medications ordered this visit were e-scribed.  Verified by order class [] yes  [x] no    Medication changes or discontinuations were communicated to patient's pharmacy: [] yes  [x] no    Dictation completed at time of chart check: [] yes  [x] no    I have checked the documentation for today s encounters and the above information has been reviewed and completed.

## 2021-07-21 NOTE — DISCHARGE SUMMARY
[facility] MEDICINE  DISCHARGE SUMMARY     Primary Care Physician: Radha Oakley  Admission Date: 7/12/2021   Discharge Provider: Ken Gar MD Discharge Date: 7/18/2021   Diet:   Active Diet and Nourishment Order   Procedures     Diet       Code Status: Prior   Activity: DCACTIVITY: Activity as tolerated        Condition at Discharge: Stable     REASON FOR PRESENTATION(See Admission Note for Details)   Jarrell Nicole is a 66 year old female PMH of stage 4 breast cancer, HTN, HLD, mood disorder, chronic pain syndrome, MOHINDER/agoraphobia, 90 to ED via EMS after patient's daughter called 911.  Daughter Rebekah, who did not hear from patient for couple days, came for wellness check, found patient very confused, walking around the house naked, and patient's house being in disarray with feces and urine over furniture and carpet.  In ED she was confused, was clear speech, and nonfocal neuro exam.  She still appears that way to me.  Patient is somewhat anxious.  Work-up in ED showed acute diverticulitis, acute renal failure, lactic acid acidosis, elevated BP, elevated troponin without ischemic EKG changes.  S/p dose of ciprofloxacin, Flagyl, 2 L NS.  She denies fever, chills, abdominal pain, nausea, vomiting, cough, headache, focal weakness, diaphoresis, arthralgia, myalgia.  She does not recall falling at home.          PRINCIPAL & ACTIVE DISCHARGE DIAGNOSES     Principal Problem:    Encephalopathy  Active Problems:    Malignant neoplasm of lower-outer quadrant of right breast of female, estrogen receptor positive (H)    Diverticulitis of large intestine without perforation or abscess without bleeding    Pain medication agreement    Panic disorder without agoraphobia    Post traumatic stress disorder    Dehydration    Elevated troponin level not due to acute coronary syndrome    Acute kidney injury (H)    Chronic pain due to neoplasm    Opioid dependence with intoxication  delirium (H)    Hypokalemia    Non-traumatic rhabdomyolysis    Essential hypertension    Anemia      PENDING LABS     Unresulted Labs Ordered in the Past 30 Days of this Admission     No orders found from 6/12/2021 to 7/13/2021.            PROCEDURES ( this hospitalization only)          RECOMMENDATIONS TO OUTPATIENT PROVIDER FOR F/U VISIT     Follow-up Appointments     Follow-up and recommended labs and tests       Follow up with Dr. Rodriguez this week             With diverticulitis episode should consider colonoscopy in 2 months to ensure no occult cancer    DISPOSITION     Home with home care    SUMMARY OF HOSPITAL COURSE:      Patient is a 66-year-old female with a history of stage IV breast cancer, hypertension, hyperlipidemia, disorder, chronic pain syndrome, MOHINDER/agoraphobia, history of opioid abuse disorder that was admitted on 7/12/2021 with acute toxic metabolic encephalopathy due to unintentional overdose of opioids and benzodiazepines in the setting of an acute mild diverticulitis and RASHAWN.     Acute toxic metabolic encephalopathy  -Symptoms of this have resolved  -Secondary to unintentional overdose on opiates and benzodiazepines also in the setting of acute diverticulitis and an RASHAWN  -Patient seen by chemical dependency, pain service, palliative and psychiatry  -She was switched over to Suboxone and gabapentin for pain     Acute sigmoid diverticulitis  -Mild without abscess or perforation, lactic acidosis 5 on admission due to dehydration/RASHAWN and has normalized with IV fluids and antibiotics  -Completed IV Cipro and Flagyl and transitioned to oral antibiotics - has 4.5 more days remaining at discharge  - should consider colonoscopy in 2 months to ensure no occult cancer    Opiate withdrawal  Benzo dependence  -Predominantly emesis which was most significant on 7/15 has improved over the last 2 days  -Was switched over from Dilaudid to buprenorphine  -See psychiatry note from   7/16 for titration  and follow-up instructions    Cancer related pain  History of opiate dependence and abuse  Collateral information from patient's daughter Rebekah (named by patient in the presence of care RN Valery as primary contact): Patient was longstanding history of opiate abuse and dependence, s/p chemical dependency treatment, methadone treatment.  Rebekah believes that patient was clear from opioid use for long time, prior to her diagnosis of breast cancer.  Now she is receiving prescription for p.o. Dilaudid for cancer related pain, and actually suspect that patient misusing Rx Dilaudid.  D/W results of UDS, being negative for benzos, and positive for cannabis.  Rebekah's not aware of patient using nonprescription substances.  Patient seen by chemical dependency specialist, D/W Dr. Joy, transition to buprenorphine and phenobarbital.  Dr. Santos will see patient next week in his clinic.    Anxiety  Longstanding benzo use but urine was apparently negative  Should stop  Was placed on phenobarb taper, has two more days   hopefully the gabapentin can replace the benzo for her anxiety     Essential hypertension  -BP quite elevated during this admission but asymptomatic  -Blood pressure controlled on her PTA amlodipine and as needed hydralazine     Elevated troponin  -This was in the setting of mild rhabdomyolysis with a CPK of 390.  Was pain-free no history of CAD and nonischemic EKG on admission.  Echo showed's LVEF 60 to 65% with no wall motion abnormalities  -Cardiology was consulted during this admission - no further evaluation advised     RASHAWN with high anion gap metabolic acidosis and resolved hyperkalemia  -This improved with IV fluids     Cannabis use  -This was evident on positive urine drug screen 7/13 and was counseled on refraining from this while taking prescription benzos opiates and any kind of narcotics.     Right-sided breast cancer stage IV with bone metastasis and chronic pain  Stage IV (cTX, cM1, ER+, ND+,  HER2-).  Diagnosed on 3/3/2021; incidental finding of the right breast mass during ED evaluation for chest pain.  Pt of Dr. Foster/last seen on 3/18/2021, and requested to be transferred to the care of [facility] provider. Now follows with Dr. Blackman.  -Pain team consulted to optimize pain regimen     Some level of cognitive impairment  -Mostly memory related with some erratic behaviors in the clinic and discussion with staff  -Psychiatry evaluated and will follow with patient          #Self-neglect, in the setting of opiate overuse.  Patient daughter Rebekah discovered patient's house in disarray, with furniture/carpeted covered in feces and urine.    Patient to be discharged with [facility] for PT/OT/social work/PCA.    Discharge Medications with Med changes:     Discharge Medication List as of 7/18/2021 12:55 PM      START taking these medications    Details   buprenorphine HCl-naloxone HCl (SUBOXONE) 4-1 MG per film Place 1 Film under the tongue 3 times daily, Disp-21 Film, R-0, E-PrescribeNADEAN: [id number]      ciprofloxacin (CIPRO) 500 MG tablet Take 1 tablet (500 mg) by mouth 2 times daily for 9 doses, Disp-9 tablet, R-0, E-Prescribe      docusate sodium (COLACE) 100 MG capsule Take 1 capsule (100 mg) by mouth 2 times daily, Disp-60 capsule, R-0, E-Prescribe      gabapentin (NEURONTIN) 300 MG capsule Take 1 capsule (300 mg) by mouth 2 times daily, Disp-20 capsule, R-0, E-Prescribe      hydrOXYzine (ATARAX) 10 MG tablet Take 1 tablet (10 mg) by mouth every 4 hours as needed for anxiety, Disp-30 tablet, R-0, E-Prescribe      Lidocaine (LIDOCARE) 4 % Patch Place 2 patches onto the skin every 24 hours To prevent lidocaine toxicity, patient should be patch free for 12 hrs daily.Disp-30 patch, [id number]      metroNIDAZOLE (FLAGYL) 500 MG tablet Take 1 tablet (500 mg) by mouth 3 times daily for 14 doses, Disp-14 tablet, R-0, E-Prescribe      ondansetron (ZOFRAN-ODT) 4 MG ODT tab Take 1 tablet (4 mg) by mouth every 6 hours as  needed for nausea or vomiting, Disp-12 tablet, R-0, E-Prescribe         CONTINUE these medications which have CHANGED    Details   PHENobarbital (LUMINAL) 16.2 MG tablet Take 2 tablets (32.4 mg) by mouth At Bedtime for 1 day, THEN 1 tablet (16.2 mg) At Bedtime for 1 day. then discontinue., Disp-3 tablet, R-0, E-Prescribe      polyethylene glycol (MIRALAX) 17 GM/Dose powder Take 17 g (1 capful) by mouth daily as needed for constipation, Disp-850 g, R-0, E-Prescribe         CONTINUE these medications which have NOT CHANGED    Details   albuterol (PROAIR HFA;PROVENTIL HFA;VENTOLIN HFA) 90 mcg/actuation inhaler Inhale 1-2 puffs into the lungs every 4 hours as needed for shortness of breath / dyspnea or wheezing , HistoricalMay substitute the equivalent medication per insurance preference.      amLODIPine (NORVASC) 5 MG tablet [AMLODIPINE (NORVASC) 5 MG TABLET] Take 5 mg by mouth daily., Historical      ascorbic acid, vitamin C, (VITAMIN C) 1000 MG tablet [ASCORBIC ACID, VITAMIN C, (VITAMIN C) 1000 MG TABLET] Take 1,000 mg by mouth daily., Historical      B,C/folic/zinc/copper ox/vit E (STRESS B-COMPLEX ORAL) [B,C/FOLIC/ZINC/COPPER OX/VIT E (STRESS B-COMPLEX ORAL)] Take 1 tablet by mouth daily. Stress supplement, Historical      cetirizine (ZYRTEC) 10 MG tablet [CETIRIZINE (ZYRTEC) 10 MG TABLET] Take 10 mg by mouth daily., Historical      cholecalciferol, vitamin D3, 125 mcg (5,000 unit) capsule [CHOLECALCIFEROL, VITAMIN D3, 125 MCG (5,000 UNIT) CAPSULE] Take 5,000 Units by mouth daily., Historical      ferrous sulfate (FEROSUL) 325 (65 Fe) MG tablet Take 325 mg by mouth daily (with breakfast), Historical      fluticasone propionate (FLONASE) 50 mcg/actuation nasal spray [FLUTICASONE PROPIONATE (FLONASE) 50 MCG/ACTUATION NASAL SPRAY] 1 spray into each nostril daily. , Historical      ibuprofen (ADVIL,MOTRIN) 200 MG tablet Take 200 mg by mouth every 6 hours as needed for pain , Historical      melatonin 10 mg Tab Take 10  mg by mouth nightly as needed for sleep , Historical      propranoloL (INDERAL) 10 MG tablet Take 10 mg by mouth 3 times daily as needed (anxiety or palpitations) , Historical      spironolactone (ALDACTONE) 100 MG tablet [SPIRONOLACTONE (ALDACTONE) 100 MG TABLET] Take 100 mg by mouth daily., Historical      SYMBICORT 80-4.5 mcg/actuation inhaler Inhale 2 puffs into the lungs 2 times daily , GANESH, Historical      venlafaxine (EFFEXOR) 37.5 MG tablet [VENLAFAXINE (EFFEXOR) 37.5 MG TABLET] Take 37.5 mg by mouth every morning., Historical      vitamin E 100 UNIT capsule [VITAMIN E 100 UNIT CAPSULE] Take 500 Units by mouth 2 (two) times a week., Historical      zaleplon (SONATA) 5 MG capsule Take 5 mg by mouth nightly as needed for sleep , Historical         STOP taking these medications       clonazePAM (KLONOPIN) 1 MG tablet Comments:   Reason for Stopping:         cyclobenzaprine (FLEXERIL) 10 MG tablet Comments:   Reason for Stopping:         HYDROmorphone (DILAUDID) 2 MG tablet Comments:   Reason for Stopping:                     Rationale for medication changes:      See above        Consults     PHARMACY IP CONSULT  PHARMACY IP CONSULT  CARDIOLOGY IP CONSULT  PHARMACY FALL RISK MEDICATION ASSESS IP CONSULT  PHYSICAL THERAPY ADULT IP CONSULT  OCCUPATIONAL THERAPY ADULT IP CONSULT  PALLIATIVE CARE ADULT IP CONSULT  PAIN MANAGEMENT ADULT IP CONSULT  PSYCHIATRY IP CONSULT  ADDICTION SERVICE ADULT IP CONSULT FOR [facility] SERVICES IP CONSULT  SOCIAL WORK IP CONSULT  PHARMACY IP CONSULT    Immunizations given this encounter       There is no immunization history on file for this patient.        Anticoagulation Information      none    SIGNIFICANT IMAGING FINDINGS     Results for orders placed or performed during the hospital encounter of 07/12/21   XR Chest Port 1 View    Impression    IMPRESSION: Negative chest.   CT Head w/o Contrast    Impression    IMPRESSION:  1.  Stable exam. No acute  intracranial abnormality.   CT Abdomen Pelvis w/o Contrast    Impression    IMPRESSION:   1.  Minimal new soft tissue stranding about the distal sigmoid colon consistent with mild diverticulitis.  2.  Sclerotic bony metastases unchanged.         SIGNIFICANT LABORATORY FINDINGS     Most Recent 3 CBC's:Recent Labs   Lab Test 07/17/21  0911 07/14/21 0311 07/12/21 1959   WBC 4.9 6.5 8.7   HGB 9.2* 9.3* 12.9   MCV 98 99 96    399 565*     Most Recent 3 BMP's:Recent Labs   Lab Test 07/17/21  0911 07/16/21  0705 07/15/21  0608    137 134*   POTASSIUM 3.7 3.5 3.6   CHLORIDE 103 105 106   CO2 23 24 21*   BUN 7* 8 9   CR 0.74 0.71 0.69   ANIONGAP 10 8 7   PADDY 8.1* 7.4* 7.3*   * 98 96     Most Recent 2 LFT's:Recent Labs   Lab Test 07/17/21 0911 07/14/21 0311   AST 23 26   ALT 17 18   ALKPHOS 79 91   BILITOTAL 0.3 0.5       Discharge Orders        MENTAL HEALTH REFERRAL  - Adult; Addiction Medicine Provider; Addiction Medicine Evaluation & Treatment; Addiction Medicine Consultation, Evaluation & Treatment [phone]; Opioids; Medication Assisted Treatment: Opioids; We will contact you t...      Activity    Your activity upon discharge: activity as tolerated     MD face to face encounter    Documentation of Face to Face and Certification for Home Health Services    I certify that patient: Jarrell Nicole is under my care and that I, or a nurse practitioner or physician's assistant working with me, had a face-to-face encounter that meets the physician face-to-face encounter requirements with this patient on: 7/18/2021.    This encounter with the patient was in whole, or in part, for the following medical condition, which is the primary reason for home health care: accidental overdose.    I certify that, based on my findings, the following services are medically necessary home health services: Nursing, Occupational Therapy, Physical Therapy, and Social Work.    My clinical findings support the need for  the above services because: Nurse is needed: To assess mental status after changes in medications or other medical regimen. and To provide assessment and oversight required in the home to assure adherence to the medical plan due to: accidental overdose, Occupational Therapy Services are needed to assess and treat cognitive ability and address ADL safety due to impairment in cognition / forgetfulness., and Physical Therapy Services are needed to assess and treat the following functional impairments: weakness.    Further, I certify that my clinical findings support that this patient is homebound (i.e. absences from home require considerable and taxing effort and are for medical reasons or [employer] services or infrequently or of short duration when for other reasons) because: Requires assistance of another person or specialized equipment to access medical services because patient: Is prone to wander/get lost without assistance...    Based on the above findings. I certify that this patient is confined to the home and needs intermittent skilled nursing care, physical therapy and/or speech therapy.  The patient is under my care, and I have initiated the establishment of the plan of care.  This patient will be followed by a physician who will periodically review the plan of care.  Physician/Provider to provide follow up care: Radha Oakley    Attending hospital physician (the Medicare certified [place] provider): Ken Gar MD  Physician Signature: See electronic signature associated with these discharge orders.  Date: 7/18/2021     Reason for your hospital stay    Diverticulitis, accidental overdose     Follow-up and recommended labs and tests     Follow up with Dr. Rodriguez this week     Diet    Follow this diet upon discharge: Orders Placed This Encounter      Regular Diet Adult       Examination   Physical Exam      Wt Readings from Last 1 Encounters:   07/15/21 65.8 kg (145 lb 1.6 oz)     No acute  distress        Please see EMR for more detailed significant labs, imaging, consultant notes etc.    I, Ken Gar MD, personally saw the patient today and spent greater than 30 minutes discharging this patient.    Ken Gar MD  [facility]    CC:Radha Oakley

## 2021-07-21 NOTE — PROGRESS NOTES
Addiction Medicine Outpatient Clinic  New Patient Assessment    Date:  7/21/2021    Referred By:  Jennifer  Reason for Referral:      DIAGNOSES      CC:  Recently started Suboxone    HPI:  Jarrell is a 65 yo female who I consulted on when she was hospitalized 7/12/21 - 7/18/21.  She was admitted for acute mental status changes.   Her daughter had found her in her apartment, confused, naked and with her apartment in disarray.  This was totally out of character for her.    It was determined that she had likely become mixed up on her medications.  She was being prescribed Morphine ER, hydromorphone and klonopin.   She was also found to have acute diverticulitis and was given a course of antibiotics.       The patient has a report hx of opioid use disorder and cocaine use disorder, but reported she had not used illicit substances (other than cannabis) in 20 yrs.   She was diagnosed with breast cancer in April this year and found to have metastasis to her bones.   That is why she was being prescribed the opiates.  While hospitalized, she was tapered off klonopin and tolerated this without difficulty.   Also, the morphine and dilaudid were discontinued and she was inducted onto buprenorphine.   At discharge, her pain was controlled on Suboxone 4 mg tid.       Past medical History  Patient Active Problem List   Diagnosis     Malignant neoplasm of lower-outer quadrant of right breast of female, estrogen receptor positive (H)     Asthma     Combined forms of age-related cataract of right eye     Diverticulitis of large intestine without perforation or abscess without bleeding     Episodic mood disorder (H)     Hyperlipidemia     Pain medication agreement     Panic disorder without agoraphobia     Post traumatic stress disorder     Dehydration     Encephalopathy     Elevated troponin level not due to acute coronary syndrome     Acute kidney injury (H)     Chronic pain due to neoplasm     Opioid dependence with intoxication  delirium (H)     Hypokalemia     Non-traumatic rhabdomyolysis     Essential hypertension     Anemia       Mental Health History  Anxiety, agoraphobia  PTSD  Hx of opioid and cocaine use disorders.        Family History  noncontributory      Social History  Lives alone in apartment.  Three daughters - one is CNA.   They check on her and are now helping set up her meds.        CURRENT MEDICATIONS    Current Outpatient Medications:      albuterol (PROAIR HFA;PROVENTIL HFA;VENTOLIN HFA) 90 mcg/actuation inhaler, Inhale 1-2 puffs into the lungs every 4 hours as needed for shortness of breath / dyspnea or wheezing , Disp: , Rfl:      amLODIPine (NORVASC) 5 MG tablet, [AMLODIPINE (NORVASC) 5 MG TABLET] Take 5 mg by mouth daily., Disp: , Rfl:      ascorbic acid, vitamin C, (VITAMIN C) 1000 MG tablet, [ASCORBIC ACID, VITAMIN C, (VITAMIN C) 1000 MG TABLET] Take 1,000 mg by mouth daily., Disp: , Rfl:      B,C/folic/zinc/copper ox/vit E (STRESS B-COMPLEX ORAL), [B,C/FOLIC/ZINC/COPPER OX/VIT E (STRESS B-COMPLEX ORAL)] Take 1 tablet by mouth daily. Stress supplement, Disp: , Rfl:      buprenorphine HCl-naloxone HCl (SUBOXONE) 4-1 MG per film, Place 1 Film under the tongue 3 times daily, Disp: 21 Film, Rfl: 0     cetirizine (ZYRTEC) 10 MG tablet, [CETIRIZINE (ZYRTEC) 10 MG TABLET] Take 10 mg by mouth daily., Disp: , Rfl:      cholecalciferol, vitamin D3, 125 mcg (5,000 unit) capsule, Take 4,000 Units by mouth daily , Disp: , Rfl:      docusate sodium (COLACE) 100 MG capsule, Take 1 capsule (100 mg) by mouth 2 times daily, Disp: 60 capsule, Rfl: 0     ferrous sulfate (FEROSUL) 325 (65 Fe) MG tablet, Take 325 mg by mouth daily (with breakfast), Disp: , Rfl:      fluticasone propionate (FLONASE) 50 mcg/actuation nasal spray, [FLUTICASONE PROPIONATE (FLONASE) 50 MCG/ACTUATION NASAL SPRAY] 1 spray into each nostril daily. , Disp: , Rfl:      gabapentin (NEURONTIN) 300 MG capsule, Take 1 capsule (300 mg) by mouth 2 times daily, Disp: 20  capsule, Rfl: 0     hydrOXYzine (ATARAX) 10 MG tablet, Take 1 tablet (10 mg) by mouth every 4 hours as needed for anxiety, Disp: 30 tablet, Rfl: 0     Lidocaine (LIDOCARE) 4 % Patch, Place 2 patches onto the skin every 24 hours To prevent lidocaine toxicity, patient should be patch free for 12 hrs daily., Disp: 30 patch, Rfl: 0     melatonin 10 mg Tab, Take 6 mg by mouth nightly as needed for sleep , Disp: , Rfl:      polyethylene glycol (MIRALAX) 17 GM/Dose powder, Take 17 g (1 capful) by mouth daily as needed for constipation, Disp: 850 g, Rfl: 0     propranoloL (INDERAL) 10 MG tablet, Take 10 mg by mouth 3 times daily as needed (anxiety or palpitations) , Disp: , Rfl:      spironolactone (ALDACTONE) 100 MG tablet, [SPIRONOLACTONE (ALDACTONE) 100 MG TABLET] Take 100 mg by mouth daily., Disp: , Rfl:      SYMBICORT 80-4.5 mcg/actuation inhaler, Inhale 2 puffs into the lungs 2 times daily , Disp: , Rfl:      venlafaxine (EFFEXOR) 37.5 MG tablet, [VENLAFAXINE (EFFEXOR) 37.5 MG TABLET] Take 37.5 mg by mouth every morning., Disp: , Rfl:      vitamin E 100 UNIT capsule, [VITAMIN E 100 UNIT CAPSULE] Take 500 Units by mouth 2 (two) times a week., Disp: , Rfl:      ibuprofen (ADVIL,MOTRIN) 200 MG tablet, Take 200 mg by mouth every 6 hours as needed for pain  (Patient not taking: Reported on 7/21/2021), Disp: , Rfl:      ondansetron (ZOFRAN-ODT) 4 MG ODT tab, Take 1 tablet (4 mg) by mouth every 6 hours as needed for nausea or vomiting, Disp: 12 tablet, Rfl: 0    ALLERGIES  Allergies   Allergen Reactions     Codeine Hives     Iodinated Contrast Media [Diagnostic X-Ray Materials] Unknown     Heart stopped and sick for multiple days after     Penicillins Swelling     Throat swelling as an infant     Tylenol [Acetaminophen] Other (See Comments)     Pt states gives her upset stomach for days.       [place] :    Reviewed.  No opiates prescribed since hospital discharge.      REVIEW OF SYSTEMS (2-9)  Constitutional:      Generally  feels weak.  Pulmonary   Denies dyspnea, cough or wheezing.  Cardiovascular   Denies chest pain at rest or with exertion.  Gastrointestinal    Appetite is reduced.     Intermittent nausea, but no vomiting, diarrhea or constipation.    Urologic   Denies dysuria or change in frequency.  Neurologic   Denies headache, tremor, focal weakness or numbness.     Denies hx of seizure.  Psychiatric   Depression and anxiety improved since being in hospital.     Denies suicidal thoughts, plan or intent.  or homicidal ideation.  Rheumatologic   Neck and hip pain  Hematologic   No unusual bleeding or bruising:      PHYSICAL EXAM (-7)    This was a video visit.      Exam at discharge, on 7/18/21:  Heart, lungs and abdomen were unremarkable.     Neuro:  Alert and oriented x 4.   No tremor    Psych:       Cooperative     Mood:  Euthymic               Affect:  Congruent               Thought content:  No SI, HI or hallucinations               Thought processes:  Linear                Speech:  Normal, talkative                Motor:  Normal                Insight/judgement:  fair/  fair      Assessment and Plan:     1.   Metastatic breast cancer  - with pain.  Followed by the [facility] by [facility].    Pain is not currently controlled for 24 hrs with Suboxone 4 mg tid.   Will increase dose to 8 mg q am and 4 mg q pm.   Also was started on gabapentin when in hospital and this seems to be helping with pain and anxiety.        2.  Acute mental status changes - these all resolved once she was off the opiates and benzodiazepines.   She did not become sedated with buprenorphine.      3.  Chronic prescription benzodiazepine use. -  Klonopin was stopped when in hospital and she was detoxed with phenobarbital.   She has completed the phenobarb taper and has not had increase in anxiety.    4.  Depression and anxiety - has seen same theapist (Leelee Yadav) for years but has now changed insurance and needs a new  therapist.  She would like to see Lori Beal, who specializes in patients with cancer.  Requests referral.     Patient had hydromorphone and morphine left at home.  She disposed of them in toilet during this video visit.   Says she does not intend to restart them or the klonopin she was on.   She is pleased that the Suboxone is controlling her pain.       Still weak - likely deconditioned from the hospitalization.   Would like to go to Physical Therapy.      Again warned that opiate pain medications will not be effective when on Subxone and that Suboxone could cause precipitated withdrawal is taken after other opiates.      Return visit in 4 wks.     Keara Rodriguez MD  Addiction Medicine Service  [facility]   Page me (click here for Pooja Rodriguez)

## 2021-07-25 RX ORDER — BUPRENORPHINE AND NALOXONE 4; 1 MG/1; MG/1
1 FILM, SOLUBLE BUCCAL; SUBLINGUAL 3 TIMES DAILY
Qty: 21 FILM | Refills: 0 | Status: SHIPPED | OUTPATIENT
Start: 2021-07-25 | End: 2021-08-03

## 2021-07-25 NOTE — PATIENT INSTRUCTIONS
1.   Increase Suboxone to 8 mg q am and 4 mg q pm.   (Given 4/1 mg Suboxone with instructions to take 2 films in morning and 1 film late afternoon.       2.  Continue gabapentin.      3.  No opiates or benzodiazepines.   If pain increases - call clinic to discuss.      4.  Return visit 4 wks.      Keara Rodriguez MD  Addiction Medicine Service  [facility]   Page me (click here for Pooja Rodriguez)

## 2021-08-03 ENCOUNTER — TELEPHONE (OUTPATIENT)
Dept: BEHAVIORAL HEALTH | Facility: CLINIC | Age: 66
End: 2021-08-03

## 2021-08-03 DIAGNOSIS — C50.911 PRIMARY CANCER OF RIGHT BREAST (H): ICD-10-CM

## 2021-08-03 DIAGNOSIS — F11.221 OPIOID DEPENDENCE WITH INTOXICATION DELIRIUM (H): ICD-10-CM

## 2021-08-03 RX ORDER — BUPRENORPHINE AND NALOXONE 4; 1 MG/1; MG/1
1 FILM, SOLUBLE BUCCAL; SUBLINGUAL 3 TIMES DAILY
Qty: 48 FILM | Refills: 0 | Status: SHIPPED | OUTPATIENT
Start: 2021-08-03 | End: 2021-08-18

## 2021-08-03 RX ORDER — HYDROXYZINE HYDROCHLORIDE 10 MG/1
10 TABLET, FILM COATED ORAL EVERY 4 HOURS PRN
Qty: 30 TABLET | Refills: 0 | Status: CANCELLED | OUTPATIENT
Start: 2021-08-03

## 2021-08-03 RX ORDER — GABAPENTIN 300 MG/1
300 CAPSULE ORAL 2 TIMES DAILY
Qty: 60 CAPSULE | Refills: 0 | Status: SHIPPED | OUTPATIENT
Start: 2021-08-03 | End: 2021-08-18

## 2021-08-03 NOTE — TELEPHONE ENCOUNTER
Behavioral Access, please schedule this patient for a future visit with Dr. Rodriguez. Patient is requesting a refill.     Date of Last Office Visit: 7/21/21  Date of Next Office Visit: None. Scheduling attempting to contact pt  No shows since last visit: 0  Cancellations since last visit: 0    Medication requested: Suboxone 4-1 mg Date last ordered: 7/25/21 Qty: 21 Refills: 0     buprenorphine HCl-naloxone HCl (SUBOXONE) 4-1 MG per film 21 Film 0 7/25/2021  No   Sig - Route: Place 1 Film under the tongue 3 times daily - Sublingual   Sent to pharmacy as: Buprenorphine HCl-Naloxone HCl 4-1 MG Sublingual          Review of [place] ?: yes  Medication last filled date: 7/26/21 Qty filled: 21/7  Other [place] substance on [place] ?: yes  If yes, is this a new medication?: yes  If yes, name of medication: Phenobarbital 16.2 mg  and date filled: 7/18/21 Qty: 3  If yes, name of medication: Gabapentin 300 mg and date filled: 7/23/21     Lapse in medication adherence greater than 5 days?: No  If yes, call patient and gather details: no  Medication refill request verified as identical to current order?: yes  Result of Last DAM, VPA, Li+ Level, CBC, or Carbamazepine Level (at or since last visit): NO    []Medication refilled per  Medication Refill in Ambulatory Care  policy.  [x]Medication unable to be refilled by RN due to criteria not met as indicated below:    []Eligibility - not seen in the last year   [x]Supervision - no future appointment   []Compliance - no shows, cancellations or lapse in therapy   []Verification - order discrepancy   [x]Controlled medication   []Medication not included in policy   []90-day supply request   [x]Other: LPN is processing request

## 2021-08-03 NOTE — TELEPHONE ENCOUNTER
Reason for call:  Medication     If this is a refill request, has the caller requested the refill from the pharmacy already? Yes     Will the patient be using a [facility]? No     Name of the pharmacy and phone number for the current request:   [id number] [place], [place] - [facility] AT [facility] [address]  [phone]    Name of the medication requested: buprenorphine HCl-naloxone HCl (SUBOXONE) 4-1 MG per film    Other request: Pt states that pharmacy has been unable to reach provider to approve refill. Pt will run out of medication tonight with none left for any doses tomorrow. Pt requests a call from nursing staff once refill is sent. Pt has a follow-up appt scheduled on 8/18/2021.    Phone number to reach patient: [phone]    Best Time:  ASAP    Can we leave a detailed message on this number?  YES    Travel screening: Negative

## 2021-08-03 NOTE — TELEPHONE ENCOUNTER
Patient called and left voice message on refill line. Patient wants pain meds refilled by provider. Returned call to patient and she said she has no more refills left and she does not want to run out of her meds. She has one left for tonight. I don't want to go through this pain. I feel better, let her (Dr. Rodriguez) know that I am doing fabulous.     Called provider and let her know of patient's rx request-urgent. Patient has last pill left.

## 2021-08-04 ENCOUNTER — TELEPHONE (OUTPATIENT)
Facility: CLINIC | Age: 66
End: 2021-08-04

## 2021-08-04 NOTE — TELEPHONE ENCOUNTER
Reason for Call:  Medication or medication refill:    Do you use a [facility]?  Name of the pharmacy and phone number for the current request:    [facility][id number] [place] [id number] AT [facility] [address]  [phone]    Name of the medication requested: buprenorphine HCl-naloxone HCl (SUBOXONE) 4-1 MG per film    Other request: This medication needs a prior authorization to be filled and for the patient to , the patient advised she is out and needs this medication asap.    Can we leave a detailed message on this number? YES    Phone number patient can be reached at: Home number on file [phone] (home)    Best Time: asap    Call taken on 8/4/2021 at 9:30 AM by Isa Wagner

## 2021-08-05 NOTE — TELEPHONE ENCOUNTER
PA approved for Suboxone 4-1 mg film from 5/6/2021 through 8/4/2022. Patient called and notified of approval. She is at the [facility] in [facility] to  the medication.     Called [facility] and spoke with vish pharmacy technician. Informed her of approval of Suboxone PA.

## 2021-08-18 ENCOUNTER — VIRTUAL VISIT (OUTPATIENT)
Dept: BEHAVIORAL HEALTH | Facility: CLINIC | Age: 66
End: 2021-08-18
Payer: COMMERCIAL

## 2021-08-18 DIAGNOSIS — C50.911 PRIMARY CANCER OF RIGHT BREAST (H): ICD-10-CM

## 2021-08-18 DIAGNOSIS — F11.221 OPIOID DEPENDENCE WITH INTOXICATION DELIRIUM (H): ICD-10-CM

## 2021-08-18 PROCEDURE — 99214 OFFICE O/P EST MOD 30 MIN: CPT | Mod: 95 | Performed by: INTERNAL MEDICINE

## 2021-08-18 RX ORDER — BUPRENORPHINE AND NALOXONE 4; 1 MG/1; MG/1
1 FILM, SOLUBLE BUCCAL; SUBLINGUAL 3 TIMES DAILY
Qty: 90 FILM | Refills: 0 | Status: SHIPPED | OUTPATIENT
Start: 2021-08-18 | End: 2021-09-15

## 2021-08-18 RX ORDER — GABAPENTIN 300 MG/1
300 CAPSULE ORAL 2 TIMES DAILY
Qty: 60 CAPSULE | Refills: 0 | Status: SHIPPED | OUTPATIENT
Start: 2021-08-18 | End: 2021-09-15

## 2021-08-18 NOTE — PROGRESS NOTES
Date:     Tele-Visit Details    Type of service:  Video Visit    Time Service Began (time 1st connected with pt):1516    Time Service Ended (time completely finished with pt): 1532    Originating Location (pt. Location): Patient Home      Distant Location (provider location): [facility]     Reason for Televisit: COVID 19    Mode of Communication:  Video Conference via [employer]Well    Physician has received verbal consent for a video visit from the patient? Yes        Jarrell Nicole   CC:  Ran out of Suboxone after last visit because PA was required for outpatient prescription.        HPI:  Nia Nicole is a 66 year old female withchronic pain related to bone metastatsis from breast cancer, diagnosed in April 2021.    She was hospitalized in jUly 2021 with confusion and disorientation when taking morphine, hydromorphone and klonopin (possibly accidentally more than prescribed).     She was switched to buprenorphine while in the hospital and was taken off klonopin with a phenobarital cross-taper.    When seen last visit, her suboxone was changed from 4 mg tid to 8 mg q am and 4 mg q pm to try to get better pain control during the day.      She reports that the 8 mg dose made her nauseated, so she has gone back to 4 mg tid, which she is able to tolerate if she takes the bup about 30 min after eating.   She has had nausea and vomiting from other opiates in the past.    She feels her pain is adequately controlled.    She is not using any other opiates and no benzodiazepines.    She is on gabapentin 300 mg bid in addition for pain.        The patient has a past hx of opioid use disorder and cocaine use disorder, but reported she had not used illicit substances (other than cannabis) in 20 yrs.   She does use cannabis occasionally recreationally.       Patient Active Problem List   Diagnosis     Malignant neoplasm of lower-outer quadrant of right breast of female,  estrogen receptor positive (H)     Asthma     Combined forms of age-related cataract of right eye     Diverticulitis of large intestine without perforation or abscess without bleeding     Episodic mood disorder (H)     Hyperlipidemia     Pain medication agreement     Panic disorder without agoraphobia     Post traumatic stress disorder     Dehydration     Encephalopathy     Elevated troponin level not due to acute coronary syndrome     Acute kidney injury (H)     Chronic pain due to neoplasm     Opioid dependence with intoxication delirium (H)     Hypokalemia     Non-traumatic rhabdomyolysis     Essential hypertension     Anemia         Mental Health History  Anxiety, agoraphobia  PTSD  Hx of opioid and cocaine use disorders.      Medication- Assisted Treatment:  Suboxone  for    Opiate misuse and chronic pain            .    ROS:  Med Compliant?:  Yes - see HPI  Abstinent?    Yes  Cravings?   No  Side Effects?:  See HPI  Mood?: denies depression,  Mild anxiety  Medical Issues?:  Getting hormonal therapy and q 3 wk IV chemo for her breast cancer.    I have reviewed and updated the patient's Past Medical History, Social History, Family History and Medication List.   [place] :  Reviewed. No prescriptions for opiates or benzos since July.       ALLERGIES   Codeine, Iodinated contrast media [diagnostic x-ray materials], Penicillins, and Tylenol [acetaminophen]   ?   Exam:    Skin:  Appears sallow.   No diaphoresis  Neuro:  Alert and oriented x 4             No tremors or involuntary movements  Psych:        Cooperative     Mood:  sl. anxious               Affect:  Congruent               Thought content:  Denies SI, HI or hallucinations               Thought processes:  Linear                Speech:  Normal- increased amounts                 Motor:  Normal                Insight/judgement:  good/ fair    No  Recent UDS or EtG    Assessment/Plan:   1.  Chronic pain secondary to malignancy with metastasis - misuse of  opiates ( probably accidental) -  Suboxone 4 mg tid is controlling pain for now.   May need increased dose in future if malignancy progresses.   Although she has had trouble tolerating higher doses of Suboxone and full opiate agonists due to GI side effects.   Follows with [facility].       2.    Acute mental status changes - related to controlled substance use -  Now off the opiates and BDZ and mental status seems to have completely cleared.   No confusion from Suboxone.       3.  MOHINDER, unspecified  - venlafaxine 37.5 mg q d,   low dose gabapentin and prn hydroxyzine which seem to be adequate for now.   Will monitor for increased anxiety and could increase venlafaxine or gabapentin if needed.       Rx:  Sent to her pharmacy:            Suboxone 4 mg tid            Gabapentin 300 mg bid     Potential benefits and side effects were discussed with patient.     Education provided regarding addiction as a disease.     Encouraged to attend [place] Support meetings.     Discussed other sober supports.     Next visit 4 wks.   Call for sooner appt. if struggling with substance use.     Keara Rodriguez MD  Addiction Medicine Service  [facility]   Page me (click here for Pooja Rodriguez)

## 2021-08-18 NOTE — PATIENT INSTRUCTIONS
1.  Change Suboxone to 4/1 mg sl Tid.  # 90 films    2.  Continue gabapentin 300 mg cap 1 cap bid  #60    3.  Next clinic visit - 4 weeks.      Keara Rodriguez MD  Addiction Medicine Service  [facility]   Page me (click here for Pooja Rodriguez)

## 2021-08-18 NOTE — NURSING NOTE
" This video/telephone visit will be conducted via a call between you and your physician/provider. We have found that certain health care needs can be provided without the need for an in-person physical exam. This service lets us provide the care you need with a video /telephone conversation. If a prescription is necessary we can send it directly to your pharmacy. If lab work is needed we can place an order for that and you can then stop by our lab to have the test done at a later time.    Just as we bill insurance for in-person visits, we also bill insurance for video/telephone visits. If you have questions about your insurance coverage, we recommend that you speak with your insurance company.    Patient has given verbal consent for video/Telephone visit? yes  Patient would like the video visit invitation sent by: Text to cell phone: SUE Send to email: [employer] or [employer] CALL to: SUE LINK/LAURA: Burt CAZARES LPN  Pt reports doing well \" everything what is going on\" .  She Is asking to refill her pain medications, denies having any SE  Patient verified allergies, medications and pharmacy via phone. Patient states she  is ready for visit.    ________________________________________  Medications Phoned  to Pharmacy [] yes [x]no  Name of Pharmacist:  List Medications, including dose, quantity and instructions    Medications ordered this visit were e-scribed.  Verified by order class [x] yes  [] no  Gabapentin and Suboxone  Medication changes or discontinuations were communicated to patient's pharmacy: [] yes  [x] no    Dictation completed at time of chart check: [x] yes  [] no    I have checked the documentation for today s encounters and the above information has been reviewed and completed.      "

## 2021-08-24 DIAGNOSIS — F11.221 OPIOID DEPENDENCE WITH INTOXICATION DELIRIUM (H): ICD-10-CM

## 2021-08-30 RX ORDER — DOCUSATE SODIUM 100 MG/1
CAPSULE, LIQUID FILLED ORAL
Qty: 60 CAPSULE | Refills: 0 | Status: SHIPPED | OUTPATIENT
Start: 2021-08-30 | End: 2021-09-15

## 2021-09-15 ENCOUNTER — TELEPHONE (OUTPATIENT)
Facility: CLINIC | Age: 66
End: 2021-09-15

## 2021-09-15 ENCOUNTER — VIRTUAL VISIT (OUTPATIENT)
Dept: BEHAVIORAL HEALTH | Facility: CLINIC | Age: 66
End: 2021-09-15
Payer: COMMERCIAL

## 2021-09-15 DIAGNOSIS — F41.1 GAD (GENERALIZED ANXIETY DISORDER): Primary | ICD-10-CM

## 2021-09-15 DIAGNOSIS — C50.911 PRIMARY CANCER OF RIGHT BREAST (H): ICD-10-CM

## 2021-09-15 DIAGNOSIS — F11.221 OPIOID DEPENDENCE WITH INTOXICATION DELIRIUM (H): ICD-10-CM

## 2021-09-15 RX ORDER — HYDROXYZINE HYDROCHLORIDE 10 MG/1
10 TABLET, FILM COATED ORAL EVERY 4 HOURS PRN
Qty: 30 TABLET | Refills: 0 | Status: CANCELLED | OUTPATIENT
Start: 2021-09-15

## 2021-09-15 NOTE — NURSING NOTE
This video/telephone visit will be conducted via a call between you and your physician/provider. We have found that certain health care needs can be provided without the need for an in-person physical exam. This service lets us provide the care you need with a video /telephone conversation. If a prescription is necessary we can send it directly to your pharmacy. If lab work is needed we can place an order for that and you can then stop by our lab to have the test done at a later time.    Just as we bill insurance for in-person visits, we also bill insurance for video/telephone visits. If you have questions about your insurance coverage, we recommend that you speak with your insurance company.    Patient has given verbal consent for video/Telephone visit? yes    Patient would like telephone visit, please call: [phone]  NIKOLAY/LAURA : Burt CAZARES LPN  Pt reports breaking up in cold sweats, having bad nausea for the past three days, and c/o of increase anxiety -  could be related to CA.  Patient verified allergies, medications and pharmacy via phone.  Patient states she  is ready for visit.    ________________________________________  Medications Phoned  to Pharmacy [] yes [x]no  Name of Pharmacist:  List Medications, including dose, quantity and instructions    Medications ordered this visit were e-scribed.  Verified by order class [x] yes  [] no  Suboxone, Colace, Gabapentin 300 mg TID , and Vistaril  Medication changes or discontinuations were communicated to patient's pharmacy: [x] yes  [] no  Called [facility] and updated of the Gabapentin dose increase to 300 mg TID  Dictation completed at time of chart check: [] yes  [x] no    I have checked the documentation for today s encounters and the above information has been reviewed and completed.

## 2021-09-15 NOTE — TELEPHONE ENCOUNTER
Pt called because her appt link wasn't working. She needs to speak with her provider and like a call back.

## 2021-09-15 NOTE — PATIENT INSTRUCTIONS
1.  Rx for hydroxyzine (vistaril) 25 mg q 4 hr prn painic attack.      2.   Increase gabapentin to 300 mg tid.    3.  Cont. Suboxone 4 mg tid    4.  Docusate 100 mg bid     5.  Next clinic visit 4 wks.

## 2021-09-16 ENCOUNTER — TELEPHONE (OUTPATIENT)
Facility: CLINIC | Age: 66
End: 2021-09-16

## 2021-09-16 RX ORDER — BUPRENORPHINE AND NALOXONE 4; 1 MG/1; MG/1
1 FILM, SOLUBLE BUCCAL; SUBLINGUAL 3 TIMES DAILY
Qty: 90 FILM | Refills: 0 | Status: SHIPPED | OUTPATIENT
Start: 2021-09-16 | End: 2021-10-18

## 2021-09-16 RX ORDER — GABAPENTIN 300 MG/1
300 CAPSULE ORAL 3 TIMES DAILY
Qty: 90 CAPSULE | Refills: 0 | Status: SHIPPED | OUTPATIENT
Start: 2021-09-16 | End: 2021-10-27 | Stop reason: DRUGHIGH

## 2021-09-16 RX ORDER — DOCUSATE SODIUM 100 MG/1
CAPSULE, LIQUID FILLED ORAL
Qty: 60 CAPSULE | Refills: 0 | Status: SHIPPED | OUTPATIENT
Start: 2021-09-16 | End: 2021-10-19

## 2021-09-16 RX ORDER — HYDROXYZINE PAMOATE 25 MG/1
25 CAPSULE ORAL EVERY 4 HOURS PRN
Qty: 60 CAPSULE | Refills: 0 | Status: SHIPPED | OUTPATIENT
Start: 2021-09-16 | End: 2021-10-19

## 2021-09-16 NOTE — TELEPHONE ENCOUNTER
Prior Authorization Retail Medication Request    Medication/Dose: hydrOXYzine (VISTARIL) 25 MG capsule  ICD code (if different than what is on RX):  MOHINDER (generalized anxiety disorder) [F41.1]  Previously Tried and Failed:    Rationale:      Insurance Name:  [employer]  Insurance ID:  [id number]    Pharmacy Information (if different than what is on RX)  Name:  [facility] #[id number] [place], [place] - [address] AT [facility] [address]  Phone:  [phone]

## 2021-09-16 NOTE — TELEPHONE ENCOUNTER
Central Prior Authorization Team   Phone: [phone]      PA Initiation    Medication: hydrOXYzine (VISTARIL) 25 MG capsule  Insurance Company: [employer] - Phone [phone] Fax [phone]  Pharmacy Filling the Rx: [id number] [place], [place] - [address] AT [facility] [address]  Filling Pharmacy Phone: [phone]  Filling Pharmacy Fax:    Start Date: 9/16/2021

## 2021-09-20 ENCOUNTER — HOSPITAL ENCOUNTER (OUTPATIENT)
Dept: PET IMAGING | Facility: HOSPITAL | Age: 66
Discharge: HOME | End: 2021-09-20
Attending: INTERNAL MEDICINE | Admitting: INTERNAL MEDICINE
Payer: COMMERCIAL

## 2021-09-20 DIAGNOSIS — C50.919 BREAST CANCER (H): ICD-10-CM

## 2021-09-20 DIAGNOSIS — C50.111 MALIGNANT NEOPLASM OF CENTRAL PORTION OF RIGHT FEMALE BREAST (H): ICD-10-CM

## 2021-09-20 LAB — GLUCOSE BLDC GLUCOMTR-MCNC: 132 MG/DL (ref 70–99)

## 2021-09-20 PROCEDURE — 78815 PET IMAGE W/CT SKULL-THIGH: CPT | Mod: PS

## 2021-09-20 PROCEDURE — A9552 F18 FDG: HCPCS | Performed by: INTERNAL MEDICINE

## 2021-09-20 PROCEDURE — 343N000001 HC RX 343: Performed by: INTERNAL MEDICINE

## 2021-09-20 RX ADMIN — FLUDEOXYGLUCOSE F-18 13.67 MCI.: 500 INJECTION, SOLUTION INTRAVENOUS at 11:44

## 2021-09-20 NOTE — TELEPHONE ENCOUNTER
Prior Authorization Approval    Authorization Effective Date: 6/19/2021  Authorization Expiration Date: 9/17/2022  Medication: hydrOXYzine (VISTARIL) 25 MG capsule-APPROVED  Approved Dose/Quantity:   Reference #:     Insurance Company: [employer] - Phone [phone] Fax [phone]  Expected CoPay:       CoPay Card Available:      Foundation Assistance Needed:    Which Pharmacy is filling the prescription (Not needed for infusion/clinic administered): [id number] - [place], [place] - [facility]note 3[address] AT [facility] [address]  Pharmacy Notified: Yes-Pharmacy will notify patient when ready.  Patient Notified: No

## 2021-09-21 ENCOUNTER — TELEPHONE (OUTPATIENT)
Dept: BEHAVIORAL HEALTH | Facility: CLINIC | Age: 66
End: 2021-09-21

## 2021-09-21 NOTE — TELEPHONE ENCOUNTER
Patient got a prescription from the pharmacy for Gabapentin.  She is confused because according to her the prescriber is someone she has never heard of, a Sigrid Connor.  Patient states the only person she has talked to about Gabapentin is Dr. Rodriguez.  Patient requesting a call back ASAP.  [phone].

## 2021-09-21 NOTE — TELEPHONE ENCOUNTER
Spoke to the patient and she is going to call her pharmacist.  I let he know the only one we have on record is from Dr. Joy.     09/16/21 1104 Sign Keara Rodriguez MD Reorder from Order:[id number]   09/16/21 1104 Taking Flag Checked Keara Rodriguez MD        Outpatient Medication Detail     Disp Refills Start End GANESH   gabapentin (NEURONTIN) 300 MG capsule 90 capsule 0 9/16/2021  No   Sig - Route: Take 1 capsule (300 mg) by mouth 3 times daily - Oral   Sent to pharmacy as: Gabapentin 300 MG Oral Capsule (NEURONTIN)   Class: E-Prescribe   Order: [id number]   E-Prescribing Status: Receipt confirmed by pharmacy (9/16/2021 11:11 AM CDT)

## 2021-10-18 ENCOUNTER — TELEPHONE (OUTPATIENT)
Facility: CLINIC | Age: 66
End: 2021-10-18

## 2021-10-18 DIAGNOSIS — F41.1 GAD (GENERALIZED ANXIETY DISORDER): ICD-10-CM

## 2021-10-18 DIAGNOSIS — F11.221 OPIOID DEPENDENCE WITH INTOXICATION DELIRIUM (H): ICD-10-CM

## 2021-10-18 NOTE — TELEPHONE ENCOUNTER
Date of Last Office Visit: 9/15/21  Date of Next Office Visit: 10/27/21  No shows since last visit: none  Cancellations since last visit: none    Medication requested: buprenorphine HCl-naloxone HCl (SUBOXONE) 4-1 MG per film Date last ordered: 9/16/21 Qty: 90 Refills: 0  Medication requested: docusate sodium 100mg Date last ordered: 9/16/21 Qty: 60 Refills: 0  Medication requested: gabapentin 300mg Date last ordered: 9/16/21 Qty: 90 Refills: 0  Medication requested: hydroxyzine 25mg Date last ordered: 9/16/21 Qty: 60 Refills: 0       [place] of [place] ?: yes  Medication last filled date: Suboxone 9/18 Qty filled: 90  Other controlled substance on [place] ?: yes  If yes, is this a new medication?: yes  If yes, name of medication: dronabinol and date filled: 9/29/21    Lapse in medication adherence greater than 5 days?: no  If yes, call patient and gather details:    Medication refill request verified as identical to current order?: yes  Result of Last DAM, VPA, Li+ Level, CBC, or Carbamazepine Level (at or since last visit): N/A    Last visit treatment plan:   Instructions    1.  Rx for hydroxyzine (vistaril) 25 mg q 4 hr prn painic attack.       2.   Increase gabapentin to 300 mg tid.     3.  Cont. Suboxone 4 mg tid     4.  Docusate 100 mg bid      5.  Next clinic visit 4 wks.              []Medication refilled per  Medication Refill in Ambulatory Care  policy.  []Medication unable to be refilled by RN due to criteria not met as indicated below:    []Eligibility - not seen in the last year   []Supervision - no future appointment   []Compliance - no shows, cancellations or lapse in therapy   []Verification - order discrepancy   []Controlled medication   []Medication not included in policy   []90-day supply request   []Other

## 2021-10-19 RX ORDER — HYDROXYZINE PAMOATE 25 MG/1
25 CAPSULE ORAL EVERY 4 HOURS PRN
Qty: 60 CAPSULE | Refills: 0 | Status: SHIPPED | OUTPATIENT
Start: 2021-10-19 | End: 2021-10-27 | Stop reason: SINTOL

## 2021-10-19 RX ORDER — BUPRENORPHINE AND NALOXONE 4; 1 MG/1; MG/1
1 FILM, SOLUBLE BUCCAL; SUBLINGUAL 3 TIMES DAILY
Qty: 21 FILM | Refills: 0 | Status: SHIPPED | OUTPATIENT
Start: 2021-10-19 | End: 2021-10-27

## 2021-10-19 RX ORDER — DOCUSATE SODIUM 100 MG/1
CAPSULE, LIQUID FILLED ORAL
Qty: 60 CAPSULE | Refills: 0 | Status: SHIPPED | OUTPATIENT
Start: 2021-10-19 | End: 2021-10-27

## 2021-10-19 NOTE — TELEPHONE ENCOUNTER
Patient has an appt scheduled with Pooja Rodriguez MD in one week for follow up. A rx for buprenorphine has been sent to her pharmacy for 7 days. Additionally sent Vistaril and laxative. Please notify patient that she will need to keep that appt with Jennifer and that further refills may or may not be given until she is seen.     Heide Adam MD  Addiction Medicine

## 2021-10-24 ENCOUNTER — HEALTH MAINTENANCE LETTER (OUTPATIENT)
Age: 66
End: 2021-10-24

## 2021-10-27 ENCOUNTER — VIRTUAL VISIT (OUTPATIENT)
Dept: BEHAVIORAL HEALTH | Facility: CLINIC | Age: 66
End: 2021-10-27
Payer: COMMERCIAL

## 2021-10-27 DIAGNOSIS — C50.911 PRIMARY CANCER OF RIGHT BREAST (H): ICD-10-CM

## 2021-10-27 DIAGNOSIS — F41.1 GAD (GENERALIZED ANXIETY DISORDER): ICD-10-CM

## 2021-10-27 DIAGNOSIS — F11.221 OPIOID DEPENDENCE WITH INTOXICATION DELIRIUM (H): ICD-10-CM

## 2021-10-27 PROCEDURE — 99215 OFFICE O/P EST HI 40 MIN: CPT | Mod: 95 | Performed by: INTERNAL MEDICINE

## 2021-10-27 RX ORDER — HYDROXYZINE PAMOATE 25 MG/1
25 CAPSULE ORAL EVERY 4 HOURS PRN
Qty: 120 CAPSULE | Status: CANCELLED | OUTPATIENT
Start: 2021-10-27

## 2021-10-27 RX ORDER — GABAPENTIN 300 MG/1
300 CAPSULE ORAL 3 TIMES DAILY
Qty: 90 CAPSULE | Status: CANCELLED | OUTPATIENT
Start: 2021-10-27
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
## 2021-10-27 NOTE — NURSING NOTE
This video/telephone visit will be conducted via a call between you and your physician/provider. We have found that certain health care needs can be provided without the need for an in-person physical exam. This service lets us provide the care you need with a video /telephone conversation. If a prescription is necessary we can send it directly to your pharmacy. If lab work is needed we can place an order for that and you can then stop by our lab to have the test done at a later time.    Just as we bill insurance for in-person visits, we also bill insurance for video/telephone visits. If you have questions about your insurance coverage, we recommend that you speak with your insurance company.    Patient has given verbal consent for video/Telephone visit? yes  Patient would like telephone visit, please call: [phone]  NIKOLAY/LAURA : Burt CAZARES LPN  Pt is very agitated, could not complete her e-checking on  line so we changed her visit to phone. She is hyper verbal, said she try whatever it takes to get into medical cannabis program, needs to get her anxiety and nausea under control. Pt thinks what Gabapentin causes diaphoresis and wondering if she need reduction in dose.      Patient verified allergies, medications and pharmacy via phone.  Patient states she  is ready for visit.
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
## 2021-10-28 RX ORDER — BUPRENORPHINE AND NALOXONE 4; 1 MG/1; MG/1
1 FILM, SOLUBLE BUCCAL; SUBLINGUAL 3 TIMES DAILY
Qty: 21 FILM | Refills: 0 | Status: SHIPPED | OUTPATIENT
Start: 2021-10-28 | End: 2021-12-01

## 2021-10-28 RX ORDER — DOCUSATE SODIUM 100 MG/1
CAPSULE, LIQUID FILLED ORAL
Qty: 60 CAPSULE | Refills: 0 | Status: SHIPPED | OUTPATIENT
Start: 2021-10-28 | End: 2022-01-05

## 2021-10-28 RX ORDER — GABAPENTIN 100 MG/1
100 CAPSULE ORAL 3 TIMES DAILY
Qty: 90 CAPSULE | Refills: 0 | Status: SHIPPED | OUTPATIENT
Start: 2021-10-28 | End: 2021-12-01

## 2021-10-28 RX ORDER — HYDROXYZINE HYDROCHLORIDE 10 MG/1
10 TABLET, FILM COATED ORAL 3 TIMES DAILY PRN
Qty: 90 TABLET | Refills: 0 | Status: SHIPPED | OUTPATIENT
Start: 2021-10-28 | End: 2022-03-29

## 2021-11-05 DIAGNOSIS — G89.3 CHRONIC PAIN DUE TO NEOPLASM: Primary | ICD-10-CM

## 2021-11-05 RX ORDER — BUPRENORPHINE AND NALOXONE 4; 1 MG/1; MG/1
1 FILM, SOLUBLE BUCCAL; SUBLINGUAL 3 TIMES DAILY
Qty: 45 FILM | Refills: 1 | Status: SHIPPED | OUTPATIENT
Start: 2021-11-05 | End: 2021-12-01

## 2021-11-17 ENCOUNTER — VIRTUAL VISIT (OUTPATIENT)
Dept: BEHAVIORAL HEALTH | Facility: CLINIC | Age: 66
End: 2021-11-17
Payer: COMMERCIAL

## 2021-11-17 VITALS — BODY MASS INDEX: 23.17 KG/M2 | WEIGHT: 135 LBS

## 2021-11-17 DIAGNOSIS — F11.221 OPIOID DEPENDENCE WITH INTOXICATION DELIRIUM (H): Primary | ICD-10-CM

## 2021-11-17 DIAGNOSIS — G89.3 CHRONIC PAIN DUE TO NEOPLASM: ICD-10-CM

## 2021-11-17 DIAGNOSIS — C50.911 PRIMARY CANCER OF RIGHT BREAST (H): ICD-10-CM

## 2021-11-17 DIAGNOSIS — F39 EPISODIC MOOD DISORDER (H): ICD-10-CM

## 2021-11-17 PROCEDURE — 99215 OFFICE O/P EST HI 40 MIN: CPT | Mod: 95 | Performed by: INTERNAL MEDICINE

## 2021-11-17 RX ORDER — BUPRENORPHINE AND NALOXONE 4; 1 MG/1; MG/1
1 FILM, SOLUBLE BUCCAL; SUBLINGUAL 4 TIMES DAILY
Qty: 60 FILM | Refills: 1 | Status: ON HOLD | OUTPATIENT
Start: 2021-11-17 | End: 2022-01-27

## 2021-11-17 RX ORDER — GABAPENTIN 100 MG/1
100 CAPSULE ORAL 3 TIMES DAILY
Qty: 90 CAPSULE | Refills: 0 | Status: SHIPPED | OUTPATIENT
Start: 2021-11-17 | End: 2021-12-01

## 2021-11-17 NOTE — PROGRESS NOTES
" This video/telephone visit will be conducted via a call between you and your physician/provider. We have found that certain health care needs can be provided without the need for an in-person physical exam. This service lets us provide the care you need with a video /telephone conversation. If a prescription is necessary we can send it directly to your pharmacy. If lab work is needed we can place an order for that and you can then stop by our lab to have the test done at a later time.    Just as we bill insurance for in-person visits, we also bill insurance for video/telephone visits. If you have questions about your insurance coverage, we recommend that you speak with your insurance company.    Patient has given verbal consent for video/Telephone visit? yes  Patient would like the telephone visit called to: [phone]    NIKOLAY/LAURA TOBAR    No questions or concerns today. Tried to go over her meds but pt stated \"everything is the same so you don't need to go over it with me\".     Patient verified allergies, medications and pharmacy via phone. Patient states she  is ready for visit.    "

## 2021-11-17 NOTE — PATIENT INSTRUCTIONS
1.  Increase Suboxone to 4mg qid .   Rx sent to pharmacy.      2. Cont. gabapentin 100 mg tid.   Rx sent to pharmacy.       3.   Next visit in 4 wks.

## 2021-11-17 NOTE — PROGRESS NOTES
Date:     Tele-Visit Details    Type of service:  Phone Visit    Time Service Began (time 1st connected with pt): 1519    Time Service Ended (time completely finished with pt): 1543    Originating Location (pt. Location): Patient Home    Distant Location (provider location): [facility] MENTAL HEALTH & ADDICTION SERVICES     Reason for Televisit: COVID 19    Mode of Communication:  Telephone call    Physician has received verbal consent for a telephone visit from the patient? Yes      Patient Active Problem List   Diagnosis     Malignant neoplasm of lower-outer quadrant of right breast of female, estrogen receptor positive (H)     Asthma     Combined forms of age-related cataract of right eye     Diverticulitis of large intestine without perforation or abscess without bleeding     Episodic mood disorder (H)     Hyperlipidemia     Pain medication agreement     Panic disorder without agoraphobia     Post traumatic stress disorder     Dehydration     Encephalopathy     Elevated troponin level not due to acute coronary syndrome     Acute kidney injury (H)     Chronic pain due to neoplasm     Opioid dependence with intoxication delirium (H)     Hypokalemia     Non-traumatic rhabdomyolysis     Essential hypertension     Anemia     11/17/21    CC: Nausea and vomiting    HPI:   Jarrell Nicole is a 66 year old female with known Stage 4 breast cancer with bone metastasis.   She is on Suboxone 4 mg tid because she had an unintentional overdose on oxycodone and klonopin in July 2021.    She denies using any opiates since starting Suboxone.   Patient is having pain despite  The Suboxone, especially during the night.   She reports that she has 4 ribs fractured.   Continues on oral chemotherapy and reports that this is giving her nausea and vomiting.   Last week it was so bad that she got dehydrated because she couldni't even keep liquids down.    She is losing weight.      She has been referred to a  provider in the oncology clinic who prescribes medical cannabis and she hopes to get on that for her nausea and vomiting.   She has a home health aid that comes every day to help with things around the house and her daughter checks on her frequently.       Medication- Assisted Treatment:   Suboxone  for   Chronic pain and opioid use disorder           .    ROS:  Med Compliant?:  Yes  Abstinent?   Yes - no opiates, alcohol or tizanidine  Cravings?  No   Side Effects?:  Constipation controlled with colace.    Mood?: Less anxious than in past.   Thinks gabapentin helps.  Also on venlafaxine.     Medical Issues?:  As above    I have reviewed and updated the patient's Past Medical History, Social History, Family History and Medication List.   MN :  Reviewed      ALLERGIES   Codeine, Iodinated contrast media [diagnostic x-ray materials], Penicillins, and Tylenol [acetaminophen]   ?   Exam:  Phone visit  Neuro:  Alert, oriented.     Psych:     Mood:  depressed due to medical condition.                 Affect:  NA               Thought content:  Denied SI, HI or hallucinations               Thought processes:  Repetitive, interrupting and hard to re-focus.   ? If she is having some trouble hearing on the phone.                  Speech:  Normal                Motor:  NA                Insight/judgement:  fair/  fair        Current Outpatient Medications   Medication Instructions     albuterol (PROAIR HFA;PROVENTIL HFA;VENTOLIN HFA) 90 mcg/actuation inhaler 1-2 puffs, Inhalation, EVERY 4 HOURS PRN     amLODIPine (NORVASC) 5 mg, DAILY     ascorbic acid 1,000 mg, DAILY     B,C/folic/zinc/copper ox/vit E (STRESS B-COMPLEX ORAL) 1 tablet, DAILY     buprenorphine HCl-naloxone HCl (SUBOXONE) 4-1 MG per film 1 Film, Sublingual, 3 TIMES DAILY, 1 film in morning, mid-afternoon and at bedtime.     buprenorphine HCl-naloxone HCl (SUBOXONE) 4-1 MG per film 1 Film, Sublingual, 3 TIMES DAILY     buprenorphine HCl-naloxone HCl (SUBOXONE)  4-1 MG per film 1 Film, Sublingual, 4 TIMES DAILY     cetirizine (ZYRTEC) 10 mg, DAILY     cholecalciferol 4,000 Units, Oral, DAILY     docusate sodium (COLACE) 100 MG capsule TAKE 1 CAPSULE(100 MG) BY MOUTH TWICE DAILY     ferrous sulfate (FEROSUL) 325 mg, Oral, DAILY WITH BREAKFAST     fluticasone propionate (FLONASE) 50 mcg/actuation nasal spray 1 spray, DAILY     gabapentin (NEURONTIN) 100 mg, Oral, 3 TIMES DAILY     gabapentin (NEURONTIN) 100 mg, Oral, 3 TIMES DAILY     hydrOXYzine (ATARAX) 10 mg, Oral, 3 TIMES DAILY PRN     Lidocaine (LIDOCARE) 4 % Patch 2 patches, Transdermal, EVERY 24 HOURS, To prevent lidocaine toxicity, patient should be patch free for 12 hrs daily.     Melatonin 6 mg, Oral, AT BEDTIME PRN     ondansetron (ZOFRAN-ODT) 4 mg, Oral, EVERY 6 HOURS PRN     polyethylene glycol (MIRALAX) 17 GM/Dose powder 1 capful, Oral, DAILY PRN     propranolol (INDERAL) 10 mg, Oral, 3 TIMES DAILY PRN     spironolactone (ALDACTONE) 100 mg, DAILY     SYMBICORT 80-4.5 mcg/actuation inhaler 2 puffs, Inhalation, 2 TIMES DAILY     venlafaxine (EFFEXOR) 37.5 mg, EVERY MORNING     vitamin E (TOCOPHEROL) 500 Units, TWICE WEEKLY      Labs:  11/3  WBC 5.0   Hgb 13.6 with macrocytosis    Assessment/Plan:   1.  Unintentional overdose on opiates and BDZ - July 2021 - Now on Suboxone 4 mg tid for chronic pain, but is requesting an increase due to pain which wakes her up at night.     Will increase to 4 mg qid.   New Rx sent for 2 wk supply with 1 refiill .  Patient has bone metatastsis and rib fractures.   May eventually get to a point where the suboxone is not enough to control her pain.       2.   Nausea and Vomiting - likely related to oral chemotherapy.   However, it is noted that she has had nausea and vomiting with opiates and thus the Suboxone needs to be considered as a possibility as well.  Plans to try cannabis to see if it helps.   If no effect, then may have to consider discontinuing the Suboxone as a trial.       3.   Chronic prescription benzodiazepine use.  - None since hospitalized in July.      4.  Stage 4 breast cancer with primary in R breast - followed by [facility].   Having regular followup with them.      Increase Suboxone to 4 mg qid.  Wrote Rx for 60 pills for 15 days with 1 refill.    Cont. Gabapentin for anxiety.   100 mg tid    Next clinic visit 4 wks.  Planning to go to [place] in late Dec. For about 2 wks.        Patient was informed that I would be leaving the OP Addiction Clinic.      Keara Rodriguez MD  Addiction Medicine Service  [facility]   Page me (click here for Pooja Rodriguez)

## 2021-11-29 ENCOUNTER — TELEPHONE (OUTPATIENT)
Dept: BEHAVIORAL HEALTH | Facility: CLINIC | Age: 66
End: 2021-11-29
Payer: COMMERCIAL

## 2021-11-29 NOTE — TELEPHONE ENCOUNTER
Re: Gabapentin 100 mg cap. According to pharmacy, Pt getting Pregabalin 150 mg PO TID from another prescriber. Pharmacy is asking if this combination safe for pt? Please advise.   Sobeida MartinPerkins's Ph: [phone]

## 2021-11-30 ENCOUNTER — TRANSFERRED RECORDS (OUTPATIENT)
Dept: HEALTH INFORMATION MANAGEMENT | Facility: CLINIC | Age: 66
End: 2021-11-30
Payer: COMMERCIAL

## 2021-11-30 NOTE — TELEPHONE ENCOUNTER
Are you discontinuing Gabapentin? Please, discontinue in the chart so we can call and cancel the Rx at the pharmacy

## 2021-12-02 NOTE — TELEPHONE ENCOUNTER
LVM for pt asking to review [employer] message and call us if any questions.     Called [facility], spoke to  and d/c'd Gabapentin since she is on Lyrica    Next f/u scheduled for 12/15/21

## 2021-12-15 ENCOUNTER — VIRTUAL VISIT (OUTPATIENT)
Dept: BEHAVIORAL HEALTH | Facility: CLINIC | Age: 66
End: 2021-12-15
Payer: COMMERCIAL

## 2021-12-15 DIAGNOSIS — C50.911 PRIMARY CANCER OF RIGHT BREAST (H): ICD-10-CM

## 2021-12-15 DIAGNOSIS — G89.3 CHRONIC PAIN DUE TO NEOPLASM: ICD-10-CM

## 2021-12-15 DIAGNOSIS — F41.1 GAD (GENERALIZED ANXIETY DISORDER): ICD-10-CM

## 2021-12-15 DIAGNOSIS — F11.221 OPIOID DEPENDENCE WITH INTOXICATION DELIRIUM (H): ICD-10-CM

## 2021-12-15 PROCEDURE — 99215 OFFICE O/P EST HI 40 MIN: CPT | Mod: 95 | Performed by: INTERNAL MEDICINE

## 2021-12-15 RX ORDER — BUPRENORPHINE AND NALOXONE 4; 1 MG/1; MG/1
1 FILM, SOLUBLE BUCCAL; SUBLINGUAL 4 TIMES DAILY
Status: CANCELLED | OUTPATIENT
Start: 2021-12-15

## 2021-12-15 RX ORDER — GABAPENTIN 300 MG/1
300 CAPSULE ORAL 3 TIMES DAILY
COMMUNITY
Start: 2021-11-22

## 2021-12-15 RX ORDER — GABAPENTIN 100 MG/1
100 CAPSULE ORAL 3 TIMES DAILY
COMMUNITY
End: 2021-12-15

## 2021-12-15 RX ORDER — HYDROXYZINE HYDROCHLORIDE 10 MG/1
10 TABLET, FILM COATED ORAL 3 TIMES DAILY PRN
Status: CANCELLED | OUTPATIENT
Start: 2021-12-15

## 2021-12-15 NOTE — PROGRESS NOTES
12/15/21    .Video-Visit Details    Type of service:  Telephone visit    Call Start Time (time video started): 1343    Call End Time (time video stopped): 1420    Originating Location (pt. Location): Home    Distant Location (provider location):  [facility] MENTAL HEALTH & ADDICTION SERVICES     Mode of Communication:  Telephone Conference     Physician has received verbal consent for a telephone Visit from the patient? Yes.    Patient also gave permission for Sandra Lira NP to sit in on the visit.              Patient Active Problem List   Diagnosis     Malignant neoplasm of lower-outer quadrant of right breast of female, estrogen receptor positive (H)     Asthma     Combined forms of age-related cataract of right eye     Diverticulitis of large intestine without perforation or abscess without bleeding     Episodic mood disorder (H)     Hyperlipidemia     Pain medication agreement     Panic disorder without agoraphobia     Post traumatic stress disorder     Dehydration     Encephalopathy     Elevated troponin level not due to acute coronary syndrome     Acute kidney injury (H)     Chronic pain due to neoplasm     Opioid dependence with intoxication delirium (H)     Hypokalemia     Non-traumatic rhabdomyolysis     Essential hypertension     Anemia      11/17/21     CC:  Pain     HPI:   Jarrell Nicole is a 66 year old female with known Stage 4 breast cancer with bone metastasis.   She is supposed to be on Suboxone 4 mg qid, but reports she is only taking tid.    She was started on suboxone in July 2021, after being hospitalized for mental status changes which were due to an unintentional overdose on oxycodone and klonopin.   She denies using any opiates or benzodiazepines since starting Suboxone.  However, patient is having increasing bone pain in chest and hip. She reports that she has 4 fractured ribs, although denies trauma.   Presumably pathologic fractures.      Continues on oral  chemotherapy and reports that this is giving her nausea and vomiting.   She has gotten dehydrated because she couldni't even keep liquids down and had to go to ED for IV fluids.  She is losing weight.       She recently saw a PA-MIREYA in the oncology clinic who recommended medical cannabis for her nausea and pain, but she has not yet started this.   She has a home health aid that comes every day to help her around the house and her daughter checks on her frequently.       Also c/o anxiety, fear of dying, concerns about pain worsening.  She does not feel that her current medications are helping.   Thinks the klonopin was helpful.   She does not want any selective serotonin reuptake inhibitors because she knew someone who became suicidal on an selective serotonin reuptake inhibitor.       Medication- Assisted Treatment:   Suboxone 4 mg qid for chronic pain due to metastatic bone disease, but has only been taking tid.       .    ROS:  Med Compliant?:  Patient reports that she has only been taking 3 Suboxone per day despite my recommendations to increase to qid last month.   Apparently,  Patient did not understand these instructions.     Abstinent?   Yes - no opiates, alcohol or benzodiazepines.    Cravings?  No However, pain is severe.    Side Effects?:  Constipation controlled with colace.    Mood?: Anxious, agitated, angry at times.   Says she is irritable with friends - because she is in pain.          Medical Issues?:  As above     I have reviewed and updated the patient's Past Medical History, Social History, Family History and Medication List.     MN :  Reviewed      ALLERGIES   Codeine, Iodinated contrast media [diagnostic x-ray materials], Penicillins, and Tylenol  ?   Exam:  Phone visit  Neuro:  Alert, oriented.     Psych:                 Mood:  depressed due to medical condition, labile, tearful               Affect:  cannot assess               Thought content:  Denied SI, HI or  hallucinations               Thought processes:  Repetitive, interrupting and hard to re-focus.   ? If she is having some trouble hearing on the phone.                  Speech:  Normal                Motor:  NA                Insight/judgement:  fair/  fair                Current Outpatient Medications   Medication Instructions     albuterol (PROAIR HFA;PROVENTIL HFA;VENTOLIN HFA) 90 mcg/actuation inhaler 1-2 puffs, Inhalation, EVERY 4 HOURS PRN     amLODIPine (NORVASC) 5 mg, DAILY     ascorbic acid 1,000 mg, DAILY     B,C/folic/zinc/copper ox/vit E (STRESS B-COMPLEX ORAL) 1 tablet, DAILY     buprenorphine HCl-naloxone HCl (SUBOXONE) 4-1 MG per film 1 Film, Sublingual, 3 TIMES DAILY, 1 film in morning, mid-afternoon and at bedtime.     buprenorphine HCl-naloxone HCl (SUBOXONE) 4-1 MG per film 1 Film, Sublingual, 3 TIMES DAILY     buprenorphine HCl-naloxone HCl (SUBOXONE) 4-1 MG per film 1 Film, Sublingual, 4 TIMES DAILY     cetirizine (ZYRTEC) 10 mg, DAILY     cholecalciferol 4,000 Units, Oral, DAILY     docusate sodium (COLACE) 100 MG capsule TAKE 1 CAPSULE(100 MG) BY MOUTH TWICE DAILY     ferrous sulfate (FEROSUL) 325 mg, Oral, DAILY WITH BREAKFAST     fluticasone propionate (FLONASE) 50 mcg/actuation nasal spray 1 spray, DAILY     gabapentin (NEURONTIN) 100 mg, Oral, 3 TIMES DAILY     gabapentin (NEURONTIN) 100 mg, Oral, 3 TIMES DAILY     hydrOXYzine (ATARAX) 10 mg, Oral, 3 TIMES DAILY PRN     Lidocaine (LIDOCARE) 4 % Patch 2 patches, Transdermal, EVERY 24 HOURS, To prevent lidocaine toxicity, patient should be patch free for 12 hrs daily.     Melatonin 6 mg, Oral, AT BEDTIME PRN     ondansetron (ZOFRAN-ODT) 4 mg, Oral, EVERY 6 HOURS PRN     polyethylene glycol (MIRALAX) 17 GM/Dose powder 1 capful, Oral, DAILY PRN     propranolol (INDERAL) 10 mg, Oral, 3 TIMES DAILY PRN     spironolactone (ALDACTONE) 100 mg, DAILY     SYMBICORT 80-4.5 mcg/actuation inhaler 2 puffs, Inhalation, 2 TIMES DAILY     venlafaxine  (EFFEXOR) 37.5 mg, EVERY MORNING     vitamin E (TOCOPHEROL) 500 Units, TWICE WEEKLY      Labs:  11/3  WBC 5.0   Hgb 13.6 with macrocytosis       Assessment/Plan:    Unintentional overdose on opiates and BDZ - July 2021 -has bone pain, rib fractures.   Recommended increasing her Suboxone dose to 4 times per day last visit but this was not understood.  Will increase to qid   Patient reports she understands and is willing to do this.     May eventually get to a point where the suboxone is not enough to control her pain.        2.   Nausea and Vomiting - likely related to oral chemotherapy.   However, it is noted that she has had nausea and vomiting with opiates in the past and thus the Suboxone could be contributing. However, it is safer for pain than the opiates.  Agree that Medical cannabis may be helpful for her chronic nausea.        3.   Chronic prescription benzodiazepine use.  - None since hospitalized in July, although she reports her anxiety is bad and would like some.   Her terminal illness is creating a great deal of fear and dread.   She admits that she does not talk to her daughters or friends about this.   She does feel she can express some of this to me and is disappointed that I will not be continuing to follow her since I will not be doing OP clinic after Jan. 5, 2022.   I think she would benefit from a therapist who deals with cancer patients and even a group with other terminal patients.   Her Oncology cinic or Palliative Care should provide this.  I encouraged her to participate.    Discussed medication options for anxiety and she is not interested in another medication.           Will plan to see her one more time on Jan. 5 and then refer to Dr. Wong, who has worked in clinic with a more integrative approach and would be a good provider for her.       4.   Stage 4 breast cancer with primary in R breast - followed by [facility].   Having regular followup with them.  See above.      Considersing Medical Cannabis.    Plan:     Rx for Suboxone 8 mg bid and 4 mg mid-day.  No benzodiazepines.  Increase hydroxyzine to 25 - 50 mg tid prn.     OK with her starting Medical Cannabis    Next visit on Jan. 5, then follow q month with Dr. Tamara Wong.

## 2021-12-15 NOTE — PATIENT INSTRUCTIONS
1.    Increase Suboxone to 20 mg total per day.   Take 8 mg bid - in am. And hs and 4 mg daily at mid-day.     2.  Change hydroxyzine to vistaril 25 - 50 mg tid prn anxiety.    3.   Refill gabapenting 100 mg at midday - gets 300 mg from oncology and takes bid am and hs.      4.   Will discuss with Keith Branch CNP regarding medical cannabis for N and V;   As well as individual and group therapy for patient to help her deal with her metstatic cancer diagnosis.       5.  Return to see me on Jan. 5.     6.  Communicate by Ismael next week to report how she is doing.         Keara Rodriguez MD  Addiction Medicine Service  [facility]   Page me (click here for Pooja Rodriguez)

## 2021-12-15 NOTE — PROGRESS NOTES
Medications Phoned  to Pharmacy [] yes [x]no  Name of Pharmacist:  List Medications, including dose, quantity and instructions     Medications ordered this visit were e-scribed.  Verified by order class [] yes  [x] no    Medication changes or discontinuations were communicated to patient's pharmacy: [] yes  [x] no     Dictation completed at time of chart check: [] yes  [x] no     I have checked the documentation for today s encounters and the above information has been reviewed and completed.        Rosemarie Corado, NIKOLAY December 15, 2021 3:52 PM

## 2021-12-15 NOTE — PROGRESS NOTES
This video/telephone visit will be conducted via a call between you and your physician/provider. We have found that certain health care needs can be provided without the need for an in-person physical exam. This service lets us provide the care you need with a video /telephone conversation. If a prescription is necessary we can send it directly to your pharmacy. If lab work is needed we can place an order for that and you can then stop by our lab to have the test done at a later time.    Just as we bill insurance for in-person visits, we also bill insurance for video/telephone visits. If you have questions about your insurance coverage, we recommend that you speak with your insurance company.    Patient has given verbal consent for Telephone visit? Yes     Patient would like telephone visit, please connect : [phone]  Reason for visit: Medication follow up   Patient verified allergies, medications and pharmacy via telephone verbally with writer.   Medication refill is pended for review and approval by provider.    Patient states she is ready for visit.  MN  to be reviewed by provider.   Daphnie Perdomo December 15, 2021 1:30 PM

## 2021-12-21 RX ORDER — BUPRENORPHINE AND NALOXONE 8; 2 MG/1; MG/1
1 FILM, SOLUBLE BUCCAL; SUBLINGUAL 2 TIMES DAILY
Qty: 60 FILM | Refills: 0 | Status: SHIPPED | OUTPATIENT
Start: 2021-12-21 | End: 2022-01-05

## 2021-12-21 RX ORDER — HYDROXYZINE PAMOATE 25 MG/1
25-50 CAPSULE ORAL 3 TIMES DAILY PRN
Qty: 150 CAPSULE | Refills: 1 | Status: SHIPPED | OUTPATIENT
Start: 2021-12-21 | End: 2022-03-15

## 2021-12-21 RX ORDER — BUPRENORPHINE AND NALOXONE 4; 1 MG/1; MG/1
1 FILM, SOLUBLE BUCCAL; SUBLINGUAL DAILY
Qty: 30 FILM | Refills: 0 | Status: ON HOLD | OUTPATIENT
Start: 2021-12-21 | End: 2022-01-27

## 2021-12-21 RX ORDER — GABAPENTIN 100 MG/1
100 CAPSULE ORAL
Qty: 90 CAPSULE | Refills: 0 | Status: SHIPPED | OUTPATIENT
Start: 2021-12-21 | End: 2022-03-29

## 2022-01-05 ENCOUNTER — VIRTUAL VISIT (OUTPATIENT)
Dept: BEHAVIORAL HEALTH | Facility: CLINIC | Age: 67
End: 2022-01-05
Payer: COMMERCIAL

## 2022-01-05 DIAGNOSIS — C50.511 MALIGNANT NEOPLASM OF LOWER-OUTER QUADRANT OF RIGHT FEMALE BREAST, UNSPECIFIED ESTROGEN RECEPTOR STATUS (H): Primary | ICD-10-CM

## 2022-01-05 DIAGNOSIS — F11.221 OPIOID DEPENDENCE WITH INTOXICATION DELIRIUM (H): ICD-10-CM

## 2022-01-05 DIAGNOSIS — D63.0 ANEMIA IN NEOPLASTIC DISEASE: ICD-10-CM

## 2022-01-05 PROCEDURE — 99215 OFFICE O/P EST HI 40 MIN: CPT | Mod: 95 | Performed by: INTERNAL MEDICINE

## 2022-01-05 RX ORDER — DOCUSATE SODIUM 100 MG/1
CAPSULE, LIQUID FILLED ORAL
Qty: 180 CAPSULE | Refills: 0 | Status: SHIPPED | OUTPATIENT
Start: 2022-01-05 | End: 2022-04-26

## 2022-01-05 RX ORDER — BUPRENORPHINE AND NALOXONE 8; 2 MG/1; MG/1
1 FILM, SOLUBLE BUCCAL; SUBLINGUAL 2 TIMES DAILY
Qty: 60 FILM | Refills: 0 | Status: SHIPPED | OUTPATIENT
Start: 2022-01-05 | End: 2022-02-01

## 2022-01-05 RX ORDER — FERROUS SULFATE 325(65) MG
325 TABLET ORAL
Qty: 30 TABLET | Refills: 1 | Status: SHIPPED | OUTPATIENT
Start: 2022-01-05

## 2022-01-05 RX ORDER — BUPRENORPHINE AND NALOXONE 4; 1 MG/1; MG/1
1-2 FILM, SOLUBLE BUCCAL; SUBLINGUAL DAILY
Qty: 60 FILM | Refills: 0 | Status: SHIPPED | OUTPATIENT
Start: 2022-01-05 | End: 2022-02-01

## 2022-01-05 NOTE — PATIENT INSTRUCTIONS
1.  Continue Suboxone 8 mg morning and hs and 4 - 8 mg in midday.   Rx for 1 mo. Sent to pharmacy.       2.  Rx for Colace, Vit. D and iron also ordered.     3.  OK if patient needs medical cannabis for nausea, vomiting.    Will contact [facility].      4.  RTC to see Marvin Jiménez in 1 month.      Keara Rodriguez MD  Addiction Medicine Service  [facility]   Page me (click here for Pooja Rodriguez)

## 2022-01-05 NOTE — NURSING NOTE
This video/telephone visit will be conducted via a call between you and your physician/provider. We have found that certain health care needs can be provided without the need for an in-person physical exam. This service lets us provide the care you need with a video /telephone conversation. If a prescription is necessary we can send it directly to your pharmacy. If lab work is needed we can place an order for that and you can then stop by our lab to have the test done at a later time.    Just as we bill insurance for in-person visits, we also bill insurance for video/telephone visits. If you have questions about your insurance coverage, we recommend that you speak with your insurance company.    Patient has given verbal consent for video/Telephone visit? yes    Patient would like phone visit, please connect : [phone]  Reason for visit: Follow up, med refill, checking of the status on therapy referral   Patient verified allergies, medications and pharmacy via phone      Patient states she is ready for visit.    Dolores St January 5, 2022 1:37 PM      Medications Phoned  to Pharmacy [] yes [x]no  Name of Pharmacist:  List Medications, including dose, quantity and instructions     Medications ordered this visit were e-scribed.  Verified by order class [x] yes  [] no  Suboxone 4 and 8 mg; Vit D, Colace, and Iron  Medication changes or discontinuations were communicated to patient's pharmacy: [] yes  [x] no     Dictation completed at time of chart check: [x] yes  [] no     I have checked the documentation for today s encounters and the above information has been reviewed and completed.        Dolores St January 6, 2022 10:52 AM

## 2022-01-05 NOTE — PROGRESS NOTES
Telephone Visit Details     Type of service:  Telephone visit     Call Start Time (time video started): 1350     Call End Time (time video stopped): 1414    Originating Location (pt. Location): Home     Distant Location (provider location):  Provider Home offixw     Mode of Communication:  Telephone      Physician has received verbal consent for a telephone Visit from the patient? Yes.      1/5/2022           Patient Active Problem List   Diagnosis     Malignant neoplasm of lower-outer quadrant of right breast of female, estrogen receptor positive (H)     Asthma     Combined forms of age-related cataract of right eye     Diverticulitis of large intestine without perforation or abscess without bleeding     Episodic mood disorder (H)     Hyperlipidemia     Pain medication agreement     Panic disorder without agoraphobia     Post traumatic stress disorder     Dehydration     Encephalopathy     Elevated troponin level not due to acute coronary syndrome     Acute kidney injury (H)     Chronic pain due to neoplasm     Opioid dependence with intoxication delirium (H)     Hypokalemia     Non-traumatic rhabdomyolysis     Essential hypertension     Anemia        CC:  On Suboxone for pain and chronic prescription opioid use     HPI:   Jarrell Nicole is a 66 year old female with known Stage 4 breast cancer with bone metastasis.   She was started on suboxone in July 2021, after being hospitalized for mental status changes which were due to an unintentional overdose on oxycodone and klonopin.   She denies using any opiates or benzodiazepines since starting Suboxone.  However, patient's bone metastasis in rib cage and hip have progressed.   Reports that pain is 8 at worst and 3 after taking the suboxone.    She Continues on oral and intermittent infusion chemotherapy.   Reports that this is giving her nausea and vomiting, which is almost constant.    She has gotten dehydrated because she couldni't even keep liquids down and had  to go to ED for IV fluids in Nov, 2021.  She is losing weight.  Reports she now weighs 130#.  Was 145# in July 2021.,        She recently saw a PA-MIREYA in the oncology clinic who recommended medical cannabis for her nausea and vomiting, but she has not started this yet.   \    Feeling less anxious and depressed than last month.     Medication- Assisted Treatment:   Suboxone 20 - 24 mg per day     .    ROS:  Med Compliant?:  Was prescribe one 4 mg Suboxone at mid-day, but patient reports she often takes two, usually spread out for 4 hrs or so.     Abstinent?   Yes - no opiates, alcohol or benzodiazepines.    Cravings?  No However, pain is severe.    Side Effects?:  Constipation controlled with colace.  Needs more.  Mood?: Less irritable and anxious, but would still like to see therapist.         Medical Issues?:  As above     I have reviewed and updated the patient's Past Medical History, Social History, Family History and Medication List.   Reports she is living on $900 per month and wishes she could work part-time, but knows she cannot bcz of her pain and weakness     [place] :  Reviewed  - no unexpected prescriptions    ALLERGIES   Codeine, Iodinated contrast media [diagnostic x-ray materials], Penicillins, and Tylenol  ?   Exam:  Phone visit  Neuro:  Alert, oriented.     Psych:               Mood:  less depressed, less tearful and less agitated than last visit.               Affect:  cannot assess               Thought content:  Denied SI, HI or hallucinations               Thought processes:  linear, focused                Speech:  Normal                Motor:  NA                Insight/judgement:  fair/  fair                   Current Outpatient Medications   Medication Instructions     albuterol (PROAIR HFA;PROVENTIL HFA;VENTOLIN HFA) 90 mcg/actuation inhaler 1-2 puffs, Inhalation, EVERY 4 HOURS PRN     amLODIPine (NORVASC) 5 mg, DAILY     ascorbic acid 1,000 mg, DAILY     B,C/folic/zinc/copper ox/vit E (STRESS  B-COMPLEX ORAL) 1 tablet, DAILY     Suboxone 8/2 mg bid     Suboxone 4/1 mg  q d (midday)     Gabapentin 300 mg  300 mg bid and 100 mg q d      cetirizine (ZYRTEC) 10 mg, DAILY     cholecalciferol 4,000 Units, Oral, DAILY     docusate sodium (COLACE) 100 MG capsule TAKE 1 CAPSULE(100 MG) BY MOUTH TWICE DAILY     ferrous sulfate (FEROSUL) 325 mg, Oral, DAILY WITH BREAKFAST     fluticasone propionate (FLONASE) 50 mcg/actuation nasal spray 1 spray, DAILY                 hydrOXYzine (ATARAX) 10 mg, Oral, 3 TIMES DAILY PRN     Lidocaine (LIDOCARE) 4 % Patch 2 patches, Transdermal, EVERY 24 HOURS, To prevent lidocaine toxicity, patient should be patch free for 12 hrs daily.     Melatonin  OTC 6 mg, Oral, AT BEDTIME PRN     ondansetron (ZOFRAN-ODT) 4 mg, Oral, EVERY 6 HOURS PRN     polyethylene glycol (MIRALAX) 17 GM/Dose powder 1 capful, Oral, DAILY PRN     propranolol (INDERAL) 10 mg, Oral, 3 TIMES DAILY PRN     spironolactone (ALDACTONE) 100 mg, DAILY     SYMBICORT 80-4.5 mcg/actuation inhaler 2 puffs, Inhalation, 2 TIMES DAILY     venlafaxine (EFFEXOR) 37.5 mg, EVERY MORNING     vitamin E (TOCOPHEROL) 500 Units, TWICE WEEKLY           Assessment/Plan:   1.  Unintentional overdose on opiates and BDZ - July 2021 -  - has been on Suboxone since and dose has gradually increasaed, so that now she is taking 20 - 24 mg per day.   However, has breast cancer with bone metastasis, so likely needs this amount.    Very possible that at some point, she may need full opioid agonists to manage her pain.   However, will continue to manage with Suboxone for as long as we can.   Given Rx for 8 mg bid and 4 mg 1 - 2 times per day.  (She wants to have the flexiibility to use less on days when pain is not as bad,         2.   Nausea and Vomiting - likely related to oral chemotherapy.   Zofran and compazine have not been effective.   Agree that Medical cannabis may be helpful for her chronic nausea.  Will contact PA at the Oncology clinic  who evalyuated her for same      3.   Chronic prescription benzodiazepine use.  - None since hospitalized in July, although she reports her anxiety is intermittently bad - on gabapentin, and hydroxyzine prn .   Would benefit from therapy with someone who works with patients with terminal illnesses.        4.   Stage 4 breast cancer with primary in R breast - followed by [facility] ([facility]),.   On chemotherapy.         Plan:     Rx for Suboxone 8 mg bid and 4a- 8  mg mid-day.  No benzodiazepines.  Increase hydroxyzine to 25 - 50 mg tid prn.     Agree with gabapentin 300 mg bid and 100 mg q midday.    OK with her starting Medical Cannabis.  I will contact the provider at the Oncology Clinic.           Patient was informed that I would no longer be working in the [facility].   Will refer to Dr. Tamara Wong. In 4 wks.    I have already discussed with her.       Keara Rodriguez MD  Addiction Medicine Service  [facility]   Page me (click here for Pooja Rodriguez)

## 2022-01-07 ENCOUNTER — TRANSFERRED RECORDS (OUTPATIENT)
Dept: HEALTH INFORMATION MANAGEMENT | Facility: CLINIC | Age: 67
End: 2022-01-07
Payer: COMMERCIAL

## 2022-01-22 ENCOUNTER — APPOINTMENT (OUTPATIENT)
Dept: CT IMAGING | Facility: HOSPITAL | Age: 67
DRG: 394 | End: 2022-01-22
Payer: COMMERCIAL

## 2022-01-22 ENCOUNTER — HOSPITAL ENCOUNTER (INPATIENT)
Facility: HOSPITAL | Age: 67
LOS: 5 days | Discharge: HOME | DRG: 394 | End: 2022-01-27
Attending: EMERGENCY MEDICINE | Admitting: INTERNAL MEDICINE
Payer: COMMERCIAL

## 2022-01-22 ENCOUNTER — APPOINTMENT (OUTPATIENT)
Dept: RADIOLOGY | Facility: HOSPITAL | Age: 67
DRG: 394 | End: 2022-01-22
Payer: COMMERCIAL

## 2022-01-22 DIAGNOSIS — E87.6 HYPOKALEMIA: ICD-10-CM

## 2022-01-22 DIAGNOSIS — R11.2 INTRACTABLE NAUSEA AND VOMITING: ICD-10-CM

## 2022-01-22 DIAGNOSIS — R11.2 NAUSEA AND VOMITING, INTRACTABILITY OF VOMITING NOT SPECIFIED, UNSPECIFIED VOMITING TYPE: ICD-10-CM

## 2022-01-22 DIAGNOSIS — G89.3 CHRONIC PAIN DUE TO NEOPLASM: Primary | Chronic | ICD-10-CM

## 2022-01-22 DIAGNOSIS — F11.221 OPIOID DEPENDENCE WITH INTOXICATION DELIRIUM (H): ICD-10-CM

## 2022-01-22 LAB
ALBUMIN SERPL-MCNC: 3.9 G/DL (ref 3.5–5)
ALBUMIN UR-MCNC: NEGATIVE MG/DL
ALP SERPL-CCNC: 74 U/L (ref 45–120)
ALT SERPL W P-5'-P-CCNC: 12 U/L (ref 0–45)
AMPHETAMINES UR QL SCN: ABNORMAL
ANION GAP SERPL CALCULATED.3IONS-SCNC: 12 MMOL/L (ref 5–18)
APPEARANCE UR: CLEAR
AST SERPL W P-5'-P-CCNC: 16 U/L (ref 0–40)
ATRIAL RATE - MUSE: 72 BPM
BARBITURATES UR QL: ABNORMAL
BASOPHILS # BLD AUTO: 0 10E3/UL (ref 0–0.2)
BASOPHILS NFR BLD AUTO: 0 %
BENZODIAZ UR QL: ABNORMAL
BILIRUB DIRECT SERPL-MCNC: 0.2 MG/DL
BILIRUB SERPL-MCNC: 0.5 MG/DL (ref 0–1)
BILIRUB UR QL STRIP: NEGATIVE
BNP SERPL-MCNC: 44 PG/ML (ref 0–109)
BUN SERPL-MCNC: 10 MG/DL (ref 8–22)
CALCIUM SERPL-MCNC: 7.8 MG/DL (ref 8.5–10.5)
CANNABINOIDS UR QL SCN: ABNORMAL
CHLORIDE BLD-SCNC: 99 MMOL/L (ref 98–107)
CO2 SERPL-SCNC: 25 MMOL/L (ref 22–31)
COCAINE UR QL: ABNORMAL
COLOR UR AUTO: COLORLESS
CREAT SERPL-MCNC: 0.84 MG/DL (ref 0.6–1.1)
CREAT SERPL-MCNC: 0.84 MG/DL (ref 0.6–1.1)
CREAT UR-MCNC: 15 MG/DL
DIASTOLIC BLOOD PRESSURE - MUSE: 69 MMHG
EOSINOPHIL # BLD AUTO: 0 10E3/UL (ref 0–0.7)
EOSINOPHIL NFR BLD AUTO: 0 %
ERYTHROCYTE [DISTWIDTH] IN BLOOD BY AUTOMATED COUNT: 14.6 % (ref 10–15)
GFR SERPL CREATININE-BSD FRML MDRD: 76 ML/MIN/1.73M2
GFR SERPL CREATININE-BSD FRML MDRD: 76 ML/MIN/1.73M2
GLUCOSE BLD-MCNC: 140 MG/DL (ref 70–125)
GLUCOSE UR STRIP-MCNC: NEGATIVE MG/DL
HCT VFR BLD AUTO: 34.8 % (ref 35–47)
HGB BLD-MCNC: 12.2 G/DL (ref 11.7–15.7)
HGB UR QL STRIP: NEGATIVE
HOLD SPECIMEN: NORMAL
HOLD SPECIMEN: NORMAL
IMM GRANULOCYTES # BLD: 0 10E3/UL
IMM GRANULOCYTES NFR BLD: 0 %
INTERPRETATION ECG - MUSE: NORMAL
KETONES UR STRIP-MCNC: NEGATIVE MG/DL
LACTATE SERPL-SCNC: 1.1 MMOL/L (ref 0.7–2)
LEUKOCYTE ESTERASE UR QL STRIP: NEGATIVE
LIPASE SERPL-CCNC: <9 U/L (ref 0–52)
LYMPHOCYTES # BLD AUTO: 1.7 10E3/UL (ref 0.8–5.3)
LYMPHOCYTES NFR BLD AUTO: 38 %
MAGNESIUM SERPL-MCNC: 2.1 MG/DL (ref 1.8–2.6)
MCH RBC QN AUTO: 33.4 PG (ref 26.5–33)
MCHC RBC AUTO-ENTMCNC: 35.1 G/DL (ref 31.5–36.5)
MCV RBC AUTO: 95 FL (ref 78–100)
MONOCYTES # BLD AUTO: 0.5 10E3/UL (ref 0–1.3)
MONOCYTES NFR BLD AUTO: 10 %
NEUTROPHILS # BLD AUTO: 2.4 10E3/UL (ref 1.6–8.3)
NEUTROPHILS NFR BLD AUTO: 52 %
NITRATE UR QL: NEGATIVE
NRBC # BLD AUTO: 0 10E3/UL
NRBC BLD AUTO-RTO: 0 /100
OPIATES UR QL SCN: ABNORMAL
OXYCODONE UR QL: ABNORMAL
P AXIS - MUSE: NORMAL DEGREES
PCP UR QL SCN: ABNORMAL
PH UR STRIP: 7 [PH] (ref 5–7)
PLATELET # BLD AUTO: 303 10E3/UL (ref 150–450)
POTASSIUM BLD-SCNC: 2.6 MMOL/L (ref 3.5–5)
POTASSIUM BLD-SCNC: 4.2 MMOL/L (ref 3.5–5)
PR INTERVAL - MUSE: 132 MS
PROT SERPL-MCNC: 6.7 G/DL (ref 6–8)
QRS DURATION - MUSE: 70 MS
QT - MUSE: 456 MS
QTC - MUSE: 499 MS
R AXIS - MUSE: -31 DEGREES
RBC # BLD AUTO: 3.65 10E6/UL (ref 3.8–5.2)
RBC URINE: 0 /HPF
SARS-COV-2 RNA RESP QL NAA+PROBE: NEGATIVE
SODIUM SERPL-SCNC: 136 MMOL/L (ref 136–145)
SP GR UR STRIP: 1 (ref 1–1.03)
SYSTOLIC BLOOD PRESSURE - MUSE: 132 MMHG
T AXIS - MUSE: -25 DEGREES
TROPONIN I SERPL-MCNC: 0.01 NG/ML (ref 0–0.29)
UROBILINOGEN UR STRIP-MCNC: <2 MG/DL
VENTRICULAR RATE- MUSE: 72 BPM
WBC # BLD AUTO: 4.6 10E3/UL (ref 4–11)
WBC URINE: <1 /HPF

## 2022-01-22 PROCEDURE — 80307 DRUG TEST PRSMV CHEM ANLYZR: CPT | Performed by: PHYSICIAN ASSISTANT

## 2022-01-22 PROCEDURE — 87040 BLOOD CULTURE FOR BACTERIA: CPT | Performed by: PHYSICIAN ASSISTANT

## 2022-01-22 PROCEDURE — 85004 AUTOMATED DIFF WBC COUNT: CPT | Performed by: PHYSICIAN ASSISTANT

## 2022-01-22 PROCEDURE — 250N000011 HC RX IP 250 OP 636: Performed by: INTERNAL MEDICINE

## 2022-01-22 PROCEDURE — C9803 HOPD COVID-19 SPEC COLLECT: HCPCS

## 2022-01-22 PROCEDURE — 82248 BILIRUBIN DIRECT: CPT | Performed by: PHYSICIAN ASSISTANT

## 2022-01-22 PROCEDURE — 83735 ASSAY OF MAGNESIUM: CPT | Performed by: PHYSICIAN ASSISTANT

## 2022-01-22 PROCEDURE — 80053 COMPREHEN METABOLIC PANEL: CPT | Performed by: PHYSICIAN ASSISTANT

## 2022-01-22 PROCEDURE — 96361 HYDRATE IV INFUSION ADD-ON: CPT

## 2022-01-22 PROCEDURE — 210N000001 HC R&B IMCU HEART CARE

## 2022-01-22 PROCEDURE — 96365 THER/PROPH/DIAG IV INF INIT: CPT

## 2022-01-22 PROCEDURE — 250N000013 HC RX MED GY IP 250 OP 250 PS 637: Performed by: PHYSICIAN ASSISTANT

## 2022-01-22 PROCEDURE — 250N000013 HC RX MED GY IP 250 OP 250 PS 637: Performed by: INTERNAL MEDICINE

## 2022-01-22 PROCEDURE — 99285 EMERGENCY DEPT VISIT HI MDM: CPT | Mod: 25

## 2022-01-22 PROCEDURE — 36415 COLL VENOUS BLD VENIPUNCTURE: CPT | Performed by: INTERNAL MEDICINE

## 2022-01-22 PROCEDURE — 81001 URINALYSIS AUTO W/SCOPE: CPT | Performed by: PHYSICIAN ASSISTANT

## 2022-01-22 PROCEDURE — 83605 ASSAY OF LACTIC ACID: CPT | Performed by: PHYSICIAN ASSISTANT

## 2022-01-22 PROCEDURE — 96366 THER/PROPH/DIAG IV INF ADDON: CPT

## 2022-01-22 PROCEDURE — 36415 COLL VENOUS BLD VENIPUNCTURE: CPT | Performed by: PHYSICIAN ASSISTANT

## 2022-01-22 PROCEDURE — 258N000003 HC RX IP 258 OP 636: Performed by: PHYSICIAN ASSISTANT

## 2022-01-22 PROCEDURE — 250N000011 HC RX IP 250 OP 636: Performed by: PHYSICIAN ASSISTANT

## 2022-01-22 PROCEDURE — 84132 ASSAY OF SERUM POTASSIUM: CPT | Performed by: INTERNAL MEDICINE

## 2022-01-22 PROCEDURE — 74176 CT ABD & PELVIS W/O CONTRAST: CPT

## 2022-01-22 PROCEDURE — 96375 TX/PRO/DX INJ NEW DRUG ADDON: CPT

## 2022-01-22 PROCEDURE — 93005 ELECTROCARDIOGRAM TRACING: CPT | Performed by: PHYSICIAN ASSISTANT

## 2022-01-22 PROCEDURE — 71045 X-RAY EXAM CHEST 1 VIEW: CPT

## 2022-01-22 PROCEDURE — 83690 ASSAY OF LIPASE: CPT | Performed by: PHYSICIAN ASSISTANT

## 2022-01-22 PROCEDURE — 87635 SARS-COV-2 COVID-19 AMP PRB: CPT | Performed by: PHYSICIAN ASSISTANT

## 2022-01-22 PROCEDURE — 96376 TX/PRO/DX INJ SAME DRUG ADON: CPT

## 2022-01-22 PROCEDURE — 84484 ASSAY OF TROPONIN QUANT: CPT | Performed by: PHYSICIAN ASSISTANT

## 2022-01-22 PROCEDURE — 83880 ASSAY OF NATRIURETIC PEPTIDE: CPT | Performed by: PHYSICIAN ASSISTANT

## 2022-01-22 PROCEDURE — 99223 1ST HOSP IP/OBS HIGH 75: CPT | Performed by: INTERNAL MEDICINE

## 2022-01-22 RX ORDER — LORAZEPAM 2 MG/ML
1 INJECTION INTRAMUSCULAR ONCE
Status: COMPLETED | OUTPATIENT
Start: 2022-01-22 | End: 2022-01-22

## 2022-01-22 RX ORDER — POTASSIUM CHLORIDE 20MEQ/15ML
40 LIQUID (ML) ORAL ONCE
Status: COMPLETED | OUTPATIENT
Start: 2022-01-22 | End: 2022-01-22

## 2022-01-22 RX ORDER — BUDESONIDE AND FORMOTEROL FUMARATE DIHYDRATE 80; 4.5 UG/1; UG/1
2 AEROSOL RESPIRATORY (INHALATION) 2 TIMES DAILY
Status: DISCONTINUED | OUTPATIENT
Start: 2022-01-22 | End: 2022-01-22

## 2022-01-22 RX ORDER — POTASSIUM CHLORIDE 7.45 MG/ML
10 INJECTION INTRAVENOUS
Status: DISCONTINUED | OUTPATIENT
Start: 2022-01-22 | End: 2022-01-22

## 2022-01-22 RX ORDER — HYDROXYZINE HYDROCHLORIDE 10 MG/1
10 TABLET, FILM COATED ORAL 3 TIMES DAILY PRN
Status: DISCONTINUED | OUTPATIENT
Start: 2022-01-22 | End: 2022-01-27 | Stop reason: HOSPADM

## 2022-01-22 RX ORDER — LIDOCAINE 4 G/G
2 PATCH TOPICAL DAILY PRN
Status: DISCONTINUED | OUTPATIENT
Start: 2022-01-22 | End: 2022-01-27 | Stop reason: HOSPADM

## 2022-01-22 RX ORDER — BUPRENORPHINE AND NALOXONE 4; 1 MG/1; MG/1
1 FILM, SOLUBLE BUCCAL; SUBLINGUAL 4 TIMES DAILY
Status: DISCONTINUED | OUTPATIENT
Start: 2022-01-22 | End: 2022-01-22

## 2022-01-22 RX ORDER — PROPRANOLOL HYDROCHLORIDE 10 MG/1
10 TABLET ORAL 3 TIMES DAILY PRN
Status: DISCONTINUED | OUTPATIENT
Start: 2022-01-22 | End: 2022-01-27 | Stop reason: HOSPADM

## 2022-01-22 RX ORDER — POTASSIUM CHLORIDE 20MEQ/15ML
60 LIQUID (ML) ORAL ONCE
Status: DISCONTINUED | OUTPATIENT
Start: 2022-01-22 | End: 2022-01-22

## 2022-01-22 RX ORDER — HYDROMORPHONE HCL IN WATER/PF 6 MG/30 ML
0.5 PATIENT CONTROLLED ANALGESIA SYRINGE INTRAVENOUS ONCE
Status: COMPLETED | OUTPATIENT
Start: 2022-01-22 | End: 2022-01-22

## 2022-01-22 RX ORDER — BUPRENORPHINE AND NALOXONE 4; 1 MG/1; MG/1
1-2 FILM, SOLUBLE BUCCAL; SUBLINGUAL DAILY
Status: DISCONTINUED | OUTPATIENT
Start: 2022-01-23 | End: 2022-01-22

## 2022-01-22 RX ORDER — GABAPENTIN 100 MG/1
100 CAPSULE ORAL
Status: DISCONTINUED | OUTPATIENT
Start: 2022-01-23 | End: 2022-01-27 | Stop reason: HOSPADM

## 2022-01-22 RX ORDER — POTASSIUM CHLORIDE 20MEQ/15ML
20 LIQUID (ML) ORAL ONCE
Status: COMPLETED | OUTPATIENT
Start: 2022-01-22 | End: 2022-01-22

## 2022-01-22 RX ORDER — GABAPENTIN 300 MG/1
300 CAPSULE ORAL 2 TIMES DAILY
Status: DISCONTINUED | OUTPATIENT
Start: 2022-01-22 | End: 2022-01-27 | Stop reason: HOSPADM

## 2022-01-22 RX ORDER — SODIUM CHLORIDE AND POTASSIUM CHLORIDE 150; 900 MG/100ML; MG/100ML
INJECTION, SOLUTION INTRAVENOUS CONTINUOUS
Status: DISCONTINUED | OUTPATIENT
Start: 2022-01-22 | End: 2022-01-24

## 2022-01-22 RX ORDER — BUPRENORPHINE HYDROCHLORIDE AND NALOXONE HYDROCHLORIDE DIHYDRATE 2; .5 MG/1; MG/1
4 TABLET SUBLINGUAL 2 TIMES DAILY
Status: DISCONTINUED | OUTPATIENT
Start: 2022-01-22 | End: 2022-01-27 | Stop reason: HOSPADM

## 2022-01-22 RX ORDER — VENLAFAXINE 37.5 MG/1
37.5 TABLET ORAL EVERY MORNING
Status: DISCONTINUED | OUTPATIENT
Start: 2022-01-23 | End: 2022-01-27 | Stop reason: HOSPADM

## 2022-01-22 RX ORDER — SODIUM CHLORIDE, SODIUM LACTATE, POTASSIUM CHLORIDE, CALCIUM CHLORIDE 600; 310; 30; 20 MG/100ML; MG/100ML; MG/100ML; MG/100ML
125 INJECTION, SOLUTION INTRAVENOUS CONTINUOUS
Status: DISCONTINUED | OUTPATIENT
Start: 2022-01-22 | End: 2022-01-24

## 2022-01-22 RX ORDER — LETROZOLE 2.5 MG/1
2.5 TABLET, FILM COATED ORAL DAILY
Status: ON HOLD | COMMUNITY
End: 2022-01-27

## 2022-01-22 RX ORDER — BUPRENORPHINE AND NALOXONE 4; 1 MG/1; MG/1
1 FILM, SOLUBLE BUCCAL; SUBLINGUAL DAILY
Status: DISCONTINUED | OUTPATIENT
Start: 2022-01-23 | End: 2022-01-22

## 2022-01-22 RX ORDER — ACETAMINOPHEN 325 MG/1
975 TABLET ORAL ONCE
Status: DISCONTINUED | OUTPATIENT
Start: 2022-01-22 | End: 2022-01-27 | Stop reason: HOSPADM

## 2022-01-22 RX ORDER — MORPHINE SULFATE 4 MG/ML
4 INJECTION, SOLUTION INTRAMUSCULAR; INTRAVENOUS ONCE
Status: COMPLETED | OUTPATIENT
Start: 2022-01-22 | End: 2022-01-22

## 2022-01-22 RX ORDER — POTASSIUM CHLORIDE 7.45 MG/ML
10 INJECTION INTRAVENOUS ONCE
Status: COMPLETED | OUTPATIENT
Start: 2022-01-22 | End: 2022-01-22

## 2022-01-22 RX ORDER — BUPRENORPHINE HYDROCHLORIDE AND NALOXONE HYDROCHLORIDE DIHYDRATE 2; .5 MG/1; MG/1
2-4 TABLET SUBLINGUAL DAILY PRN
Status: DISCONTINUED | OUTPATIENT
Start: 2022-01-22 | End: 2022-01-27 | Stop reason: HOSPADM

## 2022-01-22 RX ORDER — ALBUTEROL SULFATE 90 UG/1
1-2 AEROSOL, METERED RESPIRATORY (INHALATION) EVERY 4 HOURS PRN
Status: DISCONTINUED | OUTPATIENT
Start: 2022-01-22 | End: 2022-01-27 | Stop reason: HOSPADM

## 2022-01-22 RX ORDER — HYDROMORPHONE HCL IN WATER/PF 6 MG/30 ML
0.5 PATIENT CONTROLLED ANALGESIA SYRINGE INTRAVENOUS EVERY 4 HOURS PRN
Status: DISPENSED | OUTPATIENT
Start: 2022-01-22 | End: 2022-01-23

## 2022-01-22 RX ADMIN — POTASSIUM CHLORIDE AND SODIUM CHLORIDE: 900; 150 INJECTION, SOLUTION INTRAVENOUS at 17:53

## 2022-01-22 RX ADMIN — ENOXAPARIN SODIUM 40 MG: 40 INJECTION SUBCUTANEOUS at 18:23

## 2022-01-22 RX ADMIN — GABAPENTIN 300 MG: 300 CAPSULE ORAL at 21:20

## 2022-01-22 RX ADMIN — MORPHINE SULFATE 4 MG: 4 INJECTION, SOLUTION INTRAMUSCULAR; INTRAVENOUS at 10:03

## 2022-01-22 RX ADMIN — BUPRENORPHINE HYDROCHLORIDE AND NALOXONE HYDROCHLORIDE DIHYDRATE 4 TABLET: 2; .5 TABLET SUBLINGUAL at 21:18

## 2022-01-22 RX ADMIN — POTASSIUM CHLORIDE 20 MEQ: 1.5 SOLUTION ORAL at 14:28

## 2022-01-22 RX ADMIN — HYDROMORPHONE HYDROCHLORIDE 0.5 MG: 0.2 INJECTION, SOLUTION INTRAMUSCULAR; INTRAVENOUS; SUBCUTANEOUS at 22:20

## 2022-01-22 RX ADMIN — HYDROMORPHONE HYDROCHLORIDE 0.5 MG: 0.2 INJECTION, SOLUTION INTRAMUSCULAR; INTRAVENOUS; SUBCUTANEOUS at 14:57

## 2022-01-22 RX ADMIN — POTASSIUM CHLORIDE 40 MEQ: 20 SOLUTION ORAL at 14:28

## 2022-01-22 RX ADMIN — PROCHLORPERAZINE EDISYLATE 10 MG: 5 INJECTION INTRAMUSCULAR; INTRAVENOUS at 09:48

## 2022-01-22 RX ADMIN — HYDROXYZINE HYDROCHLORIDE 10 MG: 10 TABLET ORAL at 23:38

## 2022-01-22 RX ADMIN — PROCHLORPERAZINE EDISYLATE 5 MG: 5 INJECTION INTRAMUSCULAR; INTRAVENOUS at 21:47

## 2022-01-22 RX ADMIN — SODIUM CHLORIDE, POTASSIUM CHLORIDE, SODIUM LACTATE AND CALCIUM CHLORIDE 1000 ML: 600; 310; 30; 20 INJECTION, SOLUTION INTRAVENOUS at 09:59

## 2022-01-22 RX ADMIN — LORAZEPAM 1 MG: 2 INJECTION INTRAMUSCULAR; INTRAVENOUS at 09:48

## 2022-01-22 RX ADMIN — POTASSIUM CHLORIDE 10 MEQ: 7.46 INJECTION, SOLUTION INTRAVENOUS at 11:12

## 2022-01-22 RX ADMIN — HYDROMORPHONE HYDROCHLORIDE 0.5 MG: 0.2 INJECTION, SOLUTION INTRAMUSCULAR; INTRAVENOUS; SUBCUTANEOUS at 12:18

## 2022-01-22 RX ADMIN — SODIUM CHLORIDE, POTASSIUM CHLORIDE, SODIUM LACTATE AND CALCIUM CHLORIDE 125 ML/HR: 600; 310; 30; 20 INJECTION, SOLUTION INTRAVENOUS at 11:12

## 2022-01-22 ASSESSMENT — ENCOUNTER SYMPTOMS
NAUSEA: 1
CHILLS: 1
DIARRHEA: 1
FREQUENCY: 0
ABDOMINAL PAIN: 1
HEADACHES: 1
COUGH: 0
VOMITING: 1
NERVOUS/ANXIOUS: 1
FEVER: 1
SHORTNESS OF BREATH: 1
PALPITATIONS: 0
DYSURIA: 0
HEMATURIA: 0
LIGHT-HEADEDNESS: 0
MYALGIAS: 0
FATIGUE: 1
SORE THROAT: 0

## 2022-01-22 ASSESSMENT — ACTIVITIES OF DAILY LIVING (ADL)
ADLS_ACUITY_SCORE: 12
DEPENDENT_IADLS:: TRANSPORTATION
ADLS_ACUITY_SCORE: 12

## 2022-01-22 ASSESSMENT — MIFFLIN-ST. JEOR: SCORE: 1137.36

## 2022-01-22 NOTE — H&P
Admission History and Physical   Jarrell Nicole,  1955, MRN [id number]    [facility]  No admission diagnoses are documented for this encounter.    PCP: Radha Oakley, [phone]   Code status:  Prior       Extended Emergency Contact Information  Primary Emergency Contact: AUTUMN HSU  Mobile Phone: [phone]  Relation: Daughter  Secondary Emergency Contact: CHINYERE HOLDEN   [facility] Phone: [phone]  Relation: Daughter       Assessment and Plan   Jarrell Nicole is a 67 year old female with a relevant history metastatic stage IV breast cancer, asthma, hypertension, diverticulitis, RASHAWN, opioid dependence, diverticulitis, and CAD who presents to the ED for evaluation of nausea and vomiting, for 6 days.    Intractable nausea and vomiting  -diarrhea  -for 6 days  -ddx include but not limited to chemotherapy induced, meds side effects, cyclic vomiting syndrome due to Marijuana, gastroenteritis  -unremarkable abd ct  -iv fluid and supportive care  -improving    Hypokalemia due to above  -replace as per protocol    Breast ca with bone mets  -Right-sided breast cancer stage IV with bone metastasis and chronic pain  Stage IV (cTX, cM1, ER+, RI+, HER2-).  Diagnosed on 3/3/2021; incidental finding of the right breast mass during ED evaluation for chest pain.  Following Dr. Blackman.  -Pain team consulted to optimize pain regimen  -pain control  -might consider oncology consultation     Abnormal urine drug screen  Cannabis use  -pt denies illicit drug abuse, unreliable  -as per record, there is concern of opiate overuse    DVT Prophylaxis: Lovenox  Diet: None     Central Lines: None     Richard Catheter: Not present       Disposition: Admitted inpatient. Anticipated Length of Stay in midnights (including a midnight in the Emergency Department after triage if applicable) is more than 2 nights          Chief Complaint: n/v     HPI:    Jarrell Nicole is a 67 year old old female with a  relevant history metastatic stage IV breast cancer, asthma, hypertension, diverticulitis, RASHAWN, opioid dependence, diverticulitis, and CAD who presents to the ED for evaluation of nausea and vomiting.     Patient reports intractable nausea and vomiting for the last 6 days. Unable to say if she had hematemesis, coffee-ground emesis, or melena. 1 episode of diarrhea today as well. She endorses chills, subjective fevers, and diffuse abdominal pain. She is unable to identify any exacerbating features. She notes shortness of breath that is baseline for her secondary to asthma. She also notes thoracic back pain which she attributes to kidney pain., along with decreased urination but no other urinary symptoms. Patient also rates her chronic bone pain 10/10.      Patient was hospitalized for hospitalized 7 months ago at [facility] for diverticulitis.   Patient's oncologist is Dr Espinal at [facility].     History was obtained from pt/chart review.      Medical History  Past Medical History:   Diagnosis Date     Asthma      Breast cancer (H) 02/24/2021    mets to bone, Stage 4     Essential hypertension      Panic disorder without agoraphobia 01/12/2012     Post traumatic stress disorder 12/30/2010     Vitamin D deficiency 03/06/2010        Surgical History  She  has a past surgical history that includes Us Lymph Node Biopsy (2/24/2021) and Ct Biopsy Bone (4/1/2021).       Social History  Reviewed, and she  reports that she quit smoking about 2 years ago. Her smoking use included cigarettes. She has a 15.00 pack-year smoking history. She has never used smokeless tobacco. She reports previous alcohol use. She reports current drug use. Drug: Marijuana.       Allergies  Allergies   Allergen Reactions     Codeine Hives     Iodinated Contrast Media [Diagnostic X-Ray Materials] Unknown     Heart stopped and sick for multiple days after     Penicillins Swelling     Throat swelling as an infant     Tylenol [Acetaminophen]  "Other (See Comments)     With codeine only .Pt states gives her upset stomach for days.    Family History  Reviewed, and family history includes No Known Problems in her daughter, daughter, daughter, and son. She was adopted.  Not pertinent to chief complaints or current medical problems.        Prior to Admission Medications   (Not in a hospital admission)    Await review     Review of Systems:  A 12 point comprehensive review of systems was negative except as noted.    ROS  Physical Exam:  Temp:  [98.2  F (36.8  C)] 98.2  F (36.8  C)  Pulse:  [84] 84  Resp:  [12] 12  BP: (132)/(74) 132/74  SpO2:  [100 %] 100 %    /74   Pulse 84   Temp 98.2  F (36.8  C) (Oral)   Resp 12   Ht 1.626 m (5' 4\")   Wt 61.2 kg (135 lb)   SpO2 100%   BMI 23.17 kg/m      Physical Exam  Vitals and nursing note reviewed.   Constitutional:       General: She is not in acute distress.     Appearance: She is ill-appearing. She is not toxic-appearing.   HENT:      Head: Normocephalic and atraumatic.      Right Ear: External ear normal.      Left Ear: External ear normal.      Nose: Nose normal.      Mouth/Throat:      Mouth: Mucous membranes are dry.   Eyes:      General:         Right eye: No discharge.         Left eye: No discharge.      Pupils: Pupils are equal, round, and reactive to light.   Cardiovascular:      Rate and Rhythm: Normal rate and regular rhythm.      Pulses: Normal pulses.      Heart sounds: No murmur heard.      Pulmonary:      Effort: Pulmonary effort is normal.   Abdominal:      Palpations: Abdomen is soft.      Tenderness: There is no abdominal tenderness.   Musculoskeletal:      Cervical back: Normal range of motion and neck supple. No rigidity.      Right lower leg: No edema.      Left lower leg: No edema.   Skin:     General: Skin is dry.      Coloration: Skin is not jaundiced.   Neurological:      General: No focal deficit present.      Mental Status: She is alert. Mental status is at baseline.      " Cranial Nerves: No cranial nerve deficit.   Psychiatric:         Mood and Affect: Mood normal.                 Pertinent Labs  Lab Results: personally reviewed.   Results for DANI HARRISON (MRN [id number]) as of 1/22/2022 11:47   Ref. Range 11/3/2021 14:24 1/22/2022 09:45 1/22/2022 09:46   Sodium Latest Ref Range: 136 - 145 mmol/L  136    Potassium Latest Ref Range: 3.5 - 5.0 mmol/L  2.6 (LL)    Chloride Latest Ref Range: 98 - 107 mmol/L  99    Carbon Dioxide Latest Ref Range: 22 - 31 mmol/L  25    Urea Nitrogen Latest Ref Range: 8 - 22 mg/dL  10    Creatinine Latest Ref Range: 0.60 - 1.10 mg/dL  0.84    GFR Estimate Latest Ref Range: >60 mL/min/1.73m2  76    Calcium Latest Ref Range: 8.5 - 10.5 mg/dL  7.8 (L)    Anion Gap Latest Ref Range: 5 - 18 mmol/L  12    Magnesium Latest Ref Range: 1.8 - 2.6 mg/dL  2.1    Albumin Latest Ref Range: 3.5 - 5.0 g/dL  3.9    Protein Total Latest Ref Range: 6.0 - 8.0 g/dL  6.7    Bilirubin Total Latest Ref Range: 0.0 - 1.0 mg/dL  0.5    Alkaline Phosphatase Latest Ref Range: 45 - 120 U/L  74    ALT Latest Ref Range: 0 - 45 U/L  12    AST Latest Ref Range: 0 - 40 U/L  16    Bilirubin Direct Latest Ref Range: <=0.5 mg/dL  0.2    BNP Latest Ref Range: 0 - 109 pg/mL   44   Lipase Latest Ref Range: 0 - 52 U/L  <9    Troponin I Latest Ref Range: 0.00 - 0.29 ng/mL  0.01    Glucose Latest Ref Range: 70 - 125 mg/dL  140 (H)    WBC Latest Ref Range: 4.0 - 11.0 10e3/uL   4.6   Hemoglobin Latest Ref Range: 11.7 - 15.7 g/dL   12.2   Hematocrit Latest Ref Range: 35.0 - 47.0 %   34.8 (L)   Platelet Count Latest Ref Range: 150 - 450 10e3/uL   303   RBC Count Latest Ref Range: 3.80 - 5.20 10e6/uL   3.65 (L)   MCV Latest Ref Range: 78 - 100 fL   95   MCH Latest Ref Range: 26.5 - 33.0 pg   33.4 (H)   MCHC Latest Ref Range: 31.5 - 36.5 g/dL   35.1   RDW Latest Ref Range: 10.0 - 15.0 %   14.6   % Neutrophils Latest Units: %   52   % Lymphocytes Latest Units: %   38   % Monocytes Latest Units: %    10   % Eosinophils Latest Units: %   0   % Basophils Latest Units: %   0   Absolute Basophils Latest Ref Range: 0.0 - 0.2 10e3/uL   0.0   Absolute Eosinophils Latest Ref Range: 0.0 - 0.7 10e3/uL   0.0   Absolute Immature Granulocytes Latest Ref Range: <=0.4 10e3/uL   0.0   Absolute Lymphocytes Latest Ref Range: 0.8 - 5.3 10e3/uL   1.7   Absolute Monocytes Latest Ref Range: 0.0 - 1.3 10e3/uL   0.5   % Immature Granulocytes Latest Units: %   0   Absolute Neutrophils Latest Ref Range: 1.6 - 8.3 10e3/uL   2.4   Absolute NRBCs Latest Units: 10e3/uL   0.0   NRBCs per 100 WBC Latest Ref Range: <1 /100   0   BLOOD CULTURE Unknown   Rpt   COVID-19 Virus by PCR (External Result) Latest Ref Range: Negative  Negative     Results for DANI HARRISON (MRN [id number]) as of 1/22/2022 15:58   Ref. Range 1/22/2022 11:26   Amphetamine Qual Urine Latest Ref Range: Screen Negative  Screen Negative   Cocaine Qual Urine Latest Ref Range: Screen Negative  Screen Negative   Benzodiazepine Qual Ur Latest Ref Range: Screen Negative  Screen Negative   Opiates Qualitative Urine Latest Ref Range: Screen Negative  Screen Positive (A)   Cannabinoids Qual Urine Latest Ref Range: Screen Negative  Screen Positive (A)   Barbiturates Qual Urine Latest Ref Range: Screen Negative  Screen Negative   Pcp Qual Urine Latest Ref Range: Screen Negative  Screen Negative   Oxycodone Qual Urine Latest Ref Range: Screen Negative  Screen Negative     Pertinent Radiology  Radiology Results: Personally reviewed impression/s  EXAM: CT ABDOMEN PELVIS W/O CONTRAST  LOCATION: [facility]  DATE/TIME: 1/22/2022 12:57 PM     INDICATION: Abdominal pain, acute, nonlocalized Nausea/vomiting  COMPARISON: 11/3/2021                                                                   IMPRESSION:   No findings to account for abdominal pain, nausea, or vomiting    EKG Results: personally reviewed.   I have independently reviewed and interpreted the  EKG.    Advanced Care Planning  Discharge planning discussed with patient        [facility] Medicine Service    Bret Luque MD  Office: [phone]

## 2022-01-22 NOTE — ED TRIAGE NOTES
Pt with stage 4 cancer has had a  Fever of 100, vomiting for the last 7 days. Had 1 diarrhea today. Pt also has uncontrollable bone pain as she has nt been able to keep her pain meds down.  Pt is very anxious.   1 pair

## 2022-01-22 NOTE — ED PROVIDER NOTES
Emergency Department Encounter   NAME: Jarrell Nicole ; AGE: 67 year old female ; YOB: 1955 ; MRN: [id number] ; EVALUATION DATE & TIME: No admission date for patient encounter. ; PCP: Radha Oakley   ED PROVIDER: Martha Vinson PA-C    Chief Complaint   Patient presents with     Vomiting     Fever       Medical Decision Making & Final Diagnosis     1. Hypokalemia    2. Nausea and vomiting, intractability of vomiting not specified, unspecified vomiting type         ED Course as of 01/22/22 1548   Sat Jan 22, 2022   0940 Jarrell is a 67 year old female with a relevant PMH of stage IV breast CA with mets, asthma, HTN, diverticulitis, RASHAWN, opioid dependence on suboxone, diverticulitis, and CAD who presents to the ED for evaluation of nausea and vomiting x 6 days.     My exam is notable for nl vital signs in a nontoxic appearing woman. Mod diffuse abd TTP with guarding. Clear lung sounds. No JVD or peripheral edema. Anxious appearing.    0942 I considered a broad differential for this patient's symptoms including medication effect, withdrawal, diverticulitis, bowel obstruction, acute cholecystitis or other hepatobiliary pathology, UTI, metabolic derangement such as starvation ketoacidosis, Covid-19, bacteremia, electrolyte derangement, and others   1310 Labs notable for hypokalemia at 2.6 and hyperglycemia 140.  No other acute abnormalities.  Lactate was obtained after fluid resuscitation so this may have been elevated initially but reassuringly has normalized.  No leukocytosis or severe anemia.  EKG reveals sinus rhythm without acute ST elevation or depression.  No pathologic arrhythmia.  When compared to prior EKG in July '21 no significant change from baseline.  EKG ordered given patient report of baseline shortness of breath and consideration for electrolyte derangement.  Troponin ordered to evaluate for end organ damage or strain.  In context of shortness of breath.  She denied chest pain.   At his baseline shortness of breath has been ongoing for the last several days and troponin is negative and EKG is nonischemic and she has not developed any other cardiogenic symptoms in the ED do not believe there is indication for delta troponin.  Considered PE given the fact that she has breast cancer.  She has no pleuritic chest pain, clinically evident shortness of breath, tachycardia, hypotension, or hypoxia.  Nothing to suggest DVT on my exam.  Low suspicion of PE especially given patient's chief complaint nausea and vomiting.  UA without evidence of infection.  Urine drug screen notable for opioids and cannabinoids which is consistent with patient's medication list.  Withdrawal is still consideration in regards to this patient's presentation.  Patient has no diagnosis of diabetes on problem list.  She states she has not been able to eat or drink anything for the last 6 days.  Glucose is elevated at 140.  It is possible she has been able to tolerate some oral intake, but she may benefit from hemoglobin A1c in the future.  Suspicious nausea and vomiting may be secondary to medication effect from chemo medications.   1545 CT without acute abnormality.  Chest x-ray is clear.  I think this patient would benefit from admission for further management of hypokalemia and nausea and vomiting.  She has had severe intractable pain here.  She was given IV fentanyl by EMS, morphine and Ativan here, Dilaudid x2.  Patient has reported significant frustration that she has not been given anything for her pain several times throughout her stay.  I went through all of the IV medications she was given.  When patient is checked on she is frequently found sleeping comfortably.  I reviewed with her that there is some difficulty dosing pain medicine with someone who takes Suboxone but she cannot anticipate transitioning to oral pain medicine as she has been able to tolerate oral potassium.  She reports she has vomited while in the  emergency department but staff has not witnessed this or helped her clean it up.  She reports she has been unable to take her Suboxone because of her nausea and vomiting at home.   1547 I think pain control is a significant component of patient's presentation.  She may benefit from consult with the pain management team.  She has metastases to her bone and it does not seem that her home Suboxone is adequately controlling her pain.          ED Course   9:18 AM I met and introduced myself to the patient. I gathered initial history and performed my physical exam. We discussed plan for initial workup.   9:58 AM Pt requesting more pain medication  10:00 staffed with Dr. Pérez  10:13 AM Dr. Pérez evaluated pt at the bedside. She states her pain is still out of control. She has been given 50 mcg fentanyl by EMS, ativan 1 mg 0948, 4 mg morphine at 1003. Will plan for dilaudid. Pain will be hard to control given patients comorbid use of suboxone.  10:26 AM Lab with critical potassium of 2.6  10:35 AM called lab regarding patients lactate. Lab needs another tube of blood. Will discuss with nursing.  11:36 AM followed up with nursing regarding lactate. They have not drawn it.  11:40 AM Paged admitting  11:55 AM I spoke with the hospitalist, Dr. Luque regarding the patient, agrees with admittance  2:20 PM updated patient on results.  Initially she refused p.o. potassium.  She denied any significant nausea but refused taking something orally.  IV potassium started to burn so now she would like to try p.o. potassium.  She has been able to tolerate 2 doses with me at the bedside.  She reports she has vomited while here but nursing staff and ED tech staff has not documented this or been aware of this.  She is expressing frustration that her pain has not been treated.  I reviewed with her that she was given IV fentanyl by EMS, IV morphine and IV Dilaudid here at a high frequency.  I discussed that I anticipate that hospital staff  will try to start transitioning back to oral medication for pain management.  We discussed additional dose of IV pain medicine now.  Other times that I have attempted to update her patient has been sleeping comfortably and I did not wake her.  We reviewed the results of work-up up to this point.  I did see the patient while wearing full COVID-compliant PPE.    MEDICATIONS GIVEN IN THE EMERGENCY:   Medications   lactated ringers BOLUS 1,000 mL (0 mLs Intravenous Stopped 1/22/22 1111)     Followed by   lactated ringers infusion (125 mL/hr Intravenous New Bag 1/22/22 1112)   acetaminophen (TYLENOL) tablet 975 mg (975 mg Oral Not Given 1/22/22 1225)   potassium chloride (KAYCIEL) solution 40 mEq (40 mEq Oral Not Given 1/22/22 1221)   LORazepam (ATIVAN) injection 1 mg (1 mg Intravenous Given 1/22/22 0948)   prochlorperazine (COMPAZINE) injection 10 mg (10 mg Intravenous Given 1/22/22 0948)   morphine (PF) injection 4 mg (4 mg Intravenous Given 1/22/22 1003)   potassium chloride 10 mEq in 100 mL sterile water intermittent infusion (premix) (0 mEq Intravenous Stopped 1/22/22 1349)   potassium chloride (KAYCIEL) solution 20 mEq (20 mEq Oral Given 1/22/22 1428)   HYDROmorphone (DILAUDID) injection 0.5 mg (0.5 mg Intravenous Given 1/22/22 1218)   HYDROmorphone (DILAUDID) injection 0.5 mg (0.5 mg Intravenous Given 1/22/22 1457)      NEW PRESCRIPTIONS STARTED AT TODAY'S ER VISIT:  New Prescriptions    No medications on file     =================================================================   History   Patient information was obtained from: patient   Use of Intrepreter: N/A    Jarrell Nicole is a 67 year old female with a relevant PMH of stage IV breast CA with mets, asthma, HTN, diverticulitis, RASHAWN, opioid dependence, diverticulitis, and CAD who presents to the ED for evaluation of nausea and vomiting x 6 days.   Patient reports intractable nausea and vomiting for the last 6 days.  Endorses chills and subjective fever.   Notes diffuse abdominal pain.  Unable to identify any exacerbating features.  Affirms shortness of breath that is baseline for her secondary to asthma.  Also reports she has a fever at this time and thoracic back pain which she attributes to kidney pain.  Decreased urination but no other urinary symptoms.  1 episode of diarrhea today.  Unable to say if she had hematemesis, coffee-ground emesis, or melena.  Rates her chronic bone pain at a 10.  She brought 50 mcg fentanyl and 8 mg Zofran by EMS.  Reports being vaccinated x2 against COVID and no other sick contacts.  Denies vision changes, sore throat, cough, chest pain, urinary symptoms, new numbness/tingling/swelling in arms or legs.  Reports she has had difficulty keeping her medicine down with nausea and vomiting.  ______________________________________________________________________  Past Medical History:   Diagnosis Date     Asthma      Breast cancer (H) 2021     Essential hypertension      Panic disorder without agoraphobia 2012     Post traumatic stress disorder 2010     Vitamin D deficiency 2010        Past Surgical History:   Procedure Laterality Date     CT BIOPSY BONE  2021     US LYMPH NODE BIOPSY  2021       Family History   Adopted: Yes   Problem Relation Age of Onset     No Known Problems Daughter      No Known Problems Daughter      No Known Problems Daughter      No Known Problems Son        Social History     Tobacco Use     Smoking status: Former Smoker     Packs/day: 0.50     Years: 30.00     Pack years: 15.00     Types: Cigarettes     Quit date: 2019     Years since quittin.1     Smokeless tobacco: Never Used   Substance Use Topics     Alcohol use: Not Currently     Drug use: Yes     Types: Marijuana     Comment: occassionally       REVIEW OF SYSTEMS:    Review of Systems   Constitutional: Positive for chills, fatigue and fever (subjective).   HENT: Negative for sore throat.    Eyes: Negative for visual  "disturbance.   Respiratory: Positive for shortness of breath (baseline). Negative for cough.    Cardiovascular: Negative for chest pain, palpitations and leg swelling.   Gastrointestinal: Positive for abdominal pain, diarrhea, nausea and vomiting.   Genitourinary: Negative for dysuria, frequency, hematuria and urgency.   Musculoskeletal: Negative for myalgias.        Bone pain present   Skin: Negative for rash.   Neurological: Positive for headaches. Negative for light-headedness.   Psychiatric/Behavioral: The patient is nervous/anxious.    All other systems reviewed and are negative.        Physical Exam   /59   Pulse 67   Temp 98.2  F (36.8  C) (Oral)   Resp 21   Ht 1.626 m (5' 4\")   Wt 61.2 kg (135 lb)   SpO2 99%   BMI 23.17 kg/m      Physical Exam  Constitutional:       General: She is not in acute distress.     Appearance: Normal appearance.      Comments: Nontoxic. Anxious appearing. Nl vital signs   HENT:      Head: Normocephalic.   Eyes:      Conjunctiva/sclera: Conjunctivae normal.   Cardiovascular:      Rate and Rhythm: Normal rate and regular rhythm.      Heart sounds: Normal heart sounds.   Pulmonary:      Effort: Pulmonary effort is normal. No respiratory distress.      Breath sounds: Normal breath sounds. No wheezing, rhonchi or rales.   Abdominal:      General: There is no distension.      Palpations: Abdomen is soft.      Tenderness: There is no guarding or rebound.      Comments: Mod diffuse TTP with voluntary guarding. No peritoneal findings.   Musculoskeletal:         General: No swelling or deformity. Normal range of motion.      Cervical back: Normal range of motion.      Right lower leg: No edema.      Left lower leg: No edema.      Comments: No TTP to BLEs.   Skin:     General: Skin is warm.   Neurological:      General: No focal deficit present.      Mental Status: She is alert.   Psychiatric:      Comments: anxious         Lab Work (Reviewed and Interpreted):   Labs Ordered and " Resulted from Time of ED Arrival to Time of ED Departure   BASIC METABOLIC PANEL - Abnormal       Result Value    Sodium 136      Potassium 2.6 (*)     Chloride 99      Carbon Dioxide (CO2) 25      Anion Gap 12      Urea Nitrogen 10      Creatinine 0.84      Calcium 7.8 (*)     Glucose 140 (*)     GFR Estimate 76     CBC WITH PLATELETS AND DIFFERENTIAL - Abnormal    WBC Count 4.6      RBC Count 3.65 (*)     Hemoglobin 12.2      Hematocrit 34.8 (*)     MCV 95      MCH 33.4 (*)     MCHC 35.1      RDW 14.6      Platelet Count 303      % Neutrophils 52      % Lymphocytes 38      % Monocytes 10      % Eosinophils 0      % Basophils 0      % Immature Granulocytes 0      NRBCs per 100 WBC 0      Absolute Neutrophils 2.4      Absolute Lymphocytes 1.7      Absolute Monocytes 0.5      Absolute Eosinophils 0.0      Absolute Basophils 0.0      Absolute Immature Granulocytes 0.0      Absolute NRBCs 0.0     DRUGS OF ABUSE 1 PANEL, URINE ([facility] ONLY) - Abnormal    Amphetamines Urine Screen Negative      Benzodiazepines Urine Screen Negative      Opiates Urine Screen Positive (*)     PCP Urine Screen Negative      Cannabinoids Urine Screen Positive (*)     Barbiturates Urine Screen Negative      Cocaine Urine Screen Negative      Oxycodone Urine Screen Negative      Creatinine Urine mg/dL 15     LACTIC ACID WHOLE BLOOD - Normal    Lactic Acid 1.1     TROPONIN I - Normal    Troponin I 0.01     MAGNESIUM - Normal    Magnesium 2.1     B-TYPE NATRIURETIC PEPTIDE ([facility] ONLY) - Normal    BNP 44     LIPASE - Normal    Lipase <9     HEPATIC FUNCTION PANEL - Normal    Bilirubin Total 0.5      Bilirubin Direct 0.2      Protein Total 6.7      Albumin 3.9      Alkaline Phosphatase 74      AST 16      ALT 12     ROUTINE UA WITH MICROSCOPIC REFLEX TO CULTURE - Normal    Color Urine Colorless      Appearance Urine Clear      Glucose Urine Negative      Bilirubin Urine Negative      Ketones Urine Negative      Specific Gravity Urine 1.003       Blood Urine Negative      pH Urine 7.0      Protein Albumin Urine Negative      Urobilinogen Urine <2.0      Nitrite Urine Negative      Leukocyte Esterase Urine Negative      RBC Urine 0      WBC Urine <1     COVID-19 VIRUS (CORONAVIRUS) BY PCR - Normal    SARS CoV2 PCR Negative     BLOOD CULTURE   BLOOD CULTURE       Imaging (Reviewed and Interpreted):   CT Abdomen Pelvis w/o Contrast   Final Result   IMPRESSION:    No findings to account for abdominal pain, nausea, or vomiting         XR Chest Port 1 View   Final Result   IMPRESSION: Negative chest.             EKG (Reviewed and Interpreted):   Sinus rhythm  Nonspecific ST abnormality.  When compared to prior EKG 7/2021 no significant change from baseline.  EKG results reviewed and interpreted by Dr. Hyde, ED MD.     PROCEDURES:   n/a    Martha Vinson PA-C   Emergency Medicine   [facility] EMERGENCY DEPARTMENT  [facility] [facility] [address]  [phone]  Dept: [phone]        Martha Vinson PA-C  01/22/22 6852

## 2022-01-22 NOTE — CONSULTS
"Care Management Initial Consult    General Information  Assessment completed with: Jarrell Sanabria  Type of CM/SW Visit: Initial Assessment    Primary Care Provider verified and updated as needed: Yes   Readmission within the last 30 days: no previous admission in last 30 days      Reason for Consult: discharge planning  Advance Care Planning: Advance Care Planning Reviewed: other (comment) (\"I don't have one\")          Communication Assessment  Patient's communication style: spoken language (English or Bilingual)             Cognitive  Cognitive/Neuro/Behavioral:                        Living Environment:   People in home: alone     Current living Arrangements: apartment      Able to return to prior arrangements: yes       Family/Social Support:  Care provided by: self,other (see comments) (\"my daughters help if needed\")  Provides care for: no one  Marital Status:   Children (\"3 daughters\")          Description of Support System: Supportive,Involved    Support Assessment: Adequate family and caregiver support,Adequate social supports,Patient communicates needs well met    Current Resources:   Patient receiving home care services: No (\"has had help from [facility] in the past\")     Community Resources: [facility] Worker ([facility] Care Manager Emperatriz [phone].)  Equipment currently used at home: none  Supplies currently used at home: Nebulizer tubing    Employment/Financial:  Employment Status: retired        Financial Concerns:     Referral to Financial Counselor: No       Lifestyle & Psychosocial Needs:  Social Determinants of Health     Tobacco Use: Medium Risk     Smoking Tobacco Use: Former Smoker     Smokeless Tobacco Use: Never Used   Alcohol Use: Not on file   Financial Resource Strain: Not on file   Food Insecurity: Not on file   Transportation Needs: Not on file   Physical Activity: Not on file   Stress: Not on file   Social Connections: Not on file   Intimate Partner Violence: Not on file " "  Depression: Not on file   Housing Stability: Not on file       Functional Status:  Prior to admission patient needed assistance:   Dependent ADLs:: Independent,Ambulation-no assistive device  Dependent IADLs:: Transportation (\"daughters help with transportation\")       Mental Health Status:  Mental Health Status: Current Concern  Mental Health Management: Medication    Chemical Dependency Status:  Chemical Dependency Status: Current Concern  Chemical Dependency Management: Other (see comment) (\"Suboxone\")          Values/Beliefs:  Spiritual, Cultural Beliefs, Anglican Practices, Values that affect care:                 Additional Information:  Jarrell lives in an apartment alone. She is independent with ADLs at baseline and daughter help PRN.    She has a [employer] Care Manager: Emperatriz [phone].    If she needs home care assistance she \"has had [facility] in the past\".    Daughter to transport at discharge.    Karine Jones RN      "

## 2022-01-22 NOTE — PHARMACY-ADMISSION MEDICATION HISTORY
Pharmacy Note - Admission Medication History    Pertinent Provider Information: Pt has not taken any medications for 7 days, including her Ibrance and letrozole. Pt states that she has tried Zofran and compazine to help with the nausea and those make her more nauseous. Patient states that she is at the start of her 1 week off with the Ibrance, but did not take any of her 3rd week of treatment.      ______________________________________________________________________    Prior To Admission (PTA) med list completed and updated in EMR.       PTA Med List   Medication Sig Last Dose     albuterol (PROAIR HFA;PROVENTIL HFA;VENTOLIN HFA) 90 mcg/actuation inhaler Inhale 1-2 puffs into the lungs every 4 hours as needed for shortness of breath / dyspnea or wheezing  Unknown at prn     amLODIPine (NORVASC) 5 MG tablet [AMLODIPINE (NORVASC) 5 MG TABLET] Take 5 mg by mouth daily. Past Week at Unknown time     ascorbic acid, vitamin C, (VITAMIN C) 1000 MG tablet [ASCORBIC ACID, VITAMIN C, (VITAMIN C) 1000 MG TABLET] Take 1,000 mg by mouth daily. Past Week at Unknown time     B,C/folic/zinc/copper ox/vit E (STRESS B-COMPLEX ORAL) [B,C/FOLIC/ZINC/COPPER OX/VIT E (STRESS B-COMPLEX ORAL)] Take 1 tablet by mouth daily. Stress supplement Past Week at Unknown time     buprenorphine HCl-naloxone HCl (SUBOXONE) 4-1 MG per film Place 1-2 Film under the tongue daily Prn for pain. Past Week at Unknown time     buprenorphine HCl-naloxone HCl (SUBOXONE) 8-2 MG per film Place 1 Film under the tongue 2 times daily Take first thing in am and at bedtime - with additional 4 mg dose at midday. Past Week at Unknown time     cetirizine (ZYRTEC) 10 MG tablet [CETIRIZINE (ZYRTEC) 10 MG TABLET] Take 10 mg by mouth daily. Past Week at Unknown time     cholecalciferol 125 MCG (5000 UT) CAPS Take 1 capsule (5,000 Units) by mouth daily Past Week at Unknown time     docusate sodium (COLACE) 100 MG capsule TAKE 1 CAPSULE(100 MG) BY MOUTH TWICE DAILY Past Week  at Unknown time     ferrous sulfate (FEROSUL) 325 (65 Fe) MG tablet Take 1 tablet (325 mg) by mouth daily (with breakfast) Past Week at Unknown time     fluticasone propionate (FLONASE) 50 mcg/actuation nasal spray [FLUTICASONE PROPIONATE (FLONASE) 50 MCG/ACTUATION NASAL SPRAY] 1 spray into each nostril daily.  Past Week at Unknown time     gabapentin (NEURONTIN) 100 MG capsule Take 1 capsule (100 mg) by mouth daily (with lunch) Take 1 at Midday Past Week at Unknown time     hydrOXYzine (VISTARIL) 25 MG capsule Take 1-2 capsules (25-50 mg) by mouth 3 times daily as needed for anxiety Past Week at Unknown time     letrozole (FEMARA) 2.5 MG tablet Take 2.5 mg by mouth daily Past Week at Unknown time     Lidocaine (LIDOCARE) 4 % Patch Place 2 patches onto the skin every 24 hours To prevent lidocaine toxicity, patient should be patch free for 12 hrs daily. Past Week at Unknown time     melatonin 10 mg Tab Take 10 mg by mouth nightly as needed for sleep  Past Week at Unknown time     palbociclib (IBRANCE) 125 MG capsule Take 125 mg by mouth daily with food 3 weeks on, 1 week off Past Week at Unknown time     polyethylene glycol (MIRALAX) 17 GM/Dose powder Take 17 g (1 capful) by mouth daily as needed for constipation Past Week at Unknown time     propranoloL (INDERAL) 10 MG tablet Take 10 mg by mouth 3 times daily as needed (anxiety or palpitations)  Past Month at prn     SYMBICORT 80-4.5 mcg/actuation inhaler Inhale 2 puffs into the lungs 2 times daily  Past Week at Unknown time     venlafaxine (EFFEXOR) 37.5 MG tablet [VENLAFAXINE (EFFEXOR) 37.5 MG TABLET] Take 37.5 mg by mouth every morning. Past Week at Unknown time     vitamin E 100 UNIT capsule [VITAMIN E 100 UNIT CAPSULE] Take 500 Units by mouth 2 (two) times a week. Past Week at Unknown time       Information source(s): Patient and CareEverywhere/SureScripts  Method of interview communication: in-person    Summary of Changes to PTA Med List  New: letrozole,  ibrance  Discontinued: N/A  Changed: N/A    Patient was asked about OTC/herbal products specifically.  PTA med list reflects this.    In the past week, patient estimated taking medication this percent of the time:  less than 50% due to illness.    Allergies were reviewed, assessed, and updated with the patient.      Patient did not bring any medications to the hospital and can't retrieve from home. No multi-dose medications are available for use during hospital stay. Patient unable to get medications from home and did not bring any in.     The information provided in this note is only as accurate as the sources available at the time of the update(s).    Thank you for the opportunity to participate in the care of this patient.    Rosina Pedroza  1/22/2022 2:11 PM

## 2022-01-22 NOTE — ED NOTES
Spoke with  about pt not wanting to take the po potassium. He will order recurring doses of IV potassium. Pharmacy aware

## 2022-01-22 NOTE — ED PROVIDER NOTES
"Emergency Department Staff Physician Note     I had a face to face encounter with this patient seen by the Advanced Practice Provider (BRENT).  I have seen, examined, and discussed the patient with the BRENT and agree with their assessment and plan of management.    Relevant HPI:     Jarrell Nicole is a 67 year old female with a relevant history metastatic stage IV breast cancer, asthma, hypertension, diverticulitis, RASHAWN, opioid dependence, diverticulitis, and CAD who presents to the ED for evaluation of nausea and vomiting.    Patient reports intractable nausea and vomiting for the last 6 days. Unable to say if she had hematemesis, coffee-ground emesis, or melena. 1 episode of diarrhea today as well. She endorses chills, subjective fevers, and diffuse abdominal pain. She is unable to identify any exacerbating features. She notes shortness of breath that is baseline for her secondary to asthma. She also notes thoracic back pain which she attributes to kidney pain., along with decreased urination but no other urinary symptoms. Patient also rates her chronic bone pain 10/10.     I, Sandy Amanda, am serving as a scribe to document services personally performed by Dr. Pérez, based on my observations and the provider's statements to me.   I, Ifeanyi Pérez MD, attest that Sandy Amanda was acting in a scribe capacity, has observed my performance of the services and has documented them in accordance with my direction.    ED Course:  10:04 AM I received the patient report from the BRENT, SAMIR Stallworth. I agree with their assessment and plan of management, and I will see the patient.  10:06 AM I met with the patient to introduce myself, gather additional history, perform my initial exam, and discuss the plan.     Brief Physical Exam:   BP (!) 160/68   Pulse 53   Temp 98.2  F (36.8  C) (Oral)   Resp 27   Ht 1.626 m (5' 4\")   Wt 61.2 kg (135 lb)   SpO2 99%   BMI 23.17 kg/m    General: Well-developed " middle-age female patient, laying in bed, uncomfortable appearing  Cardiovascular: Regular rate and rhythm, extremities well-perfused  Pulmonary: Breathing comfortably on room air  Abdomen: Mildly tender throughout without any focal areas of guarding or peritoneal signs     Impression / ED Plan:  I personally saw the patient and performed a substantive portion of the visit including all aspects of the medical decision making.     Jarrell Nicole is a 67 year old female presents to the ED for evaluation of abdominal pain. Patient with history of metastatic cancer, chronic pain. Presenting with nausea and vomiting which is prohibiting her from taking her usual pain medications. Her abdominal exam is fairly reassuring and CT shows no acute intra-abdominal inflammatory process. Labs are most notable for severe hypokalemia to 2.6. This was replaced parenterally initially because patient unable to tolerate p.o. She was given additional fluid bolus and analgesics here. Patient's vital signs are stable at this point. She is having a lot of continued pain despite multiple doses of antiemetics and analgesics here. She is going to be admitted for electrolyte replacement and pain control. Agree with remainder of note as documented by BRENT.    1. Hypokalemia    2. Nausea and vomiting, intractability of vomiting not specified, unspecified vomiting type        Ifeanyi Pérez MD  Staff Physician  [facility] Emergency Department      Ifeanyi Pérez MD  01/22/22 4647

## 2022-01-23 LAB
HOLD SPECIMEN: NORMAL
POTASSIUM BLD-SCNC: 3.7 MMOL/L (ref 3.5–5)

## 2022-01-23 PROCEDURE — 99233 SBSQ HOSP IP/OBS HIGH 50: CPT | Performed by: INTERNAL MEDICINE

## 2022-01-23 PROCEDURE — 96376 TX/PRO/DX INJ SAME DRUG ADON: CPT

## 2022-01-23 PROCEDURE — 99222 1ST HOSP IP/OBS MODERATE 55: CPT | Performed by: CLINICAL NURSE SPECIALIST

## 2022-01-23 PROCEDURE — 84132 ASSAY OF SERUM POTASSIUM: CPT | Performed by: INTERNAL MEDICINE

## 2022-01-23 PROCEDURE — 250N000011 HC RX IP 250 OP 636: Performed by: INTERNAL MEDICINE

## 2022-01-23 PROCEDURE — 36415 COLL VENOUS BLD VENIPUNCTURE: CPT | Performed by: INTERNAL MEDICINE

## 2022-01-23 PROCEDURE — 99207 PR CONSULT E&M CHANGED TO INITIAL LEVEL: CPT | Performed by: CLINICAL NURSE SPECIALIST

## 2022-01-23 PROCEDURE — 210N000001 HC R&B IMCU HEART CARE

## 2022-01-23 PROCEDURE — 250N000013 HC RX MED GY IP 250 OP 250 PS 637: Performed by: CLINICAL NURSE SPECIALIST

## 2022-01-23 PROCEDURE — 250N000009 HC RX 250: Performed by: CLINICAL NURSE SPECIALIST

## 2022-01-23 PROCEDURE — 250N000013 HC RX MED GY IP 250 OP 250 PS 637: Performed by: INTERNAL MEDICINE

## 2022-01-23 RX ORDER — UREA 10 %
500 LOTION (ML) TOPICAL 2 TIMES DAILY
Status: DISCONTINUED | OUTPATIENT
Start: 2022-01-23 | End: 2022-01-27 | Stop reason: HOSPADM

## 2022-01-23 RX ORDER — POTASSIUM CHLORIDE 7.45 MG/ML
10 INJECTION INTRAVENOUS ONCE
Status: DISCONTINUED | OUTPATIENT
Start: 2022-01-23 | End: 2022-01-23

## 2022-01-23 RX ORDER — HYDROMORPHONE HCL IN WATER/PF 6 MG/30 ML
0.5 PATIENT CONTROLLED ANALGESIA SYRINGE INTRAVENOUS EVERY 4 HOURS PRN
Status: DISCONTINUED | OUTPATIENT
Start: 2022-01-23 | End: 2022-01-25

## 2022-01-23 RX ORDER — POTASSIUM CHLORIDE 750 MG/1
10 TABLET, EXTENDED RELEASE ORAL ONCE
Status: COMPLETED | OUTPATIENT
Start: 2022-01-23 | End: 2022-01-23

## 2022-01-23 RX ORDER — LIDOCAINE 50 MG/G
OINTMENT TOPICAL 4 TIMES DAILY
Status: DISCONTINUED | OUTPATIENT
Start: 2022-01-23 | End: 2022-01-27 | Stop reason: HOSPADM

## 2022-01-23 RX ORDER — UREA 10 %
500 LOTION (ML) TOPICAL DAILY
Status: DISCONTINUED | OUTPATIENT
Start: 2022-01-23 | End: 2022-01-23

## 2022-01-23 RX ADMIN — POTASSIUM CHLORIDE AND SODIUM CHLORIDE: 900; 150 INJECTION, SOLUTION INTRAVENOUS at 11:41

## 2022-01-23 RX ADMIN — POTASSIUM CHLORIDE 10 MEQ: 750 TABLET, EXTENDED RELEASE ORAL at 11:42

## 2022-01-23 RX ADMIN — GABAPENTIN 300 MG: 300 CAPSULE ORAL at 08:39

## 2022-01-23 RX ADMIN — PROCHLORPERAZINE EDISYLATE 5 MG: 5 INJECTION INTRAMUSCULAR; INTRAVENOUS at 17:14

## 2022-01-23 RX ADMIN — BUPRENORPHINE HYDROCHLORIDE AND NALOXONE HYDROCHLORIDE DIHYDRATE 4 TABLET: 2; .5 TABLET SUBLINGUAL at 20:12

## 2022-01-23 RX ADMIN — HYDROMORPHONE HYDROCHLORIDE 0.5 MG: 0.2 INJECTION, SOLUTION INTRAMUSCULAR; INTRAVENOUS; SUBCUTANEOUS at 08:39

## 2022-01-23 RX ADMIN — POTASSIUM CHLORIDE AND SODIUM CHLORIDE: 900; 150 INJECTION, SOLUTION INTRAVENOUS at 19:57

## 2022-01-23 RX ADMIN — HYDROMORPHONE HYDROCHLORIDE 0.5 MG: 0.2 INJECTION, SOLUTION INTRAMUSCULAR; INTRAVENOUS; SUBCUTANEOUS at 21:33

## 2022-01-23 RX ADMIN — HYDROMORPHONE HYDROCHLORIDE 0.5 MG: 0.2 INJECTION, SOLUTION INTRAMUSCULAR; INTRAVENOUS; SUBCUTANEOUS at 12:44

## 2022-01-23 RX ADMIN — HYDROMORPHONE HYDROCHLORIDE 0.5 MG: 0.2 INJECTION, SOLUTION INTRAMUSCULAR; INTRAVENOUS; SUBCUTANEOUS at 17:11

## 2022-01-23 RX ADMIN — PROCHLORPERAZINE EDISYLATE 5 MG: 5 INJECTION INTRAMUSCULAR; INTRAVENOUS at 09:00

## 2022-01-23 RX ADMIN — FLUTICASONE FUROATE AND VILANTEROL TRIFENATATE 1 PUFF: 100; 25 POWDER RESPIRATORY (INHALATION) at 10:23

## 2022-01-23 RX ADMIN — GABAPENTIN 300 MG: 300 CAPSULE ORAL at 20:12

## 2022-01-23 RX ADMIN — HYDROMORPHONE HYDROCHLORIDE 0.5 MG: 0.2 INJECTION, SOLUTION INTRAMUSCULAR; INTRAVENOUS; SUBCUTANEOUS at 02:32

## 2022-01-23 RX ADMIN — LIDOCAINE 2 PATCH: 246 PATCH TOPICAL at 08:40

## 2022-01-23 RX ADMIN — Medication 500 MG: at 21:34

## 2022-01-23 RX ADMIN — LIDOCAINE: 50 OINTMENT TOPICAL at 21:39

## 2022-01-23 RX ADMIN — GABAPENTIN 100 MG: 100 CAPSULE ORAL at 12:44

## 2022-01-23 RX ADMIN — POTASSIUM CHLORIDE AND SODIUM CHLORIDE: 900; 150 INJECTION, SOLUTION INTRAVENOUS at 02:40

## 2022-01-23 RX ADMIN — BUPRENORPHINE HYDROCHLORIDE AND NALOXONE HYDROCHLORIDE DIHYDRATE 4 TABLET: 2; .5 TABLET SUBLINGUAL at 08:39

## 2022-01-23 RX ADMIN — ENOXAPARIN SODIUM 40 MG: 40 INJECTION SUBCUTANEOUS at 17:16

## 2022-01-23 RX ADMIN — VENLAFAXINE 37.5 MG: 37.5 TABLET ORAL at 09:01

## 2022-01-23 ASSESSMENT — ACTIVITIES OF DAILY LIVING (ADL)
FALL_HISTORY_WITHIN_LAST_SIX_MONTHS: YES
DOING_ERRANDS_INDEPENDENTLY_DIFFICULTY: NO
ADLS_ACUITY_SCORE: 12
ADLS_ACUITY_SCORE: 4
ADLS_ACUITY_SCORE: 4
PATIENT_/_FAMILY_COMMUNICATION_STYLE: SPOKEN LANGUAGE (ENGLISH OR BILINGUAL)
ADLS_ACUITY_SCORE: 4
HEARING_DIFFICULTY_OR_DEAF: NO
ADLS_ACUITY_SCORE: 12
NUMBER_OF_TIMES_PATIENT_HAS_FALLEN_WITHIN_LAST_SIX_MONTHS: 2
DRESSING/BATHING_DIFFICULTY: NO
ADLS_ACUITY_SCORE: 4
DIFFICULTY_COMMUNICATING: NO
ADLS_ACUITY_SCORE: 12
CONCENTRATING,_REMEMBERING_OR_MAKING_DECISIONS_DIFFICULTY: NO
WALKING_OR_CLIMBING_STAIRS_DIFFICULTY: NO
ADLS_ACUITY_SCORE: 4
WEAR_GLASSES_OR_BLIND: YES
ADLS_ACUITY_SCORE: 12
ADLS_ACUITY_SCORE: 4
ADLS_ACUITY_SCORE: 4
DIFFICULTY_EATING/SWALLOWING: NO
TOILETING_ISSUES: NO
ADLS_ACUITY_SCORE: 4
VISION_MANAGEMENT: GLASSES
ADLS_ACUITY_SCORE: 12
ADLS_ACUITY_SCORE: 4
ADLS_ACUITY_SCORE: 12
ADLS_ACUITY_SCORE: 4

## 2022-01-23 ASSESSMENT — MIFFLIN-ST. JEOR: SCORE: 1160.49

## 2022-01-23 NOTE — PROGRESS NOTES
PRN IV hydromorphone ordered for acute pain control for 1 day.  Pain service consulted for acute on chronic pain control.

## 2022-01-23 NOTE — PLAN OF CARE
0900-Pt with a lot of generalized pain this am.  Rating it 8/10.  Dilaudid given with relief to 6.  Lidocain patches applied to collar bone as pt state that they help there.  Pt also c/o nausea.  Compazine given with relief of nausea.

## 2022-01-23 NOTE — ED NOTES
Pt on liquid diet. Gave her 118 mls each of apple juice and cranberry juice and 118 mls x 2 of orange jello

## 2022-01-23 NOTE — PROGRESS NOTES
Pt admitted to 316 from ED.  Pt alert and oriented x4, able to ambulate to the bathroom and to bed without difficulties noted.  IV infiltrated to R arm, swollen. Ice pack given and new SL placed. IVF continued.  Potassium this AM ordered and drawn, pending results.  Pt oriented to room and call light within reach.   POC discussed.

## 2022-01-23 NOTE — PROGRESS NOTES
Hospitalist Progress Note  ADMIT DATE: 1/22/2022     FACILITY: [facility]    PCP: Radha Oakley, [phone]    Assessment/Plan    Jarrell Nicole is a 67 year old female with a relevant history metastatic stage IV breast cancer, asthma, hypertension, diverticulitis, RASHAWN, opioid dependence, diverticulitis, and CAD who presents to the ED for evaluation of nausea and vomiting, for 6 days.     Intractable nausea and vomiting  -diarrhea  -for 6 days  -ddx include but not limited to chemotherapy induced, meds side effects, cyclic vomiting syndrome due to Marijuana, gastroenteritis  -unremarkable abd ct  -iv fluid and supportive care  -improving     Hypokalemia due to above  -replace as per protocol  -improved     Breast ca with bone mets  -Right-sided breast cancer stage IV with bone metastasis and chronic pain  Stage IV (cTX, cM1, ER+, AK+, HER2-).  Diagnosed on 3/3/2021; incidental finding of the right breast mass during ED evaluation for chest pain.  Following Dr. Blackman.  -Pain team consulted to optimize pain regimen  -pain control  -might consider oncology consultation      Abnormal urine drug screen  Cannabis use  -pt denies illicit drug abuse, unreliable  -as per record, there is concern of opiate overuse     DVT Prophylaxis: Lovenox  Diet: None     Central Lines: None     Richard Catheter: Not present     Barriers to Discharge:  Still some n/v/d, pain    Anticipated discharge date/Disposition: 1-2 days    Subjective  Feels better, but still n/v/d; pain due to cancer    Objective    Vital signs in last 24 hours  Temp:  [97.7  F (36.5  C)-98.2  F (36.8  C)] 97.7  F (36.5  C)  Pulse:  [53-84] 70  Resp:  [12-49] 20  BP: (103-162)/(54-74) 149/68  SpO2:  [96 %-100 %] 98 % [unfilled] O2 Device: None (Room air)    Weight:   [unfilled] Weight change:     Intake/Output last 3 shifts  I/O last 3 completed shifts:  In: 350 [I.V.:300; IV Piggyback:50]  Out: 2400 [Urine:2400]  Body mass index is  24.05 kg/m .    Physical Exam    Physical Exam  General appearance: not in acute distress  HEENT: PERRL, EOMI  Lungs: Clear breath sounds in bilateral lung fields  Cardiovascular: Regular rate and rhythm, normal S1-S2  Abdomen: Soft, mild tender, no distension, normal bowel sound  Musculoskeletal: No joint swelling  Skin: No rash and no edema  Neurology: AAO ×3.  Cranial nerves II - XII normal.  Normal muscle strength in all four extremities.      Pertinent Labs   Lab Results: personally reviewed.     k 3.7  Medications  Scheduled Meds:    acetaminophen  975 mg Oral Once     buprenorphine-naloxone  4 tablet Sublingual BID     enoxaparin ANTICOAGULANT  40 mg Subcutaneous Q24H     fluticasone-vilanterol  1 puff Inhalation Daily     gabapentin  100 mg Oral Daily with lunch     gabapentin  300 mg Oral BID     potassium chloride  10 mEq Intravenous Once     venlafaxine  37.5 mg Oral QAM     Continuous Infusions:    0.9% sodium chloride + KCl 20 mEq/L 125 mL/hr at 01/23/22 0240     lactated ringers Stopped (01/22/22 1822)     PRN Meds:.albuterol, buprenorphine-naloxone, HYDROmorphone, hydrOXYzine, Lidocaine, prochlorperazine, propranolol      Advanced Care Planning:  Discharge planning discussed with patient         [facility] Medicine Service  Bret Luque

## 2022-01-23 NOTE — PROGRESS NOTES
Care Management Follow Up    Length of Stay (days): 1    Expected Discharge Date: 01/24/2022       Concerns to be Addressed:   Monitoring labs (presented with hypokalemia). Diet tolerance (nausea, vomiting and diarrhea noted). Pain control requiring IV analgesia (history of metastatic breast cancer).   Patient plan of care discussed at interdisciplinary rounds: Yes    Anticipated Discharge Disposition:  Home.     Anticipated Discharge Services:  None.  Anticipated Discharge DME:  None    Patient/family educated on Medicare website which has current facility and service quality ratings:  NA  Education Provided on the Discharge Plan:  Per team  Patient/Family in Agreement with the Plan:  Yes    Referrals Placed by CM/SW:  None  Private pay costs discussed: Not applicable     Additional Information:  Patient lives in an apartment alone. She is independent with activities of daily living at baseline and her daughter helps PRN. She has a [employer] Care Manager: Emperatriz at [phone]. If she needs home care assistance, she states that she has used [facility] in the past. Her daughter will provide transport at discharge.    Gloria Sterling RN

## 2022-01-23 NOTE — PLAN OF CARE
sc  Patient Name: Jarrell Nicole   MRN: [id number]   Date of Admission: 1/22/2022    Procedure:     Post Op day #:6    Subjective (Patient focus/Primary Problem for shift): pain control          Pain Goal 5 Pain Rating 7           Pain Medication/ Regime effective to reduce patient pain yes- pain team following, too.    Objective (Physical assessment):           Rhythm: normal sinus rhythm            Bowel Activity: yes if Yes indicate when: 1/23          Bowel Medications: yes            Incision: healing well-bruised          Incentive Spirometry Q 1-2 hour when awake:  yes Volume: 1000                  Patient Activity:           Up to chair for meals: yes          Ambulation with RN x2 (Not including CR): yes            Is patient in home clothes:no                                  Assessment (Nursing primary shift focus): Pt with low bp's this afternoon. Pt is feeling a little lightheaded.  RN page CV surgery. Awaiting call back.  Oxycodone and lasix held per parameters.  Pt showered this afternoon.    Plan (Patient Care Plan/focus): Encourage activity.  Encourage IS.        Deena Omalley RN   1/23/2022   2:38 PM

## 2022-01-23 NOTE — ED NOTES
"[facility] ED Handoff Report    ED Chief Complaint: vomiting    ED Diagnosis:  (E87.6) Hypokalemia  Comment:   Plan:     (R11.2) Nausea and vomiting, intractability of vomiting not specified, unspecified vomiting type  Comment:   Plan:        PMH:    Past Medical History:   Diagnosis Date     Asthma      Breast cancer (H) 02/24/2021    mets to bone, Stage 4     Essential hypertension      Panic disorder without agoraphobia 01/12/2012     Post traumatic stress disorder 12/30/2010     Vitamin D deficiency 03/06/2010        Code Status:  Prior     Falls Risk: No Band: Not applicable    Current Living Situation/Residence: lives alone, independent has family support when needed.     Elimination Status: Continent: Yes     Activity Level: SBA    Patients Preferred Language:  English     Needed: No    Vital Signs:  BP (!) 147/64   Pulse 66   Temp 97.9  F (36.6  C) (Oral)   Resp 21   Ht 1.626 m (5' 4\")   Wt 61.2 kg (135 lb)   SpO2 98%   BMI 23.17 kg/m       Cardiac Rhythm: NSR    Pain Score: 8/10, chronic pain from bone mets, patient is on suboxone but just starting it so is also receiving dilaudid Q6H. Patient will have pain consult in am.    Is the Patient Confused:  No    Last Food or Drink: 01/23/22 at 0300, patient enjoys hot chocolate.     Focused Assessment:  Compazine relieved nausea. Cardiac WDL,      Tests Performed: Done: Labs and Imaging    Treatments Provided:  Dilaudid and compazine. IVF running.     Family Dynamics/Concerns: No    Family Updated On Visitor Policy: No    Plan of Care Communicated to Family: No    Who Was Updated about Plan of Care: Patient has updated family    Belongings Checklist Done and Signed by Patient: Yes    Medications sent with patient: yes    Covid: asymptomatic , negative    Additional Information: patient gets very upset and tearful especially when in pain.     RN: Lydia Kaplan 1/23/2022 4:35 AM     "

## 2022-01-23 NOTE — ED NOTES
Pain upset and crying when writer went in to tell her we are contacting the doctor for pain meds. She said pharmacy mentioned that gabapentin is for pain and pt said that it is for her hot flashes. Pt crying and requests to take off the pulse oximeter. Pharmacy confirms pt upset

## 2022-01-23 NOTE — CONSULTS
[facility] ACUTE PAIN SERVICE    ([facility], [facility])   Consult Note    Date of Admission:  1/22/2022  Date of Consult: 01/23/22    Physician requesting consult: Michael Martin MD   Reason for consult: chronic pain     Assessment/Plan:     Jarrell Nicole is a 67 year old female who was admitted on 1/22/2022.  Pain Service is asked to see the patient for chronic pain. Admitted for evaluation of abdominal pain with nausea and vomitng. History of Stage IV breast cancer diagnosed 3/3/21, (mets to bone in rib cage and hip, (incidental finding of the right breast mass during ED evaluation for chest pain), asthma, hypertension, diverticulitis, acute kidney injury, coronary artery disease, opioid dependence.   Patient with intractable nausea/vomiting for the past 6 days, one episode of diarrhea.  Increased pain 10 out of 10.    Admitted for pain control and electrolyte replacement.    Differential diagnosis include but not limited to chemotherapy induced, meds side effects, cyclic vomiting syndrome due to Marijuana, gastroenteritis    Patient had been seen by Acute Pain Management  during July hospitalization.  Patient had an unintentional overdose of opiates and benzo in setting of acute diverticulitis and RASHAWN.  Patient was referred to Addiction Medicine July 2021 and started on Buprenorphine/Naloxone     Tapered off of benzodiazepine-Klonopin and used phenobarb to taper during July hospitalization.    Last appointment with Dr. Joy addiction medicine 1/5/22:   Plan:     Rx for Suboxone 8 mg bid and 4a- 8  mg mid-day.  No benzodiazepines.  Increase hydroxyzine to 25 - 50 mg tid prn.     Agree with gabapentin 300 mg bid and 100 mg q midday.    OK with her starting Medical Cannabis.  I will contact the provider at the Oncology Clinic.    Follow up with Tam[facility] Wong Addiction Medicine in 4 weeks.        Attempted to meet with patient this afternoon with the RN in room.  Patient did not  "want pain service involved with her care.  She identifies that her pain is under good control.  She feels comfortable with her pain management with Oncology and will continue to follow up in Addiction Medicine.    Patient was tearful but appropriate in her requests.      Reviewed current plan and discussed with Dr. Luque.  Pain Service will sign off with no additional recommendations at this time.    Please re consult if we can be any additional assistance.      PLAN:   1) Pain is consistent with cancer associated pain stage IV breast cancer with disease involving ribs and hips, pain uncontrolled on buprenorphine/naloxone for pain management and opioid use disorder.     Patient identifies outpatient prescriptions for hydromorphone.  This did not show up in   2)Multimodal Medication Therapy  Topical: lidocaine cream qid   NSAID'S: none consider steroids  Muscle Relaxants: consider  Adjuvants: gabapentin 300 mg bid and 100 mg every afternoon, atarax 10 mg tid prn    Antidepressants/anxiolytics:avoid  Opioids: buprenorphine/naloxone 2-0.5mg sublingual tablets 4 tablets two times daily, with additional 2-0.5mg 2-4 tablets daily prn   Consider adding hydromorphone 2-4 mg every four hours prn as transition off of IV  IV Pain medication: hydromorphone 0.5 mg every four hours prn   3)Non-medication interventions  Pharmacy consult- appreciate recommendations   Acupuncture consult- as available Mon and Thursday  Integrative consult - called referral to [phone]   4)Constipation Prophylaxis  Daily stool softener/laxative   5) Follow up   Palliative Care Consult    She is interested in doing an Advanced Directive and states she will be seeing someone from Palliative Care as an Outpatient.  She did think it would helpful to see someone with Palliative Care Team here.  She is hoping to find someone to help with Couseling to \"help her to die and help her family with coming to terms with her advanced cancer and her prognosis.  "            History of Present Illness (HPI):       Jarrell Nicole is a 67 year old female with stage IV breast cancer currently undergoing chemotherapy.   Patient has pain due to bony metastasis.  Pain currently being managed with IV hydromorphone.  She continues on her buprenorphine/naloxone.     Patient plans to go on Medical Marijuana and is process of getting registered.  States she is hoping it will help with her pain and appetite.  Last UA:for opioids and cannabinoids.         [place]  pulled from system on 01/23/22. Last refill on 1/12/22. This indicated prescriptions for Buprenorphine-Naloxone 4-1 mg films 60 for 30 days   Gabapentin 60 300 mg capsules for 30 days.   No additional opioids are showing up in the , not sure why as it does appear that she was getting prescriptions last spring and she also states she is currently getting hydromorphone from Oncology.    May need to verify with Oncology Outpatient Provider       Medical History  Patient Active Problem List    Diagnosis Date Noted     Nausea and vomiting, intractability of vomiting not specified, unspecified vomiting type 01/22/2022     Priority: Medium     Anemia 07/14/2021     Priority: Medium     hgb 9.3 gm/dl       Chronic pain due to neoplasm 07/13/2021     Priority: Medium     Opioid dependence with intoxication delirium (H) 07/13/2021     Priority: Medium     Hypokalemia 07/13/2021     Priority: Medium     K 2.9 on admission       Non-traumatic rhabdomyolysis 07/13/2021     Priority: Medium     Essential hypertension      Priority: Medium     Dehydration 07/12/2021     Priority: Medium     Encephalopathy 07/12/2021     Priority: Medium     Acute kidney injury (H) 07/12/2021     Priority: Medium     Asthma      Priority: Medium     Elevated troponin level not due to acute coronary syndrome      Priority: Medium     Malignant neoplasm of lower-outer quadrant of right breast of female, estrogen receptor positive (H) 03/03/2021     Priority:  Medium     Combined forms of age-related cataract of right eye 04/25/2018     Priority: Medium     Diverticulitis of large intestine without perforation or abscess without bleeding 10/27/2016     Priority: Medium     Hyperlipidemia 12/22/2014     Priority: Medium     Pain medication agreement 03/18/2014     Priority: Medium     Formatting of this note might be different from the original.  Discussed on 3/18/2014. Percocet 5-325 mg tablets - 60 tablets to last for 30 days. For severe right ankle pain.  Follows with Dr. Ermias regan. Irais Steele MD       Episodic mood disorder (H) 01/12/2012     Priority: Medium     Panic disorder without agoraphobia 01/12/2012     Priority: Medium     Post traumatic stress disorder 12/30/2010     Priority: Medium        Surgical History  She  has a past surgical history that includes Us Lymph Node Biopsy (2/24/2021) and Ct Biopsy Bone (4/1/2021).     Past Surgical History:   Procedure Laterality Date     CT BIOPSY BONE  4/1/2021     US LYMPH NODE BIOPSY  2/24/2021       Allergies  Allergies   Allergen Reactions     Codeine Hives     Iodinated Contrast Media [Diagnostic X-Ray Materials] Unknown     Heart stopped and sick for multiple days after     Penicillins Swelling     Throat swelling as an infant     Tylenol [Acetaminophen] Other (See Comments)     With codeine only .Pt states gives her upset stomach for days.       Prior to Admission Medications   Medications Prior to Admission   Medication Sig Dispense Refill Last Dose     albuterol (PROAIR HFA;PROVENTIL HFA;VENTOLIN HFA) 90 mcg/actuation inhaler Inhale 1-2 puffs into the lungs every 4 hours as needed for shortness of breath / dyspnea or wheezing    Unknown at prn     amLODIPine (NORVASC) 5 MG tablet [AMLODIPINE (NORVASC) 5 MG TABLET] Take 5 mg by mouth daily.   Past Week at Unknown time     ascorbic acid, vitamin C, (VITAMIN C) 1000 MG tablet [ASCORBIC ACID, VITAMIN C, (VITAMIN C) 1000 MG TABLET] Take 1,000 mg by mouth  daily.   Past Week at Unknown time     B,C/folic/zinc/copper ox/vit E (STRESS B-COMPLEX ORAL) [B,C/FOLIC/ZINC/COPPER OX/VIT E (STRESS B-COMPLEX ORAL)] Take 1 tablet by mouth daily. Stress supplement   Past Week at Unknown time     buprenorphine HCl-naloxone HCl (SUBOXONE) 4-1 MG per film Place 1-2 Film under the tongue daily Prn for pain. 60 Film 0 Past Week at Unknown time     buprenorphine HCl-naloxone HCl (SUBOXONE) 8-2 MG per film Place 1 Film under the tongue 2 times daily Take first thing in am and at bedtime - with additional 4 mg dose at midday. 60 Film 0 Past Week at Unknown time     cetirizine (ZYRTEC) 10 MG tablet [CETIRIZINE (ZYRTEC) 10 MG TABLET] Take 10 mg by mouth daily.   Past Week at Unknown time     cholecalciferol 125 MCG (5000 UT) CAPS Take 1 capsule (5,000 Units) by mouth daily 90 capsule 0 Past Week at Unknown time     docusate sodium (COLACE) 100 MG capsule TAKE 1 CAPSULE(100 MG) BY MOUTH TWICE DAILY 180 capsule 0 Past Week at Unknown time     ferrous sulfate (FEROSUL) 325 (65 Fe) MG tablet Take 1 tablet (325 mg) by mouth daily (with breakfast) 30 tablet 1 Past Week at Unknown time     fluticasone propionate (FLONASE) 50 mcg/actuation nasal spray [FLUTICASONE PROPIONATE (FLONASE) 50 MCG/ACTUATION NASAL SPRAY] 1 spray into each nostril daily.    Past Week at Unknown time     gabapentin (NEURONTIN) 100 MG capsule Take 1 capsule (100 mg) by mouth daily (with lunch) Take 1 at Midday 90 capsule 0 Past Week at Unknown time     gabapentin (NEURONTIN) 300 MG capsule Take 300 mg by mouth 2 times daily    Past Week at Unknown time     hydrOXYzine (VISTARIL) 25 MG capsule Take 1-2 capsules (25-50 mg) by mouth 3 times daily as needed for anxiety 150 capsule 1 Past Week at Unknown time     letrozole (FEMARA) 2.5 MG tablet Take 2.5 mg by mouth daily   Past Week at Unknown time     Lidocaine (LIDOCARE) 4 % Patch Place 2 patches onto the skin every 24 hours To prevent lidocaine toxicity, patient should be  patch free for 12 hrs daily. 30 patch 0 Past Week at Unknown time     melatonin 10 mg Tab Take 10 mg by mouth nightly as needed for sleep    Past Week at Unknown time     palbociclib (IBRANCE) 125 MG capsule Take 125 mg by mouth daily with food 3 weeks on, 1 week off   Past Week at Unknown time     polyethylene glycol (MIRALAX) 17 GM/Dose powder Take 17 g (1 capful) by mouth daily as needed for constipation 850 g 0 Past Week at Unknown time     propranoloL (INDERAL) 10 MG tablet Take 10 mg by mouth 3 times daily as needed (anxiety or palpitations)    Past Month at prn     SYMBICORT 80-4.5 mcg/actuation inhaler Inhale 2 puffs into the lungs 2 times daily    Past Week at Unknown time     venlafaxine (EFFEXOR) 37.5 MG tablet [VENLAFAXINE (EFFEXOR) 37.5 MG TABLET] Take 37.5 mg by mouth every morning.   Past Week at Unknown time     vitamin E 100 UNIT capsule [VITAMIN E 100 UNIT CAPSULE] Take 500 Units by mouth 2 (two) times a week.   Past Week at Unknown time     buprenorphine HCl-naloxone HCl (SUBOXONE) 4-1 MG per film Place 1 Film under the tongue daily Take 1 film in mid-day; along with 8 mg bid in am and hs. (Patient not taking: Reported on 1/5/2022) 30 Film 0      buprenorphine HCl-naloxone HCl (SUBOXONE) 4-1 MG per film Place 1 Film under the tongue 4 times daily (Patient not taking: Reported on 1/5/2022) 60 Film 1      hydrOXYzine (ATARAX) 10 MG tablet Take 1 tablet (10 mg) by mouth 3 times daily as needed for anxiety (Patient not taking: Reported on 1/5/2022) 90 tablet 0      ondansetron (ZOFRAN-ODT) 4 MG ODT tab Take 1 tablet (4 mg) by mouth every 6 hours as needed for nausea or vomiting (Patient not taking: Reported on 1/22/2022) 12 tablet 0 Not Taking at Unknown time       Social History  Reviewed, and she  reports that she quit smoking about 2 years ago. Her smoking use included cigarettes. She has a 15.00 pack-year smoking history. She has never used smokeless tobacco. She reports previous alcohol use. She  "reports current drug use. Drug: Marijuana.  Social History     Tobacco Use     Smoking status: Former Smoker     Packs/day: 0.50     Years: 30.00     Pack years: 15.00     Types: Cigarettes     Quit date: 2019     Years since quittin.1     Smokeless tobacco: Never Used   Substance Use Topics     Alcohol use: Not Currently       Family History  Reviewed, and family history includes No Known Problems in her daughter, daughter, daughter, and son. She was adopted.    Review of Systems  Complete ROS reviewed, unless noted, all other systems reviewed and found to be negative.        Objective:     Physical Exam:  /56 (BP Location: Left arm, Patient Position: Sitting)   Pulse 70   Temp 98.3  F (36.8  C) (Oral)   Resp 20   Ht 1.626 m (5' 4\")   Wt 63.5 kg (140 lb 1.6 oz)   SpO2 98%   BMI 24.05 kg/m    Weight:   Weight change:   Body mass index is 24.05 kg/m .      General Appearance:  Alert, tearful, sitting up in bed   Head:  Normocephalic, without obvious abnormality, atraumatic   Eyes:  PERRL, conjunctiva/corneas clear, EOM's intact   ENT/Throat: Lips, mucosa, and tongue normal; teeth and gums normal   Lymph/Neck: Supple, symmetrical, trachea midline   Lungs:   respirations unlabored   Musculoskeletal: Spine and extremities well developed, multiple sites of pain in joints, spine   Skin: Skin color, texture, turgor normal, no rashes or lesions   Neurologic: Coordinated movement  Alert and oriented X 3       Psych: Affect is appropriate to circumstance            Imaging Reviewed   XR Chest Port 1 View    Result Date: 2022  EXAM: XR CHEST PORTABLE 1 VIEW LOCATION: [facility] DATE/TIME: 2022, 9:47 AM INDICATION: Shortness of breath. COMPARISON: 2021.     IMPRESSION: Negative chest.     CT Abdomen Pelvis w/o Contrast    Result Date: 2022  EXAM: CT ABDOMEN PELVIS W/O CONTRAST LOCATION: [facility] DATE/TIME: 2022 12:57 PM " INDICATION: Abdominal pain, acute, nonlocalized Nausea/vomiting COMPARISON: 11/3/2021 TECHNIQUE: CT scan of the abdomen and pelvis was performed without IV contrast. Multiplanar reformats were obtained. Dose reduction techniques were used. CONTRAST: None.     IMPRESSION: No findings to account for abdominal pain, nausea, or vomiting       Labs Reviewed Personally By Myself         Total time spent 65 minutes with greater than 50% in consultation, education and coordination of care.     Also discussed with RN    Thank you for this consultation.      Paty Sethi APRN, CNS-BC, DNP  Acute Care Pain Management Program  [facility] ([place]AN[place], [place], Radha)   With questions call [phone]  Preference if for [facility] - [facility]  Click HERE to page Rachna

## 2022-01-24 ENCOUNTER — DOCUMENTATION ONLY (OUTPATIENT)
Dept: OTHER | Facility: CLINIC | Age: 67
End: 2022-01-24
Payer: COMMERCIAL

## 2022-01-24 LAB
HOLD SPECIMEN: NORMAL
POTASSIUM BLD-SCNC: 4.5 MMOL/L (ref 3.5–5)

## 2022-01-24 PROCEDURE — 84132 ASSAY OF SERUM POTASSIUM: CPT | Performed by: INTERNAL MEDICINE

## 2022-01-24 PROCEDURE — 250N000013 HC RX MED GY IP 250 OP 250 PS 637: Performed by: CLINICAL NURSE SPECIALIST

## 2022-01-24 PROCEDURE — 250N000011 HC RX IP 250 OP 636: Performed by: INTERNAL MEDICINE

## 2022-01-24 PROCEDURE — 99233 SBSQ HOSP IP/OBS HIGH 50: CPT | Performed by: INTERNAL MEDICINE

## 2022-01-24 PROCEDURE — 99223 1ST HOSP IP/OBS HIGH 75: CPT | Performed by: CLINICAL NURSE SPECIALIST

## 2022-01-24 PROCEDURE — 250N000013 HC RX MED GY IP 250 OP 250 PS 637: Performed by: INTERNAL MEDICINE

## 2022-01-24 PROCEDURE — 36415 COLL VENOUS BLD VENIPUNCTURE: CPT | Performed by: INTERNAL MEDICINE

## 2022-01-24 PROCEDURE — 250N000009 HC RX 250: Performed by: CLINICAL NURSE SPECIALIST

## 2022-01-24 PROCEDURE — 82565 ASSAY OF CREATININE: CPT | Performed by: INTERNAL MEDICINE

## 2022-01-24 PROCEDURE — 210N000001 HC R&B IMCU HEART CARE

## 2022-01-24 PROCEDURE — 99207 PR CONSULT E&M CHANGED TO INITIAL LEVEL: CPT | Performed by: CLINICAL NURSE SPECIALIST

## 2022-01-24 PROCEDURE — 250N000011 HC RX IP 250 OP 636: Performed by: CLINICAL NURSE SPECIALIST

## 2022-01-24 RX ORDER — PROCHLORPERAZINE MALEATE 5 MG
5 TABLET ORAL EVERY 6 HOURS PRN
Status: DISCONTINUED | OUTPATIENT
Start: 2022-01-24 | End: 2022-01-27 | Stop reason: HOSPADM

## 2022-01-24 RX ORDER — HYDROMORPHONE HYDROCHLORIDE 4 MG/1
4 TABLET ORAL EVERY 4 HOURS PRN
Status: DISCONTINUED | OUTPATIENT
Start: 2022-01-24 | End: 2022-01-27 | Stop reason: HOSPADM

## 2022-01-24 RX ORDER — ONDANSETRON 4 MG/1
4 TABLET, ORALLY DISINTEGRATING ORAL
Status: DISCONTINUED | OUTPATIENT
Start: 2022-01-24 | End: 2022-01-27 | Stop reason: HOSPADM

## 2022-01-24 RX ORDER — SCOLOPAMINE TRANSDERMAL SYSTEM 1 MG/1
1 PATCH, EXTENDED RELEASE TRANSDERMAL
Status: DISCONTINUED | OUTPATIENT
Start: 2022-01-24 | End: 2022-01-27 | Stop reason: HOSPADM

## 2022-01-24 RX ADMIN — ONDANSETRON 4 MG: 4 TABLET, ORALLY DISINTEGRATING ORAL at 11:55

## 2022-01-24 RX ADMIN — BUPRENORPHINE HYDROCHLORIDE AND NALOXONE HYDROCHLORIDE DIHYDRATE 4 TABLET: 2; .5 TABLET SUBLINGUAL at 19:30

## 2022-01-24 RX ADMIN — HYDROMORPHONE HYDROCHLORIDE 4 MG: 4 TABLET ORAL at 21:35

## 2022-01-24 RX ADMIN — ONDANSETRON 4 MG: 4 TABLET, ORALLY DISINTEGRATING ORAL at 16:11

## 2022-01-24 RX ADMIN — HYDROMORPHONE HYDROCHLORIDE 4 MG: 4 TABLET ORAL at 13:20

## 2022-01-24 RX ADMIN — Medication 500 MG: at 08:47

## 2022-01-24 RX ADMIN — HYDROMORPHONE HYDROCHLORIDE 0.5 MG: 0.2 INJECTION, SOLUTION INTRAMUSCULAR; INTRAVENOUS; SUBCUTANEOUS at 05:37

## 2022-01-24 RX ADMIN — ENOXAPARIN SODIUM 40 MG: 40 INJECTION SUBCUTANEOUS at 17:38

## 2022-01-24 RX ADMIN — BUPRENORPHINE HYDROCHLORIDE AND NALOXONE HYDROCHLORIDE DIHYDRATE 4 TABLET: 2; .5 TABLET SUBLINGUAL at 08:47

## 2022-01-24 RX ADMIN — HYDROMORPHONE HYDROCHLORIDE 4 MG: 4 TABLET ORAL at 17:38

## 2022-01-24 RX ADMIN — Medication 500 MG: at 21:35

## 2022-01-24 RX ADMIN — FLUTICASONE FUROATE AND VILANTEROL TRIFENATATE 1 PUFF: 100; 25 POWDER RESPIRATORY (INHALATION) at 08:48

## 2022-01-24 RX ADMIN — POTASSIUM CHLORIDE AND SODIUM CHLORIDE: 900; 150 INJECTION, SOLUTION INTRAVENOUS at 04:48

## 2022-01-24 RX ADMIN — PROCHLORPERAZINE EDISYLATE 5 MG: 5 INJECTION INTRAMUSCULAR; INTRAVENOUS at 08:41

## 2022-01-24 RX ADMIN — GABAPENTIN 300 MG: 300 CAPSULE ORAL at 19:29

## 2022-01-24 RX ADMIN — LIDOCAINE: 50 OINTMENT TOPICAL at 08:48

## 2022-01-24 RX ADMIN — LIDOCAINE: 50 OINTMENT TOPICAL at 12:33

## 2022-01-24 RX ADMIN — GABAPENTIN 100 MG: 100 CAPSULE ORAL at 11:55

## 2022-01-24 RX ADMIN — GABAPENTIN 300 MG: 300 CAPSULE ORAL at 08:47

## 2022-01-24 RX ADMIN — VENLAFAXINE 37.5 MG: 37.5 TABLET ORAL at 08:47

## 2022-01-24 RX ADMIN — HYDROMORPHONE HYDROCHLORIDE 0.5 MG: 0.2 INJECTION, SOLUTION INTRAMUSCULAR; INTRAVENOUS; SUBCUTANEOUS at 01:35

## 2022-01-24 RX ADMIN — SCOPALAMINE 1 PATCH: 1 PATCH, EXTENDED RELEASE TRANSDERMAL at 12:33

## 2022-01-24 RX ADMIN — LIDOCAINE: 50 OINTMENT TOPICAL at 17:40

## 2022-01-24 ASSESSMENT — ACTIVITIES OF DAILY LIVING (ADL)
ADLS_ACUITY_SCORE: 4

## 2022-01-24 ASSESSMENT — MIFFLIN-ST. JEOR: SCORE: 1156.86

## 2022-01-24 NOTE — PROGRESS NOTES
Hospitalist Progress Note  ADMIT DATE: 1/22/2022     FACILITY: [facility]    PCP: Radha Oakley, [phone]    Assessment/Plan    Jarrell Nicole is a 67 year old female with a relevant history metastatic stage IV breast cancer, asthma, hypertension, diverticulitis, RASHAWN, opioid dependence, diverticulitis, and CAD who presents to the ED for evaluation of nausea and vomiting, for 6 days.     Intractable nausea and vomiting  -diarrhea  -for 6 days pta  -ddx include but not limited to chemotherapy induced, meds side effects, cyclic vomiting syndrome due to Marijuana, gastroenteritis  -unremarkable abd ct  -iv fluid and supportive care  -improving, but still nausea, poor appetite, gagging; loose stool slowing down     Hypokalemia due to above  -replace as per protocol  -resolved     Breast ca with bone mets  -Right-sided breast cancer stage IV with bone metastasis and chronic pain  Stage IV (cTX, cM1, ER+, OR+, HER2-).  Diagnosed on 3/3/2021; incidental finding of the right breast mass during ED evaluation for chest pain.  Following Dr. Blackman.  -Pain team consulted to optimize pain regimen -patient refused to change her home regimen  -pain control  -might consider oncology consultation if not in control      Abnormal urine drug screen  Cannabis use  -pt denies illicit drug abuse, unreliable  -as per record, there is concern of opiate overuse     DVT Prophylaxis: Lovenox  Diet: regular     Central Lines: None     Richard Catheter: Not present     Barriers to Discharge:  Still some n/v/d, pain     Anticipated discharge date/Disposition:  1/25 to home      Subjective  C/o nausea, still loose tool a little better, no fever/chill, poor appetite  Still pain same as yesterday    Objective    Vital signs in last 24 hours  Temp:  [97.9  F (36.6  C)-98.6  F (37  C)] 98  F (36.7  C)  Pulse:  [63-76] 76  Resp:  [16-20] 20  BP: (135-146)/(56-77) 138/63  SpO2:  [96 %-99 %] 98 % [unfilled] O2 Device:  None (Room air)    Weight:   [unfilled] Weight change: 1.95 kg (4 lb 4.8 oz)    Intake/Output last 3 shifts  I/O last 3 completed shifts:  In: 2393 [P.O.:850; I.V.:1543]  Out: 4600 [Urine:4600]  Body mass index is 23.91 kg/m .    Physical Exam    Physical Exam  General appearance: not in acute distress  HEENT: PERRL, EOMI  Lungs: Clear breath sounds in bilateral lung fields  Cardiovascular: Regular rate and rhythm, normal S1-S2  Abdomen: Soft, non tender, no distension, normal bowel sound  Musculoskeletal: No joint swelling  Skin: No rash and no edema  Neurology: AAO ×3.  Cranial nerves II - XII normal.  Normal muscle strength in all four extremities.      Pertinent Labs   Lab Results: personally reviewed.   Results for DANI HARRISON (MRN [id number]) as of 1/24/2022 10:02   Ref. Range 1/24/2022 05:29   Potassium Latest Ref Range: 3.5 - 5.0 mmol/L 4.5       Medications  Scheduled Meds:    acetaminophen  975 mg Oral Once     buprenorphine-naloxone  4 tablet Sublingual BID     enoxaparin ANTICOAGULANT  40 mg Subcutaneous Q24H     fluticasone-vilanterol  1 puff Inhalation Daily     gabapentin  100 mg Oral Daily with lunch     gabapentin  300 mg Oral BID     lidocaine   Topical 4x Daily     magnesium gluconate  500 mg Oral BID     venlafaxine  37.5 mg Oral QAM     Continuous Infusions:    0.9% sodium chloride + KCl 20 mEq/L 125 mL/hr at 01/24/22 0448     lactated ringers Stopped (01/22/22 1822)     PRN Meds:.albuterol, buprenorphine-naloxone, HYDROmorphone, hydrOXYzine, Lidocaine, prochlorperazine, propranolol    Advanced Care Planning:  Discharge planning discussed with patient         [facility] Medicine Service  Bret Luque

## 2022-01-24 NOTE — CONSULTS
"ACUPUNCTURIST TREATMENT NOTE    Name: Jarrell Nicole  :  1955  MRN:  [id number]    Acupuncture Treatment  Patient Type: Medical  Intervention Reason: Pain,Anxiety/Stress,Headache,Nausea  Pain Location: generalized   Pre-session Pain Ratin  Post-session Pain Ratin  Pre-session Stress/Anxiety ratin  Post-session Stress/Anxiety ratin  Pre-session Headache Ratin  Post-session Headache Ratin  Pre-session Nausea ratin  Post-session Nausea ratin  Patient complaint:: Patient complains of generalized aching pain all over body, anxiety, headache, and nausea. No nausea at time of treatment after taking medication, reports nausea comes and goes.  Initial insertions: Baihui, [place], Du24, Yintang, Ren17, Pc6, Li11, St36, Gb34, Sp6. (R) Li4 (L) Liv3  Number of needles inserted: 20  Number of needles removed: 20  Treatment Observations: Patient stated she relaxed very well during treatment.  Acupuncture Treatment Recommendations: Patient is very interested in outpatient follow up. Information provided on acupuncture at [facility] and [facility]         \"Risks and benefits of acupuncture were discussed with patient. Consent for treatment was given. We thank you for the referral.\"     Ana Cortes L.Ac.    Date:  2022  Time:  12:59 PM    "

## 2022-01-24 NOTE — PLAN OF CARE
Problem: Electrolyte Imbalance  Goal: Electrolyte Balance  Outcome: Improving   K+ was 4.5 this am. Recheck in the AM.  NS w/ KCL D/C'd this shift.    Problem: Pain Acute  Goal: Acceptable Pain Control and Functional Ability  Outcome: Improving  Intervention: Develop Pain Management Plan  Recent Flowsheet Documentation  Taken 1/24/2022 1320 by Clara Villa, RN  Pain Management Interventions: medication (see MAR)  Taken 1/24/2022 0847 by Clara Villa, RN  Pain Management Interventions: (Lidocaine ointment) medication (see MAR)   Patient doing much better with her pain regimen. Given 0.5mg IV Dilaudid @ 0537 then next dose request was @ 1320, 4mg PO Dilaudid given.   Patient also had Acupuncture today and enjoyed that very much.

## 2022-01-24 NOTE — PLAN OF CARE
Problem: Pain Acute  Goal: Acceptable Pain Control and Functional Ability  Outcome: No Change     Pt continues to have generalized bone pain r/t breast cancer with mets. Dilaudid 0.5mg IV PRN given for moderate relief. Pt reports able to sleep and rest in between doses.    Problem: Electrolyte Imbalance  Goal: Electrolyte Balance  Outcome: Improving     Pt's Potassium level 4.5 this AM.  Pt continues to have minimal nausea but did not require any anti emetics. No diarrhea this shift.

## 2022-01-24 NOTE — CONSULTS
[facility]  Palliative Care Consultation Note    Patient: Jarrell Nicole  Date of Admission:  1/22/2022    Requesting Clinician / Team: TEMI Arora CNP  Reason for consult: Goals of care  Decisional support    Code status: Full Code    Impression & Recommendations:  SYMPTOM ASSESSMENT  1. Nausea 2/2 to Chemotherapy side effects  - Scheduled Zofran ODT 4 mg TID before meals  - Schedule scopolamine patch q72 hours  - Continue compazine 5 mg IV/PO q6h prn  - Patient will connect as outpatient with [facility] Cann[facility] program for adjustment in dose and prescribing.    2. Cancer related pain   - Appreciate Pain Team consultation and recommendations.  - Continue suboxone as at pain clinic - 4 mg po twice daily  - Add home dosing of dilaudid 4 mg q4h as needed (per patient report. Receives this from [facility]). I do not see hydromorphone prescribed on PDMP review. [facility] has not prescribed this medication. Instructed patient to follow up with Pain clinic regarding as needed opiates.  - Continue Gabapentin 300 mg qam, 100 mg midday and 300 mg qpm  - Hydroxizine 25-50 mg po TID prn by pain team    ADVANCED CARE PLANNING/GOALS OF CARE DISCUSSION  - FULL CODE  - POLST completed today. FULL code, Full treatment, trial of tube feeding and IV antibiotics.  - Honoring Choices completed and notarized today.  - Surrogate decision makers: Daughters, Sandra and Rebekah Hannahrenzo.  - Goals are restorative.  - Recommend patient follow up with Palliative Care with [facility] - last seen by Keith Branch CNP 11/2021.Spoke with [facility] who will reach out to schedule palliative care appointment with patient.   - Discussed and Updated with [facility].  - Updated Dr. Luque.      Thank you for the opportunity to participate in the care of this patient and family.      During regular M-F work hours -- if you are not sure who specifically to contact -- please contact us by calling us  "directly at the Palliative Care Main Line [phone]    After regular work hours and on weekends/holidays, you can leave a message at [phone].      History of Present Illness:  History gathered today from: patient, medical chart, medical team members, unit team members  Jarrell Nicole is a 67 year old female admitted on 1/22/2022 with complaints of abdominal pain with nausea and vomitng. Patient has past medical history of Stage IV breast cancer diagnosed 3/3/21 (mets to bone in rib cage and hip, (incidental finding of the right breast mass during ED evaluation for chest pain), asthma, hypertension, diverticulitis, acute kidney injury, coronary artery disease and opioid dependence. She follows with Dr. Joy at pain clinic.    Today, the patient was seen for:  Nausea, goals of care discussion and completion of POLST and Honoring Choices    Prognosis, Goals, & Planning:      Functional Status just prior to hospitalization: 1 (Restricted in physically strenuous activity but ambulatory and able to carry out work of a light or sedentary nature)      Prognosis, Goals, and/or Advance Care Planning were addressed today: Yes        Summary/Comments: Patient acknowledges that she has stage IV disease, that she is currently responding to treatments and wishes to continue to \"control\" disease for more time. Understands that at some point cancer make progress. Discussed resuscitation and intubation and benefit versus burden. She wishes for FULL CODE at this time.   When cancer advances and her condition deteriorates, she would then want to focus on comfort and change code status to DNR/DNI.  Reviewed Honoring Choices and POLST today - both completed.  Daughters, Sandra and Rebekah Garcia identified as her primary decision makers.       Patient's decision making preferences: independently          Patient has decision-making capacity today for complex decisions: Yes            I have concerns about the " "patient/family's health literacy today: No           Patient has a completed Health Care Directive: No.       Code status: Full Code    Coping, Meaning, & Spirituality:   Mood, coping, and/or meaning in the context of serious illness were addressed today: Yes  Summary/Comments:   Patient is open to psychotherapy and support. Feels like \"I need help and support with this breast cancer and support with dying one day.\"    Key Palliative Symptom Data:  # Pain severity the last 12 hours: low  # Dyspnea severity the last 12 hours: none  # Nausea severity the last 12 hours: moderate  # Anxiety severity the last 12 hours: low  Fatigue: Mild  Constipation: None presently, currently having some loose stools. Reports episodes of \"No BMs for several days.\"  Dizziness: None  Headache: None  Appetite: poor 2/2 nausea and vomiting    ROS:  Comprehensive ROS is reviewed and is negative except as here & per HPI.     Past Medical History:  Past Medical History:   Diagnosis Date     Asthma      Breast cancer (H) 02/24/2021    mets to bone, Stage 4     Essential hypertension      Panic disorder without agoraphobia 01/12/2012     Post traumatic stress disorder 12/30/2010     Vitamin D deficiency 03/06/2010        Past Surgical History:  Past Surgical History:   Procedure Laterality Date     CT BIOPSY BONE  4/1/2021     US LYMPH NODE BIOPSY  2/24/2021         Family History:  Family History   Adopted: Yes   Problem Relation Age of Onset     No Known Problems Daughter      No Known Problems Daughter      No Known Problems Daughter      No Known Problems Son       Social:     Living situation: lives alone in her own apartment.    Key family / caregivers: 2 daughters, Kev and Rebekah Garcia.     Substance use history: Opioid, yes    Current in-home services: none  Has 4 children: Maxi (46), Kev (45), Susanna (40) and Rebekah (38)     Allergies:  Allergies   Allergen Reactions     Codeine Hives     Iodinated Contrast Media " [Diagnostic X-Ray Materials] Unknown     Heart stopped and sick for multiple days after     Penicillins Swelling     Throat swelling as an infant     Tylenol [Acetaminophen] Other (See Comments)     With codeine only .Pt states gives her upset stomach for days.        Medications:  I have reviewed this patient's medication profile and medications from this hospitalization.   Noted: MAR reviewed.      Lab Results: personally reviewed.   Lab Results   Component Value Date     01/22/2022    CO2 25 01/22/2022    BUN 10 01/22/2022     Lab Results   Component Value Date    WBC 4.6 01/22/2022    HGB 12.2 01/22/2022    HCT 34.8 01/22/2022    MCV 95 01/22/2022     01/22/2022     AST   Date Value Ref Range Status   01/22/2022 16 0 - 40 U/L Final     ALT   Date Value Ref Range Status   01/22/2022 12 0 - 45 U/L Final     Alkaline Phosphatase   Date Value Ref Range Status   01/22/2022 74 45 - 120 U/L Final     Albumin   Date Value Ref Range Status   01/22/2022 3.9 3.5 - 5.0 g/dL Final       RADIOLOGY:  XR Chest Port 1 View    Result Date: 1/22/2022  EXAM: XR CHEST PORTABLE 1 VIEW LOCATION: [facility] DATE/TIME: 01/22/2022, 9:47 AM INDICATION: Shortness of breath. COMPARISON: 07/12/2021.     IMPRESSION: Negative chest.     CT Abdomen Pelvis w/o Contrast    Result Date: 1/22/2022  EXAM: CT ABDOMEN PELVIS W/O CONTRAST LOCATION: [facility] DATE/TIME: 1/22/2022 12:57 PM INDICATION: Abdominal pain, acute, nonlocalized Nausea/vomiting COMPARISON: 11/3/2021 TECHNIQUE: CT scan of the abdomen and pelvis was performed without IV contrast. Multiplanar reformats were obtained. Dose reduction techniques were used. CONTRAST: None. FINDINGS: LOWER CHEST: Normal. HEPATOBILIARY: Cholecystectomy with bile duct dilation from reservoir effect. PANCREAS: Normal. SPLEEN: Diminutive. ADRENAL GLANDS: Normal. KIDNEYS/BLADDER: Normal. No urinary stones or hydroureter. BOWEL: Scattered colonic  diverticula. No diverticulitis. No evidence of appendicitis. LYMPH NODES: Normal. VASCULATURE: Moderate calcified atherosclerosis. PELVIC ORGANS: Hysterectomy. Pelvic phleboliths. MUSCULOSKELETAL: Stable scattered sclerotic skeletal metastases. Calcified injection granuloma left buttock.     IMPRESSION: No findings to account for abdominal pain, nausea, or vomiting       Physical Exam:  Temp:  [97.9  F (36.6  C)-98.6  F (37  C)] 98.3  F (36.8  C)  Pulse:  [63-76] 66  Resp:  [16-20] 16  BP: (135-171)/(63-77) 171/77  SpO2:  [96 %-100 %] 100 %  Wt Readings from Last 3 Encounters:   01/24/22 63.2 kg (139 lb 4.8 oz)   07/15/21 65.8 kg (145 lb 1.6 oz)      General appearance: alert, appears stated age, cooperative and no distress  Head: Normocephalic, without obvious abnormality, atraumatic  Eyes: lids and lashes normal. Sclera anicteric.  Nose: no discharge  Throat: lips, mucosa, and tongue normal; teeth and gums normal  Neck: no JVD and supple, symmetrical, trachea midline  Lungs: clear to auscultation bilaterally  Heart: regular rate and rhythm. No murmur noted.  Abdomen: soft, +BSx4, nontender  Extremities: no edema, cyanosis.  Skin: Skin color, texture, turgor normal. No rashes or lesions  Neurologic: Mental status: Alert, oriented, thought content appropriate  Cranial nerves: normal    TTS: I have personally spent a total of 90 minutes today reviewing patient's medical record, consultation with the medical providers, and assessing patient and syptoms and providing emotional support with than 50% of this time spent face to face with patient assessing symptoms and discussing goals of care (completing POLST and Honoring Choices).    TEMI Stubbs, CNS  Palliative Care  [phone]

## 2022-01-25 ENCOUNTER — APPOINTMENT (OUTPATIENT)
Dept: MRI IMAGING | Facility: HOSPITAL | Age: 67
DRG: 394 | End: 2022-01-25
Attending: PHYSICIAN ASSISTANT
Payer: COMMERCIAL

## 2022-01-25 LAB
CREAT SERPL-MCNC: 0.62 MG/DL (ref 0.6–1.1)
GFR SERPL CREATININE-BSD FRML MDRD: >90 ML/MIN/1.73M2
PLATELET # BLD AUTO: 250 10E3/UL (ref 150–450)
POTASSIUM BLD-SCNC: 4.6 MMOL/L (ref 3.5–5)

## 2022-01-25 PROCEDURE — 85049 AUTOMATED PLATELET COUNT: CPT | Performed by: INTERNAL MEDICINE

## 2022-01-25 PROCEDURE — 36415 COLL VENOUS BLD VENIPUNCTURE: CPT | Performed by: INTERNAL MEDICINE

## 2022-01-25 PROCEDURE — 250N000013 HC RX MED GY IP 250 OP 250 PS 637: Performed by: INTERNAL MEDICINE

## 2022-01-25 PROCEDURE — 250N000013 HC RX MED GY IP 250 OP 250 PS 637: Performed by: PHYSICIAN ASSISTANT

## 2022-01-25 PROCEDURE — 250N000011 HC RX IP 250 OP 636: Performed by: INTERNAL MEDICINE

## 2022-01-25 PROCEDURE — 250N000013 HC RX MED GY IP 250 OP 250 PS 637: Performed by: CLINICAL NURSE SPECIALIST

## 2022-01-25 PROCEDURE — 210N000001 HC R&B IMCU HEART CARE

## 2022-01-25 PROCEDURE — 250N000011 HC RX IP 250 OP 636: Performed by: CLINICAL NURSE SPECIALIST

## 2022-01-25 PROCEDURE — 70551 MRI BRAIN STEM W/O DYE: CPT

## 2022-01-25 PROCEDURE — 84132 ASSAY OF SERUM POTASSIUM: CPT | Performed by: INTERNAL MEDICINE

## 2022-01-25 PROCEDURE — 99233 SBSQ HOSP IP/OBS HIGH 50: CPT | Performed by: INTERNAL MEDICINE

## 2022-01-25 PROCEDURE — 99233 SBSQ HOSP IP/OBS HIGH 50: CPT | Performed by: CLINICAL NURSE SPECIALIST

## 2022-01-25 RX ORDER — GADOBUTROL 604.72 MG/ML
6 INJECTION INTRAVENOUS ONCE
Status: DISCONTINUED | OUTPATIENT
Start: 2022-01-25 | End: 2022-01-25 | Stop reason: CLARIF

## 2022-01-25 RX ORDER — LORAZEPAM 0.5 MG/1
0.5 TABLET ORAL ONCE
Status: COMPLETED | OUTPATIENT
Start: 2022-01-25 | End: 2022-01-25

## 2022-01-25 RX ORDER — AMOXICILLIN 250 MG
1 CAPSULE ORAL 2 TIMES DAILY PRN
Status: DISCONTINUED | OUTPATIENT
Start: 2022-01-25 | End: 2022-01-27 | Stop reason: HOSPADM

## 2022-01-25 RX ADMIN — HYDROMORPHONE HYDROCHLORIDE 4 MG: 4 TABLET ORAL at 22:20

## 2022-01-25 RX ADMIN — LIDOCAINE: 50 OINTMENT TOPICAL at 08:09

## 2022-01-25 RX ADMIN — Medication 500 MG: at 21:13

## 2022-01-25 RX ADMIN — VENLAFAXINE 37.5 MG: 37.5 TABLET ORAL at 08:09

## 2022-01-25 RX ADMIN — PROCHLORPERAZINE MALEATE 5 MG: 5 TABLET ORAL at 21:12

## 2022-01-25 RX ADMIN — PROCHLORPERAZINE EDISYLATE 5 MG: 5 INJECTION INTRAMUSCULAR; INTRAVENOUS at 07:43

## 2022-01-25 RX ADMIN — LIDOCAINE: 50 OINTMENT TOPICAL at 13:08

## 2022-01-25 RX ADMIN — GABAPENTIN 300 MG: 300 CAPSULE ORAL at 22:20

## 2022-01-25 RX ADMIN — GABAPENTIN 300 MG: 300 CAPSULE ORAL at 08:08

## 2022-01-25 RX ADMIN — Medication 500 MG: at 08:09

## 2022-01-25 RX ADMIN — HYDROMORPHONE HYDROCHLORIDE 4 MG: 4 TABLET ORAL at 06:43

## 2022-01-25 RX ADMIN — BUPRENORPHINE HYDROCHLORIDE AND NALOXONE HYDROCHLORIDE DIHYDRATE 4 TABLET: 2; .5 TABLET SUBLINGUAL at 21:12

## 2022-01-25 RX ADMIN — ENOXAPARIN SODIUM 40 MG: 40 INJECTION SUBCUTANEOUS at 18:48

## 2022-01-25 RX ADMIN — ONDANSETRON 4 MG: 4 TABLET, ORALLY DISINTEGRATING ORAL at 11:05

## 2022-01-25 RX ADMIN — BUPRENORPHINE HYDROCHLORIDE AND NALOXONE HYDROCHLORIDE DIHYDRATE 4 TABLET: 2; .5 TABLET SUBLINGUAL at 08:08

## 2022-01-25 RX ADMIN — HYDROMORPHONE HYDROCHLORIDE 4 MG: 4 TABLET ORAL at 11:05

## 2022-01-25 RX ADMIN — ONDANSETRON 4 MG: 4 TABLET, ORALLY DISINTEGRATING ORAL at 06:43

## 2022-01-25 RX ADMIN — ONDANSETRON 4 MG: 4 TABLET, ORALLY DISINTEGRATING ORAL at 16:32

## 2022-01-25 RX ADMIN — FLUTICASONE FUROATE AND VILANTEROL TRIFENATATE 1 PUFF: 100; 25 POWDER RESPIRATORY (INHALATION) at 08:09

## 2022-01-25 RX ADMIN — LORAZEPAM 0.5 MG: 0.5 TABLET ORAL at 18:48

## 2022-01-25 RX ADMIN — HYDROMORPHONE HYDROCHLORIDE 4 MG: 4 TABLET ORAL at 01:54

## 2022-01-25 ASSESSMENT — ACTIVITIES OF DAILY LIVING (ADL)
ADLS_ACUITY_SCORE: 4

## 2022-01-25 ASSESSMENT — MIFFLIN-ST. JEOR: SCORE: 1146.88

## 2022-01-25 NOTE — PROGRESS NOTES
"[facility] Palliative [facility]   Social Work Note    Assessment:  Introductory visit with Jarrell along with TEMI Coy. Discussed my role on the team and support available. Initially she was feeling overwhelmed by conversation and felt she was \"spinning her wheels.\" She feels her pain has improved, but her nausea is about the same. Her appetite remains poor.     Jarrell is processing all the ways that cancer is impacting her life and the many losses associated. She was tearful as she talked about this. Encouraged sharing of feelings and experiences.     As the visit went on her spirits lifted and she expressed appreciation for all the help. She asked that I return later today for further discussion as she wanted to rest.     Returned around 2:30pm - She shared her wish to get support with end of life and how to best help her kids prepare for her death. She shared stories about her kids and her relationships with them. She hopes to connect with a therapist at [facility]. Encouraged her to do so. She feels more accepting of help now and wants to do all she can for both her family and herself during this difficult time.     Will follow up with Jarrell on Wednesday as well.     Clinical Interventions Provided:  Build trust/rapport  Active listening   Validation of feelings   Empathetic presence  Processing of emotions  Emotional support    Plan:   SW will continue to be available to offer psychosocial support to aJrrell.     Carina Jay, [facility]  Palliative Care   Office # [phone]  "

## 2022-01-25 NOTE — PLAN OF CARE
Problem: Electrolyte Imbalance  Goal: Electrolyte Balance  Outcome: Improving   Patient's K+ remains WNL @ 4.6 recheck in the morning.    Problem: Pain Acute  Goal: Acceptable Pain Control and Functional Ability  Outcome: Improving   Patient feels that her pain is much better controlled. Was visited by integrative therapy for massage. Patient very happy with results and was able to sleep afterwards.

## 2022-01-25 NOTE — CONSULTS
Consultation    Jarrell Nicole MRN# [id number]   YOB: 1955 Age: 67 year old   Date of Admission: 1/22/2022  Requesting physician: Dr. Bret Luque MD  Reason for consult: Diagnosis of Metastatic breast cancer           Assessment and Plan:   Patient is a 67-year-old female patient of Dr. Blackman's with a diagnosis of metastatic breast cancer.  She is currently undergoing treatment with palbociclib 125 mg days 1-21 every 28 days since April 2021.  She also continues on letrozole daily since early April 2021.  Was recently seen in our clinic on 1/7/2022 for follow-up.  At the time, labs are stable along with her tumor marker CA 27-29.  She is currently admitted at [facility] due to ongoing issues with nausea and vomiting and electrolyte imbalance due to this.  This is been an ongoing chronic issue for her.    1.  Hold palbociclib and letrozole at this time.  I informed the patient to hold this medication until she follows up with her oncologist Dr. Blackman.  She has scheduled follow-up with her sometime next week.  2.  CT abdomen pelvis was negative for any worsening of her known metastatic disease.  She is more than likely not progressing at this time.  Plan is for an outpatient PET scan to be done sometime next week.  No changes to this.  3.  Continue antinausea medication such as Zofran, Compazine, scopolamine patch per internal medicine.  4.  In regards to pain, continue IV Dilaudid as per internal medicine.  5.  For completeness, will check a brain MRI due to her diagnosis of metastatic breast cancer.  Ongoing nausea vomiting can be issues of brain metastasis.  She is having no issues with headaches or blurry vision at this time.  6.  We will provide her with 0.5 mg of Ativan prior to MRI due to claustrophobia.  7.  If her nausea is more than likely due to her oral chemotherapy, will discuss timing of antiemetics along with taking her oral chemotherapy medication in the outpatient  setting.    Disposition: Pending.  Per internal medicine.  We will review MRI results once available.  If negative, will plan for to have an outpatient PET scan and follow-up with her MD sometime next week.  She will hold her palbociclib and letrozole until her follow-up visit.    Patient discussed with Dr. Yehuda Galvez PA-C  [facility]  [phone] (office); [phone] (cell)              Chief Complaint:   Vomiting and Fever           History of Present Illness:   This patient is a 67 year old female With a diagnosis of metastatic breast cancer.  She is currently on palbociclib 125 mg days 1 through 21 every 28 days.  She is also on letrozole daily.  Her history is as follows-    2/22/21 CT chest without contrast: nodule in R breast with enlarged R axillary LNs and surrounding soft tissue stranding. A few medually space lucent lesions are present, most notably in the L anterior 4th and R posterior sixth ribs, suspect for bone metastases  2/23/21 NM lung perfusion scan: normal lung perfusion  2/23/21 CT abd/pelvis showed subtle, patchy osteosclerosis through the visualized bones, stable to slightly increased compared to 7/28, new compared to 10/27/16, could represent metabolic bone disease.   2/24/21 u/s guided R axillary LN biopsy: moderately differentiated adenocarcinoma consistent with breast primary, ER >90%, VA 80%, HER2/TRICE negative.   2/24/21 bone scan showed numerous scattered foci of abnormal uptake in the axial and proximal appendicular skeleton, consistent with osseous metastases, including calvarium, spine, bilateral ribs, bilateral scapulae, multiple sites in the bony pelvis and both proximal femurs.   3/15/21 PET/CT: Innumerable FDG avid metastases throughout the axila skeletal, including the R scapula, L iliac wing, and lesser trochanter of the L femur (SUV max 4.3). FDV avid R breast cancer with LN metastases in the R axilla, mediastinum and neck.   3/21/21 CT CAP: chest pain due to  "rib metastases, one of which has new periosteal bone formation, indicative of pathologic fracture, numerous scattered lucent lesions in the ribs.   3/21/21 CT head without contrast: no CT finding of mass, hemorrhage or focal area to suggest acute infarct  21 pathology L iliac bone: metastatic moderately differentiated adenocarcinoma, consistent with breast primary, ER/WA positive, HER2 negative.     Today, patient is lying in hospital bed.  She states she continues have issues with nausea and vomiting but scopolamine patch is helping her.  She continues to have pain but Dilaudid is helping her.  This is been a chronic issue for her.  Currently not taking her Ibrance or letrozole at this time due to nausea and vomiting.         Physical Exam:   Vitals were reviewed  Blood pressure (!) 167/70, pulse 71, temperature 98.6  F (37  C), temperature source Oral, resp. rate 16, height 1.626 m (5' 4\"), weight 62.2 kg (137 lb 1.6 oz), SpO2 96 %, not currently breastfeeding.  Temperatures:  Current - Temp: 98.6  F (37  C); Max - Temp  Av.7  F (37.1  C)  Min: 98.3  F (36.8  C)  Max: 99.3  F (37.4  C)  Respiration range: Resp  Av.7  Min: 16  Max: 20  Pulse range: Pulse  Av.7  Min: 49  Max: 71  Blood pressure range: Systolic (24hrs), Av , Min:111 , Max:167   ; Diastolic (24hrs), Av, Min:51, Max:70    Pulse oximetry range: SpO2  Av %  Min: 96 %  Max: 98 %    Intake/Output Summary (Last 24 hours) at 2022 1726  Last data filed at 2022 0800  Gross per 24 hour   Intake 960 ml   Output 1300 ml   Net -340 ml       GENERAL: No acute distress.  SKIN: No rashes or jaundice.  HEENT: Normocephalic, atraumatic. Eyes anicteric. Oropharynx is clear.  LYMPH: No palpable lymphadenopathy in the cervical, supraclavicular, axillary, or inguinal regions.  HEART: Regular rate and rhythm with no murmurs.  LUNGS: Clear bilaterally.  ABDOMEN: Soft, nontender, nondistended with no palpable " hepatosplenomegaly.  EXTREMITIES: No clubbing, cyanosis, or edema.  MUSCULOSKELETAL: No pain to percussion over entire spine.  MENTAL: Alert and oriented to person, place, and time.  NEURO: Cranial nerves II through XII grossly intact with no focal motor or sensory deficits.              Past Medical History:   I have reviewed this patient's past medical history  Past Medical History:   Diagnosis Date     Asthma      Breast cancer (H) 2021    mets to bone, Stage 4     Essential hypertension      Panic disorder without agoraphobia 2012     Post traumatic stress disorder 2010     Vitamin D deficiency 2010             Past Surgical History:   I have reviewed this patient's past surgical history  Past Surgical History:   Procedure Laterality Date     CT BIOPSY BONE  2021     US LYMPH NODE BIOPSY  2021               Social History:   I have reviewed this patient's social history  Social History     Tobacco Use     Smoking status: Former Smoker     Packs/day: 0.50     Years: 30.00     Pack years: 15.00     Types: Cigarettes     Quit date: 2019     Years since quittin.1     Smokeless tobacco: Never Used   Substance Use Topics     Alcohol use: Not Currently             Family History:   I have reviewed this patient's family history  Family History   Adopted: Yes   Problem Relation Age of Onset     No Known Problems Daughter      No Known Problems Daughter      No Known Problems Daughter      No Known Problems Son              Allergies:     Allergies   Allergen Reactions     Codeine Hives     Iodinated Contrast Media [Diagnostic X-Ray Materials] Unknown     Heart stopped and sick for multiple days after     Penicillins Swelling     Throat swelling as an infant     Tylenol [Acetaminophen] Other (See Comments)     With codeine only .Pt states gives her upset stomach for days.             Medications:   I have reviewed this patient's current medications  Medications Prior to Admission    Medication Sig Dispense Refill Last Dose     albuterol (PROAIR HFA;PROVENTIL HFA;VENTOLIN HFA) 90 mcg/actuation inhaler Inhale 1-2 puffs into the lungs every 4 hours as needed for shortness of breath / dyspnea or wheezing    Unknown at prn     amLODIPine (NORVASC) 5 MG tablet [AMLODIPINE (NORVASC) 5 MG TABLET] Take 5 mg by mouth daily.   Past Week at Unknown time     ascorbic acid, vitamin C, (VITAMIN C) 1000 MG tablet [ASCORBIC ACID, VITAMIN C, (VITAMIN C) 1000 MG TABLET] Take 1,000 mg by mouth daily.   Past Week at Unknown time     B,C/folic/zinc/copper ox/vit E (STRESS B-COMPLEX ORAL) [B,C/FOLIC/ZINC/COPPER OX/VIT E (STRESS B-COMPLEX ORAL)] Take 1 tablet by mouth daily. Stress supplement   Past Week at Unknown time     buprenorphine HCl-naloxone HCl (SUBOXONE) 4-1 MG per film Place 1-2 Film under the tongue daily Prn for pain. 60 Film 0 Past Week at Unknown time     buprenorphine HCl-naloxone HCl (SUBOXONE) 8-2 MG per film Place 1 Film under the tongue 2 times daily Take first thing in am and at bedtime - with additional 4 mg dose at midday. 60 Film 0 Past Week at Unknown time     cetirizine (ZYRTEC) 10 MG tablet [CETIRIZINE (ZYRTEC) 10 MG TABLET] Take 10 mg by mouth daily.   Past Week at Unknown time     cholecalciferol 125 MCG (5000 UT) CAPS Take 1 capsule (5,000 Units) by mouth daily 90 capsule 0 Past Week at Unknown time     docusate sodium (COLACE) 100 MG capsule TAKE 1 CAPSULE(100 MG) BY MOUTH TWICE DAILY 180 capsule 0 Past Week at Unknown time     ferrous sulfate (FEROSUL) 325 (65 Fe) MG tablet Take 1 tablet (325 mg) by mouth daily (with breakfast) 30 tablet 1 Past Week at Unknown time     fluticasone propionate (FLONASE) 50 mcg/actuation nasal spray [FLUTICASONE PROPIONATE (FLONASE) 50 MCG/ACTUATION NASAL SPRAY] 1 spray into each nostril daily.    Past Week at Unknown time     gabapentin (NEURONTIN) 100 MG capsule Take 1 capsule (100 mg) by mouth daily (with lunch) Take 1 at Midday 90 capsule 0 Past  Week at Unknown time     gabapentin (NEURONTIN) 300 MG capsule Take 300 mg by mouth 2 times daily    Past Week at Unknown time     hydrOXYzine (VISTARIL) 25 MG capsule Take 1-2 capsules (25-50 mg) by mouth 3 times daily as needed for anxiety 150 capsule 1 Past Week at Unknown time     letrozole (FEMARA) 2.5 MG tablet Take 2.5 mg by mouth daily   Past Week at Unknown time     Lidocaine (LIDOCARE) 4 % Patch Place 2 patches onto the skin every 24 hours To prevent lidocaine toxicity, patient should be patch free for 12 hrs daily. 30 patch 0 Past Week at Unknown time     melatonin 10 mg Tab Take 10 mg by mouth nightly as needed for sleep    Past Week at Unknown time     palbociclib (IBRANCE) 125 MG capsule Take 125 mg by mouth daily with food 3 weeks on, 1 week off   Past Week at Unknown time     polyethylene glycol (MIRALAX) 17 GM/Dose powder Take 17 g (1 capful) by mouth daily as needed for constipation 850 g 0 Past Week at Unknown time     propranoloL (INDERAL) 10 MG tablet Take 10 mg by mouth 3 times daily as needed (anxiety or palpitations)    Past Month at prn     SYMBICORT 80-4.5 mcg/actuation inhaler Inhale 2 puffs into the lungs 2 times daily    Past Week at Unknown time     venlafaxine (EFFEXOR) 37.5 MG tablet [VENLAFAXINE (EFFEXOR) 37.5 MG TABLET] Take 37.5 mg by mouth every morning.   Past Week at Unknown time     vitamin E 100 UNIT capsule [VITAMIN E 100 UNIT CAPSULE] Take 500 Units by mouth 2 (two) times a week.   Past Week at Unknown time     buprenorphine HCl-naloxone HCl (SUBOXONE) 4-1 MG per film Place 1 Film under the tongue daily Take 1 film in mid-day; along with 8 mg bid in am and hs. (Patient not taking: Reported on 1/5/2022) 30 Film 0      buprenorphine HCl-naloxone HCl (SUBOXONE) 4-1 MG per film Place 1 Film under the tongue 4 times daily (Patient not taking: Reported on 1/5/2022) 60 Film 1      hydrOXYzine (ATARAX) 10 MG tablet Take 1 tablet (10 mg) by mouth 3 times daily as needed for anxiety  (Patient not taking: Reported on 1/5/2022) 90 tablet 0      ondansetron (ZOFRAN-ODT) 4 MG ODT tab Take 1 tablet (4 mg) by mouth every 6 hours as needed for nausea or vomiting (Patient not taking: Reported on 1/22/2022) 12 tablet 0 Not Taking at Unknown time     Current Facility-Administered Medications Ordered in Epic   Medication Dose Route Frequency Last Rate Last Admin     acetaminophen (TYLENOL) tablet 975 mg  975 mg Oral Once         albuterol (PROVENTIL HFA/VENTOLIN HFA) inhaler  1-2 puff Inhalation Q4H PRN         buprenorphine-naloxone (SUBOXONE) 2-0.5 MG sublingual tablet 2-4 tablet  2-4 tablet Sublingual Daily PRN         buprenorphine-naloxone (SUBOXONE) 2-0.5 MG sublingual tablet 4 tablet  4 tablet Sublingual BID   4 tablet at 01/25/22 0808     enoxaparin ANTICOAGULANT (LOVENOX) injection 40 mg  40 mg Subcutaneous Q24H   40 mg at 01/24/22 1738     fluticasone-vilanterol (BREO ELLIPTA) 100-25 MCG/INH inhaler 1 puff  1 puff Inhalation Daily   1 puff at 01/25/22 0809     gabapentin (NEURONTIN) capsule 100 mg  100 mg Oral Daily with lunch   100 mg at 01/24/22 1155     gabapentin (NEURONTIN) capsule 300 mg  300 mg Oral BID   300 mg at 01/25/22 0808     HYDROmorphone (DILAUDID) tablet 4 mg  4 mg Oral Q4H PRN   4 mg at 01/25/22 1105     hydrOXYzine (ATARAX) tablet 10 mg  10 mg Oral TID PRN   10 mg at 01/22/22 2338     Lidocaine (LIDOCARE) 4 % Patch 2 patch  2 patch Transdermal Daily PRN   2 patch at 01/23/22 0840     lidocaine (XYLOCAINE) 5 % ointment   Topical 4x Daily   Given at 01/25/22 1308     LORazepam (ATIVAN) tablet 0.5 mg  0.5 mg Oral Once         magnesium gluconate (MAGONATE) tablet 500 mg  500 mg Oral BID   500 mg at 01/25/22 0809     ondansetron (ZOFRAN-ODT) ODT tab 4 mg  4 mg Oral TID AC   4 mg at 01/25/22 1632     prochlorperazine (COMPAZINE) injection 5 mg  5 mg Intravenous Q6H PRN   5 mg at 01/25/22 0743     prochlorperazine (COMPAZINE) tablet 5 mg  5 mg Oral Q6H PRN         propranolol  (INDERAL) tablet 10 mg  10 mg Oral TID PRN         scopolamine (TRANSDERM) 72 hr patch 1 patch  1 patch Transdermal Q72H   1 patch at 01/24/22 1233    And     scopolamine (TRANSDERM-SCOP) Patch in Place   Transdermal Q8H         senna-docusate (SENOKOT-S/PERICOLACE) 8.6-50 MG per tablet 1 tablet  1 tablet Oral BID PRN         venlafaxine (EFFEXOR) tablet 37.5 mg  37.5 mg Oral QAM   37.5 mg at 01/25/22 0809     No current [facility]-ordered outpatient medications on file.             Review of Systems:     The 10 point Review of Systems is negative other than noted in the HPI.            Data:   Data   Results for orders placed or performed during the hospital encounter of 01/22/22 (from the past 24 hour(s))   Platelet count   Result Value Ref Range    Platelet Count 250 150 - 450 10e3/uL   Potassium   Result Value Ref Range    Potassium 4.6 3.5 - 5.0 mmol/L

## 2022-01-25 NOTE — PLAN OF CARE
With medications changes, pt.'s nausea was better although she said it was only a little better, she ate much more than yesterday.   Pain seems better controlled, she usually rates a 6 .5 or 8 now rates between after medications a 5 or 6.  Down to a dull roar as she says.

## 2022-01-25 NOTE — PLAN OF CARE
Problem: Pain Acute  Goal: Acceptable Pain Control and Functional Ability  Outcome: Improving  Intervention: Develop Pain Management Plan  Recent Flowsheet Documentation  Taken 1/25/2022 0142 by Gladis Garza, RN  Pain Management Interventions: medication (see MAR)     Vitally stable, denies nausea, pain managed with PRN dilaudid, voiding appropriately, independent in room.

## 2022-01-25 NOTE — PROGRESS NOTES
[facility]  Palliative Care Daily Progress Note      Code status: Full Code     Impressions, Recommendations & Counseling     SYMPTOM ASSESSMENT  1. Nausea 2/2 to Chemotherapy side effects - continues and improved  - Scheduled Zofran ODT 4 mg TID before meals  - Scopolamine patch q72 hours  - Continue compazine 5 mg IV/PO q6h prn  - Patient will connect as outpatient with [facility] of health Cannabis program for adjustment in dose and prescribing.     2. Cancer related pain - improved with dilaudid prn  - Appreciate Pain Team consultation and recommendations.  - Continue suboxone 4 mg po twice daily.   - Dilaudid 4 mg q4h as needed (per patient report. Receives this from [facility]). I do not see hydromorphone prescribed on PDMP review. [facility] has not prescribed this medication.   - Instructed patient to follow up with Pain clinic or [facility] regarding as needed opiates after discharge.  - Continue Gabapentin 300 mg qam, 100 mg midday and 300 mg qpm  - Hydroxizine 25-50 mg po TID prn by pain team    3. Bowel Regimen - on opioids  - Add Senna-S/ Pericolace 1 tablet twice daily as needed.     ADVANCED CARE PLANNING/GOALS OF CARE DISCUSSION  - FULL CODE  - POLST completed. FULL code, Full treatment, trial of tube feeding and IV antibiotics.  - Honoring Choices completed and notarized today.  - Surrogate decision makers: Daughters, Sandra and Rebekah Garcia.  - Goals are restorative.  - Recommend patient follow up with Palliative Care with [facility] - last seen by Keith Branch CNP 11/2021.Spoke with [facility] who will reach out to schedule palliative care appointment with patient.   - Discussed and Updated with [facility] 1/24/2022.    Palliative care will sign off at this time. Please call [phone] with questions or needs.      HPI          Jarrell Niocle is a 67 year old female admitted on 1/22/2022 with complaints of abdominal pain with nausea and vomitng. Patient has  past medical history of Stage IV breast cancer diagnosed 3/3/21 (mets to bone in rib cage and hip, (incidental finding of the right breast mass during ED evaluation for chest pain), asthma, hypertension, diverticulitis, acute kidney injury, coronary artery disease and opioid dependence. She follows with Dr. Joy at pain clinic.    Today, the patient was seen for:  Nausea, pain control, support    Prognosis, Goals, or Advance Care Planning was addressed today with: Yes.  Mood, coping, and/or meaning in the context of serious illness were addressed today: Yes.  Summary/Comments: Overall feels pain and nausea improved. Hopes to discharge home tomorrow. May go home with her daughter, Rebekah.            Interval History:     Chart review/discussion with unit or clinical team members:   MELISSA Hairston.    Key Palliative Symptoms:  # Pain severity the last 12 hours: low  # Dyspnea severity the last 12 hours: none  # Nausea severity the last 12 hours: low  # Anxiety severity the last 12 hours: low             Review of Systems:     Besides above, an additional system ROS was reviewed and is unremarkable          Medications:     I have reviewed this patient's medication profile and medications during this hospitalization.    Noted meds:  MAR reviewed           Physical Exam:   Temp:  [98.2  F (36.8  C)-99.3  F (37.4  C)] 98.5  F (36.9  C)  Pulse:  [54-66] 57  Resp:  [16-18] 18  BP: (111-160)/(51-89) 111/51  SpO2:  [96 %-99 %] 97 %    General appearance: alert, cooperative and fatigued  Head: Normocephalic, without obvious abnormality, atraumatic  Eyes: lids and lashes normal, sclera anicteric  Nose: no discharge  Throat: lips, mucosa, and tongue normal; teeth and gums normal  Lungs: non-labored  Extremities: no edema, no cyanosis  Skin: Skin color, texture, turgor normal. No rashes or lesions  Neurologic: Grossly normal             Data Reviewed:     Pertinent Labs  Lab Results: personally reviewed.   Lab Results    Component Value Date     01/22/2022    CO2 25 01/22/2022    BUN 10 01/22/2022     Lab Results   Component Value Date    WBC 4.6 01/22/2022    HGB 12.2 01/22/2022    HCT 34.8 01/22/2022    MCV 95 01/22/2022     01/25/2022     AST   Date Value Ref Range Status   01/22/2022 16 0 - 40 U/L Final     ALT   Date Value Ref Range Status   01/22/2022 12 0 - 45 U/L Final     Alkaline Phosphatase   Date Value Ref Range Status   01/22/2022 74 45 - 120 U/L Final     Albumin   Date Value Ref Range Status   01/22/2022 3.9 3.5 - 5.0 g/dL Final         Radiology Results  XR Chest Port 1 View    Result Date: 1/22/2022  EXAM: XR CHEST PORTABLE 1 VIEW LOCATION: [facility] DATE/TIME: 01/22/2022, 9:47 AM INDICATION: Shortness of breath. COMPARISON: 07/12/2021.     IMPRESSION: Negative chest.     CT Abdomen Pelvis w/o Contrast    Result Date: 1/22/2022  EXAM: CT ABDOMEN PELVIS W/O CONTRAST LOCATION: [facility] DATE/TIME: 1/22/2022 12:57 PM INDICATION: Abdominal pain, acute, nonlocalized Nausea/vomiting COMPARISON: 11/3/2021 TECHNIQUE: CT scan of the abdomen and pelvis was performed without IV contrast. Multiplanar reformats were obtained. Dose reduction techniques were used. CONTRAST: None. FINDINGS: LOWER CHEST: Normal. HEPATOBILIARY: Cholecystectomy with bile duct dilation from reservoir effect. PANCREAS: Normal. SPLEEN: Diminutive. ADRENAL GLANDS: Normal. KIDNEYS/BLADDER: Normal. No urinary stones or hydroureter. BOWEL: Scattered colonic diverticula. No diverticulitis. No evidence of appendicitis. LYMPH NODES: Normal. VASCULATURE: Moderate calcified atherosclerosis. PELVIC ORGANS: Hysterectomy. Pelvic phleboliths. MUSCULOSKELETAL: Stable scattered sclerotic skeletal metastases. Calcified injection granuloma left buttock.     IMPRESSION: No findings to account for abdominal pain, nausea, or vomiting          TTS: I have personally spent a total of 35 minutes today  reviewing patient's medical record, consultation with the medical providers, assessing patient and symptoms and providing emotional support with more than 50% of this time spent face to face with patient discussing symptoms, providing support and reviewing medications.    TEMI Stubbs, CNS  Palliative Care  [phone]

## 2022-01-26 LAB
HOLD SPECIMEN: NORMAL
POTASSIUM BLD-SCNC: 4.6 MMOL/L (ref 3.5–5)

## 2022-01-26 PROCEDURE — 250N000011 HC RX IP 250 OP 636: Performed by: CLINICAL NURSE SPECIALIST

## 2022-01-26 PROCEDURE — 250N000013 HC RX MED GY IP 250 OP 250 PS 637: Performed by: INTERNAL MEDICINE

## 2022-01-26 PROCEDURE — 250N000013 HC RX MED GY IP 250 OP 250 PS 637: Performed by: CLINICAL NURSE SPECIALIST

## 2022-01-26 PROCEDURE — 36415 COLL VENOUS BLD VENIPUNCTURE: CPT | Performed by: INTERNAL MEDICINE

## 2022-01-26 PROCEDURE — 250N000009 HC RX 250: Performed by: CLINICAL NURSE SPECIALIST

## 2022-01-26 PROCEDURE — 120N000004 HC R&B MS OVERFLOW

## 2022-01-26 PROCEDURE — 99233 SBSQ HOSP IP/OBS HIGH 50: CPT | Performed by: INTERNAL MEDICINE

## 2022-01-26 PROCEDURE — 250N000011 HC RX IP 250 OP 636: Performed by: INTERNAL MEDICINE

## 2022-01-26 PROCEDURE — 84132 ASSAY OF SERUM POTASSIUM: CPT | Performed by: INTERNAL MEDICINE

## 2022-01-26 RX ORDER — LORAZEPAM 0.5 MG/1
0.5 TABLET ORAL EVERY 4 HOURS PRN
Status: DISCONTINUED | OUTPATIENT
Start: 2022-01-26 | End: 2022-01-27 | Stop reason: HOSPADM

## 2022-01-26 RX ADMIN — LORAZEPAM 0.5 MG: 0.5 TABLET ORAL at 10:44

## 2022-01-26 RX ADMIN — HYDROMORPHONE HYDROCHLORIDE 4 MG: 4 TABLET ORAL at 14:11

## 2022-01-26 RX ADMIN — HYDROMORPHONE HYDROCHLORIDE 4 MG: 4 TABLET ORAL at 08:44

## 2022-01-26 RX ADMIN — ENOXAPARIN SODIUM 40 MG: 40 INJECTION SUBCUTANEOUS at 17:21

## 2022-01-26 RX ADMIN — LORAZEPAM 0.5 MG: 0.5 TABLET ORAL at 14:54

## 2022-01-26 RX ADMIN — Medication 500 MG: at 08:43

## 2022-01-26 RX ADMIN — BUPRENORPHINE HYDROCHLORIDE AND NALOXONE HYDROCHLORIDE DIHYDRATE 4 TABLET: 2; .5 TABLET SUBLINGUAL at 08:42

## 2022-01-26 RX ADMIN — LORAZEPAM 0.5 MG: 0.5 TABLET ORAL at 22:44

## 2022-01-26 RX ADMIN — FLUTICASONE FUROATE AND VILANTEROL TRIFENATATE 1 PUFF: 100; 25 POWDER RESPIRATORY (INHALATION) at 08:43

## 2022-01-26 RX ADMIN — LIDOCAINE: 50 OINTMENT TOPICAL at 17:21

## 2022-01-26 RX ADMIN — ONDANSETRON 4 MG: 4 TABLET, ORALLY DISINTEGRATING ORAL at 11:43

## 2022-01-26 RX ADMIN — PROCHLORPERAZINE MALEATE 5 MG: 5 TABLET ORAL at 09:58

## 2022-01-26 RX ADMIN — HYDROMORPHONE HYDROCHLORIDE 4 MG: 4 TABLET ORAL at 19:55

## 2022-01-26 RX ADMIN — ONDANSETRON 4 MG: 4 TABLET, ORALLY DISINTEGRATING ORAL at 06:45

## 2022-01-26 RX ADMIN — PROCHLORPERAZINE MALEATE 5 MG: 5 TABLET ORAL at 20:44

## 2022-01-26 RX ADMIN — LIDOCAINE: 50 OINTMENT TOPICAL at 08:44

## 2022-01-26 RX ADMIN — SCOPALAMINE 1 PATCH: 1 PATCH, EXTENDED RELEASE TRANSDERMAL at 06:42

## 2022-01-26 RX ADMIN — HYDROMORPHONE HYDROCHLORIDE 4 MG: 4 TABLET ORAL at 03:55

## 2022-01-26 RX ADMIN — ONDANSETRON 4 MG: 4 TABLET, ORALLY DISINTEGRATING ORAL at 17:21

## 2022-01-26 RX ADMIN — GABAPENTIN 100 MG: 100 CAPSULE ORAL at 11:43

## 2022-01-26 RX ADMIN — Medication 500 MG: at 20:44

## 2022-01-26 RX ADMIN — VENLAFAXINE 37.5 MG: 37.5 TABLET ORAL at 08:44

## 2022-01-26 ASSESSMENT — ACTIVITIES OF DAILY LIVING (ADL)
ADLS_ACUITY_SCORE: 4
ADLS_ACUITY_SCORE: 6
ADLS_ACUITY_SCORE: 4
ADLS_ACUITY_SCORE: 6
ADLS_ACUITY_SCORE: 4
ADLS_ACUITY_SCORE: 6
ADLS_ACUITY_SCORE: 4
ADLS_ACUITY_SCORE: 6
ADLS_ACUITY_SCORE: 4
ADLS_ACUITY_SCORE: 6
ADLS_ACUITY_SCORE: 4
ADLS_ACUITY_SCORE: 4

## 2022-01-26 ASSESSMENT — MIFFLIN-ST. JEOR: SCORE: 1151.87

## 2022-01-26 NOTE — PLAN OF CARE
Pt feels pain is controlled with current regime.  Tele removed per order.  Pt independent in room.

## 2022-01-26 NOTE — PROGRESS NOTES
CM received call from [facility] Care Coordinator [phone], requesting update and discharge plan. Provided update. Received call form [facility] Palliative Rn, Marcella [phone] automated phone that hung up when I pressed extension.    SKY De La O  1/26/2022  2:21 PM

## 2022-01-26 NOTE — PLAN OF CARE
Pt. Slept fairly well, pain managed with PO dilaudid. Denied nausea or dyspnea. VSS. HR on telemetry was ash rhythm in the 50's. Continue to monitor.

## 2022-01-26 NOTE — PROGRESS NOTES
Progress Note     Primary Oncologist/Hematologist:  Dr. Dion Blackman MD          Assessment and Plan:New actions/orders today (01/26/2022) are underlined.   Patient is a 67-year-old female patient of Dr. Blackman's with a diagnosis of metastatic breast cancer.  Currently admitted due to ongoing issues with nausea and vomiting.  Currently not taking her Ibrance and letrozole due to this.  Brain MRI from yesterday was negative for any intracranial process.  She is feeling better today on her current medications.  Hopefully will be discharging tomorrow morning.    1.  Continue to hold Ibrance and letrozole at this time.  We will follow up with her in the clinic next week with a PET CT scan done prior to the visit.  We will discuss resumption of her oral chemotherapy and letrozole after her visit with her MD.  2.  Continue on antiemetic medication per internal medicine.  She will need to continue this at discharge.  Will discuss timing of this in the outpatient setting along with taking her oral chemotherapy and letrozole.  3.  MRI negative for any intracranial process.  Continue to monitor for now.    Disposition: Pending.  Per IM.  More than likely tomorrow morning. Ok to discharge from heme/onc standpoint.  We will see her in the clinic next week.  I will work with our scheduling department to schedule her to see her MD following her PET/CT scan scheduled for 2/3/2021.  Currently scheduled to see her MD on 2/1/2021.      Boy Galvez PA-C  [facility]  [phone] (office), [phone] (cell)        Interval History:     Patient briefly seen today.  Sitting up in hospital bed.  Feels better today.  Antiemetic medication is helping.  She has questions about follow-up.  She continues to hold her Ibrance and letrozole.              Review of Systems:     The 5 point Review of Systems is negative other than noted in the HPI             Medications:   Scheduled Medications    acetaminophen  975 mg Oral Once      "buprenorphine-naloxone  4 tablet Sublingual BID     enoxaparin ANTICOAGULANT  40 mg Subcutaneous Q24H     fluticasone-vilanterol  1 puff Inhalation Daily     gabapentin  100 mg Oral Daily with lunch     gabapentin  300 mg Oral BID     lidocaine   Topical 4x Daily     magnesium gluconate  500 mg Oral BID     ondansetron  4 mg Oral TID AC     scopolamine  1 patch Transdermal Q72H    And     scopolamine   Transdermal Q8H     venlafaxine  37.5 mg Oral QAM     PRN Medications  albuterol, buprenorphine-naloxone, HYDROmorphone, hydrOXYzine, Lidocaine, LORazepam, prochlorperazine, prochlorperazine, propranolol, senna-docusate               Physical Exam:   Vitals were reviewed  Blood pressure 138/62, pulse 62, temperature 98.4  F (36.9  C), temperature source Oral, resp. rate 18, height 1.626 m (5' 4\"), weight 62.7 kg (138 lb 3.2 oz), SpO2 95 %, not currently breastfeeding.  Wt Readings from Last 4 Encounters:   01/26/22 62.7 kg (138 lb 3.2 oz)   07/15/21 65.8 kg (145 lb 1.6 oz)       I/O last 3 completed shifts:  In: 840 [P.O.:840]  Out: 2250 [Urine:2250]    No formal exam done today.           Data:   All laboratory data and imaging studies reviewed.    CMP  Recent Labs   Lab 01/26/22  0447 01/25/22  0504 01/24/22  0529 01/23/22  0718 01/22/22 2001 01/22/22  0945   NA  --   --   --   --   --  136   POTASSIUM 4.6 4.6 4.5 3.7   < > 2.6*   CHLORIDE  --   --   --   --   --  99   CO2  --   --   --   --   --  25   ANIONGAP  --   --   --   --   --  12   GLC  --   --   --   --   --  140*   BUN  --   --   --   --   --  10   CR  --   --  0.62  --   --  0.84  0.84   GFRESTIMATED  --   --  >90  --   --  76  76   PADDY  --   --   --   --   --  7.8*   MAG  --   --   --   --   --  2.1   PROTTOTAL  --   --   --   --   --  6.7   ALBUMIN  --   --   --   --   --  3.9   BILITOTAL  --   --   --   --   --  0.5   ALKPHOS  --   --   --   --   --  74   AST  --   --   --   --   --  16   ALT  --   --   --   --   --  12    < > = values in this " interval not displayed.     CBC  Recent Labs   Lab 01/25/22  0504 01/22/22  0946   WBC  --  4.6   RBC  --  3.65*   HGB  --  12.2   HCT  --  34.8*   MCV  --  95   MCH  --  33.4*   MCHC  --  35.1   RDW  --  14.6    303     INRNo lab results found in last 7 days.  Data   Results for orders placed or performed during the hospital encounter of 01/22/22 (from the past 24 hour(s))   MR Brain w/o Contrast    Narrative    EXAM: MR BRAIN W/O CONTRAST  LOCATION: [facility]  DATE/TIME: 1/25/2022 8:48 PM    INDICATION: Brain mass or lesion.  COMPARISON: Head CT 07/12/2021.  TECHNIQUE: Routine multiplanar multisequence head MRI without intravenous contrast.    FINDINGS:  INTRACRANIAL CONTENTS: No acute or subacute infarct. No mass, acute hemorrhage, or extra-axial fluid collections. Scattered nonspecific T2/FLAIR hyperintensities within the cerebral white matter most consistent with mild chronic microvascular ischemic   change. Mild generalized cerebral atrophy. No hydrocephalus. Normal position of the cerebellar tonsils.     SELLA: No abnormality accounting for technique.    OSSEOUS STRUCTURES/SOFT TISSUES: Normal marrow signal. The major intracranial vascular flow voids are maintained.     ORBITS: No abnormality accounting for technique.     SINUSES/MASTOIDS: No paranasal sinus mucosal disease. No middle ear or mastoid effusion.       Impression    IMPRESSION:  1.  No acute intracranial process.  2.  Generalized brain atrophy and presumed microvascular ischemic changes as detailed above.   Potassium   Result Value Ref Range    Potassium 4.6 3.5 - 5.0 mmol/L   Extra Tube    Narrative    The following orders were created for panel order Extra Tube.  Procedure                               Abnormality         Status                     ---------                               -----------         ------                     Extra Purple Top Tube[[id number]]                            Final result                  Please view results for these tests on the individual orders.   Extra Purple Top Tube   Result Value Ref Range    Hold Specimen JIC

## 2022-01-26 NOTE — PROGRESS NOTES
[facility] Palliative Care   Social Work Note    Assessment   Follow up visit with Jarrell. She shared about her MRI experience yesterday that caused her to have a panic attack. She was grateful her dtr Rebekah was at the hospital to support her. She also felt the aromatherapy was helpful.     Discussed getting SW/Therapy support through [facility] and gave contact information. During our visit DIANE Brunson from [facility] also joined us. He updated Jarrell that the MRI did not show any cancer in her brain which she was happy to hear. He also confirmed SW support is available to help with resources and therapy. She expressed concern about hair loss today.  She continues to worry about her family and wants to have emotional support in place for all of them. Jarrell will follow up with [facility] in clinic next week. Nausea continues to be the most troubling symptom, but it has improved today. She expressed appreciation for support from Palliative Care team. No other needs at this time.     Clinical Interventions Provided  Active listening  Empathetic presence   Validation of feelings   Assess coping   Emotional support     Plan of care  Palliative Care will sign off at this time. Please contact us if any needs arise. Thank you for involving us in Jarrell's care.     Carina Jay, [facility]  Palliative Care   Office # [phone]

## 2022-01-26 NOTE — PLAN OF CARE
"Patient continues to have intermittent nausea, ondansetron and compazine work well in alternating. Pt alertx4. Bilateral Lower lobes auscultated crackles, upper lobes clear. PRN po lorazepam 0.5 mg did not help patient for MRI, she was not able to complete entire things. She told me she was \"out\" for her last MRI, possible general anesthesia? When she came back from MRI she was shaking and clearly upset, informed me she felt she was having a panic attack. Re-assured her, and she calmed down after 15 minutes. Palliative signed off.     Protocol  K+: 4.6, am recheck, 1/26.    Telemetry reads Normal Sinus Rhythm.        Problem: Pain Acute  Goal: Acceptable Pain Control and Functional Ability  Outcome: No Change  Intervention: Develop Pain Management Plan  Recent Flowsheet Documentation  Taken 1/25/2022 2220 by Martina Justice, RN  Pain Management Interventions:   medication (see MAR)   distraction   emotional support  Taken 1/25/2022 1630 by Martina Justice RN  Pain Management Interventions:   medication (see MAR)   distraction   emotional support       Problem: Electrolyte Imbalance  Goal: Electrolyte Balance  Outcome: Improving     "

## 2022-01-26 NOTE — PROGRESS NOTES
[facility]    PROGRESS NOTE - Hospitalist Service    ASSESSMENT AND PLAN     Active Problems:    Hypokalemia    Nausea and vomiting, intractability of vomiting not specified, unspecified vomiting type    Jarrell Nicole is a 67 year old female with a relevant history metastatic stage IV breast cancer, asthma, hypertension, diverticulitis, RASHAWN, opioid dependence, diverticulitis, and CAD who presents to the ED for evaluation of nausea and vomiting, for 6 days.     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     Hypokalemia- resolved     Breast ca with bone mets, pain   Right-sided breast cancer stage IV with bone metastasis and chronic pain   Stage IV (cTX, cM1, ER+, UT+, HER2-).  Diagnosed on 3/3/2021; incidental finding of the right breast mass during ED evaluation for chest pain.   Following Dr. Blackman.   Pain team consult: suboxone as at pain clinic - 4 mg po twice daily  -home dilaudid 4 mg q4h as needed (per patient report. Receives this from [facility]).-Instructed patient to follow up with Pain clinic regarding as needed opiates.   Continue Gabapentin 300 mg qam, 100 mg midday and 300 mg qpm   Hydroxizine 25-50 mg po TID prn by pain team   MRI brain obtained 1/25 per oncology recommendations.  Patient had a panic attack.   MRI brain reviewed-generalized brain atrophy.  No acute intracranial process   Oncology planning for PET scan as an outpatient.  Continuing to hold palbociclib and letrozole until follow-up.     Abnormal urine drug  screen   Cannabis use   As per record, there is concern of opiate overuse     Barriers to discharge: Improvement in nausea    Anticipated length of stay: 1 more day    Subjective:  Patient is anxious during my interaction with her.  She notes that the Ativan did seem to help her nausea.  No complaints of pain today.  Excitable.    PHYSICAL EXAM  Temp:  [98.1  F (36.7  C)-98.6  F (37  C)] 98.5  F (36.9  C)  Pulse:  [56-71] 57  Resp:  [16-20] 18  BP: (127-167)/(62-70) 127/63  SpO2:  [94 %-99 %] 99 %  Wt Readings from Last 1 Encounters:   01/26/22 62.7 kg (138 lb 3.2 oz)       Intake/Output Summary (Last 24 hours) at 1/26/2022 1414  Last data filed at 1/26/2022 1045  Gross per 24 hour   Intake 840 ml   Output 2250 ml   Net -1410 ml      Body mass index is 23.72 kg/m .    GENRL: Alert and answering questions appropriately. Not in acute respiratory distress. Lying in bed   HEENT: no lymphadenopathy or thyromegaly  CHEST: Clear to auscultation bilaterally. No wheezes, rhonchi or crackles. Breathing easily   HEART: Regular rate and rhythm, S1S2 auscultated. No murmurs  ABDMN: Soft. Non-tender, non-distended. No organomegaly. No guarding or rigidity. Bowel sounds present   EXTRM: No pedal edema, DP pulses 2+.   NEURO: Following commands and moving all extremities.    PSYCH: Anxious and very excitable.  INTGM: No skin rash, no cyanosis or clubbing    PERTINENT LABS/IMAGING:  Results for orders placed or performed during the hospital encounter of 01/22/22   XR Chest Port 1 View    Impression    IMPRESSION: Negative chest.     CT Abdomen Pelvis w/o Contrast    Impression    IMPRESSION:   No findings to account for abdominal pain, nausea, or vomiting     MR Brain w/o Contrast    Impression    IMPRESSION:  1.  No acute intracranial process.  2.  Generalized brain atrophy and presumed microvascular ischemic changes as detailed above.     Most Recent 3 CBC's:Recent Labs   Lab Test 01/25/22  0504 01/22/22  0946 07/17/21  0911  07/14/21  0311   WBC  --  4.6 4.9 6.5   HGB  --  12.2 9.2* 9.3*   MCV  --  95 98 99    303 315 399       No results for input(s): CHOL, HDL, LDL, TRIG, CHOLHDLRATIO in the last 08600 hours.  No results for input(s): LDL in the last 02372 hours.  Recent Labs   Lab Test 01/26/22  0447 01/25/22  0504 01/24/22  0529 01/22/22 2001 01/22/22  0945   NA  --   --   --   --  136   POTASSIUM 4.6   < > 4.5   < > 2.6*   CHLORIDE  --   --   --   --  99   CO2  --   --   --   --  25   GLC  --   --   --   --  140*   BUN  --   --   --   --  10   CR  --   --  0.62  --  0.84  0.84   GFRESTIMATED  --   --  >90  --  76  76   PADDY  --   --   --   --  7.8*    < > = values in this interval not displayed.     No results for input(s): A1C in the last 84540 hours.  Recent Labs   Lab Test 01/22/22  0946 07/17/21  0911 07/14/21 0311   HGB 12.2 9.2* 9.3*     Recent Labs   Lab Test 01/22/22  0945 07/14/21  0311 07/13/21  1638   TROPONINI 0.01 0.22 0.42*     Recent Labs   Lab Test 01/22/22  0946 07/13/21  1034 02/22/21  1256   BNP 44 364* 39     Recent Labs   Lab Test 07/12/21  1959   TSH 0.96     Recent Labs   Lab Test 03/21/21  1703   INR 1.05       Payton Dye DO  [facility]  [phone]

## 2022-01-27 VITALS
RESPIRATION RATE: 18 BRPM | TEMPERATURE: 98.5 F | WEIGHT: 139.4 LBS | BODY MASS INDEX: 23.8 KG/M2 | HEART RATE: 60 BPM | OXYGEN SATURATION: 95 % | HEIGHT: 64 IN | SYSTOLIC BLOOD PRESSURE: 134 MMHG | DIASTOLIC BLOOD PRESSURE: 64 MMHG

## 2022-01-27 LAB
BACTERIA BLD CULT: NO GROWTH
BACTERIA BLD CULT: NO GROWTH
HOLD SPECIMEN: NORMAL
POTASSIUM BLD-SCNC: 4.7 MMOL/L (ref 3.5–5)

## 2022-01-27 PROCEDURE — 250N000013 HC RX MED GY IP 250 OP 250 PS 637: Performed by: INTERNAL MEDICINE

## 2022-01-27 PROCEDURE — 84132 ASSAY OF SERUM POTASSIUM: CPT | Performed by: INTERNAL MEDICINE

## 2022-01-27 PROCEDURE — 250N000013 HC RX MED GY IP 250 OP 250 PS 637: Performed by: CLINICAL NURSE SPECIALIST

## 2022-01-27 PROCEDURE — 99239 HOSP IP/OBS DSCHRG MGMT >30: CPT | Performed by: INTERNAL MEDICINE

## 2022-01-27 PROCEDURE — 250N000011 HC RX IP 250 OP 636: Performed by: CLINICAL NURSE SPECIALIST

## 2022-01-27 PROCEDURE — 36415 COLL VENOUS BLD VENIPUNCTURE: CPT | Performed by: INTERNAL MEDICINE

## 2022-01-27 RX ORDER — PROCHLORPERAZINE MALEATE 5 MG
5 TABLET ORAL EVERY 6 HOURS PRN
Qty: 45 TABLET | Refills: 0 | Status: SHIPPED | OUTPATIENT
Start: 2022-01-27 | End: 2022-04-26

## 2022-01-27 RX ORDER — SCOLOPAMINE TRANSDERMAL SYSTEM 1 MG/1
1 PATCH, EXTENDED RELEASE TRANSDERMAL
Qty: 10 PATCH | Refills: 0 | Status: SHIPPED | OUTPATIENT
Start: 2022-01-29 | End: 2022-03-01

## 2022-01-27 RX ORDER — HYDROMORPHONE HYDROCHLORIDE 4 MG/1
4 TABLET ORAL EVERY 4 HOURS PRN
Qty: 13 TABLET | Refills: 0 | Status: SHIPPED | OUTPATIENT
Start: 2022-01-27 | End: 2022-03-01

## 2022-01-27 RX ORDER — LORAZEPAM 0.5 MG/1
0.5 TABLET ORAL EVERY 4 HOURS PRN
Qty: 13 TABLET | Refills: 0 | Status: SHIPPED | OUTPATIENT
Start: 2022-01-27 | End: 2022-03-29

## 2022-01-27 RX ADMIN — Medication 500 MG: at 08:16

## 2022-01-27 RX ADMIN — VENLAFAXINE 37.5 MG: 37.5 TABLET ORAL at 08:16

## 2022-01-27 RX ADMIN — FLUTICASONE FUROATE AND VILANTEROL TRIFENATATE 1 PUFF: 100; 25 POWDER RESPIRATORY (INHALATION) at 08:15

## 2022-01-27 RX ADMIN — ONDANSETRON 4 MG: 4 TABLET, ORALLY DISINTEGRATING ORAL at 07:09

## 2022-01-27 RX ADMIN — GABAPENTIN 100 MG: 100 CAPSULE ORAL at 11:06

## 2022-01-27 RX ADMIN — LIDOCAINE 2 PATCH: 246 PATCH TOPICAL at 08:16

## 2022-01-27 RX ADMIN — LORAZEPAM 0.5 MG: 0.5 TABLET ORAL at 11:06

## 2022-01-27 RX ADMIN — BUPRENORPHINE HYDROCHLORIDE AND NALOXONE HYDROCHLORIDE DIHYDRATE 4 TABLET: 2; .5 TABLET SUBLINGUAL at 08:16

## 2022-01-27 RX ADMIN — ONDANSETRON 4 MG: 4 TABLET, ORALLY DISINTEGRATING ORAL at 11:06

## 2022-01-27 RX ADMIN — HYDROMORPHONE HYDROCHLORIDE 4 MG: 4 TABLET ORAL at 08:16

## 2022-01-27 ASSESSMENT — ACTIVITIES OF DAILY LIVING (ADL)
ADLS_ACUITY_SCORE: 4

## 2022-01-27 ASSESSMENT — MIFFLIN-ST. JEOR: SCORE: 1157.31

## 2022-01-27 NOTE — PLAN OF CARE
Problem: Adult Inpatient Plan of Care  Goal: Absence of Hospital-Acquired Illness or Injury  Intervention: Prevent Skin Injury  Recent Flowsheet Documentation  Taken 1/27/2022 0030 by Vanessa Hinojosa RN  Body Position:    position changed independently    supine, head elevated     Problem: Adult Inpatient Plan of Care  Goal: Optimal Comfort and Wellbeing  Outcome: No Change   Pt. is aox4. IND in room with all cares and transfers. Lungs clear on RA. Vitals stable. Pt. currently denies pain. Has no c/o of nausea during shift. Slept most of the night.

## 2022-01-27 NOTE — PLAN OF CARE
Pt c/o continous nausea. Gave scheduled Zofran.  Place Seabands to try. Pt had good relief from the sea bands. Pt stated that she was not having any nausea around 7pm.

## 2022-01-27 NOTE — DISCHARGE SUMMARY
[facility]  Hospitalist Discharge Summary      Date of Admission:  1/22/2022  Date of Discharge:  1/27/2022 12:25 PM  Discharging Provider: Payton Dye DO  Discharge Service: Hospitalist Service    Discharge Diagnoses   Intractable nausea and vomiting  Anxiety  Breast cancer with bone metastases  Chronic pain  Hypokalemia    Follow-ups Needed After Discharge   Follow-up Appointments     Follow-up and recommended labs and tests       Follow up with primary care provider, Radha Oakley, within 7 days for   hospital follow- up.  Follow up with oncology as scheduled           Unresulted Labs Ordered in the Past 30 Days of this Admission     No orders found from 12/23/2021 to 1/23/2022.        Discharge Disposition   Discharged to home  Condition at discharge: Stable      Hospital Course   Jarrell Nicole is a 67 year old female with a relevant history metastatic stage IV breast cancer, asthma, hypertension, diverticulitis, RASHAWN, opioid dependence, diverticulitis, and CAD who presents to the ED for evaluation of nausea and vomiting, for 6 days.     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   Discharged with prescriptions for Ativan and Dilaudid-13 tabs per patient request     Hypokalemia- resolved      Breast ca with bone mets, pain              Right-sided breast cancer stage IV with bone metastasis and chronic pain              Stage IV (cTX, cM1, ER+, NE+, HER2-).   Diagnosed on 3/3/2021; incidental finding of the right breast mass during ED evaluation for chest pain.              Following Dr. Blackman.              Pain team consult: suboxone as at pain clinic - 4 mg po twice daily  -home dilaudid 4 mg q4h as needed (per patient report. Receives this from [facility]).-Instructed patient to follow up with Pain clinic regarding as needed opiates.              Continue Gabapentin 300 mg qam, 100 mg midday and 300 mg qpm              Hydroxizine 25-50 mg po TID prn by pain team              MRI brain obtained 1/25 per oncology recommendations.  Patient had a panic attack.              MRI brain reviewed-generalized brain atrophy.  No acute intracranial process              Oncology planning for PET scan as an outpatient.  Continuing to hold palbociclib and letrozole until follow-up.     Abnormal urine drug screen              Cannabis use              As per record, there is concern of opiate overuse       Consultations This Hospital Stay   SOCIAL WORK IP CONSULT  PAIN MANAGEMENT ADULT IP CONSULT  PALLIATIVE CARE ADULT IP CONSULT  ACUPUNCTURE IP CONSULT  HEMATOLOGY & ONCOLOGY IP CONSULT    Code Status   Full Code    Time Spent on this Encounter   I, Payton Dye DO, personally saw the patient today and spent greater than 30 minutes discharging this patient.       Payton Dye DO  [facility]  [address] [place] [id number]  Phone: [phone]  Fax: [phone]  ______________________________________________________________________    Physical Exam   Vital Signs: Temp: 98.5  F (36.9  C) Temp src: Oral BP: 134/64 Pulse: 60   Resp: 18 SpO2: 95 % O2 Device: None (Room air)    Weight: 139 lbs 6.4 oz  GENRL: Alert and answering questions appropriately. Not in acute respiratory distress. Lying in bed   HEENT: no lymphadenopathy or thyromegaly  CHEST: Clear to auscultation bilaterally. No wheezes, rhonchi or crackles.  Breathing easily   HEART: Regular rate and rhythm, S1S2 auscultated. No murmurs  ABDMN: Soft. Non-tender, non-distended. No organomegaly. No guarding or rigidity. Bowel sounds present   EXTRM: No pedal edema, DP pulses 2+.   NEURO: Following commands and moving all extremities.    PSYCH: Anxious and very excitable.  INTGM: No skin rash, no cyanosis or clubbing       Primary Care Physician   Radha Oakley    Discharge Orders      Acupuncture Referral      Reason for your hospital stay    Intractable nausea and vomiting     Follow-up and recommended labs and tests     Follow up with primary care provider, Radha Oakley, within 7 days for hospital follow- up.  Follow up with oncology as scheduled     Activity    Your activity upon discharge: activity as tolerated     Diet    Follow this diet upon discharge: Orders Placed This Encounter      Regular Diet Adult       Significant Results and Procedures   Most Recent 3 CBC's:Recent Labs   Lab Test 01/25/22  0504 01/22/22  0946 07/17/21  0911 07/14/21  0311   WBC  --  4.6 4.9 6.5   HGB  --  12.2 9.2* 9.3*   MCV  --  95 98 99    303 315 399     Most Recent 3 BMP's:Recent Labs   Lab Test 01/27/22  0450 01/26/22  0447 01/25/22  0504 01/24/22  0529 01/22/22 2001 01/22/22  0945 09/20/21  1133 07/17/21  0911 07/16/21  0705   NA  --   --   --   --   --  136  --  136 137   POTASSIUM 4.7 4.6 4.6 4.5   < > 2.6*  --  3.7 3.5   CHLORIDE  --   --   --   --   --  99  --  103 105   CO2  --   --   --   --   --  25  --  23 24   BUN  --   --   --   --   --  10  --  7* 8   CR  --   --   --  0.62  --  0.84  0.84  --  0.74 0.71   ANIONGAP  --   --   --   --   --  12  --  10 8   PADDY  --   --   --   --   --  7.8*  --  8.1* 7.4*   GLC  --   --   --   --   --  140* 132* 172* 98    < > = values in this interval not displayed.     Most Recent 2 LFT's:Recent Labs   Lab Test 01/22/22  0945 07/17/21  0911   AST 16 23   ALT 12 17   ALKPHOS 74 79   BILITOTAL 0.5 0.3   ,   Results for orders  placed or performed during the hospital encounter of 01/22/22   XR Chest Port 1 View    Narrative    EXAM: XR CHEST PORTABLE 1 VIEW  LOCATION: [facility]  DATE/TIME: 01/22/2022, 9:47 AM    INDICATION: Shortness of breath.  COMPARISON: 07/12/2021.      Impression    IMPRESSION: Negative chest.     CT Abdomen Pelvis w/o Contrast    Narrative    EXAM: CT ABDOMEN PELVIS W/O CONTRAST  LOCATION: [facility]  DATE/TIME: 1/22/2022 12:57 PM    INDICATION: Abdominal pain, acute, nonlocalized Nausea/vomiting  COMPARISON: 11/3/2021  TECHNIQUE: CT scan of the abdomen and pelvis was performed without IV contrast. Multiplanar reformats were obtained. Dose reduction techniques were used.  CONTRAST: None.    FINDINGS:   LOWER CHEST: Normal.    HEPATOBILIARY: Cholecystectomy with bile duct dilation from reservoir effect.    PANCREAS: Normal.    SPLEEN: Diminutive.    ADRENAL GLANDS: Normal.    KIDNEYS/BLADDER: Normal. No urinary stones or hydroureter.    BOWEL: Scattered colonic diverticula. No diverticulitis. No evidence of appendicitis.    LYMPH NODES: Normal.    VASCULATURE: Moderate calcified atherosclerosis.    PELVIC ORGANS: Hysterectomy. Pelvic phleboliths.    MUSCULOSKELETAL: Stable scattered sclerotic skeletal metastases. Calcified injection granuloma left buttock.      Impression    IMPRESSION:   No findings to account for abdominal pain, nausea, or vomiting     MR Brain w/o Contrast    Narrative    EXAM: MR BRAIN W/O CONTRAST  LOCATION: [facility]  DATE/TIME: 1/25/2022 8:48 PM    INDICATION: Brain mass or lesion.  COMPARISON: Head CT 07/12/2021.  TECHNIQUE: Routine multiplanar multisequence head MRI without intravenous contrast.    FINDINGS:  INTRACRANIAL CONTENTS: No acute or subacute infarct. No mass, acute hemorrhage, or extra-axial fluid collections. Scattered nonspecific T2/FLAIR hyperintensities within the cerebral white matter most consistent  with mild chronic microvascular ischemic   change. Mild generalized cerebral atrophy. No hydrocephalus. Normal position of the cerebellar tonsils.     SELLA: No abnormality accounting for technique.    OSSEOUS STRUCTURES/SOFT TISSUES: Normal marrow signal. The major intracranial vascular flow voids are maintained.     ORBITS: No abnormality accounting for technique.     SINUSES/MASTOIDS: No paranasal sinus mucosal disease. No middle ear or mastoid effusion.       Impression    IMPRESSION:  1.  No acute intracranial process.  2.  Generalized brain atrophy and presumed microvascular ischemic changes as detailed above.       Discharge Medications   Discharge Medication List as of 1/27/2022 10:08 AM      START taking these medications    Details   HYDROmorphone (DILAUDID) 4 MG tablet Take 1 tablet (4 mg) by mouth every 4 hours as needed for moderate to severe pain, Disp-13 tablet, R-0, E-Prescribe      LORazepam (ATIVAN) 0.5 MG tablet Take 1 tablet (0.5 mg) by mouth every 4 hours as needed for anxiety, nausea or vomiting, Disp-13 tablet, R-0, E-Prescribe      prochlorperazine (COMPAZINE) 5 MG tablet Take 1 tablet (5 mg) by mouth every 6 hours as needed for nausea or vomiting, Disp-45 tablet, R-0, E-Prescribe      scopolamine (TRANSDERM) 1 MG/3DAYS 72 hr patch Place 1 patch onto the skin every 72 hours, Disp-10 patch, R-0, E-Prescribe         CONTINUE these medications which have NOT CHANGED    Details   albuterol (PROAIR HFA;PROVENTIL HFA;VENTOLIN HFA) 90 mcg/actuation inhaler Inhale 1-2 puffs into the lungs every 4 hours as needed for shortness of breath / dyspnea or wheezing , HistoricalMay substitute the equivalent medication per insurance preference.      ascorbic acid, vitamin C, (VITAMIN C) 1000 MG tablet [ASCORBIC ACID, VITAMIN C, (VITAMIN C) 1000 MG TABLET] Take 1,000 mg by mouth daily., Historical      B,C/folic/zinc/copper ox/vit E (STRESS B-COMPLEX ORAL) [B,C/FOLIC/ZINC/COPPER OX/VIT E (STRESS B-COMPLEX  ORAL)] Take 1 tablet by mouth daily. Stress supplement, Historical      buprenorphine HCl-naloxone HCl (SUBOXONE) 4-1 MG per film Place 1-2 Film under the tongue daily Prn for pain., Disp-60 Film, R-0, E-PrescribeNADEAN: [id number]      buprenorphine HCl-naloxone HCl (SUBOXONE) 8-2 MG per film Place 1 Film under the tongue 2 times daily Take first thing in am and at bedtime - with additional 4 mg dose at midday., Disp-60 Film, R-0, E-PrescribeNADEAN: [id number]      cetirizine (ZYRTEC) 10 MG tablet [CETIRIZINE (ZYRTEC) 10 MG TABLET] Take 10 mg by mouth daily., Historical      cholecalciferol 125 MCG (5000 UT) CAPS Take 1 capsule (5,000 Units) by mouth daily, Disp-90 capsule, R-0, E-Prescribe      docusate sodium (COLACE) 100 MG capsule TAKE 1 CAPSULE(100 MG) BY MOUTH TWICE DAILY, Disp-180 capsule, R-0, E-Prescribe      ferrous sulfate (FEROSUL) 325 (65 Fe) MG tablet Take 1 tablet (325 mg) by mouth daily (with breakfast), Disp-30 tablet, R-1, E-Prescribe      fluticasone propionate (FLONASE) 50 mcg/actuation nasal spray [FLUTICASONE PROPIONATE (FLONASE) 50 MCG/ACTUATION NASAL SPRAY] 1 spray into each nostril daily. , Historical      !! gabapentin (NEURONTIN) 100 MG capsule Take 1 capsule (100 mg) by mouth daily (with lunch) Take 1 at Midday, Disp-90 capsule, R-0, E-Prescribe      !! gabapentin (NEURONTIN) 300 MG capsule Take 300 mg by mouth 2 times daily , Historical      hydrOXYzine (VISTARIL) 25 MG capsule Take 1-2 capsules (25-50 mg) by mouth 3 times daily as needed for anxiety, Disp-150 capsule, R-1, E-Prescribe      Lidocaine (LIDOCARE) 4 % Patch Place 2 patches onto the skin every 24 hours To prevent lidocaine toxicity, patient should be patch free for 12 hrs daily.Disp-30 patch, [id number]      melatonin 10 mg Tab Take 10 mg by mouth nightly as needed for sleep , Historical      polyethylene glycol (MIRALAX) 17 GM/Dose powder Take 17 g (1 capful) by mouth daily as needed for constipation, Disp-850 g, R-0,  E-Prescribe      propranoloL (INDERAL) 10 MG tablet Take 10 mg by mouth 3 times daily as needed (anxiety or palpitations) , Historical      SYMBICORT 80-4.5 mcg/actuation inhaler Inhale 2 puffs into the lungs 2 times daily , GANESH, Historical      venlafaxine (EFFEXOR) 37.5 MG tablet [VENLAFAXINE (EFFEXOR) 37.5 MG TABLET] Take 37.5 mg by mouth every morning., Historical      vitamin E 100 UNIT capsule [VITAMIN E 100 UNIT CAPSULE] Take 500 Units by mouth 2 (two) times a week., Historical      hydrOXYzine (ATARAX) 10 MG tablet Take 1 tablet (10 mg) by mouth 3 times daily as needed for anxiety, Disp-90 tablet, R-0, E-Prescribe      ondansetron (ZOFRAN-ODT) 4 MG ODT tab Take 1 tablet (4 mg) by mouth every 6 hours as needed for nausea or vomiting, Disp-12 tablet, R-0, E-Prescribe       !! - Potential duplicate medications found. Please discuss with provider.      STOP taking these medications       amLODIPine (NORVASC) 5 MG tablet Comments:   Reason for Stopping:         letrozole (FEMARA) 2.5 MG tablet Comments:   Reason for Stopping:         palbociclib (IBRANCE) 125 MG capsule Comments:   Reason for Stopping:             Allergies   Allergies   Allergen Reactions     Codeine Hives     Iodinated Contrast Media [Diagnostic X-Ray Materials] Unknown     Heart stopped and sick for multiple days after     Penicillins Swelling     Throat swelling as an infant     Tylenol [Acetaminophen] Other (See Comments)     With codeine only .Pt states gives her upset stomach for days.

## 2022-01-28 ENCOUNTER — PATIENT OUTREACH (OUTPATIENT)
Dept: CARE COORDINATION | Facility: CLINIC | Age: 67
End: 2022-01-28
Payer: COMMERCIAL

## 2022-01-28 DIAGNOSIS — Z71.89 OTHER SPECIFIED COUNSELING: ICD-10-CM

## 2022-01-28 NOTE — PROGRESS NOTES
Clinic Care Coordination Contact  [facility]/Voicemail    Clinical Data: Care Coordinator Outreach  Outreach attempted x 1. Left message on patient's voicemail with call back information and requested return call.     Plan:Care Coordinator will try to reach patient again in 1-2 business days.    HILARY Lenz  [phone]  [facility]

## 2022-01-29 ENCOUNTER — PATIENT OUTREACH (OUTPATIENT)
Dept: CARE COORDINATION | Facility: CLINIC | Age: 67
End: 2022-01-29
Payer: COMMERCIAL

## 2022-01-29 NOTE — PROGRESS NOTES
Clinic Care Coordination Contact  [facility]/Voicemail       Clinical Data: Care Coordinator Outreach  Outreach attempted x 2.  Left message on patient's voicemail with call back information and requested return call.  Plan:  Care Coordinator will do no further outreaches at this time.    Yudi Trivedi  Community Health Worker  [facility]  [phone]

## 2022-02-01 ENCOUNTER — VIRTUAL VISIT (OUTPATIENT)
Dept: BEHAVIORAL HEALTH | Facility: CLINIC | Age: 67
End: 2022-02-01
Payer: COMMERCIAL

## 2022-02-01 DIAGNOSIS — G89.3 CHRONIC PAIN DUE TO NEOPLASM: Chronic | ICD-10-CM

## 2022-02-01 DIAGNOSIS — F41.1 GAD (GENERALIZED ANXIETY DISORDER): ICD-10-CM

## 2022-02-01 DIAGNOSIS — F11.221 OPIOID DEPENDENCE WITH INTOXICATION DELIRIUM (H): Primary | ICD-10-CM

## 2022-02-01 PROCEDURE — 99214 OFFICE O/P EST MOD 30 MIN: CPT | Mod: 95 | Performed by: FAMILY MEDICINE

## 2022-02-01 RX ORDER — VENLAFAXINE 75 MG/1
75 TABLET ORAL EVERY MORNING
Qty: 30 TABLET | Refills: 1 | Status: SHIPPED | OUTPATIENT
Start: 2022-02-01 | End: 2022-02-24

## 2022-02-01 RX ORDER — BUPRENORPHINE HYDROCHLORIDE AND NALOXONE HYDROCHLORIDE DIHYDRATE 8; 2 MG/1; MG/1
TABLET SUBLINGUAL
Qty: 90 TABLET | Refills: 0 | Status: SHIPPED | OUTPATIENT
Start: 2022-02-01 | End: 2022-03-01

## 2022-02-01 ASSESSMENT — ANXIETY QUESTIONNAIRES
3. WORRYING TOO MUCH ABOUT DIFFERENT THINGS: SEVERAL DAYS
1. FEELING NERVOUS, ANXIOUS, OR ON EDGE: NEARLY EVERY DAY
6. BECOMING EASILY ANNOYED OR IRRITABLE: SEVERAL DAYS
IF YOU CHECKED OFF ANY PROBLEMS ON THIS QUESTIONNAIRE, HOW DIFFICULT HAVE THESE PROBLEMS MADE IT FOR YOU TO DO YOUR WORK, TAKE CARE OF THINGS AT HOME, OR GET ALONG WITH OTHER PEOPLE: EXTREMELY DIFFICULT
7. FEELING AFRAID AS IF SOMETHING AWFUL MIGHT HAPPEN: SEVERAL DAYS
GAD7 TOTAL SCORE: 13
4. TROUBLE RELAXING: NEARLY EVERY DAY
2. NOT BEING ABLE TO STOP OR CONTROL WORRYING: SEVERAL DAYS
5. BEING SO RESTLESS THAT IT IS HARD TO SIT STILL: NEARLY EVERY DAY

## 2022-02-01 NOTE — PROGRESS NOTES
This video/telephone visit will be conducted via a call between you and your physician/provider. We have found that certain health care needs can be provided without the need for an in-person physical exam. This service lets us provide the care you need with a video /telephone conversation. If a prescription is necessary we can send it directly to your pharmacy. If lab work is needed we can place an order for that and you can then stop by our lab to have the test done at a later time.    Just as we bill insurance for in-person visits, we also bill insurance for video/telephone visits. If you have questions about your insurance coverage, we recommend that you speak with your insurance company.    Patient has given verbal consent for video/Telephone visit? Yes    Patient would like video visit, please connect : Text message to [phone]  Reason for visit: Intake  Patient verified allergies, medications and pharmacy via telephone.   MOHINDER-7 scores:    MOHINDER-7 SCORE 2/1/2022   Total Score 13     PHQ-9 declined, PHQ-2 score:  0    Patient states she  is ready for visit.  [place]  to be reviewed by provider.     Rosemarie Corado CMA February 1, 2022 2:01 PM

## 2022-02-01 NOTE — PROGRESS NOTES
"     [facility] [place]    A/P                                                    ASSESSMENT/PLAN    1. Opioid dependence with intoxication delirium (H)  2. Chronic pain due to neoplasm  We discussed her challenges with pain control.  She has stage IV metastatic breast cancer with bony mets which are source of significant pain which impacts her quality of life.  She expresses that her ability to be mobile and do \"normal\" things is very important to her.  She finds her current medication regimen to be working well.  She will continue buprenorphine 16-24mg/day as ordered, changed to tablets per her preference.  Encouraged her to use Suboxone as first line for pain management and only using dilaudid if Suboxone is not effective.  She will follow-up with her oncologist regarding dilaudid and pain control.  I began to discuss with her that if, in the future, she is requiring dilaudid regularly, then it may be worth considering stopping Suboxone and having her transition to full opioid agonist.  I advised her that I am happy to speak with her oncologist about pain control plans in the future.    - buprenorphine-naloxone (SUBOXONE) 8-2 MG SUBL sublingual tablet; Place 1 tablet under the tongue 2 times daily. May also place 0.5-1 tablets daily as needed for severe pain.  Dispense: 90 tablet; Refill: 0    3. MOHINDER (generalized anxiety disorder)  Anxiety is very high, she is not depressed.  She has been on Effexor at same dose for years.  I asked her to discuss increasing this dose with her PCP however she requests to make change today instead of waiting since anxiety is so high which is reasonable.  She was discharged with prescription for lorazepam to help with anxiety and nausea.  She has experienced unintentional opioid and benzodiazepine overdose in the past.  Ongoing use of benzodiazepines bring risk of another unintentional overdose.  Certainly, risks vs benefits in setting of terminal illness and goals " of care is an important consideration as well.  She has upcoming visit with her oncologist on 2/8 and PCP on 2/9.   - venlafaxine (EFFEXOR) 75 MG tablet; Take 1 tablet (75 mg) by mouth every morning  Dispense: 30 tablet; Refill: 1      PDMP Review       Value Time User    State PDMP site checked  Yes 2/1/2022  2:30 PM Haley Wong,             RTC  Return in 4 weeks (on 3/1/2022) for Follow up, with me, using a video visit - 3/1 @ 1pm.    She will call sooner if needed.        SUBJECTIVE                                                    Jarrell Nicole is a 67 year old female with PMHx of stage IV breast cancer w/ bony metastases, asthma, HTN, diverticulitis, anxiety, opioid dependence, and history of unintentional opioid and benzodiazepine overdose (July 2021) who presents to clinic today for follow up.  Transferring care from Dr Rodriguez.      Visit performed Virtual, via telephone    Phone call duration:  30 minutes    Brief history (from chart review):  Patient was initially seen by addiction medicine service when admitted for encephalopathy 2/2 unintentional overdose of opiates and benzodiazepines in July 2021.  She was diagnosed with metastatic breast cancer in March 2021 and was taking opioids since April 2021 for cancer related pain.  She also has long standing history of anxiety and had been on benzodiazepines for 7-8 years prior to hospitalization in July 2021 at which time she was weaned off with phenobarbital taper.        Plan from most recent office visit (1/5/22 w/ Dr Rodriguez):  1.  Unintentional overdose on opiates and BDZ - July 2021 -  - has been on Suboxone since and dose has gradually increasaed, so that now she is taking 20 - 24 mg per day.   However, has breast cancer with bone metastasis, so likely needs this amount.    Very possible that at some point, she may need full opioid agonists to manage her pain.   However, will continue to manage with Suboxone for as long as we can.   Given  Rx for 8 mg bid and 4 mg 1 - 2 times per day.  (She wants to have the flexiibility to use less on days when pain is not as bad,         2.   Nausea and Vomiting - likely related to oral chemotherapy.   Zofran and compazine have not been effective.   Agree that Medical cannabis may be helpful for her chronic nausea.  Will contact PA at the Oncology clinic who evalyuated her for same      3.   Chronic prescription benzodiazepine use.  - None since hospitalized in July, although she reports her anxiety is intermittently bad - on gabapentin, and hydroxyzine prn .   Would benefit from therapy with someone who works with patients with terminal illnesses.        4.   Stage 4 breast cancer with primary in R breast - followed by [facility] ([facility]),.   On chemotherapy.         Plan:     Rx for Suboxone 8 mg bid and 4a- 8  mg mid-day.  No benzodiazepines.  Increase hydroxyzine to 25 - 50 mg tid prn.     Agree with gabapentin 300 mg bid and 100 mg q midday.    OK with her starting Medical Cannabis.  I will contact the provider at the Oncology Clinic.      TODAY'S VISIT  HPI Feb 1, 2022  -  Was recently hospitalized at [facility] for vomiting (w/ significant electrolyte abnormalities) and uncontrolled pain  -  When in hospital she felt like the Suboxone tablets dissolved more easily and she prefers these if possible, does not like taste  - Working on medical cannabis certification  - Scopolamine patch very helpful for nausea  - Taking lorazepam 0.5mg for anxiety and nausea is gone  - Also continues to take Effexor 37.5mg daily  - Plans to continue acupuncture - helpful for anxiety and fear  - Continues to take Effexor 37.5mg daily (appears she has been on this dose for a long time) - denies feeling depressed, just incredible anxiety  - reports taking buprenorphine 8mg in the morning and evening plus additional 4-8mg mid-day with dose of dilaudid for breakthrough pain has been very effective  - reports  she is out of buprenorphine, found 2 films while on phone with me - I was concerned that she has been taking full opioid agonists without buprenorphine and risk of precipitated withdrawal.  As I was explaining this and trying to determine timing of last dose of dilaudid (which was about 16 hours ago), she did take a dose of Suboxone and felt fine.    - has PET scan 2/3 and then sees oncology 2/8    Suboxone 4-1mg #60 last filled on 1/5/22  Suboxone 8-2mg #60 last filled on 12/21/21    OBJECTIVE                                                    PHYSICAL EXAM:  There were no vitals taken for this visit.    RESP: No respiratory distress, no coughing or wheezing, speaking in full sentences  MENTAL STATUS EXAM  Speech: Normal  Mood/Affect: anxiety, tearful at times (appropriate)  Insight: Fair    LAB  No results found for any visits on 02/01/22.      HISTORY                                                    Problem list reviewed & adjusted, as indicated.  Patient Active Problem List   Diagnosis     Malignant neoplasm of lower-outer quadrant of right breast of female, estrogen receptor positive (H)     Asthma     Combined forms of age-related cataract of right eye     Diverticulitis of large intestine without perforation or abscess without bleeding     Episodic mood disorder (H)     Hyperlipidemia     Pain medication agreement     Panic disorder without agoraphobia     Post traumatic stress disorder     Dehydration     Encephalopathy     Elevated troponin level not due to acute coronary syndrome     Acute kidney injury (H)     Chronic pain due to neoplasm     Opioid dependence with intoxication delirium (H)     Hypokalemia     Non-traumatic rhabdomyolysis     Essential hypertension     Anemia     Nausea and vomiting, intractability of vomiting not specified, unspecified vomiting type         MEDICATION LIST (prior to visit)  albuterol (PROAIR HFA;PROVENTIL HFA;VENTOLIN HFA) 90 mcg/actuation inhaler, Inhale 1-2 puffs  into the lungs every 4 hours as needed for shortness of breath / dyspnea or wheezing   ascorbic acid, vitamin C, (VITAMIN C) 1000 MG tablet, [ASCORBIC ACID, VITAMIN C, (VITAMIN C) 1000 MG TABLET] Take 1,000 mg by mouth daily.  B,C/folic/zinc/copper ox/vit E (STRESS B-COMPLEX ORAL), [B,C/FOLIC/ZINC/COPPER OX/VIT E (STRESS B-COMPLEX ORAL)] Take 1 tablet by mouth daily. Stress supplement  cetirizine (ZYRTEC) 10 MG tablet, [CETIRIZINE (ZYRTEC) 10 MG TABLET] Take 10 mg by mouth daily.  cholecalciferol 125 MCG (5000 UT) CAPS, Take 1 capsule (5,000 Units) by mouth daily  docusate sodium (COLACE) 100 MG capsule, TAKE 1 CAPSULE(100 MG) BY MOUTH TWICE DAILY  ferrous sulfate (FEROSUL) 325 (65 Fe) MG tablet, Take 1 tablet (325 mg) by mouth daily (with breakfast)  fluticasone propionate (FLONASE) 50 mcg/actuation nasal spray, [FLUTICASONE PROPIONATE (FLONASE) 50 MCG/ACTUATION NASAL SPRAY] 1 spray into each nostril daily.   gabapentin (NEURONTIN) 100 MG capsule, Take 1 capsule (100 mg) by mouth daily (with lunch) Take 1 at Midday  gabapentin (NEURONTIN) 300 MG capsule, Take 300 mg by mouth 2 times daily   hydrOXYzine (ATARAX) 10 MG tablet, Take 1 tablet (10 mg) by mouth 3 times daily as needed for anxiety  hydrOXYzine (VISTARIL) 25 MG capsule, Take 1-2 capsules (25-50 mg) by mouth 3 times daily as needed for anxiety  Lidocaine (LIDOCARE) 4 % Patch, Place 2 patches onto the skin every 24 hours To prevent lidocaine toxicity, patient should be patch free for 12 hrs daily.  LORazepam (ATIVAN) 0.5 MG tablet, Take 1 tablet (0.5 mg) by mouth every 4 hours as needed for anxiety, nausea or vomiting  melatonin 10 mg Tab, Take 10 mg by mouth nightly as needed for sleep   ondansetron (ZOFRAN-ODT) 4 MG ODT tab, Take 1 tablet (4 mg) by mouth every 6 hours as needed for nausea or vomiting  polyethylene glycol (MIRALAX) 17 GM/Dose powder, Take 17 g (1 capful) by mouth daily as needed for constipation  prochlorperazine (COMPAZINE) 5 MG tablet,  Take 1 tablet (5 mg) by mouth every 6 hours as needed for nausea or vomiting  propranoloL (INDERAL) 10 MG tablet, Take 10 mg by mouth 3 times daily as needed (anxiety or palpitations)   scopolamine (TRANSDERM) 1 MG/3DAYS 72 hr patch, Place 1 patch onto the skin every 72 hours  SYMBICORT 80-4.5 mcg/actuation inhaler, Inhale 2 puffs into the lungs 2 times daily   vitamin E 100 UNIT capsule, [VITAMIN E 100 UNIT CAPSULE] Take 500 Units by mouth 2 (two) times a week.  HYDROmorphone (DILAUDID) 4 MG tablet, Take 1 tablet (4 mg) by mouth every 4 hours as needed for moderate to severe pain (Patient not taking: Reported on 2/1/2022)  [DISCONTINUED] ipratropium-albuteroL (DUO-NEB) 0.5-2.5 mg/3 mL nebulizer, [IPRATROPIUM-ALBUTEROL (DUO-NEB) 0.5-2.5 MG/3 ML NEBULIZER] Take 3 mL by nebulization 4 (four) times a day as needed.    No current facility-administered medications on file prior to visit.      MEDICATION LIST (after visit)  Current Outpatient Medications   Medication     albuterol (PROAIR HFA;PROVENTIL HFA;VENTOLIN HFA) 90 mcg/actuation inhaler     ascorbic acid, vitamin C, (VITAMIN C) 1000 MG tablet     B,C/folic/zinc/copper ox/vit E (STRESS B-COMPLEX ORAL)     buprenorphine-naloxone (SUBOXONE) 8-2 MG SUBL sublingual tablet     cetirizine (ZYRTEC) 10 MG tablet     cholecalciferol 125 MCG (5000 UT) CAPS     docusate sodium (COLACE) 100 MG capsule     ferrous sulfate (FEROSUL) 325 (65 Fe) MG tablet     fluticasone propionate (FLONASE) 50 mcg/actuation nasal spray     gabapentin (NEURONTIN) 100 MG capsule     gabapentin (NEURONTIN) 300 MG capsule     hydrOXYzine (ATARAX) 10 MG tablet     hydrOXYzine (VISTARIL) 25 MG capsule     Lidocaine (LIDOCARE) 4 % Patch     LORazepam (ATIVAN) 0.5 MG tablet     melatonin 10 mg Tab     ondansetron (ZOFRAN-ODT) 4 MG ODT tab     polyethylene glycol (MIRALAX) 17 GM/Dose powder     prochlorperazine (COMPAZINE) 5 MG tablet     propranoloL (INDERAL) 10 MG tablet     scopolamine (TRANSDERM) 1  MG/3DAYS 72 hr patch     SYMBICORT 80-4.5 mcg/actuation inhaler     venlafaxine (EFFEXOR) 75 MG tablet     vitamin E 100 UNIT capsule     HYDROmorphone (DILAUDID) 4 MG tablet     No current facility-administered medications for this visit.         Allergies   Allergen Reactions     Codeine Hives     Iodinated Contrast Media [Diagnostic X-Ray Materials] Unknown     Heart stopped and sick for multiple days after     Penicillins Swelling     Throat swelling as an infant     Tylenol [Acetaminophen] Other (See Comments)     With codeine only .Pt states gives her upset stomach for days.     Haley Wong, [facility]  [facility]  [phone]

## 2022-02-01 NOTE — PROGRESS NOTES
Medications Phoned  to Pharmacy [x] yes []no    List Medications: Venlafaxine 75mg, Suboxone 8-2 mg tablet   Medications ordered this visit were e-scribed.  Verified by order class [x] yes  [] no    Medication changes or discontinuations were communicated to patient's pharmacy: [] yes  [x] no     Dictation completed at time of chart check: [] yes  [x] no     I have checked the documentation for today s encounters and the above information has been reviewed and completed.        Rosemarie Corado, NIKOLAY February 1, 2022 3:46 PM

## 2022-02-01 NOTE — PATIENT INSTRUCTIONS
Continue Suboxone.  I changed your prescription from films to tablets since you felt the tablets dissolved more easily while you were in the hospital.  Take 1 tablet in the morning and 1 in the evening.  Take 1/2 to 1 tablet as needed for pain mid-day as you were previously.    Continue to work with your oncologist regarding additional pain medications.  I am happy to discuss with your oncologist as well.  We may at some point find that we need to stop Suboxone so you can be treated with more traditional opioids if pain control becomes more challenging.     Increase Effexor to 75mg daily - higher doses are often needed for anxiety so this is a reasonable change.    A prescription has been sent to your pharmacy of choice.    If a prior authorization is required it may take several days to get your medication.    Please make sure your pharmacy had your contact information so they can contact you when it is ready to .  Please contact your pharmacy to see if your medication is ready to be picked up.     You are encouraged to have some type of recovery program in addition to medication treatment.    Medication alone is generally not enough to lead to long term recovery.    This may include having some type of sober network, avoiding isolating, avoiding triggers (people, places, things you associate with using substances and help with managing cravings)    Options for support include :         AA Intergroup service office in [facility]:    www.[email or web address]    [phone]  Narcotics Anonymous   www.[employer].org    7-508-395-9329   Smart Recovery   www.[employer] App  Women for Sobriety   www.womenforsobriety.org  Celebrate Recovery   www.[employer]   (Protestant centered recovery).   [facility] ([facility])  [facility] connects people seeking recovery to resources that help foster and sustain long-term recovery.  Whether you are seeking resources for treatment, transportation,  housing, job training, education, health care or other pathways to recovery, [facility] is a great place to start.  (www.[facility].org)   [phone]  [facility] and local community support groups  Mental health counseling   -we can assist with referral     You are strongly recommended abstaining from alcohol, benzodiazepines, cannabis, opioids and other drugs of abuse.     Using these in combination with Buprenorphine can raise your risk of overdose and death    Contact Us:   [facility] [phone] or by [employer]      If you cannot make your appointment please call the office and reschedule immediately.     If a refill or bridge is needed it is important to call in advance so you do not run out of medications.   We will make every effort to manage your request promptly but please be aware that it may take up to one business day for your refill request to be processed.   If there is a concern with your request we will contact you.  Otherwise, please contact your pharmacy directly to see if your prescription is ready for .     Our clinic is open from Monday-Friday 8:00am-4:30pm and there is not an On Call after hours service.   If medical care is needed after hours or on the weekend you will need to contact your primary care physician or go to an Urgent Care or ER.

## 2022-02-02 ASSESSMENT — ANXIETY QUESTIONNAIRES: GAD7 TOTAL SCORE: 13

## 2022-02-03 ENCOUNTER — HOSPITAL ENCOUNTER (OUTPATIENT)
Dept: PET IMAGING | Facility: HOSPITAL | Age: 67
Discharge: HOME | End: 2022-02-03
Attending: PHYSICIAN ASSISTANT | Admitting: PHYSICIAN ASSISTANT
Payer: COMMERCIAL

## 2022-02-03 DIAGNOSIS — C50.111 MALIGNANT NEOPLASM OF CENTRAL PORTION OF RIGHT FEMALE BREAST (H): ICD-10-CM

## 2022-02-03 LAB — GLUCOSE BLDC GLUCOMTR-MCNC: 113 MG/DL (ref 70–99)

## 2022-02-03 PROCEDURE — 71260 CT THORAX DX C+: CPT | Mod: 26 | Performed by: STUDENT IN AN ORGANIZED HEALTH CARE EDUCATION/TRAINING PROGRAM

## 2022-02-03 PROCEDURE — 82962 GLUCOSE BLOOD TEST: CPT

## 2022-02-03 PROCEDURE — 78815 PET IMAGE W/CT SKULL-THIGH: CPT | Mod: 26 | Performed by: STUDENT IN AN ORGANIZED HEALTH CARE EDUCATION/TRAINING PROGRAM

## 2022-02-03 PROCEDURE — 74177 CT ABD & PELVIS W/CONTRAST: CPT | Mod: 26 | Performed by: STUDENT IN AN ORGANIZED HEALTH CARE EDUCATION/TRAINING PROGRAM

## 2022-02-03 PROCEDURE — 78815 PET IMAGE W/CT SKULL-THIGH: CPT | Mod: PS

## 2022-02-03 PROCEDURE — 343N000001 HC RX 343: Performed by: PHYSICIAN ASSISTANT

## 2022-02-03 PROCEDURE — A9552 F18 FDG: HCPCS | Performed by: PHYSICIAN ASSISTANT

## 2022-02-03 RX ADMIN — FLUDEOXYGLUCOSE F-18 9.29 MCI.: 500 INJECTION, SOLUTION INTRAVENOUS at 09:55

## 2022-02-08 ENCOUNTER — TELEPHONE (OUTPATIENT)
Dept: FAMILY MEDICINE | Facility: CLINIC | Age: 67
End: 2022-02-08
Payer: COMMERCIAL

## 2022-02-08 DIAGNOSIS — G89.4 CHRONIC PAIN SYNDROME: Primary | ICD-10-CM

## 2022-02-08 DIAGNOSIS — F11.20 CONTINUOUS OPIOID DEPENDENCE (H): ICD-10-CM

## 2022-02-08 NOTE — TELEPHONE ENCOUNTER
Spoke with Dr Blackman and it sounds like having Jarrell see a pain specialist to help address her ongoing pain would be helpful.      I am happy to place a pain management referral for Jarrell if she is open to it?      Haley Wong, DO on 2/8/2022 at 2:12 PM

## 2022-02-08 NOTE — TELEPHONE ENCOUNTER
2/8/22: Pt's oncologist, Dr. Blackman, would like speak with Marvin regarding pt. Call can be returned at [phone].

## 2022-02-09 ENCOUNTER — TRANSFERRED RECORDS (OUTPATIENT)
Dept: HEALTH INFORMATION MANAGEMENT | Facility: CLINIC | Age: 67
End: 2022-02-09
Payer: COMMERCIAL

## 2022-02-09 ENCOUNTER — TELEPHONE (OUTPATIENT)
Dept: PALLIATIVE MEDICINE | Facility: CLINIC | Age: 67
End: 2022-02-09

## 2022-02-09 NOTE — TELEPHONE ENCOUNTER
Returned call to patient and she said she is okay with provider doing a pain management referral.

## 2022-02-09 NOTE — LETTER
February 11, 2022    Jarrell Nicole  [address]  [place] [address]    Dear Jarrell                                                                 Welcome to the [facility].  We are located on the 2nd floor ([address]) of the [facility], located at [address] [place], [place] [address], [place] [address].  Your appointment has been scheduled on 03/07/2022 at 11AM with Harsha Trinh MD.    At your first visit, you will meet your team of caregivers who will help you to develop pain management strategies that will last a lifetime. You will meet with our support staff to review your insurance information, and collect your co-payment if required by your insurance company. You will also meet with a medical pain specialist and care coordinator who will assess your pain and develop a plan of care for your successful pain rehabilitation. You should expect to spend approximately 1 hour at your first visit with us. Usually, patients work with us for a period of 6-12 months, and eventually return to their primary doctor once their pain management has stabilized.      To help us make your visit go as smoothly as possible, please bring the following items with you on your visit:       Completed Pain Questionnaire enclosed in this packet.  If you do not bring the completed questionnaire, we may have to reschedule your appointment.    List of any medicines that you are currently taking or have been prescribed    Important NON-[facility] medical information such as medical records or tests results (X-rays, or laboratory tests)    Your health insurance card    Financial resources to cover your co-payment or balance due at the time of service (cash, personal check, Visa, and MasterCard are acceptable methods of payment)     Due to the demand for new patient evaluations, you must notify the scheduling department 48 hours (2 days) in advance if you are not able to keep this appointment.  Failure to do so could affect your ability to reschedule with our clinic. Please be aware that we will not prescribe any medications at your first visit.     Please call [phone] with any questions regarding your appointment. We look forward to meeting you and working to address your health care needs.     Sincerely,      [facility]

## 2022-02-09 NOTE — TELEPHONE ENCOUNTER
Called patient to find out if she is ok with a pain clinic referral, no answer, left sms message (voice mail message through patient's phone system) with the clinic's call back number.

## 2022-02-09 NOTE — TELEPHONE ENCOUNTER
Reason for call:  Other   Patient called regarding (reason for call): returning call  Additional comments: pt is returning a call from her care team    Phone number to reach patient:  Home number on file [phone] (home)    Best Time:  asap    Can we leave a detailed message on this number?  YES    Travel screening: Not Applicable

## 2022-02-09 NOTE — TELEPHONE ENCOUNTER
Order received for a New Evaluation.    Are there any red flags that may impact the assessment or management of the patient?   Active or recent alcohol, drug or prescription medication misuse - use comments  Mental Illness / Communication Difficulties - use comments        Routing to review.    Gloria SIMEON    [facility]

## 2022-02-11 NOTE — TELEPHONE ENCOUNTER
Ok to schedule  Will need to work with suboxone provider.    Tamara Hill MD  [facility]

## 2022-02-11 NOTE — TELEPHONE ENCOUNTER
LVM to schedule New Evaluation        Margie Joe    [facility]

## 2022-02-11 NOTE — TELEPHONE ENCOUNTER

## 2022-02-16 ENCOUNTER — TELEPHONE (OUTPATIENT)
Dept: BEHAVIORAL HEALTH | Facility: CLINIC | Age: 67
End: 2022-02-16
Payer: COMMERCIAL

## 2022-02-16 ENCOUNTER — TRANSFERRED RECORDS (OUTPATIENT)
Dept: HEALTH INFORMATION MANAGEMENT | Facility: CLINIC | Age: 67
End: 2022-02-16

## 2022-02-16 DIAGNOSIS — F41.1 GAD (GENERALIZED ANXIETY DISORDER): ICD-10-CM

## 2022-02-16 NOTE — TELEPHONE ENCOUNTER
Spoke to Dr Branch at [facility].  Discussed plan for pain control - she has upcoming visit with Dr Trinh at pain clinic on 3/7.  He states she has dilaudid for now and we will plan to defer ongoing pain management to specialist.  Regarding benzodiazepine, advised that I am not comfortable continuing benzodiazepine, may benefit from seeing psychiatry. Also clarified with him that I am seeing her for addiction medicine, not psychiatry or in pain management.      Dr Branch also mentioned patient had mentioned venlafaxine script should be ER, not IR formulation.  Based on chart review it appears it has been IR formulation which I did confirm with pharmacy.  We can consider changing to ER formulation in future - I will send her a [employer] message.    Haley Wong, DO on 2/16/2022 at 1:15 PM

## 2022-02-16 NOTE — TELEPHONE ENCOUNTER
2/16/22    [facility] staff called to request a call back from Haley Wong to discuss a mutual pt. Please call Dr. Keith Branch at [phone].

## 2022-02-22 NOTE — TELEPHONE ENCOUNTER
Pt called immediately yelling at writer as to why she is being treated like a drug addict when she has stage 4 cancer. Pt states she isn't going to bother to show up to her appointment if we treat our patients like addicts. Pt was upset at the questions that were asked on the new patient questionnaire. Writer attempted to explain to the patient hat we are a chronic pain management facility and that questionnaire is sent to every New Patient. Pt states well that is bullshit and would like a call back to discuss.    Gloria SIMEON    [facility]

## 2022-02-23 ENCOUNTER — TELEPHONE (OUTPATIENT)
Dept: FAMILY MEDICINE | Facility: CLINIC | Age: 67
End: 2022-02-23

## 2022-02-23 DIAGNOSIS — F41.1 GENERALIZED ANXIETY DISORDER: Primary | ICD-10-CM

## 2022-02-23 NOTE — TELEPHONE ENCOUNTER
Reason for Call:  Medication information    Detailed comments: Received a call from patient, stated she is weaning herself off of Dilaudid, and that she does not need to go to a pain clinic, also stated she needs to be prescribed Effexor XR, and it works better for her.     Phone Number Patient can be reached at: [phone]    Best Time: Anytime    Can we leave a detailed message on this number? Yes    Call taken on 2/23/2022 at 11:13 AM by Logan Corea

## 2022-02-24 RX ORDER — VENLAFAXINE HYDROCHLORIDE 75 MG/1
75 TABLET, EXTENDED RELEASE ORAL DAILY
Qty: 30 TABLET | Refills: 0 | Status: SHIPPED | OUTPATIENT
Start: 2022-02-24 | End: 2022-03-01

## 2022-03-01 ENCOUNTER — VIRTUAL VISIT (OUTPATIENT)
Dept: BEHAVIORAL HEALTH | Facility: CLINIC | Age: 67
End: 2022-03-01
Payer: COMMERCIAL

## 2022-03-01 DIAGNOSIS — F11.20 UNCOMPLICATED OPIOID DEPENDENCE (H): Primary | ICD-10-CM

## 2022-03-01 DIAGNOSIS — F41.1 GAD (GENERALIZED ANXIETY DISORDER): ICD-10-CM

## 2022-03-01 PROCEDURE — 99207 PR CDG-CODE INCORRECT PER BILLING BASED ON TIME: CPT | Performed by: FAMILY MEDICINE

## 2022-03-01 PROCEDURE — 99215 OFFICE O/P EST HI 40 MIN: CPT | Mod: 95 | Performed by: FAMILY MEDICINE

## 2022-03-01 PROCEDURE — 99417 PROLNG OP E/M EACH 15 MIN: CPT | Mod: 95 | Performed by: FAMILY MEDICINE

## 2022-03-01 RX ORDER — VENLAFAXINE 37.5 MG/1
TABLET ORAL
Qty: 46 TABLET | Refills: 0 | Status: SHIPPED | OUTPATIENT
Start: 2022-03-01 | End: 2022-03-29

## 2022-03-01 RX ORDER — BUPRENORPHINE HYDROCHLORIDE AND NALOXONE HYDROCHLORIDE DIHYDRATE 8; 2 MG/1; MG/1
TABLET SUBLINGUAL
Qty: 90 TABLET | Refills: 0 | Status: CANCELLED | OUTPATIENT
Start: 2022-03-01 | End: 2022-03-31

## 2022-03-01 RX ORDER — BUPRENORPHINE 8 MG/1
8 TABLET SUBLINGUAL 2 TIMES DAILY
Qty: 90 TABLET | Refills: 0 | Status: SHIPPED | OUTPATIENT
Start: 2022-03-01 | End: 2022-04-26

## 2022-03-01 RX ORDER — CYCLOBENZAPRINE HCL 10 MG
5-10 TABLET ORAL 3 TIMES DAILY PRN
COMMUNITY
Start: 2021-01-08 | End: 2022-03-29

## 2022-03-01 ASSESSMENT — ANXIETY QUESTIONNAIRES
2. NOT BEING ABLE TO STOP OR CONTROL WORRYING: NOT AT ALL
6. BECOMING EASILY ANNOYED OR IRRITABLE: SEVERAL DAYS
7. FEELING AFRAID AS IF SOMETHING AWFUL MIGHT HAPPEN: NOT AT ALL
GAD7 TOTAL SCORE: 2
3. WORRYING TOO MUCH ABOUT DIFFERENT THINGS: NOT AT ALL
1. FEELING NERVOUS, ANXIOUS, OR ON EDGE: SEVERAL DAYS
IF YOU CHECKED OFF ANY PROBLEMS ON THIS QUESTIONNAIRE, HOW DIFFICULT HAVE THESE PROBLEMS MADE IT FOR YOU TO DO YOUR WORK, TAKE CARE OF THINGS AT HOME, OR GET ALONG WITH OTHER PEOPLE: VERY DIFFICULT
4. TROUBLE RELAXING: NOT AT ALL
5. BEING SO RESTLESS THAT IT IS HARD TO SIT STILL: NOT AT ALL

## 2022-03-01 ASSESSMENT — PATIENT HEALTH QUESTIONNAIRE - PHQ9: SUM OF ALL RESPONSES TO PHQ QUESTIONS 1-9: 4

## 2022-03-01 NOTE — PATIENT INSTRUCTIONS
Continue Suboxone, taking 8-2mg twice daily with additional 4-8mg daily as needed.      I have sent a refill for a slightly different product, buprenorphine without naloxone which can SOMETIMES cause nausea.  Let's a least try it.  Same amount of pain medicine (buprenorphine) so I don't think this should change your pain control, only potentially improving your nausea.      A prescription has been sent to your pharmacy of choice.    If a prior authorization is required it may take several days to get your medication.    Please make sure your pharmacy had your contact information so they can contact you when it is ready to .  Please contact your pharmacy to see if your medication is ready to be picked up.     You are encouraged to have some type of recovery program in addition to medication treatment.    Medication alone is generally not enough to lead to long term recovery.    This may include having some type of sober network, avoiding isolating, avoiding triggers (people, places, things you associate with using substances and help with managing cravings)    Options for support include :         [employer] service office in [facility]:    www.[employer].org    [phone]  Narcotics Anonymous   www.HungerTime.org    1-104-707-9832   [facility] Recovery   www.[employer]  Women for Sobriety   www.womenforsobriety.org  Celebrate Recovery   [email or web address] [email or web address]   ([facility] centered recovery).   [facility] ([facility])  [facility] connects people seeking recovery to resources that help foster and sustain long-term recovery.  Whether you are seeking resources for treatment, transportation, housing, job training, education, health care or other pathways to recovery, [facility] is a great place to start.  (www.[email or web address])   [phone]  [facility] and local community support groups  Mental health counseling   -we can assist with referral     You are strongly recommended abstaining from  alcohol, benzodiazepines, cannabis, opioids and other drugs of abuse.     Using these in combination with Buprenorphine can raise your risk of overdose and death    Contact Us:   [facility] [phone] or by [employer]      If you cannot make your appointment please call the office and reschedule immediately.     If a refill or bridge is needed it is important to call in advance so you do not run out of medications.   We will make every effort to manage your request promptly but please be aware that it may take up to one business day for your refill request to be processed.   If there is a concern with your request we will contact you.  Otherwise, please contact your pharmacy directly to see if your prescription is ready for .     Our clinic is open from Monday-Friday 8:00am-4:30pm and there is not an On Call after hours service.   If medical care is needed after hours or on the weekend you will need to contact your primary care physician or go to an Urgent Care or ER.

## 2022-03-01 NOTE — PROGRESS NOTES
MH&A Post-Appointment Cart -check      Correct pharmacy verified with patient and updated in chart? [x] yes []no    Charge captured ? [] yes  [] no [x] n/a-virtual     Medications ordered this visit were e-scribed.  Verified by order class [x] yes  [] no    List Medications:  Effexor 37.5 mg; Narcan 4 mg/Nasal spray; Subutex 8 mg     Medication changes or discontinuations were communicated to patient's pharmacy: [] yes  [] no    UA collected [] yes  [] no  [x] n/a-virtual     Outside referrals / labs, etc support staff to follow up: [] yes  [x] no    Future appointment was made: [x] yes  [] no  [] n/a   03/29/2022  Dictation completed at time of chart check: [] yes  [x] no    I have checked the documentation for today s encounters and the above information has been reviewed and completed.      Daphnie Perdomo on March 1, 2022 at 3:11 PM

## 2022-03-01 NOTE — PROGRESS NOTES
This video/telephone visit will be conducted via a call between you and your physician/provider. We have found that certain health care needs can be provided without the need for an in-person physical exam. This service lets us provide the care you need with a video /telephone conversation. If a prescription is necessary we can send it directly to your pharmacy. If lab work is needed we can place an order for that and you can then stop by our lab to have the test done at a later time.    Just as we bill insurance for in-person visits, we also bill insurance for video/telephone visits. If you have questions about your insurance coverage, we recommend that you speak with your insurance company.    Patient has given verbal consent for video/Telephone visit? Yes   Patient would like video visit, please connect: [employer] if connection issue: [phone]  Reason for visit:  Medication follow up  Pt has lots to discuss today. Met with PCP and discussed Pain Clinic and Palliative Care.   Patient verified allergies, medications and pharmacy via telephone verbally with writer.   Medication refill is pended for review and approval by provider.  MOHINDER-7 scores: Very difficult   MOHINDER-7 SCORE 2/1/2022 3/1/2022   Total Score 13 2     PHQ-9 scores:  Not difficult at all   PHQ-9 SCORE 3/1/2022   PHQ-9 Total Score 4     Patient states she is ready for visit.  [place]  to be reviewed by provider.   Daphnie Perdomo March 1, 2022 12:38 PM

## 2022-03-01 NOTE — PROGRESS NOTES
[facility] Addiction Medicine    A/P                                                    ASSESSMENT/PLAN    1. Uncomplicated opioid dependence (H)  Overall dong well from an opioid use and pain control standpoint.  She will continue with buprenorphine 16mg/day with option to take up to 8mg more if needed for pain.  She is no longer taking dilaudid.  She does not wish to follow-up with pain clinic, would rather work with palliative care.  - buprenorphine (SUBUTEX) 8 MG SUBL sublingual tablet; Place 1 tablet (8 mg) under the tongue 2 times daily May take an additional 1/2 to 1 tablet daily as needed for pain.  Dispense: 90 tablet; Refill: 0  - naloxone (NARCAN) 4 MG/0.1ML nasal spray; Spray 1 spray (4 mg) into one nostril alternating nostrils once as needed for opioid reversal every 2-3 minutes until assistance arrives  Dispense: 0.2 mL; Refill: 11  - Urine Drugs of Abuse Screen Panel 1 - Drug Screen (Full); Future  - Buprenorphine Urine, Qualitative; Future    2. MOHINDER (generalized anxiety disorder)  Needs improvement.  We will switch her Effexor back to IR since better tolerated.  Can further increase in the future if needed and if tolerated.  She is meeting with therapist today.   - venlafaxine (EFFEXOR) 37.5 MG tablet; Take 1 tablet (37.5 mg) by mouth daily for 14 days, THEN 2 tablets (75 mg) daily for 16 days.  Dispense: 46 tablet; Refill: 0      PDMP Review       Value Time User    State PDMP site checked  Yes 3/1/2022  1:49 PM Haley Wong,           RTC  Return in about 4 weeks (around 3/29/2022) for Follow up, with me, in person - 1:30pm.      Counseled the patient on the importance of having a recovery program in addition to medication to manage recovery.  Components include avoiding isolating, having willingness to change, avoiding triggers and managing cravings. Encouraged having some type of sober network and practicing honesty with trusted support person(s). Encouraged other services  "such as counseling, 12 step or other self-help organizations.      Opioid warning reviewed.  Risk of overdose following a period of abstinence due to decrease tolerance was discussed including risk of death.  Strongly recommended abstain from alcohol, benzodiazepines, THC, opioids and other drugs of abuse.  Increased risk of return to opioid use after use of these substances discussed.  Increased risk of overdose/death with use of other substances particularly benzodiazepines/alcohol reviewed.        SUBJECTIVE                                                    Jarrell Nicole is a 67 year old female who presents to clinic today for follow up    Visit performed Virtual, via video    Video-Visit Details    Type of service:  Video Visit    Video Start Time: 1:08 PM  Video End Time: 2:19 PM    Originating Location (pt. Location): Home    Distant Location (provider location):  [facility]     Platform used for Video Visit: Radha    Brief history (from chart review):  Patient was initially seen by addiction medicine service when admitted for encephalopathy 2/2 unintentional overdose of opiates and benzodiazepines in July 2021.  She was diagnosed with metastatic breast cancer in March 2021 and was taking opioids since April 2021 for cancer related pain.  She also has long standing history of anxiety and had been on benzodiazepines for 7-8 years prior to hospitalization in July 2021 at which time she was weaned off with phenobarbital taper    Plan from most recent office visit (2//22):  1. Opioid dependence with intoxication delirium (H)  2. Chronic pain due to neoplasm  We discussed her challenges with pain control.  She has stage IV metastatic breast cancer with bony mets which are source of significant pain which impacts her quality of life.  She expresses that her ability to be mobile and do \"normal\" things is very important to her.  She finds her current medication regimen to be working well.  She " will continue buprenorphine 16-24mg/day as ordered, changed to tablets per her preference.  Encouraged her to use Suboxone as first line for pain management and only using dilaudid if Suboxone is not effective.  She will follow-up with her oncologist regarding dilaudid and pain control.  I began to discuss with her that if, in the future, she is requiring dilaudid regularly, then it may be worth considering stopping Suboxone and having her transition to full opioid agonist.  I advised her that I am happy to speak with her oncologist about pain control plans in the future.    - buprenorphine-naloxone (SUBOXONE) 8-2 MG SUBL sublingual tablet; Place 1 tablet under the tongue 2 times daily. May also place 0.5-1 tablets daily as needed for severe pain.  Dispense: 90 tablet; Refill: 0     3. MOHINDER (generalized anxiety disorder)  Anxiety is very high, she is not depressed.  She has been on Effexor at same dose for years.  I asked her to discuss increasing this dose with her PCP however she requests to make change today instead of waiting since anxiety is so high which is reasonable.  She was discharged with prescription for lorazepam to help with anxiety and nausea.  She has experienced unintentional opioid and benzodiazepine overdose in the past.  Ongoing use of benzodiazepines bring risk of another unintentional overdose.  Certainly, risks vs benefits in setting of terminal illness and goals of care is an important consideration as well.  She has upcoming visit with her oncologist on 2/8 and PCP on 2/9.   - venlafaxine (EFFEXOR) 75 MG tablet; Take 1 tablet (75 mg) by mouth every morning  Dispense: 30 tablet; Refill: 1      TODAY'S VISIT  HPI Mar 1, 2022    - she received packet from pain clinic but has decided against going after discussion with PCP - afraid she will not be allowed to continue some of her current medications which she is not willing to do and will be under a medication contract  - expresses that feeling  judged is quite triggering  - anxiety remains high, no depression  - discusses her concern regarding documentation in medical record - reports that [employer] made her take entire bottle of Effexor  - plans to continue lorazepam through palliative care/oncology (through [place] [facility])  - Effexor ER lead to vomiting - she is very fearful of this as she has been hospitalized many times for vomiting, tolerated Effexor IR without issues and would be willing to take that again  - seeing a therapist today (Shea Lan)  - was approved for medical cannabis, for now taking drobinol which has helped increase her fluid intake (able to drink protein drinks) but appetite remains poor  - tried cancer support group but too depressing  - no longer taking dilaudid (last dose 2/13 and she did experience some withdrawal hen she stopped it) - dilaudid made her feel poorly (alex)  - Going for sleep study  - Suboxone is taking care of most of pain (worst in ribs) - taking 8mg BID + additional 4-8mg mid-day if needed for pain, denies sedation or constipation, little bit of dry mouth  - Finds flexeril helpful for her neck pain  - reviewed goals:  Mobility is very important to her, driving with her dog, [place] time [place], doing yoga, and she would like to make friends    MOHINDER-7 SCORE 2/1/2022 3/1/2022   Total Score 13 2       PHQ 3/1/2022   PHQ-9 Total Score 4   Q9: Thoughts of better off dead/self-harm past 2 weeks Not at all         OBJECTIVE                                                    PHYSICAL EXAM:  There were no vitals taken for this visit.    GENERAL: alert and no distress  EYES: Eyes grossly normal to inspection, conjunctivae and sclerae appear normal  RESP: No respiratory distress, no coughing or whezing  MENTAL STATUS EXAM  Appearance/Behavior: No appearant distress  Speech: Normal  Mood/Affect: anxiety and affect is mood congruent  Insight: Fair    LAB  No results found for any visits on 03/01/22.      HISTORY                                                     Problem list reviewed & adjusted, as indicated.  Patient Active Problem List   Diagnosis     Malignant neoplasm of lower-outer quadrant of right breast of female, estrogen receptor positive (H)     Asthma     Combined forms of age-related cataract of right eye     Diverticulitis of large intestine without perforation or abscess without bleeding     Episodic mood disorder (H)     Hyperlipidemia     Pain medication agreement     Panic disorder without agoraphobia     Post traumatic stress disorder     Dehydration     Encephalopathy     Elevated troponin level not due to acute coronary syndrome     Acute kidney injury (H)     Chronic pain due to neoplasm     Opioid dependence with intoxication delirium (H)     Hypokalemia     Non-traumatic rhabdomyolysis     Essential hypertension     Anemia     Nausea and vomiting, intractability of vomiting not specified, unspecified vomiting type         MEDICATION LIST (prior to visit)  albuterol (PROAIR HFA;PROVENTIL HFA;VENTOLIN HFA) 90 mcg/actuation inhaler, Inhale 1-2 puffs into the lungs every 4 hours as needed for shortness of breath / dyspnea or wheezing   ascorbic acid, vitamin C, (VITAMIN C) 1000 MG tablet, [ASCORBIC ACID, VITAMIN C, (VITAMIN C) 1000 MG TABLET] Take 1,000 mg by mouth daily.  B,C/folic/zinc/copper ox/vit E (STRESS B-COMPLEX ORAL), [B,C/FOLIC/ZINC/COPPER OX/VIT E (STRESS B-COMPLEX ORAL)] Take 1 tablet by mouth daily. Stress supplement  cetirizine (ZYRTEC) 10 MG tablet, [CETIRIZINE (ZYRTEC) 10 MG TABLET] Take 10 mg by mouth daily.  cholecalciferol 125 MCG (5000 UT) CAPS, Take 1 capsule (5,000 Units) by mouth daily  cyclobenzaprine (FLEXERIL) 10 MG tablet, Take 5-10 mg by mouth 3 times daily as needed  docusate sodium (COLACE) 100 MG capsule, TAKE 1 CAPSULE(100 MG) BY MOUTH TWICE DAILY  ferrous sulfate (FEROSUL) 325 (65 Fe) MG tablet, Take 1 tablet (325 mg) by mouth daily (with breakfast)  fluticasone propionate  (FLONASE) 50 mcg/actuation nasal spray, [FLUTICASONE PROPIONATE (FLONASE) 50 MCG/ACTUATION NASAL SPRAY] 1 spray into each nostril daily.   gabapentin (NEURONTIN) 100 MG capsule, Take 1 capsule (100 mg) by mouth daily (with lunch) Take 1 at Midday  gabapentin (NEURONTIN) 300 MG capsule, Take 300 mg by mouth 2 times daily   hydrOXYzine (ATARAX) 10 MG tablet, Take 1 tablet (10 mg) by mouth 3 times daily as needed for anxiety  Lidocaine (LIDOCARE) 4 % Patch, Place 2 patches onto the skin every 24 hours To prevent lidocaine toxicity, patient should be patch free for 12 hrs daily.  LORazepam (ATIVAN) 0.5 MG tablet, Take 1 tablet (0.5 mg) by mouth every 4 hours as needed for anxiety, nausea or vomiting  melatonin 10 mg Tab, Take 10 mg by mouth nightly as needed for sleep   ondansetron (ZOFRAN-ODT) 4 MG ODT tab, Take 1 tablet (4 mg) by mouth every 6 hours as needed for nausea or vomiting  polyethylene glycol (MIRALAX) 17 GM/Dose powder, Take 17 g (1 capful) by mouth daily as needed for constipation  prochlorperazine (COMPAZINE) 5 MG tablet, Take 1 tablet (5 mg) by mouth every 6 hours as needed for nausea or vomiting  propranoloL (INDERAL) 10 MG tablet, Take 10 mg by mouth 3 times daily as needed (anxiety or palpitations)   SYMBICORT 80-4.5 mcg/actuation inhaler, Inhale 2 puffs into the lungs 2 times daily   vitamin E 100 UNIT capsule, [VITAMIN E 100 UNIT CAPSULE] Take 500 Units by mouth 2 (two) times a week.  [DISCONTINUED] ipratropium-albuteroL (DUO-NEB) 0.5-2.5 mg/3 mL nebulizer, [IPRATROPIUM-ALBUTEROL (DUO-NEB) 0.5-2.5 MG/3 ML NEBULIZER] Take 3 mL by nebulization 4 (four) times a day as needed.    No current facility-administered medications on file prior to visit.      MEDICATION LIST (after visit)  Current Outpatient Medications   Medication     albuterol (PROAIR HFA;PROVENTIL HFA;VENTOLIN HFA) 90 mcg/actuation inhaler     ascorbic acid, vitamin C, (VITAMIN C) 1000 MG tablet     B,C/folic/zinc/copper ox/vit E (STRESS  B-COMPLEX ORAL)     buprenorphine (SUBUTEX) 8 MG SUBL sublingual tablet     cetirizine (ZYRTEC) 10 MG tablet     cholecalciferol 125 MCG (5000 UT) CAPS     cyclobenzaprine (FLEXERIL) 10 MG tablet     docusate sodium (COLACE) 100 MG capsule     ferrous sulfate (FEROSUL) 325 (65 Fe) MG tablet     fluticasone propionate (FLONASE) 50 mcg/actuation nasal spray     gabapentin (NEURONTIN) 100 MG capsule     gabapentin (NEURONTIN) 300 MG capsule     hydrOXYzine (ATARAX) 10 MG tablet     Lidocaine (LIDOCARE) 4 % Patch     LORazepam (ATIVAN) 0.5 MG tablet     melatonin 10 mg Tab     naloxone (NARCAN) 4 MG/0.1ML nasal spray     ondansetron (ZOFRAN-ODT) 4 MG ODT tab     polyethylene glycol (MIRALAX) 17 GM/Dose powder     prochlorperazine (COMPAZINE) 5 MG tablet     propranoloL (INDERAL) 10 MG tablet     SYMBICORT 80-4.5 mcg/actuation inhaler     venlafaxine (EFFEXOR) 37.5 MG tablet     vitamin E 100 UNIT capsule     hydrOXYzine (VISTARIL) 25 MG capsule     No current facility-administered medications for this visit.         Allergies   Allergen Reactions     Codeine Hives     Iodinated Contrast Media [Diagnostic X-Ray Materials] Unknown     Heart stopped and sick for multiple days after     Penicillins Swelling     Throat swelling as an infant     Tylenol [Acetaminophen] Other (See Comments)     With codeine only .Pt states gives her upset stomach for days.     Venlafaxine Hcl Er Nausea and Vomiting     Vomited 20 min after taking XR, OK with IR       Haley Wong DO  [facility]  [facility]  [phone]

## 2022-03-02 ASSESSMENT — ANXIETY QUESTIONNAIRES: GAD7 TOTAL SCORE: 2

## 2022-03-09 NOTE — TELEPHONE ENCOUNTER
I am reviewing referrals in Katies mailbox.  I am not sure what happened with this one.  It looks like she has set up with a buprenorphine provider already (Dr. Wong) and is getting pain care through oncology.     I think we should just leave it for now and if she calls back we can offer her an appointment at that time.     Lora Luna MD

## 2022-03-15 ENCOUNTER — TELEPHONE (OUTPATIENT)
Dept: FAMILY MEDICINE | Facility: CLINIC | Age: 67
End: 2022-03-15
Payer: COMMERCIAL

## 2022-03-15 DIAGNOSIS — F41.1 GAD (GENERALIZED ANXIETY DISORDER): ICD-10-CM

## 2022-03-15 RX ORDER — HYDROXYZINE PAMOATE 25 MG/1
25-50 CAPSULE ORAL 3 TIMES DAILY PRN
Qty: 150 CAPSULE | Refills: 1 | Status: SHIPPED | OUTPATIENT
Start: 2022-03-15 | End: 2022-03-29

## 2022-03-15 NOTE — TELEPHONE ENCOUNTER
Date of Last Office Visit: 3/1/2022  Date of Next Office Visit: 3/29/2022  No shows since last visit: no  Cancellations since last visit: no    Medication requested: Hydroxyzine Pamoate.  Date last ordered: 12/21/2021 Qty: 150 tab Refills: 12/21/2021       Lapse in medication adherence greater than 5 days?: unknown  If yes, call patient and gather details: NA  Medication refill request verified as identical to current order?: yes  Result of Last DAM, VPA, Li+ Level, CBC, or Carbamazepine Level (at or since last visit): N/A    . MOHINDER (generalized anxiety disorder)  Anxiety is very high, she is not depressed.  She has been on Effexor at same dose for years.  I asked her to discuss increasing this dose with her PCP however she requests to make change today instead of waiting since anxiety is so high which is reasonable.  She was discharged with prescription for lorazepam to help with anxiety and nausea.  She has experienced unintentional opioid and benzodiazepine overdose in the past.  Ongoing use of benzodiazepines bring risk of another unintentional overdose.  Certainly, risks vs benefits in setting of terminal illness and goals of care is an important consideration as well.  She has upcoming visit with her oncologist on 2/8 and PCP on 2/9.   - venlafaxine (EFFEXOR) 75 MG tablet; Take 1 tablet (75 mg) by mouth every morning  Dispense: 30 tablet; Refill: 1    Last visit treatment plan:MEDICATION LIST (after visit)      Current Outpatient Medications   Medication     albuterol (PROAIR HFA;PROVENTIL HFA;VENTOLIN HFA) 90 mcg/actuation inhaler     ascorbic acid, vitamin C, (VITAMIN C) 1000 MG tablet     B,C/folic/zinc/copper ox/vit E (STRESS B-COMPLEX ORAL)     buprenorphine-naloxone (SUBOXONE) 8-2 MG SUBL sublingual tablet     cetirizine (ZYRTEC) 10 MG tablet     cholecalciferol 125 MCG (5000 UT) CAPS     docusate sodium (COLACE) 100 MG capsule     ferrous sulfate (FEROSUL) 325 (65 Fe) MG tablet     fluticasone propionate  (FLONASE) 50 mcg/actuation nasal spray     gabapentin (NEURONTIN) 100 MG capsule     gabapentin (NEURONTIN) 300 MG capsule     hydrOXYzine (ATARAX) 10 MG tablet     hydrOXYzine (VISTARIL) 25 MG capsule     Lidocaine (LIDOCARE) 4 % Patch     LORazepam (ATIVAN) 0.5 MG tablet     melatonin 10 mg Tab     ondansetron (ZOFRAN-ODT) 4 MG ODT tab     polyethylene glycol (MIRALAX) 17 GM/Dose powder     prochlorperazine (COMPAZINE) 5 MG tablet     propranoloL (INDERAL) 10 MG tablet     SYMBICORT 80-4.5 mcg/actuation inhaler     venlafaxine (EFFEXOR-ER) 75 MG 24 hr tablet     vitamin E 100 UNIT capsule     HYDROmorphone (DILAUDID) 4 MG tablet      No current facility-administered medications for this visit.          []Medication refilled per  Medication Refill in Ambulatory Care  policy.  [x]Medication unable to be refilled by RN due to criteria not met as indicated below:    []Eligibility - not seen in the last year   []Supervision - no future appointment   []Compliance - no shows, cancellations or lapse in therapy   [x]Verification - order discrepancy.    Patient's active medication list included both Atarax and Vistaril.  Needs clarification.     []Controlled medication   []Medication not included in policy   []90-day supply request   []Other

## 2022-03-29 ENCOUNTER — OFFICE VISIT (OUTPATIENT)
Dept: BEHAVIORAL HEALTH | Facility: CLINIC | Age: 67
End: 2022-03-29
Payer: COMMERCIAL

## 2022-03-29 VITALS
BODY MASS INDEX: 24.89 KG/M2 | SYSTOLIC BLOOD PRESSURE: 148 MMHG | HEART RATE: 84 BPM | WEIGHT: 145 LBS | DIASTOLIC BLOOD PRESSURE: 77 MMHG

## 2022-03-29 DIAGNOSIS — F11.20 UNCOMPLICATED OPIOID DEPENDENCE (H): Primary | ICD-10-CM

## 2022-03-29 DIAGNOSIS — M54.2 CHRONIC NECK PAIN: ICD-10-CM

## 2022-03-29 DIAGNOSIS — G89.29 CHRONIC NECK PAIN: ICD-10-CM

## 2022-03-29 DIAGNOSIS — C50.511 MALIGNANT NEOPLASM OF LOWER-OUTER QUADRANT OF RIGHT FEMALE BREAST, UNSPECIFIED ESTROGEN RECEPTOR STATUS (H): ICD-10-CM

## 2022-03-29 DIAGNOSIS — F41.0 PANIC ATTACK: ICD-10-CM

## 2022-03-29 DIAGNOSIS — F41.1 GAD (GENERALIZED ANXIETY DISORDER): ICD-10-CM

## 2022-03-29 LAB
AMPHETAMINES UR QL SCN: ABNORMAL
BARBITURATES UR QL: ABNORMAL
BENZODIAZ UR QL: ABNORMAL
BUPRENORPHINE QUAL URINE LHE: ABNORMAL
CANNABINOIDS UR QL SCN: ABNORMAL
COCAINE UR QL: ABNORMAL
CREAT UR-MCNC: 53 MG/DL
CREAT UR-MCNC: 53 MG/DL
METHADONE UR QL SCN: ABNORMAL
OPIATES UR QL SCN: ABNORMAL
OXYCODONE UR QL: ABNORMAL
PCP UR QL SCN: ABNORMAL

## 2022-03-29 PROCEDURE — 99214 OFFICE O/P EST MOD 30 MIN: CPT | Performed by: FAMILY MEDICINE

## 2022-03-29 PROCEDURE — 80307 DRUG TEST PRSMV CHEM ANLYZR: CPT | Performed by: FAMILY MEDICINE

## 2022-03-29 PROCEDURE — G0463 HOSPITAL OUTPT CLINIC VISIT: HCPCS

## 2022-03-29 RX ORDER — GABAPENTIN 100 MG/1
100 CAPSULE ORAL DAILY
COMMUNITY
End: 2022-04-26

## 2022-03-29 RX ORDER — TIZANIDINE 2 MG/1
2 TABLET ORAL 2 TIMES DAILY PRN
Qty: 60 TABLET | Refills: 0 | Status: SHIPPED | OUTPATIENT
Start: 2022-03-29 | End: 2022-04-26

## 2022-03-29 ASSESSMENT — ANXIETY QUESTIONNAIRES
GAD7 TOTAL SCORE: 3
6. BECOMING EASILY ANNOYED OR IRRITABLE: SEVERAL DAYS
1. FEELING NERVOUS, ANXIOUS, OR ON EDGE: NOT AT ALL
4. TROUBLE RELAXING: NOT AT ALL
3. WORRYING TOO MUCH ABOUT DIFFERENT THINGS: SEVERAL DAYS
2. NOT BEING ABLE TO STOP OR CONTROL WORRYING: NOT AT ALL
IF YOU CHECKED OFF ANY PROBLEMS ON THIS QUESTIONNAIRE, HOW DIFFICULT HAVE THESE PROBLEMS MADE IT FOR YOU TO DO YOUR WORK, TAKE CARE OF THINGS AT HOME, OR GET ALONG WITH OTHER PEOPLE: SOMEWHAT DIFFICULT
5. BEING SO RESTLESS THAT IT IS HARD TO SIT STILL: SEVERAL DAYS
7. FEELING AFRAID AS IF SOMETHING AWFUL MIGHT HAPPEN: NOT AT ALL

## 2022-03-29 ASSESSMENT — PATIENT HEALTH QUESTIONNAIRE - PHQ9: SUM OF ALL RESPONSES TO PHQ QUESTIONS 1-9: 3

## 2022-03-29 NOTE — PROGRESS NOTES
Patient in clinic for medication follow up.  [place]  to be reviewed by provider.   Medication refill is pended for review and approval by provider.  Rosemarie Corado CMA on 3/29/2022 at 1:04 PM

## 2022-03-29 NOTE — PATIENT INSTRUCTIONS
[facility]:  [phone] - they have some in-home options for therapy and also have medication management.    I will also place a referral for [facility] psychology and psychiatry (medication management).  I can also ask that the transition clinic see you while you are waiting to get in with psychiatry - this will expedite getting you started on a new treatment plan for your anxiety.  Call [phone].      Trial of tizanidine 2mg twice as needed for neck pain.  (Muscle Relaxant) - use with caution, can be sedating.      Continue buprenorphine, no change.  Call when you are needing a refill.  Notice if you feel less nauseous while taking buprenorphine (without naloxone).  Make sure to allow tablet to dissolve completely under your tongue.
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
## 2022-03-30 ASSESSMENT — ANXIETY QUESTIONNAIRES: GAD7 TOTAL SCORE: 3

## 2022-04-05 ENCOUNTER — TRANSFERRED RECORDS (OUTPATIENT)
Dept: HEALTH INFORMATION MANAGEMENT | Facility: CLINIC | Age: 67
End: 2022-04-05
Payer: COMMERCIAL

## 2022-04-15 ENCOUNTER — TELEPHONE (OUTPATIENT)
Dept: BEHAVIORAL HEALTH | Facility: CLINIC | Age: 67
End: 2022-04-15
Payer: COMMERCIAL

## 2022-04-15 NOTE — TELEPHONE ENCOUNTER
No appointments seen in [facility].  Phone call to [facility], spoke with Ty. States he will contact pt.

## 2022-04-15 NOTE — TELEPHONE ENCOUNTER
Please check in with patient regarding status of psychiatry referral?  Has she scheduled?  If appointment is not for more than a month or so we can refer to transition clinic.      Thanks!    Haley Wong, DO on 4/15/2022 at 11:04 AM

## 2022-04-19 ENCOUNTER — TRANSFERRED RECORDS (OUTPATIENT)
Dept: HEALTH INFORMATION MANAGEMENT | Facility: CLINIC | Age: 67
End: 2022-04-19
Payer: COMMERCIAL

## 2022-04-26 ENCOUNTER — VIRTUAL VISIT (OUTPATIENT)
Dept: BEHAVIORAL HEALTH | Facility: CLINIC | Age: 67
End: 2022-04-26
Attending: PSYCHIATRY & NEUROLOGY
Payer: COMMERCIAL

## 2022-04-26 DIAGNOSIS — M54.2 CHRONIC NECK PAIN: ICD-10-CM

## 2022-04-26 DIAGNOSIS — F11.20 UNCOMPLICATED OPIOID DEPENDENCE (H): Primary | ICD-10-CM

## 2022-04-26 DIAGNOSIS — J30.2 SEASONAL ALLERGIC RHINITIS, UNSPECIFIED TRIGGER: ICD-10-CM

## 2022-04-26 DIAGNOSIS — G89.29 CHRONIC NECK PAIN: ICD-10-CM

## 2022-04-26 PROCEDURE — 99215 OFFICE O/P EST HI 40 MIN: CPT | Mod: 95 | Performed by: FAMILY MEDICINE

## 2022-04-26 RX ORDER — BUPRENORPHINE HYDROCHLORIDE AND NALOXONE HYDROCHLORIDE DIHYDRATE 8; 2 MG/1; MG/1
TABLET SUBLINGUAL
Qty: 90 TABLET | Refills: 0 | Status: SHIPPED | OUTPATIENT
Start: 2022-04-26 | End: 2022-06-21

## 2022-04-26 RX ORDER — LETROZOLE 2.5 MG/1
2.5 TABLET, FILM COATED ORAL DAILY
COMMUNITY
Start: 2022-04-05 | End: 2024-10-04

## 2022-04-26 RX ORDER — ONDANSETRON 8 MG/1
8 TABLET, FILM COATED ORAL 3 TIMES DAILY PRN
COMMUNITY
Start: 2022-04-05

## 2022-04-26 RX ORDER — BUPRENORPHINE 8 MG/1
8 TABLET SUBLINGUAL 2 TIMES DAILY
Qty: 90 TABLET | Refills: 0 | Status: CANCELLED | OUTPATIENT
Start: 2022-04-26

## 2022-04-26 RX ORDER — DOCUSATE SODIUM 100 MG/1
100 CAPSULE, LIQUID FILLED ORAL 2 TIMES DAILY PRN
Qty: 180 CAPSULE | Refills: 1 | Status: SHIPPED | OUTPATIENT
Start: 2022-04-26 | End: 2022-09-22

## 2022-04-26 RX ORDER — POLYETHYLENE GLYCOL 3350 17 G/17G
1 POWDER, FOR SOLUTION ORAL DAILY PRN
Qty: 850 G | Refills: 1 | Status: SHIPPED | OUTPATIENT
Start: 2022-04-26

## 2022-04-26 RX ORDER — TIZANIDINE 2 MG/1
2 TABLET ORAL 2 TIMES DAILY PRN
Qty: 60 TABLET | Refills: 0 | Status: CANCELLED | OUTPATIENT
Start: 2022-04-26

## 2022-04-26 RX ORDER — GABAPENTIN 100 MG/1
100 CAPSULE ORAL DAILY
Qty: 30 CAPSULE | Refills: 2 | Status: SHIPPED | OUTPATIENT
Start: 2022-04-26 | End: 2022-07-05

## 2022-04-26 RX ORDER — PALBOCICLIB 125 MG/1
TABLET, FILM COATED ORAL
COMMUNITY
Start: 2022-04-05

## 2022-04-26 RX ORDER — CETIRIZINE HYDROCHLORIDE 10 MG/1
10 TABLET ORAL DAILY
Qty: 90 TABLET | Refills: 0 | Status: SHIPPED | OUTPATIENT
Start: 2022-04-26

## 2022-04-26 NOTE — PROGRESS NOTES
"     [facility]    A/P                                                    ASSESSMENT/PLAN    1. Uncomplicated opioid dependence (H)  Stable.  Symptoms well controlled.  Continue Suboxone, no change.  Discussed treating constipation more aggressively given report of \"pebbly\" stools.    - buprenorphine-naloxone (SUBOXONE) 8-2 MG SUBL sublingual tablet; Place 1 tablet under the tongue 2 times daily. May also place 0.5-1 tablets daily as needed for severe pain.  Dispense: 90 tablet; Refill: 0  - docusate sodium (COLACE) 100 MG capsule; Take 1 capsule (100 mg) by mouth 2 times daily as needed for constipation TAKE 1 CAPSULE(100 MG) BY MOUTH TWICE DAILY  Dispense: 180 capsule; Refill: 1  - polyethylene glycol (MIRALAX) 17 GM/Dose powder; Take 17 g (1 capful) by mouth daily as needed for constipation  Dispense: 850 g; Refill: 1    2. Chronic neck pain  Needs improvement.  She is on gabapentin.  Flexeril worked well but not covered by insurance.  She is concerned about potential medication interactions or dependence of other muscle relaxants.  Encouraged her to consider dry needling or trigger point injections.    - gabapentin (NEURONTIN) 100 MG capsule; Take 1 capsule (100 mg) by mouth daily Mid-day  Dispense: 30 capsule; Refill: 2    3. Seasonal allergic rhinitis, unspecified trigger  Refill of Zyrtec provided at her request today.  - cetirizine (ZYRTEC) 10 MG tablet; Take 1 tablet (10 mg) by mouth daily  Dispense: 90 tablet; Refill: 0      PDMP Review       Value Time User    State PDMP site checked  Yes 3/29/2022  2:05 PM Haley Wong DO            RTC  Return in about 4 weeks (around 5/24/2022) for Follow up, with me, using a video visit.      Counseled the patient on the importance of having a recovery program in addition to medication to manage recovery.  Components include avoiding isolating, having willingness to change, avoiding triggers and managing cravings. Encouraged having " some type of sober network and practicing honesty with trusted support person(s). Encouraged other services such as counseling, 12 step or other self-help organizations.      Opioid warning reviewed.  Risk of overdose following a period of abstinence due to decrease tolerance was discussed including risk of death.  Strongly recommended abstain from alcohol, benzodiazepines, THC, opioids and other drugs of abuse.  Increased risk of return to opioid use after use of these substances discussed.  Increased risk of overdose/death with use of other substances particularly benzodiazepines/alcohol reviewed.        SUBJECTIVE                                                    Jarrell Nicole is a 67 year old female with PMHx of stage IV breast cancer w/ bony metastases, asthma, HTN, diverticulitis, anxiety, opioid dependence, and history of unintentional opioid and benzodiazepine overdose (July 2021) who presents to clinic today for follow up.    Visit performed Virtual, via telephone    Phone call duration: 40 min    Brief history (from chart review):  Patient was initially seen by addiction medicine service when admitted for encephalopathy 2/2 unintentional overdose of opiates and benzodiazepines in July 2021.  She was diagnosed with metastatic breast cancer in March 2021 and was taking opioids since April 2021 for cancer related pain.  She also has long standing history of anxiety and had been on benzodiazepines for 7-8 years prior to hospitalization in July 2021 at which time she was weaned off with phenobarbital taper     Plan from most recent office visit (3/29/22):  1. Uncomplicated opioid dependence (H)  She is stable/doing well from chronic pain standpoint.  No side effects from buprenorphine.  Taking 16-24mg/day depending on pain level which is how it is has been prescribed.  Has surplus of medication given several hospitalizations and variable dosing so will let me know when she needs more.    - Drugs of Abuse 1+  "Panel, Urine ([facility] Only); Future  - Buprenorphine Urine, Qualitative; Future  - Drugs of Abuse 1+ Panel, Urine ([facility] Only)  - Buprenorphine Urine, Qualitative     2. Malignant neoplasm of lower-outer quadrant of right female breast, unspecified estrogen receptor status (H)  Discussed seeing therapist who specializes in chronic health issues.    - Adult Mental Health  Referral; Future  - Adult Mental Health  Referral; Future     3. Chronic neck pain  She has long history of neck pain which has previously been managed with Flexeril but issues with insurance coverage (also concern about use in her age group).  Trial of tizanidine for now.  Discussed monitoring for sedation, dizziness, etc.    - tiZANidine (ZANAFLEX) 2 MG tablet; Take 1 tablet (2 mg) by mouth 2 times daily as needed for muscle spasms  Dispense: 60 tablet; Refill: 0     4. MOHINDER (generalized anxiety disorder)  5. Panic attack  Recommend she establish with psychiatry to better manage anxiety.    - Adult Mental Health  Referral; Future    TODAY'S VISIT  [facility] Apr 26, 2022  - Concerned about tizanidine medication interactions - reviewed medication list in detail  - Continues to struggle with chronic neck pain, muscle tension  - Tried Subutex but taste caused vomiting, would prefer Suboxone (needs refill)  - Suboxone 8-2mg BID, most days needs additional dose - pain is \"manageable\"  - Tingling in hands and feet  - Constipation is well managed with Colace 100mg BID but still has \"pebbly\" BM  - Will be relocating to [place] in a few months to live with her daughter - would like help finding a Suboxone provider in [place]  - Looks forward to warm weather and more sunshine for both physical and mental health  - Uses SAD light during winter month      PHQ 3/1/2022 3/29/2022   PHQ-9 Total Score 4 3   Q9: Thoughts of better off dead/self-harm past 2 weeks Not at all Not at all     MOHINDER-7 SCORE 2/1/2022 3/1/2022 3/29/2022   Total Score 13 2 3 "       OBJECTIVE                                                    PHYSICAL EXAM:  There were no vitals taken for this visit.    Speech is clear and coherent.  Mood is anxious, affect is mood congruent.  Future oriented.  Thought process and content normal.  No coughing or wheezing.  Speaking in full sentences.      LAB  No results found for any visits on 04/26/22.      HISTORY                                                    Problem list reviewed & adjusted, as indicated.  Patient Active Problem List   Diagnosis     Malignant neoplasm of lower-outer quadrant of right breast of female, estrogen receptor positive (H)     Asthma     Combined forms of age-related cataract of right eye     Diverticulitis of large intestine without perforation or abscess without bleeding     Episodic mood disorder (H)     Hyperlipidemia     Pain medication agreement     Panic disorder without agoraphobia     Post traumatic stress disorder     Dehydration     Encephalopathy     Elevated troponin level not due to acute coronary syndrome     Acute kidney injury (H)     Chronic pain due to neoplasm     Opioid dependence with intoxication delirium (H)     Hypokalemia     Non-traumatic rhabdomyolysis     Essential hypertension     Anemia     Nausea and vomiting, intractability of vomiting not specified, unspecified vomiting type         MEDICATION LIST (prior to visit)  albuterol (PROAIR HFA;PROVENTIL HFA;VENTOLIN HFA) 90 mcg/actuation inhaler, Inhale 1-2 puffs into the lungs every 4 hours as needed for shortness of breath / dyspnea or wheezing   ascorbic acid, vitamin C, (VITAMIN C) 1000 MG tablet, [ASCORBIC ACID, VITAMIN C, (VITAMIN C) 1000 MG TABLET] Take 1,000 mg by mouth daily.  B,C/folic/zinc/copper ox/vit E (STRESS B-COMPLEX ORAL), [B,C/FOLIC/ZINC/COPPER OX/VIT E (STRESS B-COMPLEX ORAL)] Take 1 tablet by mouth daily. Stress supplement  cholecalciferol 125 MCG (5000 UT) CAPS, Take 1 capsule (5,000 Units) by mouth daily  ferrous sulfate  (FEROSUL) 325 (65 Fe) MG tablet, Take 1 tablet (325 mg) by mouth daily (with breakfast)  fluticasone propionate (FLONASE) 50 mcg/actuation nasal spray, [FLUTICASONE PROPIONATE (FLONASE) 50 MCG/ACTUATION NASAL SPRAY] 1 spray into each nostril daily.   gabapentin (NEURONTIN) 300 MG capsule, Take 300 mg by mouth 2 times daily   IBRANCE 125 MG tablet, Take 125mg daily for 3 weeks, then off for 1 week  letrozole (FEMARA) 2.5 MG tablet, Take 2.5 mg by mouth daily  Lidocaine (LIDOCARE) 4 % Patch, Place 2 patches onto the skin every 24 hours To prevent lidocaine toxicity, patient should be patch free for 12 hrs daily.  melatonin 10 mg Tab, Take 10 mg by mouth nightly as needed for sleep   naloxone (NARCAN) 4 MG/0.1ML nasal spray, Spray 1 spray (4 mg) into one nostril alternating nostrils once as needed for opioid reversal every 2-3 minutes until assistance arrives  ondansetron (ZOFRAN) 8 MG tablet, Take 8 mg by mouth 3 times daily as needed  propranoloL (INDERAL) 10 MG tablet, Take 10 mg by mouth 3 times daily as needed (anxiety or palpitations)   SYMBICORT 80-4.5 mcg/actuation inhaler, Inhale 2 puffs into the lungs 2 times daily   vitamin E 100 UNIT capsule, [VITAMIN E 100 UNIT CAPSULE] Take 500 Units by mouth 2 (two) times a week.  [DISCONTINUED] ipratropium-albuteroL (DUO-NEB) 0.5-2.5 mg/3 mL nebulizer, [IPRATROPIUM-ALBUTEROL (DUO-NEB) 0.5-2.5 MG/3 ML NEBULIZER] Take 3 mL by nebulization 4 (four) times a day as needed.    No current facility-administered medications on file prior to visit.      MEDICATION LIST (after visit)  Current Outpatient Medications   Medication     buprenorphine-naloxone (SUBOXONE) 8-2 MG SUBL sublingual tablet     cetirizine (ZYRTEC) 10 MG tablet     docusate sodium (COLACE) 100 MG capsule     gabapentin (NEURONTIN) 100 MG capsule     polyethylene glycol (MIRALAX) 17 GM/Dose powder     albuterol (PROAIR HFA;PROVENTIL HFA;VENTOLIN HFA) 90 mcg/actuation inhaler     ascorbic acid, vitamin C,  (VITAMIN C) 1000 MG tablet     B,C/folic/zinc/copper ox/vit E (STRESS B-COMPLEX ORAL)     cholecalciferol 125 MCG (5000 UT) CAPS     ferrous sulfate (FEROSUL) 325 (65 Fe) MG tablet     fluticasone propionate (FLONASE) 50 mcg/actuation nasal spray     gabapentin (NEURONTIN) 300 MG capsule     IBRANCE 125 MG tablet     letrozole (FEMARA) 2.5 MG tablet     Lidocaine (LIDOCARE) 4 % Patch     melatonin 10 mg Tab     naloxone (NARCAN) 4 MG/0.1ML nasal spray     ondansetron (ZOFRAN) 8 MG tablet     propranoloL (INDERAL) 10 MG tablet     SYMBICORT 80-4.5 mcg/actuation inhaler     vitamin E 100 UNIT capsule     No current facility-administered medications for this visit.         Allergies   Allergen Reactions     Codeine Hives     Iodinated Contrast Media [Diagn[employer] X[employer]] Unknown     Heart stopped and sick for multiple days after     Penicillins Swelling     Throat swelling as an infant     Tylenol [Acetaminophen] Other (See Comments)     With codeine only .Pt states gives her upset stomach for days.     Venlafaxine Hcl Er Nausea and Vomiting     Vomited 20 min after taking XR, OK with IR       Haley Wong [facility]  [facility]  [phone]

## 2022-04-26 NOTE — PROGRESS NOTES
This video/telephone visit will be conducted via a call between you and your physician/provider. We have found that certain health care needs can be provided without the need for an in-person physical exam. This service lets us provide the care you need with a video /telephone conversation. If a prescription is necessary we can send it directly to your pharmacy. If lab work is needed we can place an order for that and you can then stop by our lab to have the test done at a later time.    Just as we bill insurance for in-person visits, we also bill insurance for video/telephone visits. If you have questions about your insurance coverage, we recommend that you speak with your insurance company.    Patient has given verbal consent for video/Telephone visit? Yes    Patient would like video visit, please connect : Call [phone]  Chief Complaint   Patient presents with     Medication Follow-up     Discuss changing Subutex to buprenorphine-naloxone     Patient verified allergies, medications and pharmacy via telephone.     MOHINDER-7 scores:    MOHINDER-7 SCORE 3/1/2022 3/29/2022   Total Score 2 3     PHQ-9 scores:   PHQ-9 SCORE 3/1/2022 3/29/2022   PHQ-9 Total Score 4 3     [place]  to be reviewed by provider.   Medication refill is pended for review and approval by provider.  Patient states she  is ready for visit.    Rosemarie Corado CMA April 26, 2022 10:53 AM

## 2022-04-28 NOTE — PROGRESS NOTES
Medications Phoned  to Pharmacy [] yes [x]no     Medications ordered this visit were e-scribed.  Verified by order class [x] yes  [] no  List medications sent to pharmacy: Subxone 8-2mg, zyrtec 10mg, colace 100mg. Gabapentin 100mg, miralax 17g  Medication changes or discontinuations were communicated to patient's pharmacy: [] yes  [x] no     Dictation completed at time of chart check: [] yes  [x] no     I have checked the documentation for today s encounters and the above information has been reviewed and completed.        Rosemarie Corado, NIKOLAY April 28, 2022 10:00 AM

## 2022-05-04 ENCOUNTER — HOSPITAL ENCOUNTER (OUTPATIENT)
Dept: PET IMAGING | Facility: HOSPITAL | Age: 67
Discharge: HOME | End: 2022-05-04
Attending: INTERNAL MEDICINE | Admitting: INTERNAL MEDICINE
Payer: COMMERCIAL

## 2022-05-04 DIAGNOSIS — C50.111 MALIGNANT NEOPLASM OF CENTRAL PORTION OF RIGHT FEMALE BREAST (H): ICD-10-CM

## 2022-05-04 LAB — GLUCOSE BLDC GLUCOMTR-MCNC: 123 MG/DL (ref 70–99)

## 2022-05-04 PROCEDURE — A9552 F18 FDG: HCPCS | Performed by: INTERNAL MEDICINE

## 2022-05-04 PROCEDURE — 82962 GLUCOSE BLOOD TEST: CPT

## 2022-05-04 PROCEDURE — 78816 PET IMAGE W/CT FULL BODY: CPT | Mod: PS

## 2022-05-04 PROCEDURE — 343N000001 HC RX 343: Performed by: INTERNAL MEDICINE

## 2022-05-04 RX ADMIN — FLUDEOXYGLUCOSE F-18 10.16 MCI.: 500 INJECTION, SOLUTION INTRAVENOUS at 12:18

## 2022-05-15 ENCOUNTER — APPOINTMENT (OUTPATIENT)
Dept: MRI IMAGING | Facility: HOSPITAL | Age: 67
End: 2022-05-15
Attending: EMERGENCY MEDICINE
Payer: COMMERCIAL

## 2022-05-15 ENCOUNTER — HOSPITAL ENCOUNTER (EMERGENCY)
Facility: HOSPITAL | Age: 67
Discharge: HOME | End: 2022-05-15
Attending: EMERGENCY MEDICINE | Admitting: EMERGENCY MEDICINE
Payer: COMMERCIAL

## 2022-05-15 ENCOUNTER — APPOINTMENT (OUTPATIENT)
Dept: CT IMAGING | Facility: HOSPITAL | Age: 67
End: 2022-05-15
Attending: EMERGENCY MEDICINE
Payer: COMMERCIAL

## 2022-05-15 VITALS
HEIGHT: 64 IN | BODY MASS INDEX: 24.75 KG/M2 | DIASTOLIC BLOOD PRESSURE: 58 MMHG | RESPIRATION RATE: 25 BRPM | SYSTOLIC BLOOD PRESSURE: 127 MMHG | OXYGEN SATURATION: 100 % | HEART RATE: 61 BPM | TEMPERATURE: 98.3 F | WEIGHT: 145 LBS

## 2022-05-15 DIAGNOSIS — Z85.3 PERSONAL HISTORY OF MALIGNANT NEOPLASM OF BREAST: ICD-10-CM

## 2022-05-15 DIAGNOSIS — H57.04 FIXED DILATED PUPIL OF LEFT EYE: ICD-10-CM

## 2022-05-15 DIAGNOSIS — Z85.89 HISTORY OF MALIGNANT NEOPLASM METASTATIC TO BONE: ICD-10-CM

## 2022-05-15 LAB
ANION GAP SERPL CALCULATED.3IONS-SCNC: 16 MMOL/L (ref 5–18)
ATRIAL RATE - MUSE: 70 BPM
BASOPHILS # BLD MANUAL: 0 10E3/UL (ref 0–0.2)
BASOPHILS NFR BLD MANUAL: 0 %
BUN SERPL-MCNC: 15 MG/DL (ref 8–22)
CALCIUM SERPL-MCNC: 9.3 MG/DL (ref 8.5–10.5)
CHLORIDE BLD-SCNC: 103 MMOL/L (ref 98–107)
CO2 SERPL-SCNC: 19 MMOL/L (ref 22–31)
CREAT SERPL-MCNC: 0.82 MG/DL (ref 0.6–1.1)
DACRYOCYTES BLD QL SMEAR: SLIGHT
DIASTOLIC BLOOD PRESSURE - MUSE: 83 MMHG
EOSINOPHIL # BLD MANUAL: 0 10E3/UL (ref 0–0.7)
EOSINOPHIL NFR BLD MANUAL: 0 %
ERYTHROCYTE [DISTWIDTH] IN BLOOD BY AUTOMATED COUNT: 18.2 % (ref 10–15)
GFR SERPL CREATININE-BSD FRML MDRD: 78 ML/MIN/1.73M2
GLUCOSE BLD-MCNC: 147 MG/DL (ref 70–125)
HCT VFR BLD AUTO: 43.3 % (ref 35–47)
HGB BLD-MCNC: 14.8 G/DL (ref 11.7–15.7)
INTERPRETATION ECG - MUSE: NORMAL
LYMPHOCYTES # BLD MANUAL: 2.5 10E3/UL (ref 0.8–5.3)
LYMPHOCYTES NFR BLD MANUAL: 58 %
MCH RBC QN AUTO: 34.3 PG (ref 26.5–33)
MCHC RBC AUTO-ENTMCNC: 34.2 G/DL (ref 31.5–36.5)
MCV RBC AUTO: 101 FL (ref 78–100)
MONOCYTES # BLD MANUAL: 0.3 10E3/UL (ref 0–1.3)
MONOCYTES NFR BLD MANUAL: 7 %
NEUTROPHILS # BLD MANUAL: 1.5 10E3/UL (ref 1.6–8.3)
NEUTROPHILS NFR BLD MANUAL: 35 %
P AXIS - MUSE: 72 DEGREES
PLAT MORPH BLD: ABNORMAL
PLATELET # BLD AUTO: 332 10E3/UL (ref 150–450)
POTASSIUM BLD-SCNC: 4.1 MMOL/L (ref 3.5–5)
PR INTERVAL - MUSE: 146 MS
QRS DURATION - MUSE: 70 MS
QT - MUSE: 408 MS
QTC - MUSE: 440 MS
R AXIS - MUSE: 60 DEGREES
RBC # BLD AUTO: 4.31 10E6/UL (ref 3.8–5.2)
RBC MORPH BLD: ABNORMAL
SODIUM SERPL-SCNC: 138 MMOL/L (ref 136–145)
SYSTOLIC BLOOD PRESSURE - MUSE: 202 MMHG
T AXIS - MUSE: 82 DEGREES
VARIANT LYMPHS BLD QL SMEAR: PRESENT
VENTRICULAR RATE- MUSE: 70 BPM
WBC # BLD AUTO: 4.3 10E3/UL (ref 4–11)

## 2022-05-15 PROCEDURE — 96376 TX/PRO/DX INJ SAME DRUG ADON: CPT

## 2022-05-15 PROCEDURE — 85027 COMPLETE CBC AUTOMATED: CPT | Performed by: EMERGENCY MEDICINE

## 2022-05-15 PROCEDURE — 96374 THER/PROPH/DIAG INJ IV PUSH: CPT

## 2022-05-15 PROCEDURE — 93005 ELECTROCARDIOGRAM TRACING: CPT | Performed by: EMERGENCY MEDICINE

## 2022-05-15 PROCEDURE — 99285 EMERGENCY DEPT VISIT HI MDM: CPT | Mod: 25

## 2022-05-15 PROCEDURE — 70544 MR ANGIOGRAPHY HEAD W/O DYE: CPT | Mod: XS

## 2022-05-15 PROCEDURE — 80048 BASIC METABOLIC PNL TOTAL CA: CPT | Performed by: EMERGENCY MEDICINE

## 2022-05-15 PROCEDURE — 70450 CT HEAD/BRAIN W/O DYE: CPT

## 2022-05-15 PROCEDURE — 250N000011 HC RX IP 250 OP 636: Performed by: EMERGENCY MEDICINE

## 2022-05-15 PROCEDURE — 36415 COLL VENOUS BLD VENIPUNCTURE: CPT | Performed by: EMERGENCY MEDICINE

## 2022-05-15 PROCEDURE — 85007 BL SMEAR W/DIFF WBC COUNT: CPT | Performed by: EMERGENCY MEDICINE

## 2022-05-15 PROCEDURE — 250N000013 HC RX MED GY IP 250 OP 250 PS 637: Performed by: FAMILY MEDICINE

## 2022-05-15 PROCEDURE — 70551 MRI BRAIN STEM W/O DYE: CPT

## 2022-05-15 RX ORDER — IBUPROFEN 200 MG
400 TABLET ORAL ONCE
Status: DISCONTINUED | OUTPATIENT
Start: 2022-05-15 | End: 2022-05-15 | Stop reason: HOSPADM

## 2022-05-15 RX ORDER — LORAZEPAM 2 MG/ML
0.5 INJECTION INTRAMUSCULAR ONCE
Status: COMPLETED | OUTPATIENT
Start: 2022-05-15 | End: 2022-05-15

## 2022-05-15 RX ORDER — BUPRENORPHINE AND NALOXONE 2; .5 MG/1; MG/1
1 FILM, SOLUBLE BUCCAL; SUBLINGUAL DAILY
Status: DISCONTINUED | OUTPATIENT
Start: 2022-05-15 | End: 2022-05-15

## 2022-05-15 RX ORDER — BUPRENORPHINE HYDROCHLORIDE AND NALOXONE HYDROCHLORIDE DIHYDRATE 8; 2 MG/1; MG/1
1 TABLET SUBLINGUAL ONCE
Status: COMPLETED | OUTPATIENT
Start: 2022-05-15 | End: 2022-05-15

## 2022-05-15 RX ORDER — BUPRENORPHINE AND NALOXONE 8; 2 MG/1; MG/1
1 FILM, SOLUBLE BUCCAL; SUBLINGUAL DAILY
Status: DISCONTINUED | OUTPATIENT
Start: 2022-05-15 | End: 2022-05-15 | Stop reason: CLARIF

## 2022-05-15 RX ADMIN — LORAZEPAM 0.5 MG: 2 INJECTION, SOLUTION INTRAMUSCULAR; INTRAVENOUS at 12:43

## 2022-05-15 RX ADMIN — LORAZEPAM 0.5 MG: 2 INJECTION INTRAMUSCULAR; INTRAVENOUS at 15:14

## 2022-05-15 RX ADMIN — BUPRENORPHINE HYDROCHLORIDE AND NALOXONE HYDROCHLORIDE DIHYDRATE 1 TABLET: 8; 2 TABLET SUBLINGUAL at 15:14

## 2022-05-15 ASSESSMENT — ENCOUNTER SYMPTOMS
DYSURIA: 0
HEMATURIA: 0
DIZZINESS: 0
SHORTNESS OF BREATH: 0
CHILLS: 0
ABDOMINAL PAIN: 0
FEVER: 0
WEAKNESS: 0
HEADACHES: 1

## 2022-05-15 NOTE — ED PROVIDER NOTES
EMERGENCY DEPARTMENT ENCOUNTER      NAME: Jarrell Nicole  AGE: 67 year old female  YOB: 1955  MRN: [id number]  EVALUATION DATE & TIME: 5/15/2022 11:49 AM    PCP: Radha Oakley    ED PROVIDER: Soraida Hyde M.D.      CHIEF COMPLAINT     Chief Complaint   Patient presents with     Eye Problem     Increase pupil non reactive to light on left side          FINAL IMPRESSION:     1. Fixed dilated pupil of left eye    2. Personal history of malignant neoplasm of breast    3. History of malignant neoplasm metastatic to bone          MEDICAL DECISION MAKING:       Pertinent Labs & Imaging studies reviewed. (See chart for details)    67 year old female presents to the Emergency Department for evaluation of dilated left eye.    ED Course as of 05/15/22 1408   Sun May 15, 2022   1309 Mrs. Nicole is a 67-year-old female with history of metastatic breast cancer who presents here complaining of dilated left eye.  She woke up doing well.  She walked her dog started feeling blurriness, allergies on the left I went home and noticed that her pupil was dilated she immediately called 911 EMS arrived they noticed the dilated pupil but no more deficits on the brought her to the emergency department via stroke code.   1309 Reevaluation patient is awake alert in no distress.  There is no focal neurological deficits the left pupil is dilated extraocular muscles are intact there is no conjunctival injection there is no weakness of any of the extremities.   1309 On further question patient did remove her scopolamine patch.  Patch was located to behind the right ear.   1310 Differential diagnoses include but not limited to compression of coronary for CVA aneurysm any recent bleeding among others.   1310 Except for the dilated pupil patient has no focal neurological deficits.   1311 Spoke with Dr. Gutierres neurologist who states very likely this is secondary to the scopolamine but did recommend CT and CTA.   1311  Unfortunately patient has an allergy to contrast.   1311 She also require sedation prior to any imaging.   1311 Spoke with neuroradiologist and he states we can order an MRI brain without contrast and an MRA brain without contrast and this will give us the information about an aneurysm.   1311 Patient in agreement.  Daughter at bedside.  No focal neurological deficits.   1330 Reevaluated.  No focal neurological deficits.  CT head negative.  Will order MRI as recommended by neuroradiologist   1405 Reevaluated pupils still dilated no focal neurological deficits neck is supple.  In agreement with plan.  Signed out to Dr. Dubose pending MRI brain without contrast and MRA brain without contrast if negative no aneurysm patient may be discharged and dilated pupil is likely secondary to anticholinergic effect of scopolamine       Vital Signs: Hypertension  EKG: Sinus rhythm  Imaging: CT head no acute MRI MRA brain without contrast pending  Home Meds: Reviewed review  ED meds/abx: Ativan  Fluids: Normal saline    Labs  K  Cr  Wbc  Hgb  platelets        Review of Previous Records    Urgent Care  Patient was seen at [facility] on 5/2/2022 with worsening sore throat and cough since since 4 days prior. Has dysphagia, describes as sharp scratchy quality. Also has symptoms of hoarseness, runny nose. Describes cough as constat and non productive. It disrupts her sleep. Her PCP, DR Oakley, prescribed her benzonatate w/ no improvement. Has tried flonase and zyrtec. Patient is unsure if she has not been exposed to strep. Denies fever, loss of taste/smell, wheezing, SOB, stomachache, n/v, or rash.       Consults  Neurologist, Dr. Gutierres    ED COURSE   11:46 AM I met with the patient, obtained history, performed an initial exam, and discussed options and plan for diagnostics and treatment here in the ED.   12:06 PM I rechecked and updated the patient   12:21 PM I spoke with Dr. Gutierres, neuroradiology.  12:45 PM  Daughter is now in the room.    At the conclusion of the encounter I discussed the results of all of the tests and the disposition. The questions were answered. The patient and duaghter acknowledged understanding and was agreeable with the care plan.           MEDICATIONS GIVEN IN THE EMERGENCY:     Medications   ibuprofen (ADVIL/MOTRIN) tablet 400 mg (has no administration in time range)   LORazepam (ATIVAN) injection 0.5 mg (has no administration in time range)   LORazepam (ATIVAN) injection 0.5 mg (0.5 mg Intravenous Given 5/15/22 1243)       NEW PRESCRIPTIONS STARTED AT TODAY'S ER VISIT     New Prescriptions    No medications on file          =================================================================    HPI     Patient information was obtained from: Patient and EMS    Use of : N/A        Jarrell Nicole is a 67 year old female who presents by EMS for evaluation of possible stroke.    EMS reports patient presents from her apartment after she called them. They state the patient had sudden blurry vision in just her left eye and became reactive to light. She reports she thought it was just a floater from allergies, but could not find it. She checked her eyes in the mirror and found her left eye's pupil was wider than her right. EMS reports patient has a left-sided headache with a blood sugar of 186. EMS states stroke scale is negative.    Patient reports she woke up this morning and removed her scopolamine patch, which she does every three days, it is to not vomit from her cancer treatment. She took her dog for a walk, gave it water, and very quickly it started to feel funny. Her vision became blurry in her left eye, she checked the her eye in the mirror for an eye booger, but could not find it. Soon after, she had sudden onset of a left-sided headache.    Patient reports a history of of beast cancer that metastasized to her bones. Patient denies fevers, chills, chest pain, shortness of breath,  abdominal pain, dysuria, hematuria, dizziness, or any other current complaints.    REVIEW OF SYSTEMS   Review of Systems   Constitutional: Negative for chills and fever.   Eyes: Positive for visual disturbance.   Respiratory: Negative for shortness of breath.    Cardiovascular: Negative for chest pain.   Gastrointestinal: Negative for abdominal pain.   Genitourinary: Negative for dysuria and hematuria.   Neurological: Positive for headaches. Negative for dizziness and weakness.   All other systems reviewed and are negative.       PAST MEDICAL HISTORY:     Past Medical History:   Diagnosis Date     Asthma      Breast cancer (H) 02/24/2021    mets to bone, Stage 4     Essential hypertension      Panic disorder without agoraphobia 01/12/2012     Post traumatic stress disorder 12/30/2010     Vitamin D deficiency 03/06/2010       PAST SURGICAL HISTORY:     Past Surgical History:   Procedure Laterality Date     CT BIOPSY BONE  4/1/2021     US LYMPH NODE BIOPSY  2/24/2021         CURRENT MEDICATIONS:   albuterol (PROAIR HFA;PROVENTIL HFA;VENTOLIN HFA) 90 mcg/actuation inhaler  ascorbic acid, vitamin C, (VITAMIN C) 1000 MG tablet  B,C/folic/zinc/copper ox/vit E (STRESS B-COMPLEX ORAL)  buprenorphine-naloxone (SUBOXONE) 8-2 MG SUBL sublingual tablet  cetirizine (ZYRTEC) 10 MG tablet  cholecalciferol 125 MCG (5000 UT) CAPS  docusate sodium (COLACE) 100 MG capsule  ferrous sulfate (FEROSUL) 325 (65 Fe) MG tablet  fluticasone propionate (FLONASE) 50 mcg/actuation nasal spray  gabapentin (NEURONTIN) 100 MG capsule  gabapentin (NEURONTIN) 300 MG capsule  IBRANCE 125 MG tablet  letrozole (FEMARA) 2.5 MG tablet  Lidocaine (LIDOCARE) 4 % Patch  melatonin 10 mg Tab  naloxone (NARCAN) 4 MG/0.1ML nasal spray  ondansetron (ZOFRAN) 8 MG tablet  polyethylene glycol (MIRALAX) 17 GM/Dose powder  propranoloL (INDERAL) 10 MG tablet  SYMBICORT 80-4.5 mcg/actuation inhaler  vitamin E 100 UNIT capsule         ALLERGIES:     Allergies   Allergen  "Reactions     Codeine Hives     Iodinated Contrast Media [Diagnostic X-Ray Materials] Unknown     Heart stopped and sick for multiple days after     Penicillins Swelling     Throat swelling as an infant     Tylenol [Acetaminophen] Other (See Comments)     With codeine only .Pt states gives her upset stomach for days.     Venlafaxine Hcl Er Nausea and Vomiting     Vomited 20 min after taking XR, OK with IR       FAMILY HISTORY:     Family History   Adopted: Yes   Problem Relation Age of Onset     No Known Problems Daughter      No Known Problems Daughter      No Known Problems Daughter      No Known Problems Son        SOCIAL HISTORY:     Social History     Socioeconomic History     Marital status:      Number of children: 4   Tobacco Use     Smoking status: Former Smoker     Packs/day: 0.50     Years: 30.00     Pack years: 15.00     Types: Cigarettes     Quit date: 2019     Years since quittin.4     Smokeless tobacco: Never Used   Vaping Use     Vaping Use: Never used   Substance and Sexual Activity     Alcohol use: Not Currently     Drug use: Yes     Types: Marijuana     Comment: Medical keyon for cancer dx       VITALS:   BP (!) 141/76   Pulse 69   Temp 98.3  F (36.8  C) (Oral)   Resp 25   Ht 1.626 m (5' 4\")   Wt 65.8 kg (145 lb)   SpO2 97%   BMI 24.89 kg/m      PHYSICAL EXAM     Physical Exam  Vitals and nursing note reviewed. Exam conducted with a chaperone present.   Constitutional:       Appearance: Normal appearance.      Comments: Nontoxic cooperative and pleasant appears anxious.   Skin:     Capillary Refill: Capillary refill takes less than 2 seconds.   Neurological:      General: No focal deficit present.      Mental Status: She is alert and oriented to person, place, and time.      GCS: GCS eye subscore is 4. GCS verbal subscore is 5. GCS motor subscore is 6.      Cranial Nerves: Cranial nerves are intact.      Sensory: Sensation is intact.      Motor: Motor function is " intact.      Coordination: Coordination is intact.      Gait: Gait is intact.      Comments: Finger-to-nose is intact.  Alternating hand movements intact.  Gait is steady.         Physical Exam   Constitutional: Cooperative, pleasant, no distress.    Head: Atraumatic.     Nose: Nose normal.     Mouth/Throat: Oropharynx is clear and moist.     Eyes: EOM are normal. Pupils are round, and reactive to light. Left pupil 5 mm reactive. No Conjunctiva injection.    Ears: Bilateral pearly white tympanic membranes    Neck: Normal range of motion. Neck supple.     Cardiovascular: Normal rate, regular rhythm and normal heart sounds.      Pulmonary/Chest: Normal effort  and breath sounds normal.     Abdominal: Soft, nontender    Musculoskeletal: Normal range of motion.     Neurological: No neural deficits.    Lymphatics: No edema    : N/A    Skin: Skin is warm and dry.     Psychiatric: Normal mood and affect. Behavior is normal.     National Institutes of Health Stroke Scale  Exam Interval: Baseline   Score    Level of consciousness: (0)   Alert, keenly responsive    LOC questions: (0)   Answers both questions correctly    LOC commands: (0)   Performs both tasks correctly    Best gaze: (0)   Normal    Visual: (0)   No visual loss    Facial palsy: (0)   Normal symmetrical movements    Motor arm (left): (0)   No drift    Motor arm (right): (0)   No drift    Motor leg (left): (0)   No drift    Motor leg (right): (0)   No drift    Limb ataxia: (0)   Absent    Sensory: (0)   Normal- no sensory loss    Best language: (0)   Normal- no aphasia    Dysarthria: (0)   Normal    Extinction and inattention: (0)   No abnormality        Total Score:  0         LAB:     All pertinent labs reviewed and interpreted.  Labs Ordered and Resulted from Time of ED Arrival to Time of ED Departure - No data to display     RADIOLOGY:     Reviewed all pertinent imaging. Please see official radiology report.  Head CT w/o contrast   Final Result    IMPRESSION:   1.  No acute intracranial process.      MR Brain w/o Contrast    (Results Pending)   MRA Brain ([place] [facility] [place]) wo Contrast    (Results Pending)        EKG:     EKG #1  Sinus rhythm normal anterior progression normal axis    Time:[id number]    Ventricular rate 70 bmp  Axis normal  AR interval  146 ms  QRS duration 70 ms  QT//440 ms    Compared to previous EKG on 320-second 2022 sinus rhythm rate of 72.  Left axis deviation then.  Now normal axis.  Otherwise no significant changes.  I have independently reviewed and interpreted the EKG(s) documented above.      PROCEDURES:     Procedures      I, Miya Sullivan, am serving as a scribe to document services personally performed by Dr. Hyde based on my observation and the provider's statements to me. I, Soraida Hyde MD attest that Miya Sullivan is acting in a scribe capacity, has observed my performance of the services and has documented them in accordance with my direction.    Soraida Hyde M.D.  Emergency Medicine  [facility] EMERGENCY DEPARTMENT  [facility] [facility] [address] [phone]  Dept: [phone]     Soraida Hyde MD  05/15/22 8673

## 2022-05-15 NOTE — ED TRIAGE NOTES
Patient comes from home with EMS with blurred vision of the left eye and a dilated pupil. Neuro exam otherwise normal.   Patient has a history of cancer.  and BP 92/66    Patient is anxious and frustrated that daughter did not come with her.       Triage Assessment     Row Name 05/15/22 1159       Triage Assessment (Adult)    Airway WDL WDL

## 2022-05-15 NOTE — ED NOTES
EMERGENCY DEPARTMENT SIGN OUT NOTE        ED COURSE AND MEDICAL DECISION MAKING  Patient was signed out to me by Dr Soraida Hyde at 2:00PM.     In brief, Jarrell Nicole is a 67 year old female who initially presented with an eye problem. Patient reports she woke up this morning and removed her scopolamine patch, which she does every three days, it is to not vomit from her cancer treatment. She took her dog for a walk, gave it water, and very quickly it started to feel funny. Her vision became blurry in her left eye, she checked the her eye in the mirror for an eye booger, but could not find it. Soon after, she had sudden onset of a left-sided headache.    At time of sign out, disposition was pending  MRI brain without contrast and MRA brain without contrast if negative no aneurysm patient may be discharged and dilated pupil is likely secondary to anticholinergic effect of scopolamine.     4:32 PM MRA/MRI negative for acute intracranial findings.  Patient says the eye is getting better, still dilated and minimally reactive.    FINAL IMPRESSION    1. Fixed dilated pupil of left eye    2. Personal history of malignant neoplasm of breast    3. History of malignant neoplasm metastatic to bone        ED MEDS  Medications   ibuprofen (ADVIL/MOTRIN) tablet 400 mg (has no administration in time range)   LORazepam (ATIVAN) injection 0.5 mg (0.5 mg Intravenous Given 5/15/22 1243)   LORazepam (ATIVAN) injection 0.5 mg (0.5 mg Intravenous Given 5/15/22 1514)   buprenorphine-naloxone (SUBOXONE) 8-2 MG sublingual tablet 1 tablet (1 tablet Sublingual Given 5/15/22 1514)       LAB  Labs Ordered and Resulted from Time of ED Arrival to Time of ED Departure - No data to display    RADIOLOGY    MRA Brain ([place] [facility] [place]) wo Contrast   Final Result   CONCLUSION:   HEAD MRI:    1.  No acute/subacute infarction, intracranial hemorrhage, mass effect, or hydrocephalus.   2.  Small chronic infarctions in the right frontal. Ventricular  white matter and posterior right middle frontal gyrus.   3.  Stable presumed sequelae of mild chronic small vessel ischemic disease.      HEAD MRA:    1.  No aneurysm. Specifically, there is no aneurysm of the left posterior cerebral artery or left superior cerebellar artery along the expected course of the left oculomotor nerves.   2.  Mild to moderate stenoses of the mid cavernous segment of the right internal carotid artery.   3.  No high-grade stenosis, vascular occlusion, or high flow AVM identified.         MR Brain w/o Contrast   Final Result   CONCLUSION:   HEAD MRI:    1.  No acute/subacute infarction, intracranial hemorrhage, mass effect, or hydrocephalus.   2.  Small chronic infarctions in the right frontal. Ventricular white matter and posterior right middle frontal gyrus.   3.  Stable presumed sequelae of mild chronic small vessel ischemic disease.      HEAD MRA:    1.  No aneurysm. Specifically, there is no aneurysm of the left posterior cerebral artery or left superior cerebellar artery along the expected course of the left oculomotor nerves.   2.  Mild to moderate stenoses of the mid cavernous segment of the right internal carotid artery.   3.  No high-grade stenosis, vascular occlusion, or high flow AVM identified.         Head CT w/o contrast   Final Result   IMPRESSION:   1.  No acute intracranial process.          DISCHARGE MEDS  New Prescriptions    No medications on file       Oc Dubose MD  [facility] EMERGENCY DEPARTMENT  [facility] [facility] [id number] [phone]     Oc Dubose MD  05/15/22 1048

## 2022-05-15 NOTE — ED NOTES
Bed: JNED-18  Expected date: 5/15/22  Expected time: 11:35 AM  Means of arrival:   Comments:  Blurred vision and fixed left pupil since 1045

## 2022-05-17 PROBLEM — F11.20 UNCOMPLICATED OPIOID DEPENDENCE (H): Status: ACTIVE | Noted: 2021-07-13

## 2022-06-07 ENCOUNTER — VIRTUAL VISIT (OUTPATIENT)
Dept: PSYCHOLOGY | Facility: CLINIC | Age: 67
End: 2022-06-07
Attending: FAMILY MEDICINE
Payer: COMMERCIAL

## 2022-06-07 DIAGNOSIS — C50.511 MALIGNANT NEOPLASM OF LOWER-OUTER QUADRANT OF RIGHT FEMALE BREAST, UNSPECIFIED ESTROGEN RECEPTOR STATUS (H): ICD-10-CM

## 2022-06-07 DIAGNOSIS — Z91.199 NO-SHOW FOR APPOINTMENT: Primary | ICD-10-CM

## 2022-06-07 NOTE — PROGRESS NOTES
Patient not yet connected as of 1:10 pm. Called patient to check-in and offer assistance with getting connected. No answer. Left message inviting patient to join the visit or call with information about when she needs to reschedule. Remained connected to visit until 1:20 and then disconnected. Will also send [employer] message.     Sheridan Ca, Ph.D., , [facility]  Clinical Health Psychologist  Phone: [phone]   Pager: [phone]  6/7/2022 1:20 PM        This patient was a no show for this scheduled appointment.

## 2022-06-10 ENCOUNTER — TRANSFERRED RECORDS (OUTPATIENT)
Dept: HEALTH INFORMATION MANAGEMENT | Facility: CLINIC | Age: 67
End: 2022-06-10
Payer: COMMERCIAL

## 2022-06-20 DIAGNOSIS — F11.20 UNCOMPLICATED OPIOID DEPENDENCE (H): ICD-10-CM

## 2022-06-20 NOTE — TELEPHONE ENCOUNTER
Reason for Call:  Medication or medication refill:    Do you use a [facility]?  Name of the pharmacy and phone number for the current request:  [facility]    Name of the medication requested: Effexor, Suboxone, and another medication she couldn't remember the one sent over on 04/26/22 needing refill for 90 tabs. Future appointment set.    Other request: Needs refills    Can we leave a detailed message on this number? YES    Phone number patient can be reached at: Cell number on file:    Telephone Information:   Mobile [phone]       Best Time: any    Call taken on 6/20/2022 at 9:04 AM by Ibrahima Miller

## 2022-06-21 RX ORDER — BUPRENORPHINE HYDROCHLORIDE AND NALOXONE HYDROCHLORIDE DIHYDRATE 8; 2 MG/1; MG/1
TABLET SUBLINGUAL
Qty: 90 TABLET | Refills: 0 | Status: SHIPPED | OUTPATIENT
Start: 2022-06-21 | End: 2022-07-05

## 2022-06-21 NOTE — TELEPHONE ENCOUNTER
I have sent a refill of Suboxone.  I see we have an upcoming visit on 7/5.      At her visit with me on 3/29 Effexor was discontinued due to vomiting.  Last filled on 3/1 for 30 day supply.  We had discussed having her see psychiatry - if she is interested in that option she can call behavioral access.    Thanks!    Haley Wong, DO on 6/21/2022 at 12:03 PM

## 2022-06-21 NOTE — TELEPHONE ENCOUNTER
Returned call to patient, she said she would have to think about a psychiatry consult. She has the number for behavioral access.

## 2022-06-21 NOTE — TELEPHONE ENCOUNTER
"Patient contacted to clarify request, she is out of Suboxone in 2 days. She said she is out of Effexor 75 mg. Not listed on pt's med list. She wasn't clear on what she was taking. Pharmacy was called, spoke with Sandra, she reports last fill of Effexor was March 4.     Date of Last Office Visit: 4/26/22  Date of Next Office Visit: 7/5/22  No shows since last visit: none  Cancellations since last visit: none    Medication requested: buprenorphine-naloxone 8-2 mg tab Date last ordered: 4/26/22 Qty: 90 Refills: 0       Review of [place] ?: not granted access  Medication last filled date: 4/26/22 Qty filled: 90  Other [place] substance on [place] ?: unknown  If yes, is this a new medication?: unknown  If yes, name of medication: unknown and date filled: NA    Lapse in medication adherence greater than 5 days?: NO  If yes, call patient and gather details: Patient called, she reports she has 2 days left of Suboxone  Medication refill request verified as identical to current order?: yes  Result of Last DAM, VPA, Li+ Level, CBC, or Carbamazepine Level (at or since last visit): Positive DOA THC, positive BUP  3/29/22    Last visit treatment plan:   ASSESSMENT/PLAN     1. Uncomplicated opioid dependence (H)  Stable.  Symptoms well controlled.  Continue Suboxone, no change.  Discussed treating constipation more aggressively given report of \"pebbly\" stools.    - buprenorphine-naloxone (SUBOXONE) 8-2 MG SUBL sublingual tablet; Place 1 tablet under the tongue 2 times daily. May also place 0.5-1 tablets daily as needed for severe pain.  Dispense: 90 tablet; Refill: 0  - docusate sodium (COLACE) 100 MG capsule; Take 1 capsule (100 mg) by mouth 2 times daily as needed for constipation TAKE 1 CAPSULE(100 MG) BY MOUTH TWICE DAILY  Dispense: 180 capsule; Refill: 1  - polyethylene glycol (MIRALAX) 17 GM/Dose powder; Take 17 g (1 capful) by mouth daily as needed for constipation  Dispense: 850 g; Refill: 1     2. Chronic neck pain  Needs " improvement.  She is on gabapentin.  Flexeril worked well but not covered by insurance.  She is concerned about potential medication interactions or dependence of other muscle relaxants.  Encouraged her to consider dry needling or trigger point injections.    - gabapentin (NEURONTIN) 100 MG capsule; Take 1 capsule (100 mg) by mouth daily Mid-day  Dispense: 30 capsule; Refill: 2     3. Seasonal allergic rhinitis, unspecified trigger  Refill of Zyrtec provided at her request today.  - cetirizine (ZYRTEC) 10 MG tablet; Take 1 tablet (10 mg) by mouth daily  Dispense: 90 tablet; Refill: 0               PDMP Review        Value Time User     State PDMP site checked  Yes 3/29/2022  2:05 PM Haley Wong DO                RTC  Return in about 4 weeks (around 5/24/2022) for Follow up, with me, using a video visit.      []Medication refilled per  Medication Refill in Ambulatory Care  policy.  [x]Medication unable to be refilled by RN due to criteria not met as indicated below:    []Eligibility - not seen in the last year   []Supervision - no future appointment   []Compliance - no shows, cancellations or lapse in therapy   []Verification - order discrepancy   [x]Controlled medication   []Medication not included in policy   []90-day supply request   []Other

## 2022-07-05 ENCOUNTER — VIRTUAL VISIT (OUTPATIENT)
Dept: ADDICTION MEDICINE | Facility: CLINIC | Age: 67
End: 2022-07-05
Payer: COMMERCIAL

## 2022-07-05 DIAGNOSIS — G89.3 CHRONIC PAIN DUE TO NEOPLASM: ICD-10-CM

## 2022-07-05 DIAGNOSIS — M54.2 CHRONIC NECK PAIN: ICD-10-CM

## 2022-07-05 DIAGNOSIS — G89.29 CHRONIC NECK PAIN: ICD-10-CM

## 2022-07-05 DIAGNOSIS — F41.1 GAD (GENERALIZED ANXIETY DISORDER): ICD-10-CM

## 2022-07-05 DIAGNOSIS — F11.20 UNCOMPLICATED OPIOID DEPENDENCE (H): Primary | ICD-10-CM

## 2022-07-05 PROCEDURE — 99214 OFFICE O/P EST MOD 30 MIN: CPT | Mod: 95 | Performed by: FAMILY MEDICINE

## 2022-07-05 RX ORDER — GABAPENTIN 300 MG/1
300 CAPSULE ORAL 2 TIMES DAILY
Qty: 60 CAPSULE | Refills: 3 | Status: CANCELLED | OUTPATIENT
Start: 2022-07-05

## 2022-07-05 RX ORDER — GABAPENTIN 100 MG/1
100 CAPSULE ORAL DAILY
Qty: 30 CAPSULE | Refills: 2 | Status: SHIPPED | OUTPATIENT
Start: 2022-07-20 | End: 2022-09-22

## 2022-07-05 RX ORDER — BUPRENORPHINE HYDROCHLORIDE AND NALOXONE HYDROCHLORIDE DIHYDRATE 8; 2 MG/1; MG/1
TABLET SUBLINGUAL
Qty: 90 TABLET | Refills: 0 | Status: SHIPPED | OUTPATIENT
Start: 2022-07-20 | End: 2022-09-22

## 2022-07-05 ASSESSMENT — PATIENT HEALTH QUESTIONNAIRE - PHQ9: SUM OF ALL RESPONSES TO PHQ QUESTIONS 1-9: 3

## 2022-07-05 NOTE — PATIENT INSTRUCTIONS
Continue Suboxone, no change.      Follow-up with me in 4-6 weeks.  Reach out sooner with questions or concerns.      A prescription has been sent to your pharmacy of choice.    If a prior authorization is required it may take several days to get your medication.    Please make sure your pharmacy had your contact information so they can contact you when it is ready to .  Please contact your pharmacy to see if your medication is ready to be picked up.     You are encouraged to have some type of recovery program in addition to medication treatment.    Medication alone is generally not enough to lead to long term recovery.    This may include having some type of sober network, avoiding isolating, avoiding triggers (people, places, things you associate with using substances and help with managing cravings)    Options for support include :         [place] service office in [facility]:    [email or web address]    [phone]  Narcotics Anonymous   www.[email or web address]    5-410-738-5192   Smart Recovery   www.[employer]  Women for Sobriety   [email or web address]  Celebrate Recovery   [email or web address]   (Zoroastrian centered recovery).   [facility] ([facility])  [facility] connects people seeking recovery to resources that help foster and sustain long-term recovery.  Whether you are seeking resources for treatment, transportation, housing, job training, education, health care or other pathways to recovery, [facility] is a great place to start.  (www.[email or web address])   [phone]  [employer] and local community support groups  Mental health counseling   -we can assist with referral     You are strongly recommended abstaining from alcohol, benzodiazepines, cannabis, opioids and other drugs of abuse.     Using these in combination with Buprenorphine can raise your risk of overdose and death    Contact Us:   [facility] [phone] or by [employer]      If you cannot make  your appointment please call the office and reschedule immediately.     If a refill or bridge is needed it is important to call in advance so you do not run out of medications.   We will make every effort to manage your request promptly but please be aware that it may take up to one business day for your refill request to be processed.   If there is a concern with your request we will contact you.  Otherwise, please contact your pharmacy directly to see if your prescription is ready for .     Our clinic is open from Monday-Friday 8:00am-4:30pm and there is not an On Call after hours service.   If medical care is needed after hours or on the weekend you will need to contact your primary care physician or go to an Urgent Care or ER.

## 2022-07-05 NOTE — PROGRESS NOTES
Medications Phoned  to Pharmacy [] yes [x]no     Medications ordered this visit were e-scribed.  Verified by order class [] yes  [] no  List medications sent to pharmacy: Suboxone 8-2mg, gabapentin 100mg    Medication changes or discontinuations were communicated to patient's pharmacy: [] yes  [x] no     Dictation completed at time of chart check: [x] yes  [] no     I have checked the documentation for today s encounters and the above information has been reviewed and completed.        Rosemarie Corado CMA July 5, 2022 2:42 PM

## 2022-07-05 NOTE — PROGRESS NOTES
[facility]    A/P                                                    ASSESSMENT/PLAN    1. Uncomplicated opioid dependence (H)  2. Chronic pain due to neoplasm  3. Chronic neck pain  Overall reports pain is adequately controlled.  Continue Suboxone and gabapentin without change.  Constipation improving.  No other side effects noted.    - buprenorphine-naloxone (SUBOXONE) 8-2 MG SUBL sublingual tablet; Place 1 tablet under the tongue 2 times daily. May also place 0.5-1 tablets daily as needed for severe pain.  Dispense: 90 tablet; Refill: 0  - gabapentin (NEURONTIN) 100 MG capsule; Take 1 capsule (100 mg) by mouth daily Mid-day  Dispense: 30 capsule; Refill: 2    4. MOHINDER (generalized anxiety disorder)  She is working on finding a psychologist.  Not interested in psychiatry at this time.      PDMP Review       Value Time User    State PDMP site checked  Yes 6/21/2022 11:59 AM Haley Wong DO            RTC  Return in about 5 weeks (around 8/9/2022) for Follow up, with me, using a video visit.        SUBJECTIVE                                                    Jarrell Nicole is a 67 year old female with PMHx of stage IV breast cancer w/ bony metastases, asthma, HTN, diverticulitis, anxiety, opioid dependence, and history of unintentional opioid and benzodiazepine overdose (July 2021) who presents to clinic today for follow up.    Visit performed Virtual, via telephone    Phone call duration:  24 minutes    Brief history (from chart review):  Patient was initially seen by addiction medicine service when admitted for encephalopathy 2/2 unintentional overdose of opiates and benzodiazepines in July 2021.  She was diagnosed with metastatic breast cancer in March 2021 and was taking opioids since April 2021 for cancer related pain.  She also has long standing history of anxiety and had been on benzodiazepines for 7-8 years prior to hospitalization in July 2021 at which time she was  "weaned off with phenobarbital taper     Plan from most recent office visit (22):  1. Uncomplicated opioid dependence (H)  Stable.  Symptoms well controlled.  Continue Suboxone, no change.  Discussed treating constipation more aggressively given report of \"pebbly\" stools.    - buprenorphine-naloxone (SUBOXONE) 8-2 MG SUBL sublingual tablet; Place 1 tablet under the tongue 2 times daily. May also place 0.5-1 tablets daily as needed for severe pain.  Dispense: 90 tablet; Refill: 0  - docusate sodium (COLACE) 100 MG capsule; Take 1 capsule (100 mg) by mouth 2 times daily as needed for constipation TAKE 1 CAPSULE(100 MG) BY MOUTH TWICE DAILY  Dispense: 180 capsule; Refill: 1  - polyethylene glycol (MIRALAX) 17 GM/Dose powder; Take 17 g (1 capful) by mouth daily as needed for constipation  Dispense: 850 g; Refill: 1     2. Chronic neck pain  Needs improvement.  She is on gabapentin.  Flexeril worked well but not covered by insurance.  She is concerned about potential medication interactions or dependence of other muscle relaxants.  Encouraged her to consider dry needling or trigger point injections.    - gabapentin (NEURONTIN) 100 MG capsule; Take 1 capsule (100 mg) by mouth daily Mid-day  Dispense: 30 capsule; Refill: 2     3. Seasonal allergic rhinitis, unspecified trigger  Refill of Zyrtec provided at her request today.  - cetirizine (ZYRTEC) 10 MG tablet; Take 1 tablet (10 mg) by mouth daily  Dispense: 90 tablet; Refill: 0    TODAY'S VISIT  HPI 2022  - friend's daughter  of cancer recently which has hit hard (was very rapid decline)  - this has brought up a lot issues  - still looking for psychologist, not psychiatrist  - trying to get back into meditation  - daughter looking into getting her some short term help to get organized  - taking Suboxone 8-2mg TID which is working well for pain, no side effects  - using breathing exercise to help manage her pain when it flares  - has small fracture in right " "clavicle per her report  - more tired during the day, less at night - has always been a \"night owl\"  - talking to her sister lately  - no longer planning to move to [place], planning to move in with daughter here, still looking for 3 bedroom place - cost in an issue  - getting out on walks with her dog  - no change in N/V  - constipation improved - continues colace, Miralax if needed      PHQ 3/1/2022 3/29/2022 7/5/2022   PHQ-9 Total Score 4 3 3   Q9: Thoughts of better off dead/self-harm past 2 weeks Not at all Not at all Not at all     MOHINDER-7 SCORE 2/1/2022 3/1/2022 3/29/2022   Total Score 13 2 3       OBJECTIVE                                                    PHYSICAL EXAM:  There were no vitals taken for this visit.    Speech is clear and coherent.  Thought process and content normal.  Mood is anxious, affect is mood congruent.  Future focused.  No coughing or wheezing, speaking in full sentences.      LAB  No results found for any visits on 07/05/22.      HISTORY                                                    Problem list reviewed & adjusted, as indicated.  Patient Active Problem List   Diagnosis     Malignant neoplasm of lower-outer quadrant of right breast of female, estrogen receptor positive (H)     Asthma     Combined forms of age-related cataract of right eye     Diverticulitis of large intestine without perforation or abscess without bleeding     Episodic mood disorder (H)     Hyperlipidemia     Pain medication agreement     Panic disorder without agoraphobia     Post traumatic stress disorder     Dehydration     Encephalopathy     Elevated troponin level not due to acute coronary syndrome     Acute kidney injury (H)     Chronic pain due to neoplasm     Uncomplicated opioid dependence (H)     Hypokalemia     Non-traumatic rhabdomyolysis     Essential hypertension     Anemia     Nausea and vomiting, intractability of vomiting not specified, unspecified vomiting type         MEDICATION LIST (prior to " visit)  albuterol (PROAIR HFA;PROVENTIL HFA;VENTOLIN HFA) 90 mcg/actuation inhaler, Inhale 1-2 puffs into the lungs every 4 hours as needed for shortness of breath / dyspnea or wheezing   ascorbic acid, vitamin C, (VITAMIN C) 1000 MG tablet, [ASCORBIC ACID, VITAMIN C, (VITAMIN C) 1000 MG TABLET] Take 1,000 mg by mouth daily.  B,C/folic/zinc/copper ox/vit E (STRESS B-COMPLEX ORAL), [B,C/FOLIC/ZINC/COPPER OX/VIT E (STRESS B-COMPLEX ORAL)] Take 1 tablet by mouth daily. Stress supplement  cetirizine (ZYRTEC) 10 MG tablet, Take 1 tablet (10 mg) by mouth daily  cholecalciferol 125 MCG (5000 UT) CAPS, Take 1 capsule (5,000 Units) by mouth daily  docusate sodium (COLACE) 100 MG capsule, Take 1 capsule (100 mg) by mouth 2 times daily as needed for constipation TAKE 1 CAPSULE(100 MG) BY MOUTH TWICE DAILY  ferrous sulfate (FEROSUL) 325 (65 Fe) MG tablet, Take 1 tablet (325 mg) by mouth daily (with breakfast)  fluticasone propionate (FLONASE) 50 mcg/actuation nasal spray, [FLUTICASONE PROPIONATE (FLONASE) 50 MCG/ACTUATION NASAL SPRAY] 1 spray into each nostril daily.   gabapentin (NEURONTIN) 300 MG capsule, Take 300 mg by mouth 2 times daily   IBRANCE 125 MG tablet, Take 125mg daily for 3 weeks, then off for 1 week  letrozole (FEMARA) 2.5 MG tablet, Take 2.5 mg by mouth daily  Lidocaine (LIDOCARE) 4 % Patch, Place 2 patches onto the skin every 24 hours To prevent lidocaine toxicity, patient should be patch free for 12 hrs daily.  melatonin 10 mg Tab, Take 10 mg by mouth nightly as needed for sleep   naloxone (NARCAN) 4 MG/0.1ML nasal spray, Spray 1 spray (4 mg) into one nostril alternating nostrils once as needed for opioid reversal every 2-3 minutes until assistance arrives  ondansetron (ZOFRAN) 8 MG tablet, Take 8 mg by mouth 3 times daily as needed  polyethylene glycol (MIRALAX) 17 GM/Dose powder, Take 17 g (1 capful) by mouth daily as needed for constipation  propranoloL (INDERAL) 10 MG tablet, Take 10 mg by mouth 3 times  daily as needed (anxiety or palpitations)   SYMBICORT 80-4.5 mcg/actuation inhaler, Inhale 2 puffs into the lungs 2 times daily   vitamin E 100 UNIT capsule, [VITAMIN E 100 UNIT CAPSULE] Take 500 Units by mouth 2 (two) times a week.  [DISCONTINUED] ipratropium-albuteroL (DUO-NEB) 0.5-2.5 mg/3 mL nebulizer, [IPRATROPIUM-ALBUTEROL (DUO-NEB) 0.5-2.5 MG/3 ML NEBULIZER] Take 3 mL by nebulization 4 (four) times a day as needed.    No current facility-administered medications on file prior to visit.      MEDICATION LIST (after visit)  Current Outpatient Medications   Medication     albuterol (PROAIR HFA;PROVENTIL HFA;VENTOLIN HFA) 90 mcg/actuation inhaler     ascorbic acid, vitamin C, (VITAMIN C) 1000 MG tablet     B,C/folic/zinc/copper ox/vit E (STRESS B-COMPLEX ORAL)     [START ON 7/20/2022] buprenorphine-naloxone (SUBOXONE) 8-2 MG SUBL sublingual tablet     cetirizine (ZYRTEC) 10 MG tablet     cholecalciferol 125 MCG (5000 UT) CAPS     docusate sodium (COLACE) 100 MG capsule     ferrous sulfate (FEROSUL) 325 (65 Fe) MG tablet     fluticasone propionate (FLONASE) 50 mcg/actuation nasal spray     [START ON 7/20/2022] gabapentin (NEURONTIN) 100 MG capsule     gabapentin (NEURONTIN) 300 MG capsule     IBRANCE 125 MG tablet     letrozole (FEMARA) 2.5 MG tablet     Lidocaine (LIDOCARE) 4 % Patch     melatonin 10 mg Tab     naloxone (NARCAN) 4 MG/0.1ML nasal spray     ondansetron (ZOFRAN) 8 MG tablet     polyethylene glycol (MIRALAX) 17 GM/Dose powder     propranoloL (INDERAL) 10 MG tablet     SYMBICORT 80-4.5 mcg/actuation inhaler     vitamin E 100 UNIT capsule     No current facility-administered medications for this visit.         Allergies   Allergen Reactions     Iodinated Contrast Media [Diagn[employer]] Unknown     Heart stopped and sick for multiple days after     Penicillins Swelling     Throat swelling as an infant     Tylenol [Acetaminophen] Other (See Comments)     With codeine only .Pt states gives her  upset stomach for days.     Venlafaxine Hcl Er Nausea and Vomiting     Vomited 20 min after taking XR, OK with IR     Codeine Hives and Itching       Haley Wong DO  [facility]  [facility]  [phone]

## 2022-07-05 NOTE — PROGRESS NOTES
This video/telephone visit will be conducted via a call between you and your physician/provider. We have found that certain health care needs can be provided without the need for an in-person physical exam. This service lets us provide the care you need with a video /telephone conversation. If a prescription is necessary we can send it directly to your pharmacy. If lab work is needed we can place an order for that and you can then stop by our lab to have the test done at a later time.    Just as we bill insurance for in-person visits, we also bill insurance for video/telephone visits. If you have questions about your insurance coverage, we recommend that you speak with your insurance company.    Patient has given verbal consent for video/Telephone visit? Yes    Patient would like telephone visit, please connect : [phone]  Reason for visit: Medication follow up  Patient verified allergies, medications and pharmacy via telephone.     MOHINDER-7 scores:    MOHINDER-7 SCORE 3/1/2022 3/29/2022   Total Score 2 3     PHQ-9 scores:   PHQ-9 SCORE 3/29/2022 7/5/2022   PHQ-9 Total Score 3 3   MN  to be reviewed by provider.   Patient states she  is ready for visit.  Rosemarie Corado, NIKOLAY July 5, 2022 12:47 PM

## 2022-07-31 ENCOUNTER — HEALTH MAINTENANCE LETTER (OUTPATIENT)
Age: 67
End: 2022-07-31

## 2022-08-09 ENCOUNTER — TRANSFERRED RECORDS (OUTPATIENT)
Dept: HEALTH INFORMATION MANAGEMENT | Facility: CLINIC | Age: 67
End: 2022-08-09

## 2022-09-02 ENCOUNTER — TRANSFERRED RECORDS (OUTPATIENT)
Dept: HEALTH INFORMATION MANAGEMENT | Facility: CLINIC | Age: 67
End: 2022-09-02

## 2022-09-22 ENCOUNTER — VIRTUAL VISIT (OUTPATIENT)
Dept: ADDICTION MEDICINE | Facility: CLINIC | Age: 67
End: 2022-09-22
Payer: COMMERCIAL

## 2022-09-22 DIAGNOSIS — F11.20 UNCOMPLICATED OPIOID DEPENDENCE (H): ICD-10-CM

## 2022-09-22 DIAGNOSIS — M54.2 CHRONIC NECK PAIN: ICD-10-CM

## 2022-09-22 DIAGNOSIS — G89.29 CHRONIC NECK PAIN: ICD-10-CM

## 2022-09-22 DIAGNOSIS — G47.00 INSOMNIA, UNSPECIFIED TYPE: Primary | ICD-10-CM

## 2022-09-22 PROCEDURE — 99214 OFFICE O/P EST MOD 30 MIN: CPT | Mod: 95 | Performed by: FAMILY MEDICINE

## 2022-09-22 RX ORDER — BUPRENORPHINE HYDROCHLORIDE AND NALOXONE HYDROCHLORIDE DIHYDRATE 8; 2 MG/1; MG/1
TABLET SUBLINGUAL
Qty: 90 TABLET | Refills: 1 | Status: SHIPPED | OUTPATIENT
Start: 2022-09-22 | End: 2022-11-17

## 2022-09-22 RX ORDER — LANOLIN ALCOHOL/MO/W.PET/CERES
3 CREAM (GRAM) TOPICAL
Qty: 30 TABLET | Refills: 2 | Status: SHIPPED | OUTPATIENT
Start: 2022-09-22

## 2022-09-22 RX ORDER — PHENOL 1.4 %
10 AEROSOL, SPRAY (ML) MUCOUS MEMBRANE
Qty: 30 TABLET | Status: CANCELLED | OUTPATIENT
Start: 2022-09-22

## 2022-09-22 RX ORDER — CYCLOBENZAPRINE HCL 5 MG
5 TABLET ORAL 2 TIMES DAILY PRN
Qty: 30 TABLET | Refills: 0 | Status: SHIPPED | OUTPATIENT
Start: 2022-09-22 | End: 2022-10-18

## 2022-09-22 RX ORDER — GABAPENTIN 100 MG/1
100 CAPSULE ORAL DAILY
Qty: 30 CAPSULE | Refills: 1 | Status: SHIPPED | OUTPATIENT
Start: 2022-09-22 | End: 2022-11-17

## 2022-09-22 RX ORDER — DOCUSATE SODIUM 100 MG/1
100 CAPSULE, LIQUID FILLED ORAL 2 TIMES DAILY PRN
Qty: 180 CAPSULE | Refills: 1 | Status: SHIPPED | OUTPATIENT
Start: 2022-09-22 | End: 2022-11-17

## 2022-09-22 NOTE — PROGRESS NOTES
[facility]    A/P                                                    ASSESSMENT/PLAN    1. Uncomplicated opioid dependence (H)  Stable.  Symptoms overall well controlled, recently needing 24mg/day most days due to weather changes which she reports increases her pain.    - buprenorphine-naloxone (SUBOXONE) 8-2 MG SUBL sublingual tablet; Place 1 tablet under the tongue 2 times daily. May also place 0.5-1 tablets daily as needed for severe pain (7-10).  Dispense: 90 tablet; Refill: 1  - docusate sodium (COLACE) 100 MG capsule; Take 1 capsule (100 mg) by mouth 2 times daily as needed for constipation TAKE 1 CAPSULE(100 MG) BY MOUTH TWICE DAILY  Dispense: 180 capsule; Refill: 1    2. Chronic neck pain  Needs improvement.  She feels neck is tight.  I have noted this on previous in person visits.  She has found Flexeril very helpful in past.  Issues with insurance coverage in past.  We will try again today.  Discussed risk of sedation or dizziness with Flexeril, especially in combination with her other sedating medications.    - gabapentin (NEURONTIN) 100 MG capsule; Take 1 capsule (100 mg) by mouth daily Mid-day  Dispense: 30 capsule; Refill: 1  - cyclobenzaprine (FLEXERIL) 5 MG tablet; Take 1 tablet (5 mg) by mouth 2 times daily as needed for muscle spasms  Dispense: 30 tablet; Refill: 0    3. Insomnia, unspecified type  Complains of difficulty falling asleep, triggered by anxiety.  Would like to try melatonin.  Discussed taking a hour before bed and dimming lights, avoiding screens.    - melatonin 3 MG tablet; Take 1 tablet (3 mg) by mouth nightly as needed for sleep  Dispense: 30 tablet; Refill: 2        PDMP Review       Value Time User    State PDMP site checked  Yes 9/22/2022 11:32 AM Haley Wong DO            RTC  Return in 26 days (on 10/18/2022) for Follow up, with me, in person - 10/18 @ 1pm.      Counseled the patient on the importance of having a recovery program in  addition to medication to manage recovery.  Components include avoiding isolating, having willingness to change, avoiding triggers and managing cravings. Encouraged having some type of sober network and practicing honesty with trusted support person(s). Encouraged other services such as counseling, 12 step or other self-help organizations.      Opioid warning reviewed.  Risk of overdose following a period of abstinence due to decrease tolerance was discussed including risk of death.  Strongly recommended abstain from alcohol, benzodiazepines, THC, opioids and other drugs of abuse.  Increased risk of return to opioid use after use of these substances discussed.  Increased risk of overdose/death with use of other substances particularly benzodiazepines/alcohol reviewed.        SUBJECTIVE                                                    Jarrell Nicole is a 67 year old female with PMHx of stage IV breast cancer w/ bony metastases, asthma, HTN, diverticulitis, anxiety, opioid dependence, and history of unintentional opioid and benzodiazepine overdose (July 2021) who presents to clinic today for follow up.    Visit performed Virtual, via telephone     Phone call duration:  27 min    Brief history (from chart review):  Patient was initially seen by addiction medicine service when admitted for encephalopathy 2/2 unintentional overdose of opiates and benzodiazepines in July 2021.  She was diagnosed with metastatic breast cancer in March 2021 and was taking opioids since April 2021 for cancer related pain.  She also has long standing history of anxiety and had been on benzodiazepines for 7-8 years prior to hospitalization in July 2021 at which time she was weaned off with phenobarbital taper     Plan from most recent office visit (7/5/22):  1. Uncomplicated opioid dependence (H)  2. Chronic pain due to neoplasm  3. Chronic neck pain  Overall reports pain is adequately controlled.  Continue Suboxone and gabapentin without  change.  Constipation improving.  No other side effects noted.    - buprenorphine-naloxone (SUBOXONE) 8-2 MG SUBL sublingual tablet; Place 1 tablet under the tongue 2 times daily. May also place 0.5-1 tablets daily as needed for severe pain.  Dispense: 90 tablet; Refill: 0  - gabapentin (NEURONTIN) 100 MG capsule; Take 1 capsule (100 mg) by mouth daily Mid-day  Dispense: 30 capsule; Refill: 2     4. MOHINDER (generalized anxiety disorder)  She is working on finding a psychologist.  Not interested in psychiatry at this time.    TODAY'S VISIT  HPI Sep 22, 2022  - No problems with Suboxone, she is needing to take more due to more pain, more regularly needing TID dosing, weather changes worsen pain  - Clavicle and rib pain, especially with lifting - 10/1 has appointment with oncology and reports she will get more information at that time  - Neck stiff/muscle tightness is worsening again and limiting functioning - Flexeril was helpful in past, Tylenol not helpful  - Concerned about going for COVID booster  - Tried THC gummy for sleep, did not like how it made her feel  - Trouble sleeping due to stress (world issues, women's rights)  - Daughter moved out and is no longer in contact with her, has new PCP    MOHINDER-7 SCORE 2/1/2022 3/1/2022 3/29/2022   Total Score 13 2 3     PHQ 3/1/2022 3/29/2022 7/5/2022   PHQ-9 Total Score 4 3 3   Q9: Thoughts of better off dead/self-harm past 2 weeks Not at all Not at all Not at all       OBJECTIVE                                                    PHYSICAL EXAM:  There were no vitals taken for this visit.    Speech is clear and coherent.  No coughing or wheezing.  Thought process and content normal.  Mood is anxious.      LAB  No results found for any visits on 09/22/22.      HISTORY                                                    Problem list reviewed & adjusted, as indicated.  Patient Active Problem List   Diagnosis     Malignant neoplasm of lower-outer quadrant of right breast of female,  estrogen receptor positive (H)     Asthma     Combined forms of age-related cataract of right eye     Diverticulitis of large intestine without perforation or abscess without bleeding     Episodic mood disorder (H)     Hyperlipidemia     Pain medication agreement     Panic disorder without agoraphobia     Post traumatic stress disorder     Dehydration     Encephalopathy     Elevated troponin level not due to acute coronary syndrome     Acute kidney injury (H)     Chronic pain due to neoplasm     Uncomplicated opioid dependence (H)     Hypokalemia     Non-traumatic rhabdomyolysis     Essential hypertension     Anemia     Nausea and vomiting, intractability of vomiting not specified, unspecified vomiting type         MEDICATION LIST (prior to visit)  gabapentin (NEURONTIN) 300 MG capsule, Take 300 mg by mouth 2 times daily   melatonin 10 mg Tab, Take 10 mg by mouth nightly as needed for sleep   naloxone (NARCAN) 4 MG/0.1ML nasal spray, Spray 1 spray (4 mg) into one nostril alternating nostrils once as needed for opioid reversal every 2-3 minutes until assistance arrives  ondansetron (ZOFRAN) 8 MG tablet, Take 8 mg by mouth 3 times daily as needed  polyethylene glycol (MIRALAX) 17 GM/Dose powder, Take 17 g (1 capful) by mouth daily as needed for constipation  propranoloL (INDERAL) 10 MG tablet, Take 10 mg by mouth 3 times daily as needed (anxiety or palpitations)   albuterol (PROAIR HFA;PROVENTIL HFA;VENTOLIN HFA) 90 mcg/actuation inhaler, Inhale 1-2 puffs into the lungs every 4 hours as needed for shortness of breath / dyspnea or wheezing   ascorbic acid, vitamin C, (VITAMIN C) 1000 MG tablet, [ASCORBIC ACID, VITAMIN C, (VITAMIN C) 1000 MG TABLET] Take 1,000 mg by mouth daily.  B,C/folic/zinc/copper ox/vit E (STRESS B-COMPLEX ORAL), [B,C/FOLIC/ZINC/COPPER OX/VIT E (STRESS B-COMPLEX ORAL)] Take 1 tablet by mouth daily. Stress supplement  cetirizine (ZYRTEC) 10 MG tablet, Take 1 tablet (10 mg) by mouth  daily  cholecalciferol 125 MCG (5000 UT) CAPS, Take 1 capsule (5,000 Units) by mouth daily  ferrous sulfate (FEROSUL) 325 (65 Fe) MG tablet, Take 1 tablet (325 mg) by mouth daily (with breakfast)  fluticasone propionate (FLONASE) 50 mcg/actuation nasal spray, [FLUTICASONE PROPIONATE (FLONASE) 50 MCG/ACTUATION NASAL SPRAY] 1 spray into each nostril daily.   IBRANCE 125 MG tablet, Take 125mg daily for 3 weeks, then off for 1 week  letrozole (FEMARA) 2.5 MG tablet, Take 2.5 mg by mouth daily  Lidocaine (LIDOCARE) 4 % Patch, Place 2 patches onto the skin every 24 hours To prevent lidocaine toxicity, patient should be patch free for 12 hrs daily.  SYMBICORT 80-4.5 mcg/actuation inhaler, Inhale 2 puffs into the lungs 2 times daily   vitamin E 100 UNIT capsule, [VITAMIN E 100 UNIT CAPSULE] Take 500 Units by mouth 2 (two) times a week.  [DISCONTINUED] ipratropium-albuteroL (DUO-NEB) 0.5-2.5 mg/3 mL nebulizer, [IPRATROPIUM-ALBUTEROL (DUO-NEB) 0.5-2.5 MG/3 ML NEBULIZER] Take 3 mL by nebulization 4 (four) times a day as needed.    No current facility-administered medications on file prior to visit.      MEDICATION LIST (after visit)  Current Outpatient Medications   Medication     buprenorphine-naloxone (SUBOXONE) 8-2 MG SUBL sublingual tablet     cyclobenzaprine (FLEXERIL) 5 MG tablet     docusate sodium (COLACE) 100 MG capsule     gabapentin (NEURONTIN) 100 MG capsule     gabapentin (NEURONTIN) 300 MG capsule     melatonin 10 mg Tab     melatonin 3 MG tablet     naloxone (NARCAN) 4 MG/0.1ML nasal spray     ondansetron (ZOFRAN) 8 MG tablet     polyethylene glycol (MIRALAX) 17 GM/Dose powder     propranoloL (INDERAL) 10 MG tablet     albuterol (PROAIR HFA;PROVENTIL HFA;VENTOLIN HFA) 90 mcg/actuation inhaler     ascorbic acid, vitamin C, (VITAMIN C) 1000 MG tablet     B,C/folic/zinc/copper ox/vit E (STRESS B-COMPLEX ORAL)     cetirizine (ZYRTEC) 10 MG tablet     cholecalciferol 125 MCG (5000 UT) CAPS     ferrous sulfate  (FEROSUL) 325 (65 Fe) MG tablet     fluticasone propionate (FLONASE) 50 mcg/actuation nasal spray     IBRANCE 125 MG tablet     letrozole (FEMARA) 2.5 MG tablet     Lidocaine (LIDOCARE) 4 % Patch     SYMBICORT 80-4.5 mcg/actuation inhaler     vitamin E 100 UNIT capsule     No current facility-administered medications for this visit.         Allergies   Allergen Reactions     Iodinated Contrast Media [Diagnostic X-Ray Materials] Unknown     Heart stopped and sick for multiple days after     Penicillins Swelling     Throat swelling as an infant     Tylenol [Acetaminophen] Other (See Comments)     With codeine only .Pt states gives her upset stomach for days.     Venlafaxine Hcl Er Nausea and Vomiting     Vomited 20 min after taking XR, OK with IR     Codeine Hives and Itching       Haley Wong DO  [facility]  [facility]  [phone]

## 2022-09-22 NOTE — PROGRESS NOTES
[facility] - [facility] Medicine       Rooming information:    Pt is hyper verbal but redirectable    Any other recent substance use:    Had some THC gummies this morning   NICOTINE- No  If using nicotine, ready to quit? No    Side effects related to medications pt would like to discuss with provider (constipation, dry mouth, HA, GI upset, sedation?) mildly constipated    Narcan currently available: Yes    Primary care provider: Radha Oakley MD     Mental health provider: no, still trying to find therapist (follow up on MH referral if needed)    Any housing, insurance deficits?:     Contact information up to date? yes    3rd Party Involvement NA (please obtain SAM if pt would like to include)     This video/telephone visit will be conducted via a call between you and your physician/provider. We have found that certain health care needs can be provided without the need for an in-person physical exam. This service lets us provide the care you need with a video /telephone conversation. If a prescription is necessary we can send it directly to your pharmacy. If lab work is needed we can place an order for that and you can then stop by our lab to have the test done at a later time.    Just as we bill insurance for in-person visits, we also bill insurance for video/telephone visits. If you have questions about your insurance coverage, we recommend that you speak with your insurance company.    Patient has given verbal consent for video/Telephone visit? yes    Patient would like a telephone visit, please connect/call : Call to [phone]   Reason for visit: AM follow up  Anything the provider should be aware of for today's appointment: Pt c/o of not sleeping and trying THC for the first time but she does not know if she likes it or not. Also wants to discuss starting muscle relaxant.     Patient verified allergies, medications and pharmacy via phone  Patient states they are ready for visit.    Dolores  Maciel September 22, 2022 11:27 AM    Dolores St  September 22, 2022  11:27 AM    [facility] Post-Appointment Chart -check      Medications ordered this visit were e-scribed.  Verified by order class [x] yes  [] no    List Medications:      Class: E-Prescribe    Medication changes or discontinuations were communicated to patient's pharmacy: [] yes  [x] no    UA collected [] yes  [] no  [x] n/a-virtual     Outside referrals / labs, etc support staff to follow up: [] yes  [x] no    Future appointment was made: [x] yes  [] no  [] n/a      Dictation completed at time of chart check: [x] yes  [] no    I have checked the documentation for today s encounters and the above information has been reviewed and completed.      Dolores St on September 23, 2022 at 10:48 AM

## 2022-10-16 ENCOUNTER — HEALTH MAINTENANCE LETTER (OUTPATIENT)
Age: 67
End: 2022-10-16

## 2022-10-18 ENCOUNTER — OFFICE VISIT (OUTPATIENT)
Dept: ADDICTION MEDICINE | Facility: CLINIC | Age: 67
End: 2022-10-18
Payer: COMMERCIAL

## 2022-10-18 VITALS
BODY MASS INDEX: 25.92 KG/M2 | SYSTOLIC BLOOD PRESSURE: 156 MMHG | HEART RATE: 60 BPM | DIASTOLIC BLOOD PRESSURE: 67 MMHG | WEIGHT: 151 LBS | RESPIRATION RATE: 20 BRPM

## 2022-10-18 DIAGNOSIS — M54.2 CHRONIC NECK PAIN: ICD-10-CM

## 2022-10-18 DIAGNOSIS — F41.1 GAD (GENERALIZED ANXIETY DISORDER): ICD-10-CM

## 2022-10-18 DIAGNOSIS — G89.29 CHRONIC NECK PAIN: ICD-10-CM

## 2022-10-18 DIAGNOSIS — F11.20 UNCOMPLICATED OPIOID DEPENDENCE (H): Primary | ICD-10-CM

## 2022-10-18 DIAGNOSIS — G47.00 INSOMNIA, UNSPECIFIED TYPE: ICD-10-CM

## 2022-10-18 PROCEDURE — 99214 OFFICE O/P EST MOD 30 MIN: CPT | Performed by: FAMILY MEDICINE

## 2022-10-18 RX ORDER — LANOLIN ALCOHOL/MO/W.PET/CERES
3 CREAM (GRAM) TOPICAL
Qty: 30 TABLET | Refills: 2 | Status: CANCELLED | OUTPATIENT
Start: 2022-10-18

## 2022-10-18 RX ORDER — CYCLOBENZAPRINE HCL 5 MG
5 TABLET ORAL 2 TIMES DAILY PRN
Qty: 60 TABLET | Refills: 0 | Status: SHIPPED | OUTPATIENT
Start: 2022-10-18 | End: 2022-11-17

## 2022-10-18 RX ORDER — BUPRENORPHINE HYDROCHLORIDE AND NALOXONE HYDROCHLORIDE DIHYDRATE 8; 2 MG/1; MG/1
TABLET SUBLINGUAL
Qty: 90 TABLET | Refills: 1 | Status: CANCELLED | OUTPATIENT
Start: 2022-10-18

## 2022-10-18 ASSESSMENT — ANXIETY QUESTIONNAIRES
GAD7 TOTAL SCORE: 4
4. TROUBLE RELAXING: SEVERAL DAYS
2. NOT BEING ABLE TO STOP OR CONTROL WORRYING: NOT AT ALL
1. FEELING NERVOUS, ANXIOUS, OR ON EDGE: SEVERAL DAYS
6. BECOMING EASILY ANNOYED OR IRRITABLE: MORE THAN HALF THE DAYS
3. WORRYING TOO MUCH ABOUT DIFFERENT THINGS: NOT AT ALL
7. FEELING AFRAID AS IF SOMETHING AWFUL MIGHT HAPPEN: NOT AT ALL
5. BEING SO RESTLESS THAT IT IS HARD TO SIT STILL: NOT AT ALL

## 2022-10-18 ASSESSMENT — PATIENT HEALTH QUESTIONNAIRE - PHQ9: SUM OF ALL RESPONSES TO PHQ QUESTIONS 1-9: 5

## 2022-10-18 NOTE — PROGRESS NOTES
[facility] Addiction Medicine    A/P                                                    ASSESSMENT/PLAN    1. Uncomplicated opioid dependence (H)  Overall stable.  Continues to take Suboxone 8-2mg BID with additional dose as needed for pain.   Reviewed recent PET scan - no active disease.  She will continue Suboxone, no change (refill available at pharmacy).    2. Chronic neck pain  Improved with Flexeril, no side effects.  OK to continue.  - cyclobenzaprine (FLEXERIL) 5 MG tablet; Take 1 tablet (5 mg) by mouth 2 times daily as needed for muscle spasms  Dispense: 60 tablet; Refill: 0    3. Insomnia, unspecified type  4. MOHINDER (generalized anxiety disorder)  Ongoing difficulty with anxiety and insomnia.  Recommend discuss medication options with psychiatry given she has tried multiple SSRIs and at least one SNRI.  Would avoid benzodiazepines given risk of combining with buprenorphine.  She is currently working on finding a therapist.    - Adult Mental Health  Referral; Future      PDMP Review       Value Time User    State PDMP site checked  Yes 10/18/2022  1:13 PM Haley Wong,             RTC  Return in about 4 weeks (around 11/15/2022) for Follow up, using a video visit.        SUBJECTIVE                                                    Jarrell Nicole is a 67 year old female with PMHx of stage IV breast cancer w/ bony metastases, asthma, HTN, diverticulitis, anxiety, opioid dependence, and history of unintentional opioid and benzodiazepine overdose (July 2021) who presents to clinic today for follow up.     Brief history (from chart review):  Patient was initially seen by addiction medicine service when admitted for encephalopathy 2/2 unintentional overdose of opiates and benzodiazepines in July 2021.  She was diagnosed with metastatic breast cancer in March 2021 and was taking opioids since April 2021 for cancer related pain.  She also has long standing history of anxiety and had  been on benzodiazepines for 7-8 years prior to hospitalization in July 2021 at which time she was weaned off with phenobarbital taper.       Plan from most recent office visit (9/22/22):  1. Uncomplicated opioid dependence (H)  Stable.  Symptoms overall well controlled, recently needing 24mg/day most days due to weather changes which she reports increases her pain.    - buprenorphine-naloxone (SUBOXONE) 8-2 MG SUBL sublingual tablet; Place 1 tablet under the tongue 2 times daily. May also place 0.5-1 tablets daily as needed for severe pain (7-10).  Dispense: 90 tablet; Refill: 1  - docusate sodium (COLACE) 100 MG capsule; Take 1 capsule (100 mg) by mouth 2 times daily as needed for constipation TAKE 1 CAPSULE(100 MG) BY MOUTH TWICE DAILY  Dispense: 180 capsule; Refill: 1     2. Chronic neck pain  Needs improvement.  She feels neck is tight.  I have noted this on previous in person visits.  She has found Flexeril very helpful in past.  Issues with insurance coverage in past.  We will try again today.  Discussed risk of sedation or dizziness with Flexeril, especially in combination with her other sedating medications.    - gabapentin (NEURONTIN) 100 MG capsule; Take 1 capsule (100 mg) by mouth daily Mid-day  Dispense: 30 capsule; Refill: 1  - cyclobenzaprine (FLEXERIL) 5 MG tablet; Take 1 tablet (5 mg) by mouth 2 times daily as needed for muscle spasms  Dispense: 30 tablet; Refill: 0     3. Insomnia, unspecified type  Complains of difficulty falling asleep, triggered by anxiety.  Would like to try melatonin.  Discussed taking a hour before bed and dimming lights, avoiding screens.    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  None of these medications have adequately managed her anxiety. Reluctant to see psychiatry because of past experience.  Working on finding a therapist.      PHQ 3/29/2022 7/5/2022 10/18/2022   PHQ-9 Total Score 3 3 5   Q9: Thoughts of better off dead/self-harm past 2 weeks Not at all Not at all Not at all     MOHINDER-7 SCORE 2/1/2022 3/1/2022 3/29/2022   Total Score 13 2 3       OBJECTIVE                                                    PHYSICAL EXAM:  BP (!) 156/67 (BP Location: Left arm, Patient Position: Sitting, Cuff Size: Adult Regular)   Pulse 60   Resp 20   Wt 68.5 kg (151 lb)   BMI 25.92 kg/m      GENERAL: healthy, alert and no distress  EYES: Eyes grossly normal to inspection  RESP: No respiratory distress  SKIN: no pallor or jaundice  NEURO: Normal gait, mentation intact and speech normal  MENTAL STATUS EXAM  Appearance/Behavior: No appearant distress  Speech: Normal (at baseline)  Mood/Affect: anxious  Insight: Fair    LAB  No results found for any visits on 10/18/22.     UDS: buprenorphine and THC  *POC urine drug screen does not screen for Fentanyl      HISTORY                                                    Problem list reviewed & adjusted, as indicated.  Patient Active Problem List   Diagnosis     Malignant neoplasm of lower-outer quadrant of right breast of female, estrogen receptor positive (H)     Asthma     Combined forms of age-related cataract of right eye     Diverticulitis of large intestine  without perforation or abscess without bleeding     Episodic mood disorder (H)     Hyperlipidemia     Pain medication agreement     Panic disorder without agoraphobia     Post traumatic stress disorder     Dehydration     Encephalopathy     Elevated troponin level not due to acute coronary syndrome     Acute kidney injury (H)     Chronic pain due to neoplasm     Uncomplicated opioid dependence (H)     Hypokalemia     Non-traumatic rhabdomyolysis     Essential hypertension     Anemia     Nausea and vomiting, intractability of vomiting not specified, unspecified vomiting type         MEDICATION LIST (prior to visit)  albuterol (PROAIR HFA;PROVENTIL HFA;VENTOLIN HFA) 90 mcg/actuation inhaler, Inhale 1-2 puffs into the lungs every 4 hours as needed for shortness of breath / dyspnea or wheezing   ascorbic acid, vitamin C, (VITAMIN C) 1000 MG tablet, [ASCORBIC ACID, VITAMIN C, (VITAMIN C) 1000 MG TABLET] Take 1,000 mg by mouth daily.  B,C/folic/zinc/copper ox/vit E (STRESS B-COMPLEX ORAL), [B,C/FOLIC/ZINC/COPPER OX/VIT E (STRESS B-COMPLEX ORAL)] Take 1 tablet by mouth daily. Stress supplement  buprenorphine-naloxone (SUBOXONE) 8-2 MG SUBL sublingual tablet, Place 1 tablet under the tongue 2 times daily. May also place 0.5-1 tablets daily as needed for severe pain (7-10).  cetirizine (ZYRTEC) 10 MG tablet, Take 1 tablet (10 mg) by mouth daily  cholecalciferol 125 MCG (5000 UT) CAPS, Take 1 capsule (5,000 Units) by mouth daily  docusate sodium (COLACE) 100 MG capsule, Take 1 capsule (100 mg) by mouth 2 times daily as needed for constipation TAKE 1 CAPSULE(100 MG) BY MOUTH TWICE DAILY  ferrous sulfate (FEROSUL) 325 (65 Fe) MG tablet, Take 1 tablet (325 mg) by mouth daily (with breakfast)  fluticasone propionate (FLONASE) 50 mcg/actuation nasal spray, [FLUTICASONE PROPIONATE (FLONASE) 50 MCG/ACTUATION NASAL SPRAY] 1 spray into each nostril daily.   gabapentin (NEURONTIN) 100 MG capsule, Take 1 capsule (100 mg) by mouth daily  Mid-day  gabapentin (NEURONTIN) 300 MG capsule, Take 300 mg by mouth 2 times daily   IBRANCE 125 MG tablet, Take 125mg daily for 3 weeks, then off for 1 week  letrozole (FEMARA) 2.5 MG tablet, Take 2.5 mg by mouth daily  Lidocaine (LIDOCARE) 4 % Patch, Place 2 patches onto the skin every 24 hours To prevent lidocaine toxicity, patient should be patch free for 12 hrs daily.  naloxone (NARCAN) 4 MG/0.1ML nasal spray, Spray 1 spray (4 mg) into one nostril alternating nostrils once as needed for opioid reversal every 2-3 minutes until assistance arrives  ondansetron (ZOFRAN) 8 MG tablet, Take 8 mg by mouth 3 times daily as needed  polyethylene glycol (MIRALAX) 17 GM/Dose powder, Take 17 g (1 capful) by mouth daily as needed for constipation  propranoloL (INDERAL) 10 MG tablet, Take 10 mg by mouth 3 times daily as needed (anxiety or palpitations)   vitamin E 100 UNIT capsule, [VITAMIN E 100 UNIT CAPSULE] Take 500 Units by mouth 2 (two) times a week.  melatonin 10 mg Tab, Take 10 mg by mouth nightly as needed for sleep  (Patient not taking: Reported on 10/18/2022)  melatonin 3 MG tablet, Take 1 tablet (3 mg) by mouth nightly as needed for sleep (Patient not taking: Reported on 10/18/2022)  SYMBICORT 80-4.5 mcg/actuation inhaler, Inhale 2 puffs into the lungs 2 times daily  (Patient not taking: Reported on 10/18/2022)  [DISCONTINUED] ipratropium-albuteroL (DUO-NEB) 0.5-2.5 mg/3 mL nebulizer, [IPRATROPIUM-ALBUTEROL (DUO-NEB) 0.5-2.5 MG/3 ML NEBULIZER] Take 3 mL by nebulization 4 (four) times a day as needed.    No current facility-administered medications on file prior to visit.      MEDICATION LIST (after visit)  Current Outpatient Medications   Medication     albuterol (PROAIR HFA;PROVENTIL HFA;VENTOLIN HFA) 90 mcg/actuation inhaler     ascorbic acid, vitamin C, (VITAMIN C) 1000 MG tablet     B,C/folic/zinc/copper ox/vit E (STRESS B-COMPLEX ORAL)     buprenorphine-naloxone (SUBOXONE) 8-2 MG SUBL sublingual tablet      cetirizine (ZYRTEC) 10 MG tablet     cholecalciferol 125 MCG (5000 UT) CAPS     cyclobenzaprine (FLEXERIL) 5 MG tablet     docusate sodium (COLACE) 100 MG capsule     ferrous sulfate (FEROSUL) 325 (65 Fe) MG tablet     fluticasone propionate (FLONASE) 50 mcg/actuation nasal spray     gabapentin (NEURONTIN) 100 MG capsule     gabapentin (NEURONTIN) 300 MG capsule     IBRANCE 125 MG tablet     letrozole (FEMARA) 2.5 MG tablet     Lidocaine (LIDOCARE) 4 % Patch     naloxone (NARCAN) 4 MG/0.1ML nasal spray     ondansetron (ZOFRAN) 8 MG tablet     polyethylene glycol (MIRALAX) 17 GM/Dose powder     propranoloL (INDERAL) 10 MG tablet     vitamin E 100 UNIT capsule     melatonin 10 mg Tab     melatonin 3 MG tablet     SYMBICORT 80-4.5 mcg/actuation inhaler     No current facility-administered medications for this visit.         Allergies   Allergen Reactions     Iodinated Contrast Media [Diagn[employer] X[employer]] Unknown     Heart stopped and sick for multiple days after     Penicillins Swelling     Throat swelling as an infant     Tylenol [Acetaminophen] Other (See Comments)     With codeine only .Pt states gives her upset stomach for days.     Venlafaxine Hcl Er Nausea and Vomiting     Vomited 20 min after taking XR, OK with IR     Codeine Hives and Itching       Haley Wong DO  [facility] Addiction Medicine  [facility]  [phone]

## 2022-10-18 NOTE — NURSING NOTE
[facility] - Addiction Medicine       Rooming information:    Point of care urine drug screen positive for: BUP, THC  Urine Drug Screen Results  AMP: Negative  BAR: Negative  BUP: Positive  BZO: Negative  SERENE: Negative  mAMP: Negative  MDMA : Negative  MTD: Negative  TSA909: Negative  OXY: Negative  PCP : Negative  THC : Positive  *POC urine drug screen does not screen for Fentanyl    PHQ-9 Scores:   PHQ 3/29/2022 7/5/2022 10/18/2022   PHQ-9 Total Score 3 3 5   Q9: Thoughts of better off dead/self-harm past 2 weeks Not at all Not at all Not at all     MOHINDER-7 Scores:  MOHINDER-7 SCORE 2/1/2022 3/1/2022 3/29/2022   Total Score 13 2 3       Any other recent substance use:     Denies and Cannabis     NICOTINE-Former  If using nicotine, ready to quit? N/A    Side effects related to medications pt would like to discuss with provider (constipation, dry mouth, HA, GI upset, sedation?) No      Narcan currently available: No    Primary care provider: Radha Oakley MD     Mental health provider: Currently looking for someone (follow up on MH referral if needed)    Any housing, insurance deficits?: Having some problems paying utilities, housing is expensive    Contact information up to date? yes    3rd Party Involvement No (please obtain SAM if pt would like to include)        Jordana Prather RN  October 18, 2022  1:20 PM

## 2022-10-18 NOTE — PATIENT INSTRUCTIONS
Continue Suboxone, twice daily with option for 3rd dose if needed for pain.    Refill of Flexeril sent to pharmacy.    Recommend scheduling with psychiatry to discuss medication to manage your anxiety.  Call [phone] to make appointment.      Follow-up with me in 4 weeks, sooner if needed.    A prescription has been sent to your pharmacy of choice.    If a prior authorization is required it may take several days to get your medication.    Please make sure your pharmacy had your contact information so they can contact you when it is ready to .  Please contact your pharmacy to see if your medication is ready to be picked up.     You are encouraged to have some type of recovery program in addition to medication treatment.    Medication alone is generally not enough to lead to long term recovery.    This may include having some type of sober network, avoiding isolating, avoiding triggers (people, places, things you associate with using substances and help with managing cravings)    Options for support include :         [employer] service office in [facility]:    www.AwayFind.org    [phone]  Narcotics Anonymous   www.[employer].org    1-741.474.2804   Smart Recovery   www.[employer]  Women for Sobriety   www.womenforsobriety.org  Celebrate Recovery   www.Mos[employer].BuildingSearch.com   (Buddhist centered recovery).   [facility] ([facility])  [facility] connects people seeking recovery to resources that help foster and sustain long-term recovery.  Whether you are seeking resources for treatment, transportation, housing, job training, education, health care or other pathways to recovery, [facility] is a great place to start.  (www.[facility].org)   [phone]  Muslim and local community support groups  Mental health counseling   -we can assist with referral     You are strongly recommended abstaining from alcohol, benzodiazepines, cannabis, opioids and other drugs of abuse.     Using  these in combination with Buprenorphine can raise your risk of overdose and death    Contact Us:   [facility] [phone] or by [employer]      If you cannot make your appointment please call the office and reschedule immediately.     If a refill or bridge is needed it is important to call in advance so you do not run out of medications.   We will make every effort to manage your request promptly but please be aware that it may take up to one business day for your refill request to be processed.   If there is a concern with your request we will contact you.  Otherwise, please contact your pharmacy directly to see if your prescription is ready for .     Our clinic is open from Monday-Friday 8:00am-4:30pm and there is not an On Call after hours service.   If medical care is needed after hours or on the weekend you will need to contact your primary care physician or go to an Urgent Care or ER.

## 2022-11-01 ENCOUNTER — TRANSFERRED RECORDS (OUTPATIENT)
Dept: HEALTH INFORMATION MANAGEMENT | Facility: CLINIC | Age: 67
End: 2022-11-01

## 2022-11-17 ENCOUNTER — VIRTUAL VISIT (OUTPATIENT)
Dept: ADDICTION MEDICINE | Facility: CLINIC | Age: 67
End: 2022-11-17
Payer: COMMERCIAL

## 2022-11-17 DIAGNOSIS — G89.29 CHRONIC NECK PAIN: ICD-10-CM

## 2022-11-17 DIAGNOSIS — F41.1 GAD (GENERALIZED ANXIETY DISORDER): ICD-10-CM

## 2022-11-17 DIAGNOSIS — M54.2 CHRONIC NECK PAIN: ICD-10-CM

## 2022-11-17 DIAGNOSIS — F11.20 UNCOMPLICATED OPIOID DEPENDENCE (H): Primary | ICD-10-CM

## 2022-11-17 PROCEDURE — 99214 OFFICE O/P EST MOD 30 MIN: CPT | Mod: 95 | Performed by: FAMILY MEDICINE

## 2022-11-17 RX ORDER — GABAPENTIN 100 MG/1
100 CAPSULE ORAL DAILY
Qty: 30 CAPSULE | Refills: 1 | Status: SHIPPED | OUTPATIENT
Start: 2022-11-17 | End: 2023-01-30

## 2022-11-17 RX ORDER — BUPRENORPHINE HYDROCHLORIDE AND NALOXONE HYDROCHLORIDE DIHYDRATE 8; 2 MG/1; MG/1
TABLET SUBLINGUAL
Qty: 90 TABLET | Refills: 0 | Status: SHIPPED | OUTPATIENT
Start: 2022-12-01 | End: 2022-11-17

## 2022-11-17 RX ORDER — MIRTAZAPINE 15 MG/1
15 TABLET, FILM COATED ORAL AT BEDTIME
COMMUNITY
Start: 2022-10-27 | End: 2023-03-16

## 2022-11-17 RX ORDER — DOCUSATE SODIUM 100 MG/1
100 CAPSULE, LIQUID FILLED ORAL 2 TIMES DAILY PRN
Qty: 180 CAPSULE | Refills: 1 | Status: SHIPPED | OUTPATIENT
Start: 2022-11-17 | End: 2023-08-15

## 2022-11-17 RX ORDER — CYCLOBENZAPRINE HCL 5 MG
5 TABLET ORAL 2 TIMES DAILY PRN
Qty: 60 TABLET | Refills: 1 | Status: SHIPPED | OUTPATIENT
Start: 2022-11-17 | End: 2023-02-16

## 2022-11-17 RX ORDER — BUPRENORPHINE HYDROCHLORIDE AND NALOXONE HYDROCHLORIDE DIHYDRATE 8; 2 MG/1; MG/1
TABLET SUBLINGUAL
Qty: 90 TABLET | Refills: 1 | Status: SHIPPED | OUTPATIENT
Start: 2022-12-01 | End: 2023-02-16

## 2022-11-17 NOTE — PATIENT INSTRUCTIONS
Continue working with your psychiatry provider.  I'm glad the mirtazapine is helpful!  Recommend avoiding use of nightshade and other herbal supplements. This can be dangerous, especially combined with your current medications.    Continue Suboxone, no change.    Follow-up with me in 4 weeks, sooner if needed.      A prescription has been sent to your pharmacy of choice.    If a prior authorization is required it may take several days to get your medication.    Please make sure your pharmacy had your contact information so they can contact you when it is ready to .  Please contact your pharmacy to see if your medication is ready to be picked up.     You are encouraged to have some type of recovery program in addition to medication treatment.    Medication alone is generally not enough to lead to long term recovery.    This may include having some type of sober network, avoiding isolating, avoiding triggers (people, places, things you associate with using substances and help with managing cravings)    Options for support include :         [place] service office in [facility]:    www.Myxer.org    [phone]  Narcotics Anonymous   www.[employer].org    8-272-729-1475   Smart Recovery   www.[employer]  Women for Sobriety   www.womenforsobriety.org  Celebrate Recovery   www.[employer]   (Orthodox centered recovery).   [facility] ([facility])  [facility] connects people seeking recovery to resources that help foster and sustain long-term recovery.  Whether you are seeking resources for treatment, transportation, housing, job training, education, health care or other pathways to recovery, [facility] is a great place to start.  (www.[facility].org)   [phone]  Mormon and local community support groups  Mental health counseling   -we can assist with referral     You are strongly recommended abstaining from alcohol, benzodiazepines, cannabis, opioids and other drugs of  abuse.     Using these in combination with Buprenorphine can raise your risk of overdose and death    Contact Us:   [facility] [phone] or by [employer]ee      If you cannot make your appointment please call the office and reschedule immediately.     If a refill or bridge is needed it is important to call in advance so you do not run out of medications.   We will make every effort to manage your request promptly but please be aware that it may take up to one business day for your refill request to be processed.   If there is a concern with your request we will contact you.  Otherwise, please contact your pharmacy directly to see if your prescription is ready for .     Our clinic is open from Monday-Friday 8:00am-4:30pm and there is not an On Call after hours service.   If medical care is needed after hours or on the weekend you will need to contact your primary care physician or go to an Urgent Care or ER.

## 2022-11-17 NOTE — PROGRESS NOTES
[facility] Addiction Medicine       Rooming information:      PHQ-9 Scores:   PHQ 3/29/2022 7/5/2022 10/18/2022   PHQ-9 Total Score 3 3 5   Q9: Thoughts of better off dead/self-harm past 2 weeks Not at all Not at all Not at all     MOHINDER-7 Scores:  MOHINDER-7 SCORE 2/1/2022 3/1/2022 3/29/2022   Total Score 13 2 3       Any other recent substance use:     Denies    NICOTINE-No      Side effects related to medications pt would like to discuss with provider (constipation, dry mouth, HA, GI upset, sedation?) Yes constipation, refill docusate    Narcan currently available: Yes    Primary care provider: Radha Oakley MD     Mental health provider: none (follow up on MH referral if needed)    Any housing, insurance deficits?: No    Contact information up to date? Yes    3rd Party Involvement none (please obtain SAM if pt would like to include)     This video/telephone visit will be conducted via a call between you and your physician/provider. We have found that certain health care needs can be provided without the need for an in-person physical exam. This service lets us provide the care you need with a video /telephone conversation. If a prescription is necessary we can send it directly to your pharmacy. If lab work is needed we can place an order for that and you can then stop by our lab to have the test done at a later time.    Just as we bill insurance for in-person visits, we also bill insurance for video/telephone visits. If you have questions about your insurance coverage, we recommend that you speak with your insurance company.    Patient has given verbal consent for video/Telephone visit? Yes    Patient would like a video visit, please connect/call : TEXT link to [phone]  Reason for visit: Follow up  Anything the provider should be aware of for today's appointment: No    Patient verified allergies, medications and pharmacy via telephone.     MOHINDER-7 scores:    MOHINDER-7 SCORE 3/1/2022 3/29/2022   Total Score 2  3       PHQ-9 scores:   PHQ-9 SCORE 7/5/2022 10/18/2022   PHQ-9 Total Score 3 5       Patient states they are ready for visit.    Rosemarie Corado CMA November 17, 2022 12:53 PM    Rosemarie Corado CMA  November 17, 2022  12:53 PM

## 2022-11-17 NOTE — PROGRESS NOTES
[facility] Addiction Medicine    A/P                                                    ASSESSMENT/PLAN    1. Uncomplicated opioid dependence (H)  Stable.  Continue Suboxone , no change.  Has Narcan.  Continue Colace as needed for constipation.  - docusate sodium (COLACE) 100 MG capsule; Take 1 capsule (100 mg) by mouth 2 times daily as needed for constipation TAKE 1 CAPSULE(100 MG) BY MOUTH TWICE DAILY  Dispense: 180 capsule; Refill: 1  - buprenorphine-naloxone (SUBOXONE) 8-2 MG SUBL sublingual tablet; Place 1 tablet under the tongue 2 times daily. May also place 0.5-1 tablets daily as needed for severe pain (7-10).  Dispense: 90 tablet; Refill: 1    2. Chronic neck pain  Stable.  Continue gabapentin (managed by myself and oncology).  OK to continue Flexeril, discussed risks vs benefits.  - gabapentin (NEURONTIN) 100 MG capsule; Take 1 capsule (100 mg) by mouth daily Mid-day  Dispense: 30 capsule; Refill: 1  - cyclobenzaprine (FLEXERIL) 5 MG tablet; Take 1 tablet (5 mg) by mouth 2 times daily as needed for muscle spasms  Dispense: 60 tablet; Refill: 1    3. MOHINDER (generalized anxiety disorder)  Needs improvement.  She will continue working with new psychiatry provider.  Recommend avoiding herbal supplements.    PDMP Review       Value Time User    State PDMP site checked  Yes 11/17/2022  1:42 PM Haley Wong, DO            RTC  Return in about 2 months (around 1/17/2023) for Follow up, in person - please call patient to schedule.      Counseled the patient on the importance of having a recovery program in addition to medication to manage recovery.  Components include avoiding isolating, having willingness to change, avoiding triggers and managing cravings. Encouraged having some type of sober network and practicing honesty with trusted support person(s). Encouraged other services such as counseling, 12 step or other self-help organizations.      Opioid warning reviewed.  Risk of overdose following a  period of abstinence due to decrease tolerance was discussed including risk of death.  Strongly recommended abstain from alcohol, benzodiazepines, THC, opioids and other drugs of abuse.  Increased risk of return to opioid use after use of these substances discussed.  Increased risk of overdose/death with use of other substances particularly benzodiazepines/alcohol reviewed.        SUBJECTIVE                                                    Brief history (from chart review):  Patient was initially seen by addiction medicine service when admitted for encephalopathy 2/2 unintentional overdose of opiates and benzodiazepines in July 2021.  She was diagnosed with metastatic breast cancer in March 2021 and was taking opioids since April 2021 for cancer related pain.  She also has long standing history of anxiety and had been on benzodiazepines for 7-8 years prior to hospitalization in July 2021 at which time she was weaned off with phenobarbital taper.      Video-Visit Details    Type of service:  Video Visit    Video Start Time: 1:34 PM  Video End Time: 2:22 PM    Originating Location (pt. Location): Home    Distant Location (provider location):  [facility]     Platform used for Video Visit: [employer]       Plan from most recent office visit (10/18/22):  1. Uncomplicated opioid dependence (H)  Overall stable.  Continues to take Suboxone 8-2mg BID with additional dose as needed for pain.   Reviewed recent PET scan - no active disease.  She will continue Suboxone, no change (refill available at pharmacy).     2. Chronic neck pain  Improved with Flexeril, no side effects.  OK to continue.  - cyclobenzaprine (FLEXERIL) 5 MG tablet; Take 1 tablet (5 mg) by mouth 2 times daily as needed for muscle spasms  Dispense: 60 tablet; Refill: 0     3. Insomnia, unspecified type  4. MOHINDER (generalized anxiety disorder)  Ongoing difficulty with anxiety and insomnia.  Recommend discuss medication options with psychiatry given she  "has tried multiple SSRIs and at least one SNRI.  Would avoid benzodiazepines given risk of combining with buprenorphine.  She is currently working on finding a therapist.    - Adult Mental Health  Referral; Future     TODAY'S VISIT  HPI Nov 17, 2022  - Doing well  - Starting seeing a psychiatrist (Abel Aguilar) - Remedies - Mirtazapine 15mg daily - very helpful!  Also taking melatonin.  Reports she has night shade at home.  - Anxiety remains very high and she continues to experience panic attacks.  Some agoraphobia.  Identifies as empath and this makes her feel overwhelmed in many situations.  This is a chronic issue for her, not new.  Grounding helpful.    - Reflects on history of heroin and cocaine use - \"checked myself in to rehab\"  - Daughter reached out yesterday - \"I know she is not in my best interest.\"  - Continues to struggle with bone pain (clavicle) - taking 2.5 films daily about 4 times per week, otherwise taking 2 films  - Flexeril is helpful for pain  - PCA Mirian was ill recently and in ICU - will back next week    PHQ 3/29/2022 7/5/2022 10/18/2022   PHQ-9 Total Score 3 3 5   Q9: Thoughts of better off dead/self-harm past 2 weeks Not at all Not at all Not at all     MOHINDER-7 SCORE 2/1/2022 3/1/2022 3/29/2022   Total Score 13 2 3       OBJECTIVE                                                    PHYSICAL EXAM:  There were no vitals taken for this visit.    Speech is clear and coherent.  Mood is anxious, affect is appropriate.  Thought process and content overall normal, somewhat tangential at times but easily redirectable.  Future oriented.  No coughing or wheezing or shortness of breath.                                     LAB  No results found for any visits on 11/17/22.      HISTORY                                                    Problem list reviewed & adjusted, as indicated.  Patient Active Problem List   Diagnosis     Malignant neoplasm of lower-outer quadrant of right breast of " female, estrogen receptor positive (H)     Asthma     Combined forms of age-related cataract of right eye     Diverticulitis of large intestine without perforation or abscess without bleeding     Episodic mood disorder (H)     Hyperlipidemia     Pain medication agreement     Panic disorder without agoraphobia     Post traumatic stress disorder     Dehydration     Encephalopathy     Elevated troponin level not due to acute coronary syndrome     Acute kidney injury (H)     Chronic pain due to neoplasm     Uncomplicated opioid dependence (H)     Hypokalemia     Non-traumatic rhabdomyolysis     Essential hypertension     Anemia     Nausea and vomiting, intractability of vomiting not specified, unspecified vomiting type         MEDICATION LIST (prior to visit)  albuterol (PROAIR HFA;PROVENTIL HFA;VENTOLIN HFA) 90 mcg/actuation inhaler, Inhale 1-2 puffs into the lungs every 4 hours as needed for shortness of breath / dyspnea or wheezing   ascorbic acid, vitamin C, (VITAMIN C) 1000 MG tablet, [ASCORBIC ACID, VITAMIN C, (VITAMIN C) 1000 MG TABLET] Take 1,000 mg by mouth daily.  B,C/folic/zinc/copper ox/vit E (STRESS B-COMPLEX ORAL), [B,C/FOLIC/ZINC/COPPER OX/VIT E (STRESS B-COMPLEX ORAL)] Take 1 tablet by mouth daily. Stress supplement  cetirizine (ZYRTEC) 10 MG tablet, Take 1 tablet (10 mg) by mouth daily  cholecalciferol 125 MCG (5000 UT) CAPS, Take 1 capsule (5,000 Units) by mouth daily  ferrous sulfate (FEROSUL) 325 (65 Fe) MG tablet, Take 1 tablet (325 mg) by mouth daily (with breakfast)  fluticasone propionate (FLONASE) 50 mcg/actuation nasal spray, [FLUTICASONE PROPIONATE (FLONASE) 50 MCG/ACTUATION NASAL SPRAY] 1 spray into each nostril daily.   gabapentin (NEURONTIN) 300 MG capsule, Take 300 mg by mouth 2 times daily   IBRANCE 125 MG tablet, Take 125mg daily for 3 weeks, then off for 1 week  letrozole (FEMARA) 2.5 MG tablet, Take 2.5 mg by mouth daily  Lidocaine (LIDOCARE) 4 % Patch, Place 2 patches onto the skin  every 24 hours To prevent lidocaine toxicity, patient should be patch free for 12 hrs daily.  melatonin 10 mg Tab, Take 10 mg by mouth nightly as needed for sleep  melatonin 3 MG tablet, Take 1 tablet (3 mg) by mouth nightly as needed for sleep  naloxone (NARCAN) 4 MG/0.1ML nasal spray, Spray 1 spray (4 mg) into one nostril alternating nostrils once as needed for opioid reversal every 2-3 minutes until assistance arrives  ondansetron (ZOFRAN) 8 MG tablet, Take 8 mg by mouth 3 times daily as needed  polyethylene glycol (MIRALAX) 17 GM/Dose powder, Take 17 g (1 capful) by mouth daily as needed for constipation  propranoloL (INDERAL) 10 MG tablet, Take 10 mg by mouth 3 times daily as needed (anxiety or palpitations)   SYMBICORT 80-4.5 mcg/actuation inhaler, Inhale 2 puffs into the lungs 2 times daily  vitamin E 100 UNIT capsule, [VITAMIN E 100 UNIT CAPSULE] Take 500 Units by mouth 2 (two) times a week.  mirtazapine (REMERON) 15 MG tablet, Take 15 mg by mouth At Bedtime  [DISCONTINUED] ipratropium-albuteroL (DUO-NEB) 0.5-2.5 mg/3 mL nebulizer, [IPRATROPIUM-ALBUTEROL (DUO-NEB) 0.5-2.5 MG/3 ML NEBULIZER] Take 3 mL by nebulization 4 (four) times a day as needed.    No current facility-administered medications on file prior to visit.      MEDICATION LIST (after visit)  Current Outpatient Medications   Medication     albuterol (PROAIR HFA;PROVENTIL HFA;VENTOLIN HFA) 90 mcg/actuation inhaler     ascorbic acid, vitamin C, (VITAMIN C) 1000 MG tablet     B,C/folic/zinc/copper ox/vit E (STRESS B-COMPLEX ORAL)     buprenorphine-naloxone (SUBOXONE) 8-2 MG SUBL sublingual tablet     cetirizine (ZYRTEC) 10 MG tablet     cholecalciferol 125 MCG (5000 UT) CAPS     cyclobenzaprine (FLEXERIL) 5 MG tablet     docusate sodium (COLACE) 100 MG capsule     ferrous sulfate (FEROSUL) 325 (65 Fe) MG tablet     fluticasone propionate (FLONASE) 50 mcg/actuation nasal spray     gabapentin (NEURONTIN) 100 MG capsule     gabapentin (NEURONTIN) 300 MG  capsule     IBRANCE 125 MG tablet     letrozole (FEMARA) 2.5 MG tablet     Lidocaine (LIDOCARE) 4 % Patch     melatonin 10 mg Tab     melatonin 3 MG tablet     naloxone (NARCAN) 4 MG/0.1ML nasal spray     ondansetron (ZOFRAN) 8 MG tablet     polyethylene glycol (MIRALAX) 17 GM/Dose powder     propranoloL (INDERAL) 10 MG tablet     SYMBICORT 80-4.5 mcg/actuation inhaler     vitamin E 100 UNIT capsule     mirtazapine (REMERON) 15 MG tablet     No current facility-administered medications for this visit.         Allergies   Allergen Reactions     Iodinated Contrast Media [Diagnostic X-Ray Materials] Unknown     Heart stopped and sick for multiple days after     Penicillins Swelling     Throat swelling as an infant     Tylenol [Acetaminophen] Other (See Comments)     With codeine only .Pt states gives her upset stomach for days.     Venlafaxine Hcl Er Nausea and Vomiting     Vomited 20 min after taking XR, OK with IR     Codeine Hives and Itching     Haley Wong DO  [facility] Addiction Medicine  [facility]  [phone]

## 2022-12-08 ENCOUNTER — VIRTUAL VISIT (OUTPATIENT)
Dept: ADDICTION MEDICINE | Facility: CLINIC | Age: 67
End: 2022-12-08
Payer: COMMERCIAL

## 2022-12-08 DIAGNOSIS — F11.20 UNCOMPLICATED OPIOID DEPENDENCE (H): Primary | ICD-10-CM

## 2022-12-08 DIAGNOSIS — G47.00 INSOMNIA, UNSPECIFIED TYPE: ICD-10-CM

## 2022-12-08 PROCEDURE — 99214 OFFICE O/P EST MOD 30 MIN: CPT | Mod: 95 | Performed by: FAMILY MEDICINE

## 2022-12-08 RX ORDER — VILAZODONE HYDROCHLORIDE 10 MG/1
TABLET ORAL
COMMUNITY
Start: 2022-12-03

## 2022-12-08 RX ORDER — FLUTICASONE FUROATE AND VILANTEROL 100; 25 UG/1; UG/1
1 POWDER RESPIRATORY (INHALATION) DAILY
COMMUNITY
Start: 2022-07-26

## 2022-12-08 NOTE — PROGRESS NOTES
[facility] Addiction Medicine    A/P                                                    ASSESSMENT/PLAN    1. Uncomplicated opioid dependence (H)  Stable.  Continue Suboxone as prescribed.  Has refill on file.  Will need to request early refill, OK to fill early due to travel plans.      2. Insomnia, unspecified type  Improving with mirtazapine, will follow-up with psychiatry.    She will make appointment to see PCP regarding cough/illness.      PDMP Review       Value Time User    State PDMP site checked  Yes 12/8/2022 10:24 AM Haley Wong DO            RTC  Return in 7 weeks (on 1/26/2023) for Follow up, in person - 1/26 @ 11am.        SUBJECTIVE                                                    Jarrell Nicole is a 67 year old female with PMHx of stage IV breast cancer w/ bony metastases, asthma, HTN, diverticulitis, anxiety, opioid dependence, and history of unintentional opioid and benzodiazepine overdose (July 2021) who presents to clinic today for follow up.    Visit performed Virtual, via telephone    Time spent on phone call: 19 minutes  Originating Location (pt. Location): Home  Distant Location (provider location):  [facility]      Brief history (from chart review):  Patient was initially seen by addiction medicine service when admitted for encephalopathy 2/2 unintentional overdose of opiates and benzodiazepines in July 2021.  She was diagnosed with metastatic breast cancer in March 2021 and was taking opioids since April 2021 for cancer related pain.  She also has long standing history of anxiety and had been on benzodiazepines for 7-8 years prior to hospitalization in July 2021 at which time she was weaned off with phenobarbital taper.      Plan from most recent office visit (11/17/22):  1. Uncomplicated opioid dependence (H)  Stable.  Continue Suboxone , no change.  Has Narcan.  Continue Colace as needed for constipation.  - docusate sodium (COLACE) 100 MG capsule;  "Take 1 capsule (100 mg) by mouth 2 times daily as needed for constipation TAKE 1 CAPSULE(100 MG) BY MOUTH TWICE DAILY  Dispense: 180 capsule; Refill: 1  - buprenorphine-naloxone (SUBOXONE) 8-2 MG SUBL sublingual tablet; Place 1 tablet under the tongue 2 times daily. May also place 0.5-1 tablets daily as needed for severe pain (7-10).  Dispense: 90 tablet; Refill: 1     2. Chronic neck pain  Stable.  Continue gabapentin (managed by myself and oncology).  OK to continue Flexeril, discussed risks vs benefits.  - gabapentin (NEURONTIN) 100 MG capsule; Take 1 capsule (100 mg) by mouth daily Mid-day  Dispense: 30 capsule; Refill: 1  - cyclobenzaprine (FLEXERIL) 5 MG tablet; Take 1 tablet (5 mg) by mouth 2 times daily as needed for muscle spasms  Dispense: 60 tablet; Refill: 1     3. MOHINDER (generalized anxiety disorder)  Needs improvement.  She will continue working with new psychiatry provider.  Recommend avoiding herbal supplements.    TODAY'S VISIT  HPI Dec 8, 2022  - Not feeling well today, \"maybe bronchitis?\", tends to have similar symptoms each year at this time, hasn't checked temp; using body aches, productive cough, some congestion, and sore throat but no trouble breathing - planning to see PCP next week or sooner  - Scheduled for COVID booster next week  - PCA is back to working with her  - Continues with psychiatry - continue mirtazapine which is working well for sleep, was prescribed Viibryd (not happy about it, does not plan to start), has follow-up in a few weeks  - Taking Suboxone as prescribed, no noted side effects  - Has follow-up in a few weeks  - Flying to [place] 12/17, returns 1/19 or 1/20 - visiting daughter    OBJECTIVE                                                    PHYSICAL EXAM:  There were no vitals taken for this visit.    Speech is clear and coherent.  Occasional cough but no audible wheezing, speaking in full sentences.  Thought process and content normal.  Mood and affect appropriate.  Insight " and judgment adequate.    LAB  No results found for any visits on 12/08/22.      HISTORY                                                    Problem list reviewed & adjusted, as indicated.  Patient Active Problem List   Diagnosis     Malignant neoplasm of lower-outer quadrant of right breast of female, estrogen receptor positive (H)     Asthma     Combined forms of age-related cataract of right eye     Diverticulitis of large intestine without perforation or abscess without bleeding     Episodic mood disorder (H)     Hyperlipidemia     Pain medication agreement     Panic disorder without agoraphobia     Post traumatic stress disorder     Dehydration     Encephalopathy     Elevated troponin level not due to acute coronary syndrome     Acute kidney injury (H)     Chronic pain due to neoplasm     Uncomplicated opioid dependence (H)     Hypokalemia     Non-traumatic rhabdomyolysis     Essential hypertension     Anemia     Nausea and vomiting, intractability of vomiting not specified, unspecified vomiting type         MEDICATION LIST (prior to visit)  fluticasone-vilanterol (BREO ELLIPTA) 100-25 MCG/ACT inhaler, Inhale 1 puff into the lungs daily  albuterol (PROAIR HFA;PROVENTIL HFA;VENTOLIN HFA) 90 mcg/actuation inhaler, Inhale 1-2 puffs into the lungs every 4 hours as needed for shortness of breath / dyspnea or wheezing   ascorbic acid, vitamin C, (VITAMIN C) 1000 MG tablet, [ASCORBIC ACID, VITAMIN C, (VITAMIN C) 1000 MG TABLET] Take 1,000 mg by mouth daily.  B,C/folic/zinc/copper ox/vit E (STRESS B-COMPLEX ORAL), [B,C/FOLIC/ZINC/COPPER OX/VIT E (STRESS B-COMPLEX ORAL)] Take 1 tablet by mouth daily. Stress supplement  buprenorphine-naloxone (SUBOXONE) 8-2 MG SUBL sublingual tablet, Place 1 tablet under the tongue 2 times daily. May also place 0.5-1 tablets daily as needed for severe pain (7-10).  cetirizine (ZYRTEC) 10 MG tablet, Take 1 tablet (10 mg) by mouth daily  cholecalciferol 125 MCG (5000 UT) CAPS, Take 1  capsule (5,000 Units) by mouth daily  cyclobenzaprine (FLEXERIL) 5 MG tablet, Take 1 tablet (5 mg) by mouth 2 times daily as needed for muscle spasms  docusate sodium (COLACE) 100 MG capsule, Take 1 capsule (100 mg) by mouth 2 times daily as needed for constipation TAKE 1 CAPSULE(100 MG) BY MOUTH TWICE DAILY  ferrous sulfate (FEROSUL) 325 (65 Fe) MG tablet, Take 1 tablet (325 mg) by mouth daily (with breakfast)  fluticasone propionate (FLONASE) 50 mcg/actuation nasal spray, [FLUTICASONE PROPIONATE (FLONASE) 50 MCG/ACTUATION NASAL SPRAY] 1 spray into each nostril daily.   gabapentin (NEURONTIN) 100 MG capsule, Take 1 capsule (100 mg) by mouth daily Mid-day  gabapentin (NEURONTIN) 300 MG capsule, Take 300 mg by mouth 2 times daily   IBRANCE 125 MG tablet, Take 125mg daily for 3 weeks, then off for 1 week  letrozole (FEMARA) 2.5 MG tablet, Take 2.5 mg by mouth daily  Lidocaine (LIDOCARE) 4 % Patch, Place 2 patches onto the skin every 24 hours To prevent lidocaine toxicity, patient should be patch free for 12 hrs daily.  melatonin 10 mg Tab, Take 10 mg by mouth nightly as needed for sleep  melatonin 3 MG tablet, Take 1 tablet (3 mg) by mouth nightly as needed for sleep  mirtazapine (REMERON) 15 MG tablet, Take 15 mg by mouth At Bedtime  naloxone (NARCAN) 4 MG/0.1ML nasal spray, Spray 1 spray (4 mg) into one nostril alternating nostrils once as needed for opioid reversal every 2-3 minutes until assistance arrives  ondansetron (ZOFRAN) 8 MG tablet, Take 8 mg by mouth 3 times daily as needed  polyethylene glycol (MIRALAX) 17 GM/Dose powder, Take 17 g (1 capful) by mouth daily as needed for constipation  propranoloL (INDERAL) 10 MG tablet, Take 10 mg by mouth 3 times daily as needed (anxiety or palpitations)   SYMBICORT 80-4.5 mcg/actuation inhaler, Inhale 2 puffs into the lungs 2 times daily  vilazodone (VIIBRYD) 10 MG TABS tablet,   vitamin E 100 UNIT capsule, [VITAMIN E 100 UNIT CAPSULE] Take 500 Units by mouth 2 (two)  times a week.  [DISCONTINUED] ipratropium-albuteroL (DUO-NEB) 0.5-2.5 mg/3 mL nebulizer, [IPRATROPIUM-ALBUTEROL (DUO-NEB) 0.5-2.5 MG/3 ML NEBULIZER] Take 3 mL by nebulization 4 (four) times a day as needed.    No current facility-administered medications on file prior to visit.      MEDICATION LIST (after visit)  Current Outpatient Medications   Medication     fluticasone-vilanterol (BREO ELLIPTA) 100-25 MCG/ACT inhaler     albuterol (PROAIR HFA;PROVENTIL HFA;VENTOLIN HFA) 90 mcg/actuation inhaler     ascorbic acid, vitamin C, (VITAMIN C) 1000 MG tablet     B,C/folic/zinc/copper ox/vit E (STRESS B-COMPLEX ORAL)     buprenorphine-naloxone (SUBOXONE) 8-2 MG SUBL sublingual tablet     cetirizine (ZYRTEC) 10 MG tablet     cholecalciferol 125 MCG (5000 UT) CAPS     cyclobenzaprine (FLEXERIL) 5 MG tablet     docusate sodium (COLACE) 100 MG capsule     ferrous sulfate (FEROSUL) 325 (65 Fe) MG tablet     fluticasone propionate (FLONASE) 50 mcg/actuation nasal spray     gabapentin (NEURONTIN) 100 MG capsule     gabapentin (NEURONTIN) 300 MG capsule     IBRANCE 125 MG tablet     letrozole (FEMARA) 2.5 MG tablet     Lidocaine (LIDOCARE) 4 % Patch     melatonin 10 mg Tab     melatonin 3 MG tablet     mirtazapine (REMERON) 15 MG tablet     naloxone (NARCAN) 4 MG/0.1ML nasal spray     ondansetron (ZOFRAN) 8 MG tablet     polyethylene glycol (MIRALAX) 17 GM/Dose powder     propranoloL (INDERAL) 10 MG tablet     SYMBICORT 80-4.5 mcg/actuation inhaler     vilazodone (VIIBRYD) 10 MG TABS tablet     vitamin E 100 UNIT capsule     No current facility-administered medications for this visit.         Allergies   Allergen Reactions     Iodinated Contrast Media [Diagnostic X-Ray Materials] Unknown     Heart stopped and sick for multiple days after     Penicillins Swelling     Throat swelling as an infant     Tylenol [Acetaminophen] Other (See Comments)     With codeine only .Pt states gives her upset stomach for days.     Venlafaxine Hcl  Er Nausea and Vomiting     Vomited 20 min after taking XR, OK with IR     Codeine Hives and Itching     Haley Wong DO  [facility] Addiction Medicine  [facility]  [phone]

## 2022-12-08 NOTE — PROGRESS NOTES
[facility] Addiction Medicine       Rooming information:    Point of care urine drug screen positive for: N/A     *POC urine drug screen does not screen for Fentanyl    PHQ-9 Scores:   PHQ 3/29/2022 7/5/2022 10/18/2022   PHQ-9 Total Score 3 3 5   Q9: Thoughts of better off dead/self-harm past 2 weeks Not at all Not at all Not at all     MOHINDER-7 Scores:  MOHINDER-7 SCORE 2/1/2022 3/1/2022 3/29/2022   Total Score 13 2 3       Any other recent substance use:     Denies    NICOTINE-No  If using nicotine, ready to quit?     Side effects related to medications pt would like to discuss with provider (constipation, dry mouth, HA, GI upset, sedation?) No     Narcan currently available: Yes    Primary care provider: Radha Oakley MD     Mental health provider: none (follow up on MH referral if needed)    Any housing, insurance deficits?: No    Contact information up to date? yes    3rd Party Involvement none (please obtain SAM if pt would like to include)     This video/telephone visit will be conducted via a call between you and your physician/provider. We have found that certain health care needs can be provided without the need for an in-person physical exam. This service lets us provide the care you need with a video /telephone conversation. If a prescription is necessary we can send it directly to your pharmacy. If lab work is needed we can place an order for that and you can then stop by our lab to have the test done at a later time.    Just as we bill insurance for in-person visits, we also bill insurance for video/telephone visits. If you have questions about your insurance coverage, we recommend that you speak with your insurance company.    Patient has given verbal consent for video/Telephone visit? Yes    Patient would like a video visit, please connect/call : text link to [phone]  Reason for visit: Follow up  Anything the provider should be aware of for today's appointment: Declined PHQ9/MOHINDER, patient not  feeling well, woke up feeling sick.    Patient verified allergies, medications and pharmacy via telephone.     MOHINDER-7 scores:    MOHINDER-7 SCORE 3/1/2022 3/29/2022   Total Score 2 3       PHQ-9 scores:   PHQ-9 SCORE 7/5/2022 10/18/2022   PHQ-9 Total Score 3 5     Patient states they are ready for visit.  Rosemarie Corado CMA December 8, 2022 9:18 AM

## 2023-01-04 NOTE — TELEPHONE ENCOUNTER
Pt out of all her medication and need refills including her pain medication. Pharmacy sent over refill request but have not received response.     Alert and interactive, no focal deficits

## 2023-01-17 ENCOUNTER — TELEPHONE (OUTPATIENT)
Dept: BEHAVIORAL HEALTH | Facility: CLINIC | Age: 68
End: 2023-01-17
Payer: MEDICARE

## 2023-01-17 NOTE — TELEPHONE ENCOUNTER
Reason for call:  Other   Patient called regarding (reason for call): call back  Additional comments: pt would like to discuss refill plans before next appt with addiction meds    Phone number to reach patient:  Home number on file [phone] (home)    Best Time:  any    Can we leave a detailed message on this number?  Not Applicable    Travel screening: Not Applicable

## 2023-01-18 NOTE — TELEPHONE ENCOUNTER
Call placed to patient. Left patient a voicemail to return our call.     Porsha Schwarz RN on 1/18/2023 at 9:18 AM

## 2023-01-19 NOTE — TELEPHONE ENCOUNTER
Spoke with the patient and she is going out of town [facility]. She wanted to know when she would be able to refill her Suboxone. It appears that the patient has 1 refill at the pharmacy and should be able to fill it for a quantity of 90 films. She is going to call pharmacy to make sure.     Porsha Schwarz RN on 1/19/2023 at 4:21 PM

## 2023-01-20 ENCOUNTER — TELEPHONE (OUTPATIENT)
Dept: ADDICTION MEDICINE | Facility: CLINIC | Age: 68
End: 2023-01-20
Payer: MEDICARE

## 2023-01-20 NOTE — TELEPHONE ENCOUNTER
"Pt called very agitated about pharmacy's request for her buprenorphine not being approved yet.  States she needs to pick it up so she can \"get on a jet and get out of here\".  Requesting pharmacy should be [facility] on [address]  Please call pt at [phone] to let her know when this has been done so she can pick it up.  "

## 2023-01-20 NOTE — TELEPHONE ENCOUNTER
Called [facility] and they do have a refill still on her Suboxone. When patient called she may have given an old prescription # and they told her they didn't have a refill. I called patient and let her know that the pharmacy was going to get that refill ready for her. She was very relieved and appreciative. She is in a lot of pain and wants to go somewhere warm for a few days which helps with her pain in her bones.    Porsha Schwarz RN on 1/20/2023 at 12:02 PM

## 2023-01-29 DIAGNOSIS — G89.29 CHRONIC NECK PAIN: ICD-10-CM

## 2023-01-29 DIAGNOSIS — M54.2 CHRONIC NECK PAIN: ICD-10-CM

## 2023-01-30 RX ORDER — GABAPENTIN 100 MG/1
CAPSULE ORAL
Qty: 30 CAPSULE | Refills: 1 | Status: SHIPPED | OUTPATIENT
Start: 2023-01-30 | End: 2023-03-16

## 2023-01-30 NOTE — TELEPHONE ENCOUNTER
Date of Last Office Visit: 12/8/22  Date of Next Office Visit: 2/2/23  No shows since last visit: 0  Cancellations since last visit: 0    Medication requested: gabapentin (NEURONTIN) 100 MG capsule Date last ordered: 11/17/22 Qty: 30 Refills: 1     Review of [place] ?: yes  Medication last filled date: gabapentin (NEURONTIN) 300 MG capsule last filled on 1/12/22 Qty filled: 120  Other controlled substance on [place] ?: yes        Lapse in medication adherence greater than 5 days?: no    Medication refill request verified as identical to current order?: yes  Result of Last DAM, VPA, Li+ Level, CBC, or Carbamazepine Level (at or since last visit): N/A    Last visit treatment plan: ASSESSMENT/PLAN     1. Uncomplicated opioid dependence (H)  Stable.  Continue Suboxone as prescribed.  Has refill on file.  Will need to request early refill, OK to fill early due to travel plans.       2. Insomnia, unspecified type  Improving with mirtazapine, will follow-up with psychiatry.     She will make appointment to see PCP regarding cough/illness.       RTC  Return in 7 weeks (on 1/26/2023) for Follow up, in person - 1/26 @ 11am.    []Medication refilled per  Medication Refill in Ambulatory Care  policy.  [x]Medication unable to be refilled by RN due to criteria not met as indicated below:    []Eligibility - not seen in the last year   []Supervision - no future appointment   []Compliance - no shows, cancellations or lapse in therapy   []Verification - order discrepancy   [x]Controlled medication   [x]Medication not included in policy   []90-day supply request   []Other

## 2023-02-13 ENCOUNTER — TRANSFERRED RECORDS (OUTPATIENT)
Dept: HEALTH INFORMATION MANAGEMENT | Facility: CLINIC | Age: 68
End: 2023-02-13

## 2023-02-15 ENCOUNTER — TRANSFERRED RECORDS (OUTPATIENT)
Dept: HEALTH INFORMATION MANAGEMENT | Facility: CLINIC | Age: 68
End: 2023-02-15

## 2023-02-16 ENCOUNTER — VIRTUAL VISIT (OUTPATIENT)
Dept: ADDICTION MEDICINE | Facility: CLINIC | Age: 68
End: 2023-02-16
Payer: COMMERCIAL

## 2023-02-16 DIAGNOSIS — G89.29 CHRONIC NECK PAIN: ICD-10-CM

## 2023-02-16 DIAGNOSIS — M54.2 CHRONIC NECK PAIN: ICD-10-CM

## 2023-02-16 DIAGNOSIS — F11.20 UNCOMPLICATED OPIOID DEPENDENCE (H): Primary | ICD-10-CM

## 2023-02-16 DIAGNOSIS — F41.9 ANXIETY: ICD-10-CM

## 2023-02-16 PROCEDURE — 99214 OFFICE O/P EST MOD 30 MIN: CPT | Mod: VID | Performed by: FAMILY MEDICINE

## 2023-02-16 RX ORDER — BUPRENORPHINE HYDROCHLORIDE AND NALOXONE HYDROCHLORIDE DIHYDRATE 8; 2 MG/1; MG/1
TABLET SUBLINGUAL
Qty: 90 TABLET | Refills: 1 | Status: SHIPPED | OUTPATIENT
Start: 2023-02-16 | End: 2023-04-13

## 2023-02-16 RX ORDER — CYCLOBENZAPRINE HCL 5 MG
5 TABLET ORAL 2 TIMES DAILY PRN
Qty: 60 TABLET | Refills: 1 | Status: SHIPPED | OUTPATIENT
Start: 2023-02-16 | End: 2023-04-13

## 2023-02-16 NOTE — PROGRESS NOTES
[facility] - Addiction Medicine       Rooming information:    Needs something for social anxiety because is getting out of control  PET scan is coming up on March 10 th    Current Gabapentin dose is 600 mg BID and 100 mg at midday    Point of care urine drug screen positive for: NA -virtual     *POC urine drug screen does not screen for Fentanyl    PHQ-9 Scores: NA  PHQ 3/29/2022 2022 10/18/2022   PHQ-9 Total Score 3 3 5   Q9: Thoughts of better off dead/self-harm past 2 weeks Not at all Not at all Not at all     MOHINDER-7 Scores: NA  MOHINDER-7 SCORE 2022 3/1/2022 3/29/2022   Total Score 13 2 3       Any other recent substance use:    Medical cannabis for CA diagnosis   NICOTINE-No  If using nicotine, ready to quit? No    Side effects related to medications pt would like to discuss with provider (constipation, dry mouth, HA, GI upset, sedation?) possible weight gain from Gabapentin    Narcan currently available: Yes. Last Rx provided on 3/1/22 and will be  soon     Primary care provider: Radha Oakley MD     Mental health provider: none (follow up on MH referral if needed)    Any housing, insurance deficits?: denies    Contact information up to date? yes    3rd Party Involvement NA (please obtain SAM if pt would like to include)     This video/telephone visit will be conducted via a call between you and your physician/provider. We have found that certain health care needs can be provided without the need for an in-person physical exam. This service lets us provide the care you need with a video /telephone conversation. If a prescription is necessary we can send it directly to your pharmacy. If lab work is needed we can place an order for that and you can then stop by our lab to have the test done at a later time.    Just as we bill insurance for in-person visits, we also bill insurance for video/telephone visits. If you have questions about your insurance coverage, we recommend that you speak with  your insurance company.    Patient has given verbal consent for video/Telephone visit? yes    Patient would like a video  visit, please connect/call : [facility]  Reason for visit: follow up  Anything the provider should be aware of for today's appointment: asking to refill all her medications except Gabapentin 100 mg    Patient verified allergies, medications and pharmacy via [employer].     Patient states they are ready for visit.    Dolores St February 16, 2023 8:27 AM    Dolores St  February 16, 2023  8:27 AM

## 2023-02-16 NOTE — PROGRESS NOTES
[facility] Addiction Medicine    A/P                                                    ASSESSMENT/PLAN    1. Uncomplicated opioid dependence (H)  Stable.  Pain controlled.  Continue Suboxone, no change.  New script for Narcan, previous script expiring.  - naloxone (NARCAN) 4 MG/0.1ML nasal spray; Spray 1 spray (4 mg) into one nostril alternating nostrils once as needed for opioid reversal every 2-3 minutes until assistance arrives  Dispense: 0.2 mL; Refill: 11  - buprenorphine-naloxone (SUBOXONE) 8-2 MG SUBL sublingual tablet; Place 1 tablet under the tongue 2 times daily. May also place 0.5-1 tablets daily as needed for severe pain (7-10).  Dispense: 90 tablet; Refill: 1    2. Chronic neck pain  Improved.  Continue Flexeril as needed.  - cyclobenzaprine (FLEXERIL) 5 MG tablet; Take 1 tablet (5 mg) by mouth 2 times daily as needed for muscle spasms  Dispense: 60 tablet; Refill: 1    3. Anxiety  Needs improvement.  Agree with plan to continue working with psychiatry.  Plans to find new provider.        PDMP Review       Value Time User    State PDMP site checked  Yes 2/16/2023  9:02 AM Haley Wong DO            RTC  Return in about 4 weeks (around 3/16/2023) for Follow up, with me, using a video visit -3/16 @1pm.        SUBJECTIVE                                                    Jarrell Nicole is a 68 year old female with PMHx of stage IV breast cancer w/ bony metastases, asthma, HTN, diverticulitis, anxiety, opioid dependence, and history of unintentional opioid and benzodiazepine overdose (July 2021) who presents to clinic today for follow up.    Visit performed Virtual, via video    Video-Visit Details    Type of service:  Video Visit    Video Start Time: 9:09 AM  Video End Time: 9:25 AM    Originating Location (pt. Location): Home    Distant Location (provider location):  [facility]     Platform used for Video Visit: BetterLesson    Brief history (from chart review):  Patient was  "initially seen by addiction medicine service when admitted for encephalopathy 2/2 unintentional overdose of opiates and benzodiazepines in July 2021.  She was diagnosed with metastatic breast cancer in March 2021 and was taking opioids since April 2021 for cancer related pain.  She also has long standing history of anxiety and had been on benzodiazepines for 7-8 years prior to hospitalization in July 2021 at which time she was weaned off with phenobarbital taper.       Plan from most recent office visit (12/8/22):  1. Uncomplicated opioid dependence (H)  Stable.  Continue Suboxone as prescribed.  Has refill on file.  Will need to request early refill, OK to fill early due to travel plans.       2. Insomnia, unspecified type   Improving with mirtazapine, will follow-up with psychiatry.    TODAY'S VISIT  HPI Feb 16, 2023  - Went to [place] for 8 days - feels it is helpful for her overall health  - \"I need to do something about my anxiety\" - describes anxiety as debilitating, wants to resume clonazepam and plans to find another psychiatrist/prescriber  - Continues to take mirtazapine for sleep  - Continues to work with PCA  - Taking Suboxone as prescribed, usually taking TID, no new side effects, helpful for pain  - Flexeril has been very helpful for neck pain/stiffness  - Tingling in hands - chemo infusions are helpful  - PET scan coming up - anxiety provider    PHQ 3/29/2022 7/5/2022 10/18/2022   PHQ-9 Total Score 3 3 5   Q9: Thoughts of better off dead/self-harm past 2 weeks Not at all Not at all Not at all     MOHINDER-7 SCORE 2/1/2022 3/1/2022 3/29/2022   Total Score 13 2 3       OBJECTIVE                                                    PHYSICAL EXAM:  There were no vitals taken for this visit.    GENERAL: healthy, alert and no distress  EYES: Eyes grossly normal to inspection, conjunctivae and sclerae normal  RESP: No respiratory distress, no coughing or wheezing  NEURO: mentation intact and speech normal  MENTAL STATUS " EXAM  Appearance/Behavior: No appearant distress  Speech: Normal  Mood/Affect: anxiety  Insight: Adequate    LAB  No results found for any visits on 02/16/23.      HISTORY                                                    Problem list reviewed & adjusted, as indicated.  Patient Active Problem List   Diagnosis     Malignant neoplasm of lower-outer quadrant of right breast of female, estrogen receptor positive (H)     Asthma     Combined forms of age-related cataract of right eye     Diverticulitis of large intestine without perforation or abscess without bleeding     Episodic mood disorder (H)     Hyperlipidemia     Pain medication agreement     Panic disorder without agoraphobia     Post traumatic stress disorder     Dehydration     Encephalopathy     Elevated troponin level not due to acute coronary syndrome     Acute kidney injury (H)     Chronic pain due to neoplasm     Uncomplicated opioid dependence (H)     Hypokalemia     Non-traumatic rhabdomyolysis     Essential hypertension     Anemia     Nausea and vomiting, intractability of vomiting not specified, unspecified vomiting type         MEDICATION LIST (prior to visit)  docusate sodium (COLACE) 100 MG capsule, Take 1 capsule (100 mg) by mouth 2 times daily as needed for constipation TAKE 1 CAPSULE(100 MG) BY MOUTH TWICE DAILY  ferrous sulfate (FEROSUL) 325 (65 Fe) MG tablet, Take 1 tablet (325 mg) by mouth daily (with breakfast)  gabapentin (NEURONTIN) 100 MG capsule, TAKE 1 CAPSULE BY MOUTH DAILY MID-DAY AS DIRECTED.  melatonin 3 MG tablet, Take 1 tablet (3 mg) by mouth nightly as needed for sleep  polyethylene glycol (MIRALAX) 17 GM/Dose powder, Take 17 g (1 capful) by mouth daily as needed for constipation  albuterol (PROAIR HFA;PROVENTIL HFA;VENTOLIN HFA) 90 mcg/actuation inhaler, Inhale 1-2 puffs into the lungs every 4 hours as needed for shortness of breath / dyspnea or wheezing   ascorbic acid, vitamin C, (VITAMIN C) 1000 MG tablet, [ASCORBIC ACID,  VITAMIN C, (VITAMIN C) 1000 MG TABLET] Take 1,000 mg by mouth daily.  B,C/folic/zinc/copper ox/vit E (STRESS B-COMPLEX ORAL), [B,C/FOLIC/ZINC/COPPER OX/VIT E (STRESS B-COMPLEX ORAL)] Take 1 tablet by mouth daily. Stress supplement  cetirizine (ZYRTEC) 10 MG tablet, Take 1 tablet (10 mg) by mouth daily  cholecalciferol 125 MCG (5000 UT) CAPS, Take 1 capsule (5,000 Units) by mouth daily  fluticasone propionate (FLONASE) 50 mcg/actuation nasal spray, [FLUTICASONE PROPIONATE (FLONASE) 50 MCG/ACTUATION NASAL SPRAY] 1 spray into each nostril daily.   fluticasone-vilanterol (BREO ELLIPTA) 100-25 MCG/ACT inhaler, Inhale 1 puff into the lungs daily  gabapentin (NEURONTIN) 300 MG capsule, Take 600 mg by mouth 2 times daily  IBRANCE 125 MG tablet, Take 125mg daily for 3 weeks, then off for 1 week  letrozole (FEMARA) 2.5 MG tablet, Take 2.5 mg by mouth daily  Lidocaine (LIDOCARE) 4 % Patch, Place 2 patches onto the skin every 24 hours To prevent lidocaine toxicity, patient should be patch free for 12 hrs daily.  melatonin 10 mg Tab, Take 10 mg by mouth nightly as needed for sleep  mirtazapine (REMERON) 15 MG tablet, Take 15 mg by mouth At Bedtime  ondansetron (ZOFRAN) 8 MG tablet, Take 8 mg by mouth 3 times daily as needed  propranoloL (INDERAL) 10 MG tablet, Take 10 mg by mouth 3 times daily as needed (anxiety or palpitations)   SYMBICORT 80-4.5 mcg/actuation inhaler, Inhale 2 puffs into the lungs 2 times daily  vilazodone (VIIBRYD) 10 MG TABS tablet,   vitamin E 100 UNIT capsule, [VITAMIN E 100 UNIT CAPSULE] Take 500 Units by mouth 2 (two) times a week.  [DISCONTINUED] ipratropium-albuteroL (DUO-NEB) 0.5-2.5 mg/3 mL nebulizer, [IPRATROPIUM-ALBUTEROL (DUO-NEB) 0.5-2.5 MG/3 ML NEBULIZER] Take 3 mL by nebulization 4 (four) times a day as needed.    No current facility-administered medications on file prior to visit.      MEDICATION LIST (after visit)  Current Outpatient Medications   Medication     buprenorphine-naloxone  (SUBOXONE) 8-2 MG SUBL sublingual tablet     cyclobenzaprine (FLEXERIL) 5 MG tablet     docusate sodium (COLACE) 100 MG capsule     ferrous sulfate (FEROSUL) 325 (65 Fe) MG tablet     gabapentin (NEURONTIN) 100 MG capsule     melatonin 3 MG tablet     naloxone (NARCAN) 4 MG/0.1ML nasal spray     polyethylene glycol (MIRALAX) 17 GM/Dose powder     albuterol (PROAIR HFA;PROVENTIL HFA;VENTOLIN HFA) 90 mcg/actuation inhaler     ascorbic acid, vitamin C, (VITAMIN C) 1000 MG tablet     B,C/folic/zinc/copper ox/vit E (STRESS B-COMPLEX ORAL)     cetirizine (ZYRTEC) 10 MG tablet     cholecalciferol 125 MCG (5000 UT) CAPS     fluticasone propionate (FLONASE) 50 mcg/actuation nasal spray     fluticasone-vilanterol (BREO ELLIPTA) 100-25 MCG/ACT inhaler     gabapentin (NEURONTIN) 300 MG capsule     IBRANCE 125 MG tablet     letrozole (FEMARA) 2.5 MG tablet     Lidocaine (LIDOCARE) 4 % Patch     melatonin 10 mg Tab     mirtazapine (REMERON) 15 MG tablet     ondansetron (ZOFRAN) 8 MG tablet     propranoloL (INDERAL) 10 MG tablet     SYMBICORT 80-4.5 mcg/actuation inhaler     vilazodone (VIIBRYD) 10 MG TABS tablet     vitamin E 100 UNIT capsule     No current facility-administered medications for this visit.         Allergies   Allergen Reactions     Iodinated Contrast Media [Diagnostic X-Ray Materials] Unknown     Heart stopped and sick for multiple days after     Penicillins Swelling     Throat swelling as an infant     Tylenol [Acetaminophen] Other (See Comments)     With codeine only .Pt states gives her upset stomach for days.     Venlafaxine Hcl Er Nausea and Vomiting     Vomited 20 min after taking XR, OK with IR     Codeine Hives and Itching       Haley Wong, [facility] Addiction Medicine  [facility]  [phone]

## 2023-02-28 ENCOUNTER — TELEPHONE (OUTPATIENT)
Dept: BEHAVIORAL HEALTH | Facility: CLINIC | Age: 68
End: 2023-02-28

## 2023-02-28 NOTE — TELEPHONE ENCOUNTER
Reason for call:  Other   Patient called regarding (reason for call): call back  Additional comments: Patient would like a call back to found out why her medication prescriptions seemed to have changed.     Phone number to reach patient:  Home number on file [phone] (home)    Best Time:  ASAP    Can we leave a detailed message on this number?  YES    Travel screening: Not Applicable

## 2023-03-07 ENCOUNTER — HOSPITAL ENCOUNTER (EMERGENCY)
Facility: HOSPITAL | Age: 68
Discharge: LEFT BEFORE DISCHARGE | End: 2023-03-07
Admitting: STUDENT IN AN ORGANIZED HEALTH CARE EDUCATION/TRAINING PROGRAM
Payer: COMMERCIAL

## 2023-03-07 VITALS
WEIGHT: 140 LBS | BODY MASS INDEX: 23.9 KG/M2 | SYSTOLIC BLOOD PRESSURE: 161 MMHG | RESPIRATION RATE: 16 BRPM | HEART RATE: 76 BPM | HEIGHT: 64 IN | DIASTOLIC BLOOD PRESSURE: 97 MMHG | OXYGEN SATURATION: 97 % | TEMPERATURE: 98.5 F

## 2023-03-07 LAB
ALBUMIN UR-MCNC: 30 MG/DL
APPEARANCE UR: CLEAR
BILIRUB UR QL STRIP: NEGATIVE
COLOR UR AUTO: ABNORMAL
GLUCOSE UR STRIP-MCNC: NEGATIVE MG/DL
HGB UR QL STRIP: ABNORMAL
KETONES UR STRIP-MCNC: NEGATIVE MG/DL
LEUKOCYTE ESTERASE UR QL STRIP: ABNORMAL
NITRATE UR QL: NEGATIVE
PH UR STRIP: 6 [PH] (ref 5–7)
RBC URINE: ABNORMAL
SP GR UR STRIP: >1.03 (ref 1–1.03)
UROBILINOGEN UR STRIP-MCNC: 4 MG/DL
WBC URINE: ABNORMAL

## 2023-03-07 PROCEDURE — 999N000104 HC STATISTIC NO CHARGE

## 2023-03-07 PROCEDURE — 81003 URINALYSIS AUTO W/O SCOPE: CPT | Performed by: STUDENT IN AN ORGANIZED HEALTH CARE EDUCATION/TRAINING PROGRAM

## 2023-03-07 NOTE — ED NOTES
Patient left without being seen after urine collected and sent.  Patient no longer wanted IV and lab work stating she was going to go to different hospital.  Writer attempted two blood draws, but patient became upset and irritated.  Patient able to stand up independently from w\c and ambulated out of building.  Writer informed Charge nurse, Daphnie.

## 2023-03-07 NOTE — TELEPHONE ENCOUNTER
1) RN reviewed Telephone message: Reason for call:  Other   Patient called regarding (reason for call): call back  Additional comments: Patient would like a call back to found out why her medication prescriptions seemed to have changed.  Phone number to reach patient:  Home number on file [phone] (home)  Best Time:  ASAP  Can we leave a detailed message on this number?  YES.    2) RN spoke with pt at [phone]. Pt was wondering why her buprenorphine-naloxone (SUBOXONE) 8-2 MG SUBL sublingual tablet was coming in multiple bottles this time.     3) RN relayed that pt was prescribed a 30 day supply plus additional tablets as needed for severe pain, along with one refill available on 3/16/23. RN explained that pharmacies sometimes send in one bottle, sometimes in two original bottles, with no clear reason why but that the amount should be the same. Pt thanked RN for the clarification.     4) RN encouraged pt to reach out via [facility] or [phone] with any additional needs.     Moira Tinsley RN on 3/7/2023 at 9:07 AM              buprenorphine-naloxone (SUBOXONE) 8-2 MG SUBL sublingual tablet

## 2023-03-07 NOTE — ED TRIAGE NOTES
Pt arrived via triage. Pt reports triage nurse at oncology office told her to come into get a SCAN of abd. Reports cancer in breast and bone. And potentially a flare of diverticulitis. Pt is extremely allergic to contrast dye.     Triage Assessment     Row Name 03/07/23 [id number]       Triage Assessment (Adult)    Airway WDL WDL       Respiratory WDL    Respiratory WDL WDL       Skin Circulation/Temperature WDL    Skin Circulation/Temperature WDL WDL       Cardiac WDL    Cardiac WDL WDL       Peripheral/Neurovascular WDL    Peripheral Neurovascular WDL WDL       Cognitive/Neuro/Behavioral WDL    Cognitive/Neuro/Behavioral WDL WDL

## 2023-03-16 ENCOUNTER — VIRTUAL VISIT (OUTPATIENT)
Dept: ADDICTION MEDICINE | Facility: CLINIC | Age: 68
End: 2023-03-16
Payer: COMMERCIAL

## 2023-03-16 DIAGNOSIS — G89.4 CHRONIC PAIN SYNDROME: Primary | ICD-10-CM

## 2023-03-16 DIAGNOSIS — G47.00 INSOMNIA, UNSPECIFIED TYPE: ICD-10-CM

## 2023-03-16 DIAGNOSIS — G89.29 CHRONIC NECK PAIN: ICD-10-CM

## 2023-03-16 DIAGNOSIS — F41.9 ANXIETY: ICD-10-CM

## 2023-03-16 DIAGNOSIS — M54.2 CHRONIC NECK PAIN: ICD-10-CM

## 2023-03-16 PROCEDURE — 99214 OFFICE O/P EST MOD 30 MIN: CPT | Mod: VID | Performed by: FAMILY MEDICINE

## 2023-03-16 RX ORDER — MIRTAZAPINE 15 MG/1
15 TABLET, FILM COATED ORAL AT BEDTIME
Qty: 30 TABLET | Refills: 1 | Status: SHIPPED | OUTPATIENT
Start: 2023-03-16 | End: 2023-05-26

## 2023-03-16 RX ORDER — GABAPENTIN 100 MG/1
CAPSULE ORAL
Qty: 30 CAPSULE | Refills: 1 | Status: SHIPPED | OUTPATIENT
Start: 2023-03-16 | End: 2023-04-13

## 2023-03-16 ASSESSMENT — PATIENT HEALTH QUESTIONNAIRE - PHQ9
10. IF YOU CHECKED OFF ANY PROBLEMS, HOW DIFFICULT HAVE THESE PROBLEMS MADE IT FOR YOU TO DO YOUR WORK, TAKE CARE OF THINGS AT HOME, OR GET ALONG WITH OTHER PEOPLE: NOT DIFFICULT AT ALL
SUM OF ALL RESPONSES TO PHQ QUESTIONS 1-9: 2
SUM OF ALL RESPONSES TO PHQ QUESTIONS 1-9: 2

## 2023-03-16 NOTE — PROGRESS NOTES
[facility] - Addiction Medicine       Rooming information:  ED visit on 3/7/23    Pt disagree with her  Uncomplicated opioid dependence  diagnosis and wants it off her chart      Point of care urine drug screen positive for: NA -virtual   No results found for: BUP, BZO, BAR, SERENE, MAMP, AMP, MDMA, MTD, SXZ856, OXY, PCP, THC, TEMP, SGPOCT    *POC urine drug screen does not screen for Fentanyl    PHQ-9 Scores: NA  PHQ 3/29/2022 7/5/2022 10/18/2022   PHQ-9 Total Score 3 3 5   Q9: Thoughts of better off dead/self-harm past 2 weeks Not at all Not at all Not at all     MOHINDER-7 Scores: NA  MOHINDER-7 SCORE 2/1/2022 3/1/2022 3/29/2022   Total Score 13 2 3       Any other recent substance use:     Cannabis     NICOTINE-No  If using nicotine, ready to quit? No    Side effects related to medications pt would like to discuss with provider (constipation, dry mouth, HA, GI upset, sedation?) No     Narcan currently available: Yes    Primary care provider: Radha Oakley MD     Mental health provider: none (follow up on MH referral if needed)    Any housing, insurance deficits?: denies    Contact information up to date? Yes    3rd Party Involvement No (please obtain SAM if pt would like to include)     This video/telephone visit will be conducted via a call between you and your physician/provider. We have found that certain health care needs can be provided without the need for an in-person physical exam. This service lets us provide the care you need with a video /telephone conversation. If a prescription is necessary we can send it directly to your pharmacy. If lab work is needed we can place an order for that and you can then stop by our lab to have the test done at a later time.    Just as we bill insurance for in-person visits, we also bill insurance for video/telephone visits. If you have questions about your insurance coverage, we recommend that you speak with your insurance company.    Patient has given verbal consent for  video/Telephone visit? yes    Patient would like a video visit, please connect/call : [facility]  Reason for visit: follow up  Anything the provider should be aware of for today's appointment: pt disagree with her  Uncomplicated opioid dependence (H diagnosis and wants it off her chart    Patient verified allergies, medications and pharmacy via [facility].     Patient states they are ready for visit.    Dolores St LPN March 16, 2023 12:58 PM    Dolores St LPN  March 16, 2023  12:44 PM

## 2023-03-16 NOTE — PROGRESS NOTES
[facility], formerly [facility] Addiction Medicine    A/P                                                    ASSESSMENT/PLAN    1. Chronic pain syndrome (cancer related pain)  Overall stable.  She will continue Suboxone, no change, has refill at pharmacy.  New script for Narcan provided.   - naloxone (NARCAN) 4 MG/0.1ML nasal spray; Spray 1 spray (4 mg) into one nostril alternating nostrils once as needed for opioid reversal every 2-3 minutes until assistance arrives  Dispense: 0.2 mL; Refill: 11    2. Chronic neck pain  Stable.  Continue gabapentin and flexeril.  Gabapentin prescribed by both myself and oncology,  reviewed.  - gabapentin (NEURONTIN) 100 MG capsule; TAKE 1 CAPSULE BY MOUTH DAILY MID-DAY AS DIRECTED.  Dispense: 30 capsule; Refill: 1    3. Insomnia, unspecified type  Reports mirtazapine has been helpful, refilled today while she is working on establishing with new psychiatrist.   - mirtazapine (REMERON) 15 MG tablet; Take 1 tablet (15 mg) by mouth At Bedtime  Dispense: 30 tablet; Refill: 1    4. Anxiety  She continues to struggle with anxiety and agoraphobia.  Encouraged working with psychiatry, planning to find a new psychiatrist.  She may consider a clinic that also prescribes Suboxone so she can feel confident in their comfort with the medication and understanding its use and interactions with other medications, as we discussed not every physician prescribes this medication.     PDMP Review       Value Time User    State PDMP site checked  Yes 3/16/2023  1:29 PM Haley Wong DO            RTC  Return in 4 weeks (on 4/13/2023) for Follow up, using a video visit - 4/13 @ 12:30pm.    SUBJECTIVE                                                    Jarrell Nicole is a 68 year old female with PMHx of stage IV breast cancer w/ bony metastases, asthma, HTN, diverticulitis, anxiety, opioid dependence, and history of unintentional opioid and benzodiazepine overdose (July 2021) who presents to clinic today  "for follow up.    Visit performed Virtual, via video    Video-Visit Details  Type of service:  Video Visit  Video Start Time: 1:09 PM  Video End Time: 1:41 PM  Originating Location (pt. Location): Home  Distant Location (provider location):  [facility]   Platform used for Video Visit: Radha    Brief history (from chart review):  Patient was initially seen by addiction medicine service when admitted for encephalopathy 2/2 unintentional overdose of opiates and benzodiazepines in July 2021.  She was diagnosed with metastatic breast cancer in March 2021 and was taking opioids since April 2021 for cancer related pain.  She also has long standing history of anxiety and had been on benzodiazepines for 7-8 years prior to hospitalization in July 2021 at which time she was weaned off with phenobarbital taper.       Plan from most recent office visit (2/16/23):  1. Uncomplicated opioid dependence (H)  Stable.  Pain controlled.  Continue Suboxone, no change.  New script for Narcan, previous script expiring.  - naloxone (NARCAN) 4 MG/0.1ML nasal spray; Spray 1 spray (4 mg) into one nostril alternating nostrils once as needed for opioid reversal every 2-3 minutes until assistance arrives  Dispense: 0.2 mL; Refill: 11  - buprenorphine-naloxone (SUBOXONE) 8-2 MG SUBL sublingual tablet; Place 1 tablet under the tongue 2 times daily. May also place 0.5-1 tablets daily as needed for severe pain (7-10).  Dispense: 90 tablet; Refill: 1     2. Chronic neck pain  Improved.  Continue Flexeril as needed.  - cyclobenzaprine (FLEXERIL) 5 MG tablet; Take 1 tablet (5 mg) by mouth 2 times daily as needed for muscle spasms  Dispense: 60 tablet; Refill: 1     3. Anxiety  Needs improvement.  Agree with plan to continue working with psychiatry.  Plans to find new provider.    TODAY'S VISIT  HPI Mar 16, 2023  - Was in ER at [facility], left due to difficulties with IV   - Very frustrated with diagnosis of \"opioid dependence\" - feels it " changes how other doctors/providers see her, we discussed that opioid dependence is different than OUD and that it is referring to the physical dependence on opioids   - Frustrated with social anxiety, finds it debilitating to not leave her home  - Taking Suboxone TID most days - pain control has been OK, wants to get to pool at [facility]   - Continues with PCA (Mirian) most days  - Previously seeing someone at [facility] for psychiatry  - Miratazapine helpful for sleep, requesting refill (will be looking for new psychiatrist)         7/5/2022    12:00 PM 10/18/2022     1:00 PM 3/16/2023    12:54 PM   PHQ   PHQ-9 Total Score 3 5 2   Q9: Thoughts of better off dead/self-harm past 2 weeks Not at all Not at all Not at all         2/1/2022     2:00 PM 3/1/2022    12:00 PM 3/29/2022     1:00 PM   MOHINDER-7 SCORE   Total Score 13 2 3       OBJECTIVE                                                    PHYSICAL EXAM:  There were no vitals taken for this visit.    GENERAL: healthy, alert and no distress  EYES: Eyes grossly normal to inspection  RESP: No respiratory distress, no coughing or wheezing  SKIN: no pallor or jaundice noted  MENTAL STATUS EXAM  Appearance/Behavior: No appearant distress  Speech: Normal  Mood/Affect: anxiety  Insight: Adequate    LAB  No results found for any visits on 03/16/23.      HISTORY                                                    Problem list reviewed & adjusted, as indicated.  Patient Active Problem List   Diagnosis     Malignant neoplasm of lower-outer quadrant of right breast of female, estrogen receptor positive (H)     Asthma     Combined forms of age-related cataract of right eye     Diverticulitis of large intestine without perforation or abscess without bleeding     Episodic mood disorder (H)     Hyperlipidemia     Pain medication agreement     Panic disorder without agoraphobia     Post traumatic stress disorder     Dehydration     Encephalopathy     Elevated troponin level not due to  acute coronary syndrome     Acute kidney injury (H)     Chronic pain due to neoplasm     Uncomplicated opioid dependence (H)     Hypokalemia     Non-traumatic rhabdomyolysis     Essential hypertension     Anemia     Nausea and vomiting, intractability of vomiting not specified, unspecified vomiting type         MEDICATION LIST (prior to visit)  albuterol (PROAIR HFA;PROVENTIL HFA;VENTOLIN HFA) 90 mcg/actuation inhaler, Inhale 1-2 puffs into the lungs every 4 hours as needed for shortness of breath / dyspnea or wheezing   ascorbic acid, vitamin C, (VITAMIN C) 1000 MG tablet, [ASCORBIC ACID, VITAMIN C, (VITAMIN C) 1000 MG TABLET] Take 1,000 mg by mouth daily.  B,C/folic/zinc/copper ox/vit E (STRESS B-COMPLEX ORAL), [B,C/FOLIC/ZINC/COPPER OX/VIT E (STRESS B-COMPLEX ORAL)] Take 1 tablet by mouth daily. Stress supplement  buprenorphine-naloxone (SUBOXONE) 8-2 MG SUBL sublingual tablet, Place 1 tablet under the tongue 2 times daily. May also place 0.5-1 tablets daily as needed for severe pain (7-10).  cetirizine (ZYRTEC) 10 MG tablet, Take 1 tablet (10 mg) by mouth daily  cholecalciferol 125 MCG (5000 UT) CAPS, Take 1 capsule (5,000 Units) by mouth daily  cyclobenzaprine (FLEXERIL) 5 MG tablet, Take 1 tablet (5 mg) by mouth 2 times daily as needed for muscle spasms  docusate sodium (COLACE) 100 MG capsule, Take 1 capsule (100 mg) by mouth 2 times daily as needed for constipation TAKE 1 CAPSULE(100 MG) BY MOUTH TWICE DAILY  ferrous sulfate (FEROSUL) 325 (65 Fe) MG tablet, Take 1 tablet (325 mg) by mouth daily (with breakfast)  fluticasone propionate (FLONASE) 50 mcg/actuation nasal spray, [FLUTICASONE PROPIONATE (FLONASE) 50 MCG/ACTUATION NASAL SPRAY] 1 spray into each nostril daily.   fluticasone-vilanterol (BREO ELLIPTA) 100-25 MCG/ACT inhaler, Inhale 1 puff into the lungs daily  gabapentin (NEURONTIN) 300 MG capsule, Take 600 mg by mouth 2 times daily  IBRANCE 125 MG tablet, Take 125mg daily for 3 weeks, then off for 1  week  letrozole (FEMARA) 2.5 MG tablet, Take 2.5 mg by mouth daily  Lidocaine (LIDOCARE) 4 % Patch, Place 2 patches onto the skin every 24 hours To prevent lidocaine toxicity, patient should be patch free for 12 hrs daily.  melatonin 10 mg Tab, Take 10 mg by mouth nightly as needed for sleep  melatonin 3 MG tablet, Take 1 tablet (3 mg) by mouth nightly as needed for sleep  ondansetron (ZOFRAN) 8 MG tablet, Take 8 mg by mouth 3 times daily as needed  polyethylene glycol (MIRALAX) 17 GM/Dose powder, Take 17 g (1 capful) by mouth daily as needed for constipation  propranoloL (INDERAL) 10 MG tablet, Take 10 mg by mouth 3 times daily as needed (anxiety or palpitations)   SYMBICORT 80-4.5 mcg/actuation inhaler, Inhale 2 puffs into the lungs 2 times daily  vilazodone (VIIBRYD) 10 MG TABS tablet,   vitamin E 100 UNIT capsule, [VITAMIN E 100 UNIT CAPSULE] Take 500 Units by mouth 2 (two) times a week.  [DISCONTINUED] ipratropium-albuteroL (DUO-NEB) 0.5-2.5 mg/3 mL nebulizer, [IPRATROPIUM-ALBUTEROL (DUO-NEB) 0.5-2.5 MG/3 ML NEBULIZER] Take 3 mL by nebulization 4 (four) times a day as needed.    No current facility-administered medications on file prior to visit.      MEDICATION LIST (after visit)  Current Outpatient Medications   Medication     gabapentin (NEURONTIN) 100 MG capsule     mirtazapine (REMERON) 15 MG tablet     naloxone (NARCAN) 4 MG/0.1ML nasal spray     albuterol (PROAIR HFA;PROVENTIL HFA;VENTOLIN HFA) 90 mcg/actuation inhaler     ascorbic acid, vitamin C, (VITAMIN C) 1000 MG tablet     B,C/folic/zinc/copper ox/vit E (STRESS B-COMPLEX ORAL)     buprenorphine-naloxone (SUBOXONE) 8-2 MG SUBL sublingual tablet     cetirizine (ZYRTEC) 10 MG tablet     cholecalciferol 125 MCG (5000 UT) CAPS     cyclobenzaprine (FLEXERIL) 5 MG tablet     docusate sodium (COLACE) 100 MG capsule     ferrous sulfate (FEROSUL) 325 (65 Fe) MG tablet     fluticasone propionate (FLONASE) 50 mcg/actuation nasal spray      fluticasone-vilanterol (BREO ELLIPTA) 100-25 MCG/ACT inhaler     gabapentin (NEURONTIN) 300 MG capsule     IBRANCE 125 MG tablet     letrozole (FEMARA) 2.5 MG tablet     Lidocaine (LIDOCARE) 4 % Patch     melatonin 10 mg Tab     melatonin 3 MG tablet     ondansetron (ZOFRAN) 8 MG tablet     polyethylene glycol (MIRALAX) 17 GM/Dose powder     propranoloL (INDERAL) 10 MG tablet     SYMBICORT 80-4.5 mcg/actuation inhaler     vilazodone (VIIBRYD) 10 MG TABS tablet     vitamin E 100 UNIT capsule     No current facility-administered medications for this visit.         Allergies   Allergen Reactions     Iodinated Contrast Media [Diagnostic X-Ray Materials] Unknown     Heart stopped and sick for multiple days after     Penicillins Swelling     Throat swelling as an infant     Tylenol [Acetaminophen] Other (See Comments)     With codeine only .Pt states gives her upset stomach for days.     Venlafaxine Hcl Er Nausea and Vomiting     Vomited 20 min after taking XR, OK with IR     Codeine Hives and Itching       Haley Wong DO  [facility]  [facility]  [phone]

## 2023-03-27 ENCOUNTER — TRANSFERRED RECORDS (OUTPATIENT)
Dept: HEALTH INFORMATION MANAGEMENT | Facility: CLINIC | Age: 68
End: 2023-03-27

## 2023-03-29 ENCOUNTER — TRANSFERRED RECORDS (OUTPATIENT)
Dept: HEALTH INFORMATION MANAGEMENT | Facility: CLINIC | Age: 68
End: 2023-03-29

## 2023-04-13 ENCOUNTER — VIRTUAL VISIT (OUTPATIENT)
Dept: ADDICTION MEDICINE | Facility: CLINIC | Age: 68
End: 2023-04-13
Payer: COMMERCIAL

## 2023-04-13 DIAGNOSIS — G89.29 CHRONIC NECK PAIN: ICD-10-CM

## 2023-04-13 DIAGNOSIS — M54.2 CHRONIC NECK PAIN: ICD-10-CM

## 2023-04-13 DIAGNOSIS — G89.3 CHRONIC PAIN DUE TO NEOPLASM: Primary | ICD-10-CM

## 2023-04-13 PROCEDURE — 99214 OFFICE O/P EST MOD 30 MIN: CPT | Mod: VID | Performed by: FAMILY MEDICINE

## 2023-04-13 RX ORDER — DOCUSATE SODIUM 100 MG/1
100 CAPSULE, LIQUID FILLED ORAL 2 TIMES DAILY PRN
Qty: 180 CAPSULE | Refills: 1 | Status: CANCELLED | OUTPATIENT
Start: 2023-04-13

## 2023-04-13 RX ORDER — CYCLOBENZAPRINE HCL 5 MG
5 TABLET ORAL 2 TIMES DAILY PRN
Qty: 60 TABLET | Refills: 1 | Status: SHIPPED | OUTPATIENT
Start: 2023-04-13

## 2023-04-13 RX ORDER — BUPRENORPHINE HYDROCHLORIDE AND NALOXONE HYDROCHLORIDE DIHYDRATE 8; 2 MG/1; MG/1
TABLET SUBLINGUAL
Qty: 90 TABLET | Refills: 1 | Status: SHIPPED | OUTPATIENT
Start: 2023-04-17 | End: 2023-06-08

## 2023-04-13 ASSESSMENT — ANXIETY QUESTIONNAIRES
3. WORRYING TOO MUCH ABOUT DIFFERENT THINGS: NOT AT ALL
GAD7 TOTAL SCORE: 3
7. FEELING AFRAID AS IF SOMETHING AWFUL MIGHT HAPPEN: NOT AT ALL
GAD7 TOTAL SCORE: 3
4. TROUBLE RELAXING: SEVERAL DAYS
1. FEELING NERVOUS, ANXIOUS, OR ON EDGE: SEVERAL DAYS
2. NOT BEING ABLE TO STOP OR CONTROL WORRYING: NOT AT ALL
8. IF YOU CHECKED OFF ANY PROBLEMS, HOW DIFFICULT HAVE THESE MADE IT FOR YOU TO DO YOUR WORK, TAKE CARE OF THINGS AT HOME, OR GET ALONG WITH OTHER PEOPLE?: NOT DIFFICULT AT ALL
GAD7 TOTAL SCORE: 3
6. BECOMING EASILY ANNOYED OR IRRITABLE: SEVERAL DAYS
5. BEING SO RESTLESS THAT IT IS HARD TO SIT STILL: NOT AT ALL
IF YOU CHECKED OFF ANY PROBLEMS ON THIS QUESTIONNAIRE, HOW DIFFICULT HAVE THESE PROBLEMS MADE IT FOR YOU TO DO YOUR WORK, TAKE CARE OF THINGS AT HOME, OR GET ALONG WITH OTHER PEOPLE: NOT DIFFICULT AT ALL
7. FEELING AFRAID AS IF SOMETHING AWFUL MIGHT HAPPEN: NOT AT ALL

## 2023-04-13 NOTE — PROGRESS NOTES
Care team has reviewed attendance agreement with patient. Patient advised that two failed appointments within 6 months may lead to termination of current episode of care.        [facility] - Addiction Medicine       Rooming information:  Her anxiety is up , got appt with psychiatrist on 4/27/23    Point of care urine drug screen positive for: NA -virtual  No results found for: BUP, BZO, BAR, SERENE, MAMP, AMP, MDMA, MTD, [id number], OXY, PCP, THC, TEMP, SGPOCT    *POC urine drug screen does not screen for Fentanyl    PHQ-9 Scores: 2      10/18/2022     1:00 PM 3/16/2023    12:54 PM 4/13/2023    12:57 PM   PHQ   PHQ-9 Total Score 5 2 2   Q9: Thoughts of better off dead/self-harm past 2 weeks Not at all Not at all Not at all     MOHINDER-7 Scores: 3      3/1/2022    12:00 PM 3/29/2022     1:00 PM 4/13/2023    12:57 PM   MOHINDER-7 SCORE   Total Score   3 (minimal anxiety)   Total Score 2 3 3       Any other recent substance use:    Medical Cannabis for CA diagnosis   NICOTINE- No  If using nicotine, ready to quit? NA    Side effects related to medications pt would like to discuss with provider (constipation, dry mouth, HA, GI upset, sedation?) No     Narcan currently available: Yes. Rx'd on 3/16/23    Primary care provider: Radha Oakley MD     Mental health provider: has intake on 4/27/23  (follow up on MH referral if needed)    Any housing, insurance deficits?: denies    Contact information up to date? yes    3rd Party Involvement NA (please obtain SAM if pt would like to include)     This video/telephone visit will be conducted via a call between you and your physician/provider. We have found that certain health care needs can be provided without the need for an in-person physical exam. This service lets us provide the care you need with a video /telephone conversation. If a prescription is necessary we can send it directly to your pharmacy. If lab work is needed we can place an order for that and you can then stop by our  lab to have the test done at a later time.    Just as we bill insurance for in-person visits, we also bill insurance for video/telephone visits. If you have questions about your insurance coverage, we recommend that you speak with your insurance company.    Patient has given verbal consent for video/Telephone visit? yes    Patient would like a video visit, please connect/call : yes  Reason for visit: AM follow up  Anything the provider should be aware of for today's appointment:     Patient verified allergies, medications and pharmacy via [employer].     Patient states they are ready for visit.    Dolores St LPN April 13, 2023 9:37 AM    Dolores St LPN  April 13, 2023  9:37 AM

## 2023-04-13 NOTE — PROGRESS NOTES
[facility]    A/P                                                    ASSESSMENT/PLAN    1. Chronic pain due to neoplasm  Overall stable.  Continue Suboxone 8-2mg TID.  Has Narcan.  May be traveling to [place] this summer so will need to be mindful of timing refills.  Planning to move the [place] later this year so will plan to help facilitate finding new Suboxone prescriber though now that X waiver no longer needed, it will likely be easier to find new provider.  - buprenorphine-naloxone (SUBOXONE) 8-2 MG SUBL sublingual tablet; Place 1 tablet under the tongue 2 times daily. May also place 0.5-1 tablets daily as needed for severe pain.  Dispense: 90 tablet; Refill: 1    2. Chronic neck pain  Stable.  Continue Flexeril BID as needed.    - cyclobenzaprine (FLEXERIL) 5 MG tablet; Take 1 tablet (5 mg) by mouth 2 times daily as needed for muscle spasms  Dispense: 60 tablet; Refill: 1      PDMP Review       Value Time User    State PDMP site checked  Yes 4/13/2023  1:04 PM Haley Wong DO            RTC  Return in about 4 weeks (around 5/11/2023) for Follow up, in person  - 5/11 @ 2pm.      SUBJECTIVE                                                    Jarrell Nicole is a 68 year old female with PMHx of stage IV breast cancer w/ bony metastases, asthma, HTN, diverticulitis, anxiety, opioid dependence, and history of unintentional opioid and benzodiazepine overdose (July 2021) who presents to clinic today for follow up.    Video-Visit Details  Type of service:  Video Visit  Video Start Time: 1:09 PM  Video End Time: 1:37 PM  Originating Location (pt. Location): Home  Distant Location (provider location):  [facility]   Platform used for Video Visit: Wrike    Brief history (from chart review):  Patient was initially seen by addiction medicine service when admitted for encephalopathy 2/2 unintentional overdose of opiates and benzodiazepines in July 2021.  She was diagnosed with  metastatic breast cancer in March 2021 and was taking opioids since April 2021 for cancer related pain.  She also has long standing history of anxiety and had been on benzodiazepines for 7-8 years prior to hospitalization in July 2021 at which time she was weaned off with phenobarbital taper.    She has continue Suboxone 8-2mg BID to TID for management of her cancer related pain.         Plan from most recent office visit (3/16/23):  1. Chronic pain syndrome (cancer related pain)  Overall stable.  She will continue Suboxone, no change, has refill at pharmacy.  New script for Narcan provided.   - naloxone (NARCAN) 4 MG/0.1ML nasal spray; Spray 1 spray (4 mg) into one nostril alternating nostrils once as needed for opioid reversal every 2-3 minutes until assistance arrives  Dispense: 0.2 mL; Refill: 11     2. Chronic neck pain  Stable.  Continue gabapentin and flexeril.  Gabapentin prescribed by both myself and oncology,  reviewed.  - gabapentin (NEURONTIN) 100 MG capsule; TAKE 1 CAPSULE BY MOUTH DAILY MID-DAY AS DIRECTED.  Dispense: 30 capsule; Refill: 1     3. Insomnia, unspecified type  Reports mirtazapine has been helpful, refilled today while she is working on establishing with new psychiatrist.   - mirtazapine (REMERON) 15 MG tablet; Take 1 tablet (15 mg) by mouth At Bedtime  Dispense: 30 tablet; Refill: 1     4. Anxiety  She continues to struggle with anxiety and agoraphobia.  Encouraged working with psychiatry, planning to find a new psychiatrist.  She may consider a clinic that also prescribes Suboxone so she can feel confident in their comfort with the medication and understanding its use and interactions with other medications, as we discussed not every physician prescribes this medication.     TODAY'S VISIT  HPI Apr 13, 2023  - Looking at moving to [place] late summer/early summer, doesn't want to be cooped up most of the year, working on downsizing  - Pain sometimes worse without clear cause, mostly  localized ribs  - Re-injured clavicle a few months ago when she tripped and fell in parking lot  - Medical cannabis is helpful for sleep and appetite but not so much for pain  - Taking Suboxone 8-2mg TID  - Saw her son last night, will be at [facility] for 6 months - struggling with mental health, cancer, oral surgery - he was looking much better so she was happy to see that  - Discussed various traumas her grandchildren have experienced (granddaughter was molested, grandson had exposure to to drug use) - she is thankful for trauma therapies  - Frustrated by state of the world (politics, global warming, etc)  - Did not sleep well last night  - Continues to struggle with intermittent nausea/vomiting, no weight loss   - Planning at go to [place] tomorrow  - Continues to work with PCA Mirian        10/18/2022     1:00 PM 3/16/2023    12:54 PM 4/13/2023    12:57 PM   PHQ   PHQ-9 Total Score 5 2 2   Q9: Thoughts of better off dead/self-harm past 2 weeks Not at all Not at all Not at all         3/1/2022    12:00 PM 3/29/2022     1:00 PM 4/13/2023    12:57 PM   MOHINDER-7 SCORE   Total Score   3 (minimal anxiety)   Total Score 2 3 3     Last filled Suboxone 3/20 per     OBJECTIVE                                                    PHYSICAL EXAM:  There were no vitals taken for this visit.    GENERAL: healthy, alert and no distress  EYES: Eyes grossly normal to inspection  RESP: No respiratory distress, no coughing or wheezing  SKIN: no pallor or jaundice appreciated   NEURO: mentation intact and speech normal  MENTAL STATUS EXAM  Appearance/Behavior: No appearant distress  Speech: Normal  Mood/Affect: anxiety  Insight: Fair    LAB  No results found for any visits on 04/13/23.      HISTORY                                                    Problem list reviewed & adjusted, as indicated.  Patient Active Problem List   Diagnosis     Malignant neoplasm of lower-outer quadrant of right breast of female, estrogen receptor positive (H)      Asthma     Combined forms of age-related cataract of right eye     Diverticulitis of large intestine without perforation or abscess without bleeding     Episodic mood disorder (H)     Hyperlipidemia     Pain medication agreement     Panic disorder without agoraphobia     Post traumatic stress disorder     Dehydration     Encephalopathy     Elevated troponin level not due to acute coronary syndrome     Acute kidney injury (H)     Chronic pain due to neoplasm     Uncomplicated opioid dependence (H)     Hypokalemia     Non-traumatic rhabdomyolysis     Essential hypertension     Anemia     Nausea and vomiting, intractability of vomiting not specified, unspecified vomiting type         MEDICATION LIST (prior to visit)  albuterol (PROAIR HFA;PROVENTIL HFA;VENTOLIN HFA) 90 mcg/actuation inhaler, Inhale 1-2 puffs into the lungs every 4 hours as needed for shortness of breath / dyspnea or wheezing   ascorbic acid, vitamin C, (VITAMIN C) 1000 MG tablet, [ASCORBIC ACID, VITAMIN C, (VITAMIN C) 1000 MG TABLET] Take 1,000 mg by mouth daily.  B,C/folic/zinc/copper ox/vit E (STRESS B-COMPLEX ORAL), [B,C/FOLIC/ZINC/COPPER OX/VIT E (STRESS B-COMPLEX ORAL)] Take 1 tablet by mouth daily. Stress supplement  cetirizine (ZYRTEC) 10 MG tablet, Take 1 tablet (10 mg) by mouth daily  cholecalciferol 125 MCG (5000 UT) CAPS, Take 1 capsule (5,000 Units) by mouth daily  docusate sodium (COLACE) 100 MG capsule, Take 1 capsule (100 mg) by mouth 2 times daily as needed for constipation TAKE 1 CAPSULE(100 MG) BY MOUTH TWICE DAILY  ferrous sulfate (FEROSUL) 325 (65 Fe) MG tablet, Take 1 tablet (325 mg) by mouth daily (with breakfast)  fluticasone propionate (FLONASE) 50 mcg/actuation nasal spray, [FLUTICASONE PROPIONATE (FLONASE) 50 MCG/ACTUATION NASAL SPRAY] 1 spray into each nostril daily.   fluticasone-vilanterol (BREO ELLIPTA) 100-25 MCG/ACT inhaler, Inhale 1 puff into the lungs daily  gabapentin (NEURONTIN) 300 MG capsule, Take 300 mg by  mouth 3 times daily  IBRANCE 125 MG tablet, Take 125mg daily for 3 weeks, then off for 1 week  letrozole (FEMARA) 2.5 MG tablet, Take 2.5 mg by mouth daily  Lidocaine (LIDOCARE) 4 % Patch, Place 2 patches onto the skin every 24 hours To prevent lidocaine toxicity, patient should be patch free for 12 hrs daily.  melatonin 10 mg Tab, Take 10 mg by mouth nightly as needed for sleep  melatonin 3 MG tablet, Take 1 tablet (3 mg) by mouth nightly as needed for sleep  mirtazapine (REMERON) 15 MG tablet, Take 1 tablet (15 mg) by mouth At Bedtime  naloxone (NARCAN) 4 MG/0.1ML nasal spray, Spray 1 spray (4 mg) into one nostril alternating nostrils once as needed for opioid reversal every 2-3 minutes until assistance arrives  ondansetron (ZOFRAN) 8 MG tablet, Take 8 mg by mouth 3 times daily as needed  polyethylene glycol (MIRALAX) 17 GM/Dose powder, Take 17 g (1 capful) by mouth daily as needed for constipation  propranoloL (INDERAL) 10 MG tablet, Take 10 mg by mouth 3 times daily as needed (anxiety or palpitations)   SYMBICORT 80-4.5 mcg/actuation inhaler, Inhale 2 puffs into the lungs 2 times daily  vilazodone (VIIBRYD) 10 MG TABS tablet,   vitamin E 100 UNIT capsule, [VITAMIN E 100 UNIT CAPSULE] Take 500 Units by mouth 2 (two) times a week.  [DISCONTINUED] ipratropium-albuteroL (DUO-NEB) 0.5-2.5 mg/3 mL nebulizer, [IPRATROPIUM-ALBUTEROL (DUO-NEB) 0.5-2.5 MG/3 ML NEBULIZER] Take 3 mL by nebulization 4 (four) times a day as needed.    No current facility-administered medications on file prior to visit.      MEDICATION LIST (after visit)  Current Outpatient Medications   Medication     [START ON 4/17/2023] buprenorphine-naloxone (SUBOXONE) 8-2 MG SUBL sublingual tablet     cyclobenzaprine (FLEXERIL) 5 MG tablet     albuterol (PROAIR HFA;PROVENTIL HFA;VENTOLIN HFA) 90 mcg/actuation inhaler     ascorbic acid, vitamin C, (VITAMIN C) 1000 MG tablet     B,C/folic/zinc/copper ox/vit E (STRESS B-COMPLEX ORAL)     cetirizine (ZYRTEC)  10 MG tablet     cholecalciferol 125 MCG (5000 UT) CAPS     docusate sodium (COLACE) 100 MG capsule     ferrous sulfate (FEROSUL) 325 (65 Fe) MG tablet     fluticasone propionate (FLONASE) 50 mcg/actuation nasal spray     fluticasone-vilanterol (BREO ELLIPTA) 100-25 MCG/ACT inhaler     gabapentin (NEURONTIN) 300 MG capsule     IBRANCE 125 MG tablet     letrozole (FEMARA) 2.5 MG tablet     Lidocaine (LIDOCARE) 4 % Patch     melatonin 10 mg Tab     melatonin 3 MG tablet     mirtazapine (REMERON) 15 MG tablet     naloxone (NARCAN) 4 MG/0.1ML nasal spray     ondansetron (ZOFRAN) 8 MG tablet     polyethylene glycol (MIRALAX) 17 GM/Dose powder     propranoloL (INDERAL) 10 MG tablet     SYMBICORT 80-4.5 mcg/actuation inhaler     vilazodone (VIIBRYD) 10 MG TABS tablet     vitamin E 100 UNIT capsule     No current facility-administered medications for this visit.         Allergies   Allergen Reactions     Iodinated Contrast Media [Diagnostic X-[employer]] Unknown     Heart stopped and sick for multiple days after     Penicillins Swelling     Throat swelling as an infant     Tylenol [Acetaminophen] Other (See Comments)     With codeine only .Pt states gives her upset stomach for days.     Venlafaxine Hcl Er Nausea and Vomiting     Vomited 20 min after taking XR, OK with IR     Codeine Hives and Itching       Haley Wogn DO  [facility] Addiction Medicine  [facility]  [phone]

## 2023-05-11 ENCOUNTER — VIRTUAL VISIT (OUTPATIENT)
Dept: ADDICTION MEDICINE | Facility: CLINIC | Age: 68
End: 2023-05-11
Payer: COMMERCIAL

## 2023-05-11 DIAGNOSIS — G89.4 CHRONIC PAIN SYNDROME: Primary | ICD-10-CM

## 2023-05-11 PROCEDURE — 99213 OFFICE O/P EST LOW 20 MIN: CPT | Mod: VID | Performed by: FAMILY MEDICINE

## 2023-05-11 RX ORDER — DRONABINOL 10 MG/1
10 CAPSULE ORAL 2 TIMES DAILY
COMMUNITY
Start: 2023-03-31

## 2023-05-11 NOTE — NURSING NOTE
Is the patient currently in the state of [place]? YES    Visit mode:VIDEO    If the visit is dropped, the patient can be reconnected by: VIDEO VISIT: Text to cell phone: [phone]    Will anyone else be joining the visit? NO      How would you like to obtain your AVS? MyChart    Are changes needed to the allergy or medication list? NO    Reason for visit: Video Visit

## 2023-05-11 NOTE — PROGRESS NOTES
"Virtual Visit Details    Type of service:  Video Visit   Video Start Time: {video visit start/end time for provider to select:602813}  Video End Time:{video visit start/end time for provider to select:100029}    Originating Location (pt. Location): {video visit patient location:122085::\"Home\"}  {PROVIDER LOCATION On-site should be selected for visits conducted from your clinic location or adjoining [facility] hospital, academic office, or other nearby [facility] building. Off-site should be selected for all other provider locations, including home:076456}  Distant Location (provider location):  {virtual location provider:825995}  Platform used for Video Visit: {Virtual Visit Platforms:623036::\"TipTap\"}  "

## 2023-05-11 NOTE — PROGRESS NOTES
[facility]    A/P                                                    ASSESSMENT/PLAN    1. Chronic pain syndrome  Overall stable.  Continue Suboxone 8-2mg as previously ordered, 1 refill remaining.    - Drugs of Abuse Screen Urine (POC CUPS) POCT; Standing    Advised follow-up with PCP and oncology given report of feeling feverish.    PDMP Review       Value Time User    State PDMP site checked  Yes 5/11/2023  2:15 PM Haley Wong,             RTC  Return in about 4 weeks (around 6/8/2023) for Follow up, in person.      SUBJECTIVE                                                    Jarrell Nicole is a 68 year old female with PMHx of stage IV breast cancer w/ bony metastases, asthma, HTN, diverticulitis, anxiety, opioid dependence, and history of unintentional opioid and benzodiazepine overdose (July 2021) who presents to clinic today for follow up.    Video-Visit Details    Type of service:  Video Visit  Video Start Time: 2:07 PM  Video End Time: 2:36 PM  Originating Location (pt. Location): Home  Distant Location (provider location):  [facility]   Platform used for Video Visit: [employer]    Brief history (from chart review):  Patient was initially seen by addiction medicine service when admitted for encephalopathy 2/2 unintentional overdose of opiates and benzodiazepines in July 2021.  She was diagnosed with metastatic breast cancer in March 2021 and was taking opioids since April 2021 for cancer related pain.  She also has long standing history of anxiety and had been on benzodiazepines for 7-8 years prior to hospitalization in July 2021 at which time she was weaned off with phenobarbital taper.     She has continue Suboxone 8-2mg BID to TID for management of her cancer related pain.         Plan from most recent office visit (3/16/23):  1. Chronic pain due to neoplasm  Overall stable.  Continue Suboxone 8-2mg TID.  Has Narcan.  May be traveling to [place] this summer  so will need to be mindful of timing refills.  Planning to move the [place] later this year so will plan to help facilitate finding new Suboxone prescriber though now that X waiver no longer needed, it will likely be easier to find new provider.  - buprenorphine-naloxone (SUBOXONE) 8-2 MG SUBL sublingual tablet; Place 1 tablet under the tongue 2 times daily. May also place 0.5-1 tablets daily as needed for severe pain.  Dispense: 90 tablet; Refill: 1     2. Chronic neck pain  Stable.  Continue Flexeril BID as needed.    - cyclobenzaprine (FLEXERIL) 5 MG tablet; Take 1 tablet (5 mg) by mouth 2 times daily as needed for muscle spasms  Dispense: 60 tablet; Refill: 1    TODAY'S VISIT  HPI May 11, 2023  - Feeling feverish, neg COVID test, using her nebulizer, has upcoming with PCP (Dr Oakley) on 5/16  - Will be seeing ortho for her wrist soon  - Refill on file for Suboxone, no issues with refills  - Grandson was shot at in a drive by shooting in [place], required eye surgery due to glass in his eyes and will likely need additional surgeries  - Son with cancer, being evaluated for possible lung cancer  - Continues to be frustrated with politics  - Started drobinol BID by palliative NP at Onc  - Upcoming psychiatry appointment        10/18/2022     1:00 PM 3/16/2023    12:54 PM 4/13/2023    12:57 PM   PHQ   PHQ-9 Total Score 5 2 2   Q9: Thoughts of better off dead/self-harm past 2 weeks Not at all Not at all Not at all         3/1/2022    12:00 PM 3/29/2022     1:00 PM 4/13/2023    12:57 PM   MOHINDER-7 SCORE   Total Score   3 (minimal anxiety)   Total Score 2 3 3       Last filled 4/13, one refill left    OBJECTIVE                                                    PHYSICAL EXAM:  There were no vitals taken for this visit.    GENERAL: healthy, alert and no distress  EYES: Eyes grossly normal to inspection  RESP: No respiratory distress, occasional cough, no wheezing, speaking in full sentences  SKIN: no visible lesions or  rashes  NEURO: mentation intact and speech normal  MENTAL STATUS EXAM  Appearance/Behavior: No appearant distress  Speech: Normal  Mood/Affect: anxiety  Insight: Fair    LAB  No results found for any visits on 05/11/23.      HISTORY                                                    Problem list reviewed & adjusted, as indicated.  Patient Active Problem List   Diagnosis     Malignant neoplasm of lower-outer quadrant of right breast of female, estrogen receptor positive (H)     Asthma     Combined forms of age-related cataract of right eye     Diverticulitis of large intestine without perforation or abscess without bleeding     Episodic mood disorder (H)     Hyperlipidemia     Pain medication agreement     Panic disorder without agoraphobia     Post traumatic stress disorder     Dehydration     Encephalopathy     Elevated troponin level not due to acute coronary syndrome     Acute kidney injury (H)     Chronic pain due to neoplasm     Uncomplicated opioid dependence (H)     Hypokalemia     Non-traumatic rhabdomyolysis     Essential hypertension     Anemia     Nausea and vomiting, intractability of vomiting not specified, unspecified vomiting type     MEDICATION LIST (prior to visit)  albuterol (PROAIR HFA;PROVENTIL HFA;VENTOLIN HFA) 90 mcg/actuation inhaler, Inhale 1-2 puffs into the lungs every 4 hours as needed for shortness of breath / dyspnea or wheezing   ascorbic acid, vitamin C, (VITAMIN C) 1000 MG tablet, [ASCORBIC ACID, VITAMIN C, (VITAMIN C) 1000 MG TABLET] Take 1,000 mg by mouth daily.  B,C/folic/zinc/copper ox/vit E (STRESS B-COMPLEX ORAL), [B,C/FOLIC/ZINC/COPPER OX/VIT E (STRESS B-COMPLEX ORAL)] Take 1 tablet by mouth daily. Stress supplement  buprenorphine-naloxone (SUBOXONE) 8-2 MG SUBL sublingual tablet, Place 1 tablet under the tongue 2 times daily. May also place 0.5-1 tablets daily as needed for severe pain.  cetirizine (ZYRTEC) 10 MG tablet, Take 1 tablet (10 mg) by mouth daily  cholecalciferol  125 MCG (5000 UT) CAPS, Take 1 capsule (5,000 Units) by mouth daily  cyclobenzaprine (FLEXERIL) 5 MG tablet, Take 1 tablet (5 mg) by mouth 2 times daily as needed for muscle spasms  docusate sodium (COLACE) 100 MG capsule, Take 1 capsule (100 mg) by mouth 2 times daily as needed for constipation TAKE 1 CAPSULE(100 MG) BY MOUTH TWICE DAILY  dronabinol (MARINOL) 10 MG capsule, Take 10 mg by mouth 2 times daily  ferrous sulfate (FEROSUL) 325 (65 Fe) MG tablet, Take 1 tablet (325 mg) by mouth daily (with breakfast)  fluticasone propionate (FLONASE) 50 mcg/actuation nasal spray, [FLUTICASONE PROPIONATE (FLONASE) 50 MCG/ACTUATION NASAL SPRAY] 1 spray into each nostril daily.   fluticasone-vilanterol (BREO ELLIPTA) 100-25 MCG/ACT inhaler, Inhale 1 puff into the lungs daily  gabapentin (NEURONTIN) 300 MG capsule, Take 300 mg by mouth 3 times daily  IBRANCE 125 MG tablet, Take 125mg daily for 3 weeks, then off for 1 week  letrozole (FEMARA) 2.5 MG tablet, Take 2.5 mg by mouth daily  Lidocaine (LIDOCARE) 4 % Patch, Place 2 patches onto the skin every 24 hours To prevent lidocaine toxicity, patient should be patch free for 12 hrs daily.  melatonin 10 mg Tab, Take 10 mg by mouth nightly as needed for sleep  melatonin 3 MG tablet, Take 1 tablet (3 mg) by mouth nightly as needed for sleep  naloxone (NARCAN) 4 MG/0.1ML nasal spray, Spray 1 spray (4 mg) into one nostril alternating nostrils once as needed for opioid reversal every 2-3 minutes until assistance arrives  ondansetron (ZOFRAN) 8 MG tablet, Take 8 mg by mouth 3 times daily as needed  polyethylene glycol (MIRALAX) 17 GM/Dose powder, Take 17 g (1 capful) by mouth daily as needed for constipation  propranoloL (INDERAL) 10 MG tablet, Take 10 mg by mouth 3 times daily as needed (anxiety or palpitations)   SYMBICORT 80-4.5 mcg/actuation inhaler, Inhale 2 puffs into the lungs 2 times daily  vilazodone (VIIBRYD) 10 MG TABS tablet,   vitamin E 100 UNIT capsule, [VITAMIN E 100  UNIT CAPSULE] Take 500 Units by mouth 2 (two) times a week.  [DISCONTINUED] ipratropium-albuteroL (DUO-NEB) 0.5-2.5 mg/3 mL nebulizer, [IPRATROPIUM-ALBUTEROL (DUO-NEB) 0.5-2.5 MG/3 ML NEBULIZER] Take 3 mL by nebulization 4 (four) times a day as needed.    No current facility-administered medications on file prior to visit.      MEDICATION LIST (after visit)  Current Outpatient Medications   Medication     albuterol (PROAIR HFA;PROVENTIL HFA;VENTOLIN HFA) 90 mcg/actuation inhaler     ascorbic acid, vitamin C, (VITAMIN C) 1000 MG tablet     B,C/folic/zinc/copper ox/vit E (STRESS B-COMPLEX ORAL)     buprenorphine-naloxone (SUBOXONE) 8-2 MG SUBL sublingual tablet     cetirizine (ZYRTEC) 10 MG tablet     cholecalciferol 125 MCG (5000 UT) CAPS     cyclobenzaprine (FLEXERIL) 5 MG tablet     docusate sodium (COLACE) 100 MG capsule     dronabinol (MARINOL) 10 MG capsule     ferrous sulfate (FEROSUL) 325 (65 Fe) MG tablet     fluticasone propionate (FLONASE) 50 mcg/actuation nasal spray     fluticasone-vilanterol (BREO ELLIPTA) 100-25 MCG/ACT inhaler     gabapentin (NEURONTIN) 300 MG capsule     IBRANCE 125 MG tablet     letrozole (FEMARA) 2.5 MG tablet     Lidocaine (LIDOCARE) 4 % Patch     melatonin 10 mg Tab     melatonin 3 MG tablet     mirtazapine (REMERON) 15 MG tablet     naloxone (NARCAN) 4 MG/0.1ML nasal spray     ondansetron (ZOFRAN) 8 MG tablet     polyethylene glycol (MIRALAX) 17 GM/Dose powder     propranoloL (INDERAL) 10 MG tablet     SYMBICORT 80-4.5 mcg/actuation inhaler     vilazodone (VIIBRYD) 10 MG TABS tablet     vitamin E 100 UNIT capsule     No current facility-administered medications for this visit.       Allergies   Allergen Reactions     Iodinated Contrast Media [Iodinated Contrast Media] Unknown     Heart stopped and sick for multiple days after     Penicillins Swelling     Throat swelling as an infant     Tylenol [Acetaminophen] Other (See Comments)     With codeine only .Pt states gives her upset  stomach for days.     Venlafaxine Hcl Er Nausea and Vomiting     Vomited 20 min after taking XR, OK with IR     Codeine Hives and Itching       Haley Wong [facility] Addiction Medicine  [facility]  [phone]

## 2023-05-26 ENCOUNTER — MYC REFILL (OUTPATIENT)
Dept: ADDICTION MEDICINE | Facility: CLINIC | Age: 68
End: 2023-05-26
Payer: MEDICARE

## 2023-05-26 DIAGNOSIS — G47.00 INSOMNIA, UNSPECIFIED TYPE: ICD-10-CM

## 2023-05-27 RX ORDER — MIRTAZAPINE 15 MG/1
15 TABLET, FILM COATED ORAL AT BEDTIME
Qty: 30 TABLET | Refills: 1 | Status: SHIPPED | OUTPATIENT
Start: 2023-05-27

## 2023-05-27 NOTE — TELEPHONE ENCOUNTER
Date of Last Office Visit: 5/11/23  Date of Next Office Visit: 6/8/23  No shows since last visit: 0  Cancellations since last visit: 0    Medication requested: mirtazapine (REMERON) 15 MG tablet Date last ordered: 3/16/23 Qty: 30 Refills: 1     Review of [place] ?: na    Lapse in medication adherence greater than 5 days?: unknown  If yes, call patient and gather details:   Medication refill request verified as identical to current order?: yes  Result of Last DAM, VPA, Li+ Level, CBC, or Carbamazepine Level (at or since last visit): N/A    Last visit treatment plan: note is not finished    []Medication refilled per  Medication Refill in Ambulatory Care  policy.  [x]Medication unable to be refilled by RN due to criteria not met as indicated below:    []Eligibility - not seen in the last year   []Supervision - no future appointment   []Compliance - no shows, cancellations or lapse in therapy   []Verification - order discrepancy   []Controlled medication   []Medication not included in policy   []90-day supply request   [x]Other out of scope of CMA

## 2023-06-08 ENCOUNTER — VIRTUAL VISIT (OUTPATIENT)
Dept: ADDICTION MEDICINE | Facility: CLINIC | Age: 68
End: 2023-06-08
Payer: COMMERCIAL

## 2023-06-08 DIAGNOSIS — M54.2 CHRONIC NECK PAIN: ICD-10-CM

## 2023-06-08 DIAGNOSIS — G89.29 CHRONIC NECK PAIN: ICD-10-CM

## 2023-06-08 DIAGNOSIS — G89.3 CHRONIC PAIN DUE TO NEOPLASM: ICD-10-CM

## 2023-06-08 PROCEDURE — 99213 OFFICE O/P EST LOW 20 MIN: CPT | Mod: VID | Performed by: FAMILY MEDICINE

## 2023-06-08 RX ORDER — BUPRENORPHINE HYDROCHLORIDE AND NALOXONE HYDROCHLORIDE DIHYDRATE 8; 2 MG/1; MG/1
TABLET SUBLINGUAL
Qty: 90 TABLET | Refills: 1 | Status: SHIPPED | OUTPATIENT
Start: 2023-06-15 | End: 2023-08-15

## 2023-06-08 NOTE — PATIENT INSTRUCTIONS
Continue Suboxone, no change.     Reach out via [employer] or call me before your next appointment if you have questions or concerns before your next visit.

## 2023-06-08 NOTE — NURSING NOTE
Is the patient currently in the state of [place]? YES    Visit mode:VIDEO    If the visit is dropped, the patient can be reconnected by: VIDEO VISIT: Send to e-mail at: [email or web address]    Will anyone else be joining the visit? NO      How would you like to obtain your AVS? MyChart    Are changes needed to the allergy or medication list? NO    Reason for visit: RECHECK

## 2023-06-08 NOTE — PROGRESS NOTES
"Virtual Visit Details    Type of service:  Video Visit   Video Start Time: {video visit start/end time for provider to select:861903}  Video End Time:{video visit start/end time for provider to select:433653}    Originating Location (pt. Location): {video visit patient location:792296::\"Home\"}  {PROVIDER LOCATION On-site should be selected for visits conducted from your clinic location or adjoining [facility] hospital, academic office, or other nearby [facility] building. Off-site should be selected for all other provider locations, including home:990153}  Distant Location (provider location):  {virtual location provider:199951}  Platform used for Video Visit: {Virtual Visit Platforms:429864::\"Embrace+\"}  "

## 2023-06-08 NOTE — PROGRESS NOTES
[facility] Addiction Medicine    A/P                                                    ASSESSMENT/PLAN    1. Chronic pain due to neoplasm  Stable.  Continue Suboxone, no change.    - buprenorphine-naloxone (SUBOXONE) 8-2 MG SUBL sublingual tablet; Place 1 tablet under the tongue 2 times daily. May also place 0.5-1 tablets daily as needed for severe pain.  Dispense: 90 tablet; Refill: 1    2. Chronic neck pain  PCP is now prescribing Flexeril, no refill needed from me.          PDMP Review       Value Time User    State PDMP site checked  Yes 6/8/2023  4:04 PM Haley Wong DO            RTC  Return in about 2 months (around 8/8/2023) for Follow up, in person - must be in person (8/3 @ 3:30pm).        SUBJECTIVE                                                    Jarrell Nicole is a 68 year old female with PMHx of stage IV breast cancer w/ bony metastases, asthma, HTN, diverticulitis, anxiety, opioid dependence, and history of unintentional opioid and benzodiazepine overdose (July 2021) who presents to clinic today for follow up.    Video-Visit Details  Type of service:  Video Visit  Video Start Time: 4:01 PM  Video End Time: 4:31 PM  Originating Location (pt. Location): Home  Distant Location (provider location):  [facility]   Platform used for Video Visit: [employer]    Brief history (from chart review):  Patient was initially seen by addiction medicine service when admitted for encephalopathy 2/2 unintentional overdose of opiates and benzodiazepines in July 2021.  She was diagnosed with metastatic breast cancer in March 2021 and was taking opioids since April 2021 for cancer related pain.  She also has long standing history of anxiety and had been on benzodiazepines for 7-8 years prior to hospitalization in July 2021 at which time she was weaned off with phenobarbital taper.     She has continue Suboxone 8-2mg BID to TID for management of her cancer related pain.        Plan from most  recent office visit (5/11/23):  1. Chronic pain syndrome  Overall stable.  Continue Suboxone 8-2mg as previously ordered, 1 refill remaining.    - Drugs of Abuse Screen Urine (POC CUPS) POCT; Standing    TODAY'S VISIT  HPI Jun 8, 2023   - Email hacked - stressful!!    - Was supposed to see psychiatry - confusion regarding insurance coverage/billing - she is checking on this  - Frustrated by past mental health diagnoses - specifically borderline personality disorder, reflects on past struggles with depression and PTSD  - Working with  regarding getting felony removed from her record  - PCA taking summer off, hopefully she will get a new PCA soon  - PCP now prescribing Flexeril  - Not moving to [place]           10/18/2022     1:00 PM 3/16/2023    12:54 PM 4/13/2023    12:57 PM   PHQ   PHQ-9 Total Score 5 2 2   Q9: Thoughts of better off dead/self-harm past 2 weeks Not at all Not at all Not at all           3/1/2022    12:00 PM 3/29/2022     1:00 PM 4/13/2023    12:57 PM   MOHINDER-7 SCORE   Total Score   3 (minimal anxiety)   Total Score 2 3 3       OBJECTIVE                                                    PHYSICAL EXAM:  There were no vitals taken for this visit.    GENERAL: healthy, alert and no distress  EYES: Eyes grossly normal to inspection  RESP: No respiratory distress, no coughing or wheezing  SKIN: no pallor or jaundice appreciated  NEURO: mentation intact and speech normal  MENTAL STATUS EXAM  Appearance/Behavior: No appearant distress  Speech: Normal  Mood/Affect: anxiety  Insight: Adequate    LAB  No results found for any visits on 06/08/23.      HISTORY                                                    Problem list reviewed & adjusted, as indicated.  Patient Active Problem List   Diagnosis     Malignant neoplasm of lower-outer quadrant of right breast of female, estrogen receptor positive (H)     Asthma     Combined forms of age-related cataract of right eye     Diverticulitis of large intestine without  perforation or abscess without bleeding     Episodic mood disorder (H)     Hyperlipidemia     Pain medication agreement     Panic disorder without agoraphobia     Post traumatic stress disorder     Dehydration     Encephalopathy     Elevated troponin level not due to acute coronary syndrome     Acute kidney injury (H)     Chronic pain due to neoplasm     Uncomplicated opioid dependence (H)     Hypokalemia     Non-traumatic rhabdomyolysis     Essential hypertension     Anemia     Nausea and vomiting, intractability of vomiting not specified, unspecified vomiting type         MEDICATION LIST (prior to visit)  albuterol (PROAIR HFA;PROVENTIL HFA;VENTOLIN HFA) 90 mcg/actuation inhaler, Inhale 1-2 puffs into the lungs every 4 hours as needed for shortness of breath / dyspnea or wheezing   ascorbic acid, vitamin C, (VITAMIN C) 1000 MG tablet, [ASCORBIC ACID, VITAMIN C, (VITAMIN C) 1000 MG TABLET] Take 1,000 mg by mouth daily.  B,C/folic/zinc/copper ox/vit E (STRESS B-COMPLEX ORAL), [B,C/FOLIC/ZINC/COPPER OX/VIT E (STRESS B-COMPLEX ORAL)] Take 1 tablet by mouth daily. Stress supplement  cetirizine (ZYRTEC) 10 MG tablet, Take 1 tablet (10 mg) by mouth daily  cholecalciferol 125 MCG (5000 UT) CAPS, Take 1 capsule (5,000 Units) by mouth daily  cyclobenzaprine (FLEXERIL) 5 MG tablet, Take 1 tablet (5 mg) by mouth 2 times daily as needed for muscle spasms  docusate sodium (COLACE) 100 MG capsule, Take 1 capsule (100 mg) by mouth 2 times daily as needed for constipation TAKE 1 CAPSULE(100 MG) BY MOUTH TWICE DAILY  dronabinol (MARINOL) 10 MG capsule, Take 10 mg by mouth 2 times daily  ferrous sulfate (FEROSUL) 325 (65 Fe) MG tablet, Take 1 tablet (325 mg) by mouth daily (with breakfast)  fluticasone propionate (FLONASE) 50 mcg/actuation nasal spray, [FLUTICASONE PROPIONATE (FLONASE) 50 MCG/ACTUATION NASAL SPRAY] 1 spray into each nostril daily.   fluticasone-vilanterol (BREO ELLIPTA) 100-25 MCG/ACT inhaler, Inhale 1 puff into the  lungs daily  gabapentin (NEURONTIN) 300 MG capsule, Take 300 mg by mouth 3 times daily  IBRANCE 125 MG tablet, Take 125mg daily for 3 weeks, then off for 1 week  letrozole (FEMARA) 2.5 MG tablet, Take 2.5 mg by mouth daily  Lidocaine (LIDOCARE) 4 % Patch, Place 2 patches onto the skin every 24 hours To prevent lidocaine toxicity, patient should be patch free for 12 hrs daily.  melatonin 10 mg Tab, Take 10 mg by mouth nightly as needed for sleep  melatonin 3 MG tablet, Take 1 tablet (3 mg) by mouth nightly as needed for sleep  mirtazapine (REMERON) 15 MG tablet, Take 1 tablet (15 mg) by mouth At Bedtime  naloxone (NARCAN) 4 MG/0.1ML nasal spray, Spray 1 spray (4 mg) into one nostril alternating nostrils once as needed for opioid reversal every 2-3 minutes until assistance arrives  ondansetron (ZOFRAN) 8 MG tablet, Take 8 mg by mouth 3 times daily as needed  polyethylene glycol (MIRALAX) 17 GM/Dose powder, Take 17 g (1 capful) by mouth daily as needed for constipation  propranoloL (INDERAL) 10 MG tablet, Take 10 mg by mouth 3 times daily as needed (anxiety or palpitations)   SYMBICORT 80-4.5 mcg/actuation inhaler, Inhale 2 puffs into the lungs 2 times daily  vilazodone (VIIBRYD) 10 MG TABS tablet,   vitamin E 100 UNIT capsule, [VITAMIN E 100 UNIT CAPSULE] Take 500 Units by mouth 2 (two) times a week.  [DISCONTINUED] ipratropium-albuteroL (DUO-NEB) 0.5-2.5 mg/3 mL nebulizer, [IPRATROPIUM-ALBUTEROL (DUO-NEB) 0.5-2.5 MG/3 ML NEBULIZER] Take 3 mL by nebulization 4 (four) times a day as needed.    No current facility-administered medications on file prior to visit.      MEDICATION LIST (after visit)  Current Outpatient Medications   Medication     buprenorphine-naloxone (SUBOXONE) 8-2 MG SUBL sublingual tablet     albuterol (PROAIR HFA;PROVENTIL HFA;VENTOLIN HFA) 90 mcg/actuation inhaler     ascorbic acid, vitamin C, (VITAMIN C) 1000 MG tablet     B,C/folic/zinc/copper ox/vit E (STRESS B-COMPLEX ORAL)     cetirizine  (ZYRTEC) 10 MG tablet     cholecalciferol 125 MCG (5000 UT) CAPS     cyclobenzaprine (FLEXERIL) 5 MG tablet     docusate sodium (COLACE) 100 MG capsule     dronabinol (MARINOL) 10 MG capsule     ferrous sulfate (FEROSUL) 325 (65 Fe) MG tablet     fluticasone propionate (FLONASE) 50 mcg/actuation nasal spray     fluticasone-vilanterol (BREO ELLIPTA) 100-25 MCG/ACT inhaler     gabapentin (NEURONTIN) 300 MG capsule     IBRANCE 125 MG tablet     letrozole (FEMARA) 2.5 MG tablet     Lidocaine (LIDOCARE) 4 % Patch     melatonin 10 mg Tab     melatonin 3 MG tablet     mirtazapine (REMERON) 15 MG tablet     naloxone (NARCAN) 4 MG/0.1ML nasal spray     ondansetron (ZOFRAN) 8 MG tablet     polyethylene glycol (MIRALAX) 17 GM/Dose powder     propranoloL (INDERAL) 10 MG tablet     SYMBICORT 80-4.5 mcg/actuation inhaler     vilazodone (VIIBRYD) 10 MG TABS tablet     vitamin E 100 UNIT capsule     No current facility-administered medications for this visit.         Allergies   Allergen Reactions     Iodinated Contrast Media [Iodinated Contrast Media] Unknown     Heart stopped and sick for multiple days after     Penicillins Swelling     Throat swelling as an infant     Tylenol [Acetaminophen] Other (See Comments)     With codeine only .Pt states gives her upset stomach for days.     Venlafaxine Hcl Er Nausea and Vomiting     Vomited 20 min after taking XR, OK with IR     Codeine Hives and Itching       Haley Wong [facility] Addiction Medicine  [facility]  [phone]

## 2023-06-09 ENCOUNTER — TRANSFERRED RECORDS (OUTPATIENT)
Dept: HEALTH INFORMATION MANAGEMENT | Facility: CLINIC | Age: 68
End: 2023-06-09
Payer: MEDICARE

## 2023-08-08 ENCOUNTER — TELEPHONE (OUTPATIENT)
Dept: ADDICTION MEDICINE | Facility: CLINIC | Age: 68
End: 2023-08-08
Payer: MEDICARE

## 2023-08-08 NOTE — TELEPHONE ENCOUNTER
Patient was to be scheduled for in-person visit this month.  Please check to see if she can come in person.  If not I would like her to please go to [facility] lab to have UDS completed (need to complete approximately q 6 months at a minimum, last one was in March 2023) before visit.  If she would prefer to go to a lab to leave sample and keep visit as virtual please let me know so I can place order.    Thanks!    Haley Wong, DO on 8/8/2023 at 5:07 PM

## 2023-08-09 NOTE — TELEPHONE ENCOUNTER
"RN received instructions from Dr. Wong to please reach out to this patient regarding appointments and possible urine drug screens.  RN phoned the patient who expressed the following.       The patient reports that she tested positive for COVID 19  approximately 2 days ago.  She reported that she contacted her PMD who instructed her to quarantine for the next 10-14 days.  She remains symptomatic and was currently using a nebulizer.  The patient reports this is her second time having COVID.      2.  RN reviewed Dr. Joaquin instructions with the patient.  She verbalized a understanding and apologized stating, \"I can't leave my house.\"  She wanted Dr. Wong to be aware that she uses marijuana and stated, \" I have not used except for marijuana and Dr. Wong is aware of that.\"  Please let Dr. Wong know that I am sorry.  Please let me know if there is any she wants me to do.     RN forwarded this message to Dr. Wong for her review.     "

## 2023-08-09 NOTE — TELEPHONE ENCOUNTER
Appreciate update.  We can leave this visit as virtual.    Thanks!    Haley Wong, DO on 8/9/2023 at 10:51 AM

## 2023-08-15 ENCOUNTER — VIRTUAL VISIT (OUTPATIENT)
Dept: ADDICTION MEDICINE | Facility: CLINIC | Age: 68
End: 2023-08-15
Payer: COMMERCIAL

## 2023-08-15 DIAGNOSIS — T40.2X5A THERAPEUTIC OPIOID-INDUCED CONSTIPATION (OIC): ICD-10-CM

## 2023-08-15 DIAGNOSIS — G89.3 CHRONIC PAIN DUE TO NEOPLASM: Primary | ICD-10-CM

## 2023-08-15 DIAGNOSIS — K59.03 THERAPEUTIC OPIOID-INDUCED CONSTIPATION (OIC): ICD-10-CM

## 2023-08-15 PROCEDURE — 99214 OFFICE O/P EST MOD 30 MIN: CPT | Mod: 95 | Performed by: FAMILY MEDICINE

## 2023-08-15 RX ORDER — GUAIFENESIN 600 MG/1
600 TABLET, EXTENDED RELEASE ORAL 2 TIMES DAILY
COMMUNITY
Start: 2023-05-25

## 2023-08-15 RX ORDER — BUPRENORPHINE HYDROCHLORIDE AND NALOXONE HYDROCHLORIDE DIHYDRATE 8; 2 MG/1; MG/1
TABLET SUBLINGUAL
Qty: 90 TABLET | Refills: 1 | Status: SHIPPED | OUTPATIENT
Start: 2023-08-19 | End: 2023-10-10

## 2023-08-15 RX ORDER — DOCUSATE SODIUM 100 MG/1
100 CAPSULE, LIQUID FILLED ORAL 2 TIMES DAILY PRN
Qty: 180 CAPSULE | Refills: 1 | Status: SHIPPED | OUTPATIENT
Start: 2023-08-15 | End: 2024-07-08

## 2023-08-15 NOTE — NURSING NOTE
Is the patient currently in the state of [place]? YES - at home.    Visit mode:VIDEO    If the visit is dropped, the patient can be reconnected by: VIDEO VISIT: Text to cell phone:   Telephone Information:   Mobile [phone]       Will anyone else be joining the visit? No  (If patient encounters technical issues they should call [phone])    How would you like to obtain your AVS? MyChart    Are changes needed to the allergy or medication list? No    Rooming Documentation: Unable to complete qnrs due to time.    Pt stated they currently have COVID.    Reason for visit: MICA Rabago, VVF

## 2023-08-15 NOTE — PROGRESS NOTES
[facility] Addiction Medicine    A/P                                                    ASSESSMENT/PLAN    1. Chronic pain due to neoplasm  Stable.  Continue Suboxone 8-2mg TID.    - buprenorphine-naloxone (SUBOXONE) 8-2 MG SUBL sublingual tablet; Place 1 tablet under the tongue 2 times daily. May also place 0.5-1 tablets daily as needed for severe pain.  Dispense: 90 tablet; Refill: 1    2. Therapeutic opioid-induced constipation (OIC)  Continue Colace as needed.  - docusate sodium (COLACE) 100 MG capsule; Take 1 capsule (100 mg) by mouth 2 times daily as needed for constipation TAKE 1 CAPSULE(100 MG) BY MOUTH TWICE DAILY  Dispense: 180 capsule; Refill: 1      PDMP Review         Value Time User    State PDMP site checked  Yes 8/15/2023  1:25 PM Haley Wong DO            RTC  Return in 8 weeks (on 10/10/2023) for Follow up, in person - 10/10 @ 2pm in person.  Needs updated UDS.  OK to continue q 2 month follow-ups given stability of symptoms.      SUBJECTIVE                                                    Jarrell Nicole is a 68 year old female with PMHx of stage IV breast cancer w/ bony metastases, asthma, HTN, diverticulitis, anxiety, opioid dependence, and history of unintentional opioid and benzodiazepine overdose (July 2021) who presents to clinic today for follow up.     Video-Visit Details    Type of service:  Video Visit  Video Start Time: 1:40PM  Video End Time: 2:14 PM  Originating Location (pt. Location): Home  Distant Location (provider location):  [facility]   Platform used for Video Visit: [employer]    Brief history (from chart review):  Patient was initially seen by addiction medicine service when admitted for encephalopathy 2/2 unintentional overdose of opiates and benzodiazepines in July 2021.  She was diagnosed with metastatic breast cancer in March 2021 and was taking opioids since April 2021 for cancer related pain.  She also has long standing history of anxiety  and had been on benzodiazepines for 7-8 years prior to hospitalization in July 2021 at which time she was weaned off with phenobarbital taper.     She has continue Suboxone 8-2mg BID to TID for management of her cancer related pain.        Plan from most recent office visit (6/8/23):  1. Chronic pain due to neoplasm  Stable.  Continue Suboxone, no change.    - buprenorphine-naloxone (SUBOXONE) 8-2 MG SUBL sublingual tablet; Place 1 tablet under the tongue 2 times daily. May also place 0.5-1 tablets daily as needed for severe pain.  Dispense: 90 tablet; Refill: 1     2. Chronic neck pain  PCP is now prescribing Flexeril, no refill needed from me.      TODAY'S VISIT  HPI Aug 15, 2023  - Sick with COVID so changed to virtual visit - tired, coughing a lot, fever finally broke, lots of body aches, doing more shakes than eating solids; PCP Rx guaifenesin for cough  - Taking Suboxone as prescribed, no side effects noted today  - Worries about environment  - Losing train of thought at times  - Reflects on desire to get back to [employer] - [place]   - Considering some support groups - getting rides is an issue  - Prefers to continue q 2 month follow-up to avoid feeling overwhelmed with appointments     Last filled 7/20/23, #90        10/18/2022     1:00 PM 3/16/2023    12:54 PM 4/13/2023    12:57 PM   PHQ   PHQ-9 Total Score 5 2 2   Q9: Thoughts of better off dead/self-harm past 2 weeks Not at all Not at all Not at all           3/1/2022    12:00 PM 3/29/2022     1:00 PM 4/13/2023    12:57 PM   MOHINDER-7 SCORE   Total Score   3 (minimal anxiety)   Total Score 2 3 3       OBJECTIVE                                                    PHYSICAL EXAM:  There were no vitals taken for this visit.    GENERAL: healthy, alert and no distress  EYES: Eyes grossly normal to inspection,  RESP: No respiratory distress, frequent coughing, no audible wheezing  SKIN: no pallor or jaundice  NEURO: mentation intact and speech normal  MENTAL  STATUS EXAM  Appearance/Behavior: No appearant distress  Speech: Normal  Mood/Affect: anxiety  Insight: Fair    LAB  No results found for any visits on 08/15/23.      HISTORY                                                    Problem list reviewed & adjusted, as indicated.  Patient Active Problem List   Diagnosis    Malignant neoplasm of lower-outer quadrant of right breast of female, estrogen receptor positive (H)    Asthma    Combined forms of age-related cataract of right eye    Diverticulitis of large intestine without perforation or abscess without bleeding    Episodic mood disorder (H)    Hyperlipidemia    Pain medication agreement    Panic disorder without agoraphobia    Post traumatic stress disorder    Dehydration    Encephalopathy    Elevated troponin level not due to acute coronary syndrome    Acute kidney injury (H)    Chronic pain due to neoplasm    Uncomplicated opioid dependence (H)    Hypokalemia    Non-traumatic rhabdomyolysis    Essential hypertension    Anemia    Nausea and vomiting, intractability of vomiting not specified, unspecified vomiting type         MEDICATION LIST (prior to visit)  guaiFENesin (MUCINEX) 600 MG 12 hr tablet, Take 600 mg by mouth 2 times daily  albuterol (PROAIR HFA;PROVENTIL HFA;VENTOLIN HFA) 90 mcg/actuation inhaler, Inhale 1-2 puffs into the lungs every 4 hours as needed for shortness of breath / dyspnea or wheezing   ascorbic acid, vitamin C, (VITAMIN C) 1000 MG tablet, [ASCORBIC ACID, VITAMIN C, (VITAMIN C) 1000 MG TABLET] Take 1,000 mg by mouth daily.  B,C/folic/zinc/copper ox/vit E (STRESS B-COMPLEX ORAL), [B,C/FOLIC/ZINC/COPPER OX/VIT E (STRESS B-COMPLEX ORAL)] Take 1 tablet by mouth daily. Stress supplement  cetirizine (ZYRTEC) 10 MG tablet, Take 1 tablet (10 mg) by mouth daily  cholecalciferol 125 MCG (5000 UT) CAPS, Take 1 capsule (5,000 Units) by mouth daily  cyclobenzaprine (FLEXERIL) 5 MG tablet, Take 1 tablet (5 mg) by mouth 2 times daily as needed for  muscle spasms  dronabinol (MARINOL) 10 MG capsule, Take 10 mg by mouth 2 times daily  ferrous sulfate (FEROSUL) 325 (65 Fe) MG tablet, Take 1 tablet (325 mg) by mouth daily (with breakfast)  fluticasone propionate (FLONASE) 50 mcg/actuation nasal spray, [FLUTICASONE PROPIONATE (FLONASE) 50 MCG/ACTUATION NASAL SPRAY] 1 spray into each nostril daily.   fluticasone-vilanterol (BREO ELLIPTA) 100-25 MCG/ACT inhaler, Inhale 1 puff into the lungs daily  gabapentin (NEURONTIN) 300 MG capsule, Take 300 mg by mouth 3 times daily  IBRANCE 125 MG tablet, Take 125mg daily for 3 weeks, then off for 1 week  letrozole (FEMARA) 2.5 MG tablet, Take 2.5 mg by mouth daily  Lidocaine (LIDOCARE) 4 % Patch, Place 2 patches onto the skin every 24 hours To prevent lidocaine toxicity, patient should be patch free for 12 hrs daily.  melatonin 10 mg Tab, Take 10 mg by mouth nightly as needed for sleep  melatonin 3 MG tablet, Take 1 tablet (3 mg) by mouth nightly as needed for sleep  mirtazapine (REMERON) 15 MG tablet, Take 1 tablet (15 mg) by mouth At Bedtime  naloxone (NARCAN) 4 MG/0.1ML nasal spray, Spray 1 spray (4 mg) into one nostril alternating nostrils once as needed for opioid reversal every 2-3 minutes until assistance arrives  ondansetron (ZOFRAN) 8 MG tablet, Take 8 mg by mouth 3 times daily as needed  polyethylene glycol (MIRALAX) 17 GM/Dose powder, Take 17 g (1 capful) by mouth daily as needed for constipation  propranoloL (INDERAL) 10 MG tablet, Take 10 mg by mouth 3 times daily as needed (anxiety or palpitations)   SYMBICORT 80-4.5 mcg/actuation inhaler, Inhale 2 puffs into the lungs 2 times daily  vilazodone (VIIBRYD) 10 MG TABS tablet,   vitamin E 100 UNIT capsule, [VITAMIN E 100 UNIT CAPSULE] Take 500 Units by mouth 2 (two) times a week.  [DISCONTINUED] ipratropium-albuteroL (DUO-NEB) 0.5-2.5 mg/3 mL nebulizer, [IPRATROPIUM-ALBUTEROL (DUO-NEB) 0.5-2.5 MG/3 ML NEBULIZER] Take 3 mL by nebulization 4 (four) times a day as  needed.    No current facility-administered medications on file prior to visit.      MEDICATION LIST (after visit)  Current Outpatient Medications   Medication    [START ON 8/19/2023] buprenorphine-naloxone (SUBOXONE) 8-2 MG SUBL sublingual tablet    docusate sodium (COLACE) 100 MG capsule    guaiFENesin (MUCINEX) 600 MG 12 hr tablet    albuterol (PROAIR HFA;PROVENTIL HFA;VENTOLIN HFA) 90 mcg/actuation inhaler    ascorbic acid, vitamin C, (VITAMIN C) 1000 MG tablet    B,C/folic/zinc/copper ox/vit E (STRESS B-COMPLEX ORAL)    cetirizine (ZYRTEC) 10 MG tablet    cholecalciferol 125 MCG (5000 UT) CAPS    cyclobenzaprine (FLEXERIL) 5 MG tablet    dronabinol (MARINOL) 10 MG capsule    ferrous sulfate (FEROSUL) 325 (65 Fe) MG tablet    fluticasone propionate (FLONASE) 50 mcg/actuation nasal spray    fluticasone-vilanterol (BREO ELLIPTA) 100-25 MCG/ACT inhaler    gabapentin (NEURONTIN) 300 MG capsule    IBRANCE 125 MG tablet    letrozole (FEMARA) 2.5 MG tablet    Lidocaine (LIDOCARE) 4 % Patch    melatonin 10 mg Tab    melatonin 3 MG tablet    mirtazapine (REMERON) 15 MG tablet    naloxone (NARCAN) 4 MG/0.1ML nasal spray    ondansetron (ZOFRAN) 8 MG tablet    polyethylene glycol (MIRALAX) 17 GM/Dose powder    propranoloL (INDERAL) 10 MG tablet    SYMBICORT 80-4.5 mcg/actuation inhaler    vilazodone (VIIBRYD) 10 MG TABS tablet    vitamin E 100 UNIT capsule     No current facility-administered medications for this visit.         Allergies   Allergen Reactions    Iodinated Contrast Media [Iodinated Contrast Media] Unknown     Heart stopped and sick for multiple days after    Penicillins Swelling     Throat swelling as an infant    Tylenol [Acetaminophen] Other (See Comments)     With codeine only .Pt states gives her upset stomach for days.    Venlafaxine Hcl Er Nausea and Vomiting     Vomited 20 min after taking XR, OK with IR    Codeine Hives and Itching       Haley Wong, [facility] Addiction  Medicine [facility]  [phone]

## 2023-08-15 NOTE — PROGRESS NOTES
"Virtual Visit Details    Type of service:  Video Visit   Video Start Time: {video visit start/end time for provider to select:296811}  Video End Time:{video visit start/end time for provider to select:108600}    Originating Location (pt. Location): {video visit patient location:657060::\"Home\"}  {PROVIDER LOCATION On-site should be selected for visits conducted from your clinic location or adjoining [facility] hospital, academic office, or other nearby [facility] building. Off-site should be selected for all other provider locations, including home:815320}  Distant Location (provider location):  {virtual location provider:816822}  Platform used for Video Visit: {Virtual Visit Platforms:498148::\"[employer]\"}        "

## 2023-08-21 ENCOUNTER — TRANSFERRED RECORDS (OUTPATIENT)
Dept: HEALTH INFORMATION MANAGEMENT | Facility: CLINIC | Age: 68
End: 2023-08-21
Payer: MEDICARE

## 2023-08-22 ENCOUNTER — TRANSFERRED RECORDS (OUTPATIENT)
Dept: HEALTH INFORMATION MANAGEMENT | Facility: CLINIC | Age: 68
End: 2023-08-22
Payer: MEDICARE

## 2023-08-26 ENCOUNTER — HEALTH MAINTENANCE LETTER (OUTPATIENT)
Age: 68
End: 2023-08-26

## 2023-10-02 ENCOUNTER — TELEPHONE (OUTPATIENT)
Dept: BEHAVIORAL HEALTH | Facility: CLINIC | Age: 68
End: 2023-10-02
Payer: MEDICARE

## 2023-10-02 NOTE — TELEPHONE ENCOUNTER
Patient's primary provider wants to start the patient back on Clonazepam. The primary wants to connect before prescribing to make sure it is safe.   Has upcoming appt on 10/10 and plans on discussing this.     Last visit:  Chronic pain due to neoplasm  Stable.  Continue Suboxone 8-2mg TID.    - buprenorphine-naloxone (SUBOXONE) 8-2 MG SUBL sublingual tablet; Place 1 tablet under the tongue 2 times daily. May also place 0.5-1 tablets daily as needed for severe pain.  Dispense: 90 tablet; Refill: 1     2. Therapeutic opioid-induced constipation (OIC)  Continue Colace as needed.  - docusate sodium (COLACE) 100 MG capsule; Take 1 capsule (100 mg) by mouth 2 times daily as needed for constipation TAKE 1 CAPSULE(100 MG) BY MOUTH TWICE DAILY    Porsha Schwarz RN on 10/2/2023 at 3:28 PM

## 2023-10-02 NOTE — TELEPHONE ENCOUNTER
Reason for call:  Other   Patient called regarding (reason for call): call back and prescription  Additional comments: Pt called to advise:   message: PCP Radha Oakley, will adding pt back onto medication: Clonopin?  and wants to connect with Marvin to make sure this is a safe. She advised she also plans to discuss in upcoming appointment    Phone number to reach patient:  Home number on file [phone] (home)    Best Time:  asap    Can we leave a detailed message on this number?  YES    Travel screening: Not Applicable

## 2023-10-10 ENCOUNTER — OFFICE VISIT (OUTPATIENT)
Dept: ADDICTION MEDICINE | Facility: CLINIC | Age: 68
End: 2023-10-10
Payer: COMMERCIAL

## 2023-10-10 VITALS
HEART RATE: 66 BPM | DIASTOLIC BLOOD PRESSURE: 63 MMHG | BODY MASS INDEX: 22.49 KG/M2 | WEIGHT: 131 LBS | SYSTOLIC BLOOD PRESSURE: 144 MMHG

## 2023-10-10 DIAGNOSIS — G89.3 CHRONIC PAIN DUE TO NEOPLASM: ICD-10-CM

## 2023-10-10 DIAGNOSIS — G89.4 CHRONIC PAIN SYNDROME: Primary | ICD-10-CM

## 2023-10-10 DIAGNOSIS — F41.9 ANXIETY: ICD-10-CM

## 2023-10-10 LAB
AMPHETAMINE QUAL URINE POCT: NEGATIVE
BARBITURATE QUAL URINE POCT: NEGATIVE
BENZODIAZEPINE QUAL URINE POCT: NEGATIVE
BUPRENORPHINE QUAL URINE POCT: ABNORMAL
COCAINE QUAL URINE POCT: NEGATIVE
CREATININE QUAL URINE POCT: ABNORMAL
INTERNAL QC QUAL URINE POCT: ABNORMAL
MDMA QUAL URINE POCT: NEGATIVE
METHADONE QUAL URINE POCT: NEGATIVE
METHAMPHETAMINE QUAL URINE POCT: NEGATIVE
OPIATE QUAL URINE POCT: NEGATIVE
OXYCODONE QUAL URINE POCT: NEGATIVE
PH QUAL URINE POCT: ABNORMAL
PHENCYCLIDINE QUAL URINE POCT: NEGATIVE
POCT KIT EXPIRATION DATE: ABNORMAL
POCT KIT LOT NUMBER: ABNORMAL
SPECIFIC GRAVITY POCT: 1.01
TEMPERATURE URINE POCT: ABNORMAL
THC QUAL URINE POCT: ABNORMAL

## 2023-10-10 PROCEDURE — 99214 OFFICE O/P EST MOD 30 MIN: CPT | Performed by: FAMILY MEDICINE

## 2023-10-10 PROCEDURE — 80305 DRUG TEST PRSMV DIR OPT OBS: CPT | Performed by: FAMILY MEDICINE

## 2023-10-10 RX ORDER — BUPRENORPHINE HYDROCHLORIDE AND NALOXONE HYDROCHLORIDE DIHYDRATE 8; 2 MG/1; MG/1
TABLET SUBLINGUAL
Qty: 90 TABLET | Refills: 1 | Status: SHIPPED | OUTPATIENT
Start: 2023-10-10 | End: 2024-01-25

## 2023-10-10 RX ORDER — BUPRENORPHINE HYDROCHLORIDE AND NALOXONE HYDROCHLORIDE DIHYDRATE 8; 2 MG/1; MG/1
TABLET SUBLINGUAL
Qty: 90 TABLET | Refills: 1 | Status: CANCELLED | OUTPATIENT
Start: 2023-10-10

## 2023-10-10 ASSESSMENT — ANXIETY QUESTIONNAIRES
4. TROUBLE RELAXING: SEVERAL DAYS
7. FEELING AFRAID AS IF SOMETHING AWFUL MIGHT HAPPEN: NOT AT ALL
2. NOT BEING ABLE TO STOP OR CONTROL WORRYING: NOT AT ALL
5. BEING SO RESTLESS THAT IT IS HARD TO SIT STILL: NOT AT ALL
6. BECOMING EASILY ANNOYED OR IRRITABLE: SEVERAL DAYS
GAD7 TOTAL SCORE: 3
1. FEELING NERVOUS, ANXIOUS, OR ON EDGE: SEVERAL DAYS
GAD7 TOTAL SCORE: 3
3. WORRYING TOO MUCH ABOUT DIFFERENT THINGS: NOT AT ALL

## 2023-10-10 ASSESSMENT — PATIENT HEALTH QUESTIONNAIRE - PHQ9: SUM OF ALL RESPONSES TO PHQ QUESTIONS 1-9: 2

## 2023-10-10 ASSESSMENT — PAIN SCALES - GENERAL: PAINLEVEL: SEVERE PAIN (7)

## 2023-10-10 NOTE — PROGRESS NOTES
[facility] Addiction Medicine    A/P                                                    ASSESSMENT/PLAN    1. Chronic pain syndrome  2. Chronic pain due to neoplasm  Stable.  Continue Suboxone 8-2mg as ordered.    - buprenorphine-naloxone (SUBOXONE) 8-2 MG SUBL sublingual tablet; Place 1 tablet under the tongue 2 times daily. May also place 0.5-1 tablets daily as needed for severe pain.  Dispense: 90 tablet; Refill: 1  - Drugs of Abuse Screen Urine (POC CUPS) POCT    3. Anxiety  Discussed her worsening anxiety and conversation with PCP regarding possibly resuming clonazepam.  Reviewed risk of combining with buprenorphine, however we also discussed how much her anxiety is impacting her ability to function.  Consideration of her functioning and diagnosis was given.  Reviewed that recommendation would be to limit use.  I did agree that I will call PCP to discuss further.        PDMP Review         Value Time User    State PDMP site checked  Yes 10/10/2023  2:21 PM Haley Wong,               RTC  Return in about 8 weeks (around 12/5/2023) for Follow up, using a video visit.        Opioid warning reviewed.  Risk of overdose following a period of abstinence due to decrease tolerance was discussed including risk of death.  Strongly recommended abstain from alcohol, benzodiazepines, THC, opioids and other drugs of abuse.  Increased risk of return to opioid use after use of these substances discussed.  Increased risk of overdose/death with use of other substances particularly benzodiazepines/alcohol reviewed.        SUBJECTIVE                                                    Jarrell Nicole is a 68 year old female with PMHx of stage IV breast cancer w/ bony metastases, asthma, HTN, diverticulitis, anxiety, opioid dependence, and history of unintentional opioid and benzodiazepine overdose (July 2021) who presents to clinic today for follow up.     Brief history (from chart review):  Patient was  initially seen by addiction medicine service when admitted for encephalopathy 2/2 unintentional overdose of opiates and benzodiazepines in July 2021.  She was diagnosed with metastatic breast cancer in March 2021 and was taking opioids since April 2021 for cancer related pain.  She also has long standing history of anxiety and had been on benzodiazepines for 7-8 years prior to hospitalization in July 2021 at which time she was weaned off with phenobarbital taper.     She has continue Suboxone 8-2mg BID to TID for management of her cancer related pain.         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  Continue Suboxone 8-2mg TID.    - buprenorphine-naloxone (SUBOXONE) 8-2 MG SUBL sublingual tablet; Place 1 tablet under the tongue 2 times daily. May also place 0.5-1 tablets daily as needed for severe pain.  Dispense: 90 tablet; Refill: 1     2. Therapeutic opioid-induced constipation (OIC)  Continue Colace as needed.  - docusate sodium (COLACE) 100 MG capsule; Take 1 capsule (100 mg) by mouth 2 times daily as needed for constipation TAKE 1 CAPSULE(100 MG) BY MOUTH TWICE DAILY  Dispense: 180 capsule; Refill: 1       TODAY'S VISIT  HPI Oct 10, 2023  - Panic attacks continue worsening, hard time leaving the house, having to send others out to run errors   - Still does not have a PCA, son has been driving down from [place] to help her as much as he can  - Going to [place] for [place] -  approximately Dec 11 - Jan 20  - Taking TID, no issues, pain 7/10, no side effects          3/16/2023    12:54 PM 4/13/2023    12:57 PM 10/10/2023     1:00 PM   PHQ   PHQ-9 Total Score 2 2 2   Q9: Thoughts of better off dead/self-harm past 2 weeks Not at all Not at all Not at all           3/29/2022     1:00 PM 4/13/2023    12:57 PM 10/10/2023     1:00 PM   MOHINDER-7 SCORE   Total Score  3 (minimal anxiety)    Total Score 3 3 3       OBJECTIVE                                                     PHYSICAL EXAM:  BP (!) 144/63 (BP Location: Left arm, Patient Position: Sitting, Cuff Size: Adult Regular)   Pulse 66   Wt 59.4 kg (131 lb)   BMI 22.49 kg/m      GENERAL: healthy, alert and no distress  EYES: Eyes grossly normal to inspection, PERRL and conjunctivae and sclerae normal  RESP: No respiratory distress  SKIN: no suspicious lesions or rashes  NEURO: Normal strength and tone, mentation intact and speech normal  MENTAL STATUS EXAM  Appearance/Behavior: No appearant distress  Speech: Normal  Mood/Affect: anxiety  Insight: Fair    LAB  Results for orders placed or performed in visit on 10/10/23   Drugs of Abuse Screen Urine (POC CUPS) POCT     Status: Abnormal   Result Value Ref Range    POCT Kit Lot Number [id number]     POCT Kit Expiration Date 2024-06-08     Temperature Urine POCT 90 F 90 F, 92 F, 94 F, 96 F, 98 F, 100 F    Specific [place] POCT 1.015 1.005, 1.015, 1.025    pH Qual Urine POCT 7 pH 4 pH, 5 pH, 7 pH, 9 pH    Creatinine Qual Urine POCT 200 mg/dL 20 mg/dL, 50 mg/dL, 100 mg/dL, 200 mg/dL    Internal QC Qual Urine POCT Valid Valid    Amphetamine Qual Urine POCT Negative Negative    Barbiturate Qual Urine POCT Negative Negative    Buprenorphine Qual Urine POCT Screen Positive (A) Negative    Benzodiazepine Qual Urine POCT Negative Negative    Cocaine Qual Urine POCT Negative Negative    Methamphetamine Qual Urine POCT Negative Negative    MDMA Qual Urine POCT Negative Negative    Methadone Qual Urine POCT Negative Negative    Opiate Qual Urine POCT Negative Negative    Oxycodone Qual Urine POCT Negative Negative    Phencyclidine Qual Urine POCT Negative Negative    THC Qual Urine POCT Screen Positive (A) Negative         HISTORY                                                    Problem list reviewed & adjusted, as indicated.  Patient Active Problem List   Diagnosis    Malignant neoplasm of lower-outer quadrant of right breast of female, estrogen receptor positive (H)    Asthma    Combined  forms of age-related cataract of right eye    Diverticulitis of large intestine without perforation or abscess without bleeding    Episodic mood disorder (H24)    Hyperlipidemia    Pain medication agreement    Panic disorder without agoraphobia    Post traumatic stress disorder    Dehydration    Encephalopathy    Elevated troponin level not due to acute coronary syndrome    Acute kidney injury (H24)    Chronic pain due to neoplasm    Uncomplicated opioid dependence (H)    Hypokalemia    Non-traumatic rhabdomyolysis    Essential hypertension    Anemia    Nausea and vomiting, intractability of vomiting not specified, unspecified vomiting type         MEDICATION LIST (prior to visit)  albuterol (PROAIR HFA;PROVENTIL HFA;VENTOLIN HFA) 90 mcg/actuation inhaler, Inhale 1-2 puffs into the lungs every 4 hours as needed for shortness of breath / dyspnea or wheezing   B,C/folic/zinc/copper ox/vit E (STRESS B-COMPLEX ORAL), [B,C/FOLIC/ZINC/COPPER OX/VIT E (STRESS B-COMPLEX ORAL)] Take 1 tablet by mouth daily. Stress supplement  cetirizine (ZYRTEC) 10 MG tablet, Take 1 tablet (10 mg) by mouth daily  cholecalciferol 125 MCG (5000 UT) CAPS, Take 1 capsule (5,000 Units) by mouth daily  cyclobenzaprine (FLEXERIL) 5 MG tablet, Take 1 tablet (5 mg) by mouth 2 times daily as needed for muscle spasms  docusate sodium (COLACE) 100 MG capsule, Take 1 capsule (100 mg) by mouth 2 times daily as needed for constipation TAKE 1 CAPSULE(100 MG) BY MOUTH TWICE DAILY  dronabinol (MARINOL) 10 MG capsule, Take 10 mg by mouth 2 times daily  ferrous sulfate (FEROSUL) 325 (65 Fe) MG tablet, Take 1 tablet (325 mg) by mouth daily (with breakfast)  fluticasone propionate (FLONASE) 50 mcg/actuation nasal spray, [FLUTICASONE PROPIONATE (FLONASE) 50 MCG/ACTUATION NASAL SPRAY] 1 spray into each nostril daily.   fluticasone-vilanterol (BREO ELLIPTA) 100-25 MCG/ACT inhaler, Inhale 1 puff into the lungs daily  gabapentin (NEURONTIN) 300 MG capsule, Take 300 mg  by mouth 3 times daily  guaiFENesin (MUCINEX) 600 MG 12 hr tablet, Take 600 mg by mouth 2 times daily  letrozole (FEMARA) 2.5 MG tablet, Take 2.5 mg by mouth daily  melatonin 3 MG tablet, Take 1 tablet (3 mg) by mouth nightly as needed for sleep  ondansetron (ZOFRAN) 8 MG tablet, Take 8 mg by mouth 3 times daily as needed  polyethylene glycol (MIRALAX) 17 GM/Dose powder, Take 17 g (1 capful) by mouth daily as needed for constipation  ascorbic acid, vitamin C, (VITAMIN C) 1000 MG tablet, [ASCORBIC ACID, VITAMIN C, (VITAMIN C) 1000 MG TABLET] Take 1,000 mg by mouth daily. (Patient not taking: Reported on 10/10/2023)  IBRANCE 125 MG tablet, Take 125mg daily for 3 weeks, then off for 1 week  Lidocaine (LIDOCARE) 4 % Patch, Place 2 patches onto the skin every 24 hours To prevent lidocaine toxicity, patient should be patch free for 12 hrs daily. (Patient not taking: Reported on 10/10/2023)  melatonin 10 mg Tab, Take 10 mg by mouth nightly as needed for sleep (Patient not taking: Reported on 10/10/2023)  mirtazapine (REMERON) 15 MG tablet, Take 1 tablet (15 mg) by mouth At Bedtime (Patient not taking: Reported on 10/10/2023)  naloxone (NARCAN) 4 MG/0.1ML nasal spray, Spray 1 spray (4 mg) into one nostril alternating nostrils once as needed for opioid reversal every 2-3 minutes until assistance arrives  propranoloL (INDERAL) 10 MG tablet, Take 10 mg by mouth 3 times daily as needed (anxiety or palpitations)  (Patient not taking: Reported on 10/10/2023)  SYMBICORT 80-4.5 mcg/actuation inhaler, Inhale 2 puffs into the lungs 2 times daily (Patient not taking: Reported on 10/10/2023)  vilazodone (VIIBRYD) 10 MG TABS tablet,   vitamin E 100 UNIT capsule, [VITAMIN E 100 UNIT CAPSULE] Take 500 Units by mouth 2 (two) times a week. (Patient not taking: Reported on 10/10/2023)  [DISCONTINUED] ipratropium-albuteroL (DUO-NEB) 0.5-2.5 mg/3 mL nebulizer, [IPRATROPIUM-ALBUTEROL (DUO-NEB) 0.5-2.5 MG/3 ML NEBULIZER] Take 3 mL by  nebulization 4 (four) times a day as needed.    No current facility-administered medications on file prior to visit.      MEDICATION LIST (after visit)  Current Outpatient Medications   Medication    albuterol (PROAIR HFA;PROVENTIL HFA;VENTOLIN HFA) 90 mcg/actuation inhaler    B,C/folic/zinc/copper ox/vit E (STRESS B-COMPLEX ORAL)    buprenorphine-naloxone (SUBOXONE) 8-2 MG SUBL sublingual tablet    cetirizine (ZYRTEC) 10 MG tablet    cholecalciferol 125 MCG (5000 UT) CAPS    cyclobenzaprine (FLEXERIL) 5 MG tablet    docusate sodium (COLACE) 100 MG capsule    dronabinol (MARINOL) 10 MG capsule    ferrous sulfate (FEROSUL) 325 (65 Fe) MG tablet    fluticasone propionate (FLONASE) 50 mcg/actuation nasal spray    fluticasone-vilanterol (BREO ELLIPTA) 100-25 MCG/ACT inhaler    gabapentin (NEURONTIN) 300 MG capsule    guaiFENesin (MUCINEX) 600 MG 12 hr tablet    letrozole (FEMARA) 2.5 MG tablet    melatonin 3 MG tablet    ondansetron (ZOFRAN) 8 MG tablet    polyethylene glycol (MIRALAX) 17 GM/Dose powder    ascorbic acid, vitamin C, (VITAMIN C) 1000 MG tablet    IBRANCE 125 MG tablet    Lidocaine (LIDOCARE) 4 % Patch    melatonin 10 mg Tab    mirtazapine (REMERON) 15 MG tablet    naloxone (NARCAN) 4 MG/0.1ML nasal spray    propranoloL (INDERAL) 10 MG tablet    SYMBICORT 80-4.5 mcg/actuation inhaler    vilazodone (VIIBRYD) 10 MG TABS tablet    vitamin E 100 UNIT capsule     No current facility-administered medications for this visit.         Allergies   Allergen Reactions    Iodinated Contrast Media [Iodinated Contrast Media] Unknown     Heart stopped and sick for multiple days after    Penicillins Swelling     Throat swelling as an infant    Tylenol [Acetaminophen] Other (See Comments)     With codeine only .Pt states gives her upset stomach for days.    Venlafaxine Hcl Er Nausea and Vomiting     Vomited 20 min after taking XR, OK with IR    Codeine Hives and Itching       Haley Wong, [facility]  Addiction Medicine  [facility] [place] [facility]  [phone]

## 2023-10-10 NOTE — PROGRESS NOTES
[facility] - Addiction Medicine       Rooming information: Pt is tearful, says she worries about her children, dislike/upset with Trump running for president again. Needing refill for Suboxone    Point of care urine drug screen positive for:  Lab Results   Component Value Date    BUP Screen Positive (A) 10/10/2023    BZO Negative 10/10/2023    BAR Negative 10/10/2023    SERENE Negative 10/10/2023    MAMP Negative 10/10/2023    AMP Negative 10/10/2023    MDMA Negative 10/10/2023    MTD Negative 10/10/2023    UJC601 Negative 10/10/2023    OXY Negative 10/10/2023    PCP Negative 10/10/2023    THC Screen Positive (A) 10/10/2023    TEMP 90 F 10/10/2023    SGPOCT 1.015 10/10/2023       *POC urine drug screen does not screen for Fentanyl    PHQ-9 Scores:       3/16/2023    12:54 PM 4/13/2023    12:57 PM 10/10/2023     1:00 PM   PHQ   PHQ-9 Total Score 2 2 2   Q9: Thoughts of better off dead/self-harm past 2 weeks Not at all Not at all Not at all     MOHINDER-7 Scores:      3/29/2022     1:00 PM 4/13/2023    12:57 PM 10/10/2023     1:00 PM   MOHINDER-7 SCORE   Total Score  3 (minimal anxiety)    Total Score 3 3 3       Any other recent substance use:     Cannabis     NICOTINE-No  If using nicotine, ready to quit? NA    Side effects related to medications pt would like to discuss with provider (constipation, dry mouth, HA, GI upset, sedation?)     Narcan currently available: Yes    Primary care provider: Radha Oakley MD     Mental health provider: denies , refuses to see any because e of her past experience(follow up on MH referral if needed)    Any housing, insurance deficits?: denies  Contact information up to date? yes    3rd Party Involvement NA (please obtain SAM if pt would like to include)      Dolores St LPN  October 10, 2023

## 2023-10-10 NOTE — PATIENT INSTRUCTIONS
Patient Education   Addiction Medicine  What to Expect  Here's what to expect from our Addiction Medicine program.  About Addiction Medicine  Addiction Medicine clinics help you with substance use problems. You set your own goals. We try to help you reach your goals. Your care plan can include:  Medicine  Creating a recovery plan  Helping you find local resources  Helping with treatment options  Clinic phone number and addresses  Clinic Phone: [phone]  Mental Health and Addiction Clinic  [facility]  [address], [address]  [place] [address]  [facility]  [address], [address]  [place] [address]  Walk-in services  We offer walk-in care for patients at the Recovery Clinic. This is only for patients with Opioid Use Disorder (OUD). Anyone with OUD is welcome. Our providers will refer you to the Recovery Clinic if you're struggling to keep up with your medicines or appointments.  Recovery Clinic ([facility])  [address], [address]  [place] [address]  Phone: [phone]  The Recovery Clinic is open for walk-ins Monday to Friday 9 a.m. to 11:30 a.m. and 12:30 p.m. to 3 p.m.  How it works  Come to your visits every time. The treatment works better when you do.   You can have as many visits as you need. When you're better, we'll refer you back to being cared for by your family doctor.   If you need it, we'll send you to doctors, psychiatrists, therapists, and other providers. We focus on treating addiction. We don't treat other problems, like managing other medicines or non-addiction issues.  About visits  Urine drug testing  We'll often test your pee (urine) for drugs. This is the only way we can know for sure whether or not you're using drugs. It helps us treat you without judgement.   Suboxone (buprenorphine)  If you're taking buprenorphine, you'll have a lot of visits at first. If your problem is getting worse, or you're  "using substances, we may schedule you for extra visits.   Cancelling visits  If you can't come to your visit, please call us right away at [phone]. If you don't cancel at least 24 hours (1 full day) before your visit, that's \"late cancellation.\"  Being late to visits  If you come late, you may not be seen. This will count as a \"no-show.\"  Please call the clinic if you're running late. This will help us plan, but it doesn't mean you'll be seen.   Being late is:  More than 15 minutes late for a return visit.  More than 30 minutes late for your first visit.  If you cancel late or don't show up 2 times within 6 months, we may transfer you to another clinic.   Getting help between visits  If you need help between visits, you can call us Monday to Friday from 8 a.m. to 4:30 p.m. at [phone]. You can also send us a message on [employer].  Medicine refills  If you miss or cancel a visit, you can still ask for a refill. But we can only refill your medicines if you've made a new appointment.  Please call your pharmacy for medicine refills. If you have a question about your refill, call us at [phone].  It takes up to 2 business days to refill your drugs. Let us know 2 to 3 days before you run out. Don't call more than 1 week before you run out. That's too early.   Please make sure we have your right phone number.  If we have a problem with your refill, we'll call you. If we call you, please call us back right away. If you don't, you may not get your medicines quickly.   Call your pharmacy to find out if your medicines are ready.   Keep your medicines in a safe place. Keep them away from pets and children. If your medicines are lost or stolen, we usually don't replace them. We recommend you file a police report if your medicines are stolen. Your insurance may not pay for early refills, even if you have a prescription.  Forms  Please give us at least 3 business days to fill out any forms. Bring the forms to your " visits if you can. We may refer you to other members of your care team to complete the forms.   Emergency care   Call 911 or go to the nearest emergency room if your life or someone else's life is in danger.  Call 988 anytime for the Suicide and Crisis Lifeline.  If you need care when we're closed, call your family doctor to see if they can help. You can also go to urgent care or an emergency room. [facility] emergency rooms may be able to give you buprenorphine or other medicine refills.  Thank you for choosing us for your care.  For informational purposes only. Not to replace the advice of your health care provider. Copyright   2023 [facility]. All rights reserved. [employer] 741938 - REV 05/23.

## 2023-10-26 ENCOUNTER — TRANSFERRED RECORDS (OUTPATIENT)
Dept: HEALTH INFORMATION MANAGEMENT | Facility: CLINIC | Age: 68
End: 2023-10-26
Payer: MEDICARE

## 2023-10-27 ENCOUNTER — TELEPHONE (OUTPATIENT)
Dept: BEHAVIORAL HEALTH | Facility: CLINIC | Age: 68
End: 2023-10-27
Payer: MEDICARE

## 2023-10-27 NOTE — TELEPHONE ENCOUNTER
Receive a FYI from [facility]    Provider, please review patient's visit summary.    Fax No. [phone]

## 2023-12-11 ENCOUNTER — TRANSFERRED RECORDS (OUTPATIENT)
Dept: HEALTH INFORMATION MANAGEMENT | Facility: CLINIC | Age: 68
End: 2023-12-11
Payer: MEDICARE

## 2024-01-25 ENCOUNTER — VIRTUAL VISIT (OUTPATIENT)
Dept: ADDICTION MEDICINE | Facility: CLINIC | Age: 69
End: 2024-01-25
Payer: COMMERCIAL

## 2024-01-25 DIAGNOSIS — G89.3 CHRONIC PAIN DUE TO NEOPLASM: ICD-10-CM

## 2024-01-25 RX ORDER — BUPRENORPHINE HYDROCHLORIDE AND NALOXONE HYDROCHLORIDE DIHYDRATE 8; 2 MG/1; MG/1
TABLET SUBLINGUAL
Qty: 15 TABLET | Refills: 0 | Status: SHIPPED | OUTPATIENT
Start: 2024-02-17 | End: 2024-02-21

## 2024-01-25 NOTE — NURSING NOTE
Is the patient currently in the state of [place]? NO    Visit mode:VIDEO    If the visit is dropped, the patient can be reconnected by: VIDEO VISIT: Text to cell phone:   Telephone Information:   Mobile [phone]       Will anyone else be joining the visit? No  (If patient encounters technical issues they should call [phone])    How would you like to obtain your AVS? MyChart    Are changes needed to the allergy or medication list? No    Rooming Documentation: Assigned questionnaire(s) completed .    Reason for visit: RECHJOSÉ LUIS Berg

## 2024-01-25 NOTE — PROGRESS NOTES
Noted that patient is out of state so advised her we would need to cancel her visit today.  Reports being in [place] has been good for her.  She is now planning to stay in [place] until after anniversary of her late 's death on Valentine's Day so she can support her daughter during this time.  Will return 2/17 - states she has enough Suboxone from old script to last until she returns so I have sent a bridge to fill upon her return.  We have rescheduled her visit to 2/20 @ 2pm.      Haley Wong DO on 1/25/2024 at 1:53 PM

## 2024-01-25 NOTE — PROGRESS NOTES
[facility] Addiction Medicine    A/P                                                    ASSESSMENT/PLAN    There are no diagnoses linked to this encounter.      PDMP Review         Value Time User    State PDMP site checked  Yes 10/10/2023  2:21 PM Haley Wong,               [facility]  No follow-ups on file.      Counseled the patient on the importance of having a recovery program in addition to medication to manage recovery.  Components include avoiding isolating, having willingness to change, avoiding triggers and managing cravings. Encouraged having some type of sober network and practicing honesty with trusted support person(s). Encouraged other services such as counseling, 12 step or other self-help organizations.      ***Opioid warning reviewed.  Risk of overdose following a period of abstinence due to decrease tolerance was discussed including risk of death.  Strongly recommended abstain from alcohol, benzodiazepines, THC, opioids and other drugs of abuse.  Increased risk of return to opioid use after use of these substances discussed.  Increased risk of overdose/death with use of other substances particularly benzodiazepines/alcohol reviewed.        SUBJECTIVE                                                          Jarrell Nicole is a 68 year old female with PMHx of stage IV breast cancer w/ bony metastases, asthma, HTN, diverticulitis, anxiety, opioid dependence, and history of unintentional opioid and benzodiazepine overdose (July 2021) who presents to clinic today for follow up.      Brief history (from chart review):  Patient was initially seen by addiction medicine service when admitted for encephalopathy 2/2 unintentional overdose of opiates and benzodiazepines in July 2021.  She was diagnosed with metastatic breast cancer in March 2021 and was taking opioids since April 2021 for cancer related pain.  She also has long standing history of anxiety and had been on benzodiazepines for  "7-8 years prior to hospitalization in July 2021 at which time she was weaned off with phenobarbital taper.     She has continue Suboxone 8-2mg BID to TID for management of her cancer related pain.        Plan from most recent office visit (10/10/23):  1. Chronic pain syndrome  2. Chronic pain due to neoplasm  Stable.  Continue Suboxone 8-2mg as ordered.    - buprenorphine-naloxone (SUBOXONE) 8-2 MG SUBL sublingual tablet; Place 1 tablet under the tongue 2 times daily. May also place 0.5-1 tablets daily as needed for severe pain.  Dispense: 90 tablet; Refill: 1  - Drugs of Abuse Screen Urine (POC CUPS) POCT     3. Anxiety  Discussed her worsening anxiety and conversation with PCP regarding possibly resuming clonazepam.  Reviewed risk of combining with buprenorphine, however we also discussed how much her anxiety is impacting her ability to function.  Consideration of her functioning and diagnosis was given.  Reviewed that recommendation would be to limit use.  I did agree that I will call PCP to discuss further.      TODAY'S VISIT  HPI Jan 25, 2024  - ***    Buprenorphine filled 10/17/23 and 11/29/23 4/13/2023    12:57 PM 10/10/2023     1:00 PM 1/24/2024     1:42 PM   PHQ   PHQ-9 Total Score 2 2 2   Q9: Thoughts of better off dead/self-harm past 2 weeks Not at all Not at all Not at all           3/29/2022     1:00 PM 4/13/2023    12:57 PM 10/10/2023     1:00 PM   MOHINDER-7 SCORE   Total Score  3 (minimal anxiety)    Total Score 3 3 3       OBJECTIVE                                                    PHYSICAL EXAM:  There were no vitals taken for this visit.    { Exam Brief:944322::\"GENERAL: healthy, alert and no distress\",\"EYES: Eyes grossly normal to inspection, PERRL and conjunctivae and sclerae normal\",\"RESP: No respiratory distress\",\"MENTAL STATUS EXAM\"}    LAB  No results found for any visits on 01/25/24.      HISTORY                                                    Problem list reviewed & adjusted, as " indicated.  Patient Active Problem List   Diagnosis    Malignant neoplasm of lower-outer quadrant of right breast of female, estrogen receptor positive (H)    Asthma    Combined forms of age-related cataract of right eye    Diverticulitis of large intestine without perforation or abscess without bleeding    Episodic mood disorder (H24)    Hyperlipidemia    Pain medication agreement    Panic disorder without agoraphobia    Post traumatic stress disorder    Dehydration    Encephalopathy    Elevated troponin level not due to acute coronary syndrome    Acute kidney injury (H24)    Chronic pain due to neoplasm    Uncomplicated opioid dependence (H)    Hypokalemia    Non-traumatic rhabdomyolysis    Essential hypertension    Anemia    Nausea and vomiting, intractability of vomiting not specified, unspecified vomiting type         MEDICATION LIST (prior to visit)  albuterol (PROAIR HFA;PROVENTIL HFA;VENTOLIN HFA) 90 mcg/actuation inhaler, Inhale 1-2 puffs into the lungs every 4 hours as needed for shortness of breath / dyspnea or wheezing   ascorbic acid, vitamin C, (VITAMIN C) 1000 MG tablet, [ASCORBIC ACID, VITAMIN C, (VITAMIN C) 1000 MG TABLET] Take 1,000 mg by mouth daily. (Patient not taking: Reported on 10/10/2023)  B,C/folic/zinc/copper ox/vit E (STRESS B-COMPLEX ORAL), [B,C/FOLIC/ZINC/COPPER OX/VIT E (STRESS B-COMPLEX ORAL)] Take 1 tablet by mouth daily. Stress supplement  buprenorphine-naloxone (SUBOXONE) 8-2 MG SUBL sublingual tablet, Place 1 tablet under the tongue 2 times daily. May also place 0.5-1 tablets daily as needed for severe pain.  cetirizine (ZYRTEC) 10 MG tablet, Take 1 tablet (10 mg) by mouth daily  cholecalciferol 125 MCG (5000 UT) CAPS, Take 1 capsule (5,000 Units) by mouth daily  cyclobenzaprine (FLEXERIL) 5 MG tablet, Take 1 tablet (5 mg) by mouth 2 times daily as needed for muscle spasms  docusate sodium (COLACE) 100 MG capsule, Take 1 capsule (100 mg) by mouth 2 times daily as needed for  constipation TAKE 1 CAPSULE(100 MG) BY MOUTH TWICE DAILY  dronabinol (MARINOL) 10 MG capsule, Take 10 mg by mouth 2 times daily  ferrous sulfate (FEROSUL) 325 (65 Fe) MG tablet, Take 1 tablet (325 mg) by mouth daily (with breakfast)  fluticasone propionate (FLONASE) 50 mcg/actuation nasal spray, [FLUTICASONE PROPIONATE (FLONASE) 50 MCG/ACTUATION NASAL SPRAY] 1 spray into each nostril daily.   fluticasone-vilanterol (BREO ELLIPTA) 100-25 MCG/ACT inhaler, Inhale 1 puff into the lungs daily  gabapentin (NEURONTIN) 300 MG capsule, Take 300 mg by mouth 3 times daily  guaiFENesin (MUCINEX) 600 MG 12 hr tablet, Take 600 mg by mouth 2 times daily  IBRANCE 125 MG tablet, Take 125mg daily for 3 weeks, then off for 1 week  letrozole (FEMARA) 2.5 MG tablet, Take 2.5 mg by mouth daily  Lidocaine (LIDOCARE) 4 % Patch, Place 2 patches onto the skin every 24 hours To prevent lidocaine toxicity, patient should be patch free for 12 hrs daily. (Patient not taking: Reported on 10/10/2023)  melatonin 10 mg Tab, Take 10 mg by mouth nightly as needed for sleep (Patient not taking: Reported on 10/10/2023)  melatonin 3 MG tablet, Take 1 tablet (3 mg) by mouth nightly as needed for sleep  mirtazapine (REMERON) 15 MG tablet, Take 1 tablet (15 mg) by mouth At Bedtime (Patient not taking: Reported on 10/10/2023)  naloxone (NARCAN) 4 MG/0.1ML nasal spray, Spray 1 spray (4 mg) into one nostril alternating nostrils once as needed for opioid reversal every 2-3 minutes until assistance arrives  ondansetron (ZOFRAN) 8 MG tablet, Take 8 mg by mouth 3 times daily as needed  polyethylene glycol (MIRALAX) 17 GM/Dose powder, Take 17 g (1 capful) by mouth daily as needed for constipation  propranoloL (INDERAL) 10 MG tablet, Take 10 mg by mouth 3 times daily as needed (anxiety or palpitations)  (Patient not taking: Reported on 10/10/2023)  SYMBICORT 80-4.5 mcg/actuation inhaler, Inhale 2 puffs into the lungs 2 times daily (Patient not taking: Reported on  10/10/2023)  vilazodone (VIIBRYD) 10 MG TABS tablet,   vitamin E 100 UNIT capsule, [VITAMIN E 100 UNIT CAPSULE] Take 500 Units by mouth 2 (two) times a week. (Patient not taking: Reported on 10/10/2023)  [DISCONTINUED] ipratropium-albuteroL (DUO-NEB) 0.5-2.5 mg/3 mL nebulizer, [IPRATROPIUM-ALBUTEROL (DUO-NEB) 0.5-2.5 MG/3 ML NEBULIZER] Take 3 mL by nebulization 4 (four) times a day as needed.    No current facility-administered medications on file prior to visit.      MEDICATION LIST (after visit)  Current Outpatient Medications   Medication    albuterol (PROAIR HFA;PROVENTIL HFA;VENTOLIN HFA) 90 mcg/actuation inhaler    ascorbic acid, vitamin C, (VITAMIN C) 1000 MG tablet    B,C/folic/zinc/copper ox/vit E (STRESS B-COMPLEX ORAL)    buprenorphine-naloxone (SUBOXONE) 8-2 MG SUBL sublingual tablet    cetirizine (ZYRTEC) 10 MG tablet    cholecalciferol 125 MCG (5000 UT) CAPS    cyclobenzaprine (FLEXERIL) 5 MG tablet    docusate sodium (COLACE) 100 MG capsule    dronabinol (MARINOL) 10 MG capsule    ferrous sulfate (FEROSUL) 325 (65 Fe) MG tablet    fluticasone propionate (FLONASE) 50 mcg/actuation nasal spray    fluticasone-vilanterol (BREO ELLIPTA) 100-25 MCG/ACT inhaler    gabapentin (NEURONTIN) 300 MG capsule    guaiFENesin (MUCINEX) 600 MG 12 hr tablet    IBRANCE 125 MG tablet    letrozole (FEMARA) 2.5 MG tablet    Lidocaine (LIDOCARE) 4 % Patch    melatonin 10 mg Tab    melatonin 3 MG tablet    mirtazapine (REMERON) 15 MG tablet    naloxone (NARCAN) 4 MG/0.1ML nasal spray    ondansetron (ZOFRAN) 8 MG tablet    polyethylene glycol (MIRALAX) 17 GM/Dose powder    propranoloL (INDERAL) 10 MG tablet    SYMBICORT 80-4.5 mcg/actuation inhaler    vilazodone (VIIBRYD) 10 MG TABS tablet    vitamin E 100 UNIT capsule     No current facility-administered medications for this visit.         Allergies   Allergen Reactions    Iodinated Contrast Media [Iodinated Contrast Media] Unknown     Heart stopped and sick for multiple days  after    Penicillins Swelling     Throat swelling as an infant    Tylenol [Acetaminophen] Other (See Comments)     With codeine only .Pt states gives her upset stomach for days.    Venlafaxine Hcl Er Nausea and Vomiting     Vomited 20 min after taking XR, OK with IR    Codeine Hives and Itching       Haley Wong DO  [facility] Medicine  [facility]  [phone]

## 2024-01-25 NOTE — PROGRESS NOTES
"Virtual Visit Details    Type of service:  Video Visit   Video Start Time: {video visit start/end time for provider to select:566134}  Video End Time:{video visit start/end time for provider to select:365882}    Originating Location (pt. Location): {video visit patient location:085885::\"Home\"}  {PROVIDER LOCATION On-site should be selected for visits conducted from your clinic location or adjoining [facility] hospital, academic office, or other nearby [facility] building. Off-site should be selected for all other provider locations, including home:[address]}  Distant Location (provider location):  {virtual location provider:748591}  Platform used for Video Visit: {Virtual Visit Platforms:553631::\"[employer]\"}  "

## 2024-02-20 ENCOUNTER — MYC MEDICAL ADVICE (OUTPATIENT)
Dept: PSYCHIATRY | Facility: CLINIC | Age: 69
End: 2024-02-20
Payer: COMMERCIAL

## 2024-02-20 DIAGNOSIS — G89.3 CHRONIC PAIN DUE TO NEOPLASM: ICD-10-CM

## 2024-02-20 NOTE — TELEPHONE ENCOUNTER
RN reviewed note from Dr. Wong.    RN attempted to call the patient at [phone], but had to leave a message to call back at [phone] or respond to MyC message that RN sent.    Machelle Campoverde RN on 2/20/2024 at 2:42 PM

## 2024-02-20 NOTE — TELEPHONE ENCOUNTER
Patient was planning to be back in town from [place] for today's visit but is not.  She has rescheduled for 2/27/24.  Virtual facilitator informed me she will need refill and patient requested her regular pharmacy in [place].     I had spoken with patient briefly on 1/25 when were were last scheduled to meet but we did not have visit as she was in [place].  I did send a refill at that time to fill when she arrived home from [place] and this appears to have been filled on 2/13/24.  Prior script was filled on 11/29/23.      RN - please confirm how often she is taking her Suboxone 8-2mg films (ordered as BID, option to take up to TID if needed for pain) and where she would like to me to send the bridge prescription since she is still in [place]?  Also, please confirm that she did indeed fill the script on 2/13 as indicated in .    Thanks!    Haley Wong, DO on 2/20/2024 at 2:22 PM

## 2024-02-21 RX ORDER — BUPRENORPHINE HYDROCHLORIDE AND NALOXONE HYDROCHLORIDE DIHYDRATE 8; 2 MG/1; MG/1
TABLET SUBLINGUAL
Qty: 15 TABLET | Refills: 0 | Status: SHIPPED | OUTPATIENT
Start: 2024-02-21 | End: 2024-02-27

## 2024-02-21 NOTE — TELEPHONE ENCOUNTER
RN called patient to discuss provider directives and patient reports she is on a plane and headed back to the [facility]. She cannot talk at this time and requests we call her later this afternoon. She states her plane lands at 1:00 pm.     Nursing will call patient later in the day.     Jayla Zimmerman RN on 2/21/2024 at 9:54 AM

## 2024-02-21 NOTE — TELEPHONE ENCOUNTER
Routing to covering provider pool while Dr. Wong is out of clinic.     aJyla Zimmerman RN on 2/21/2024 at 4:25 PM

## 2024-02-21 NOTE — TELEPHONE ENCOUNTER
Reviewed. Bridge approved.       1. Chronic pain due to neoplasm  - buprenorphine-naloxone (SUBOXONE) 8-2 MG SUBL sublingual tablet; Place 1 tablet under the tongue 2 times daily. May also place 0.5-1 tablets daily as needed for severe pain.  Dispense: 15 tablet; Refill: 0    TEMI Ramos CNP on 2/21/2024 at 4:29 PM

## 2024-02-21 NOTE — TELEPHONE ENCOUNTER
RN called patient back and she is now home in [place]  but will eventually be moving to [place]. She states the following:    She took her last Suboxone this morning at 0400. She had some left from a script from [place].  Her PCA picked up her last script from [place] and sent it to her in [place].  She has been taking 8 mg three times per day.   She would like to use her [facility] in [place] on [address] and appreciates a call once the script is sent so she can get a ride.  She is aware of her next appointment with Dr. Wong on 2/27/2024 virtually at 2:00 pm and has a lot to talk about.     Routing to Dr. Wong High priority for Suboxone refill. Will notify patient of outcome.     Jayla Zimmerman RN on 2/21/2024 at 3:29 PM

## 2024-02-21 NOTE — TELEPHONE ENCOUNTER
RN called patient back and let her know a supply of medication was sent to her pharmacy of choice by another provider to get her to her next appointment.     Patient was appreciative.     Jayla Zimmerman RN on 2/21/2024 at 4:38 PM

## 2024-02-27 ENCOUNTER — VIRTUAL VISIT (OUTPATIENT)
Dept: ADDICTION MEDICINE | Facility: CLINIC | Age: 69
End: 2024-02-27
Payer: COMMERCIAL

## 2024-02-27 DIAGNOSIS — G89.3 CHRONIC PAIN DUE TO NEOPLASM: ICD-10-CM

## 2024-02-27 DIAGNOSIS — F41.9 ANXIETY: Primary | ICD-10-CM

## 2024-02-27 PROCEDURE — G2211 COMPLEX E/M VISIT ADD ON: HCPCS | Mod: 95 | Performed by: FAMILY MEDICINE

## 2024-02-27 PROCEDURE — 99214 OFFICE O/P EST MOD 30 MIN: CPT | Mod: 95 | Performed by: FAMILY MEDICINE

## 2024-02-27 RX ORDER — BUPRENORPHINE HYDROCHLORIDE AND NALOXONE HYDROCHLORIDE DIHYDRATE 8; 2 MG/1; MG/1
TABLET SUBLINGUAL
Qty: 90 TABLET | Refills: 0 | Status: SHIPPED | OUTPATIENT
Start: 2024-02-27 | End: 2024-03-30

## 2024-02-27 ASSESSMENT — PAIN SCALES - GENERAL: PAINLEVEL: SEVERE PAIN (7)

## 2024-02-27 NOTE — TELEPHONE ENCOUNTER
Discussed with patient that the script sent last week was to bridge to visit today.  Full script sent today during visit, she is aware and will let me know if any issues.      Haley Wong, DO on 2/27/2024 at 3:17 PM

## 2024-02-27 NOTE — NURSING NOTE
Is the patient currently in the state of [place]? YES    Visit mode:VIDEO    If the visit is dropped, the patient can be reconnected by: VIDEO VISIT:  Send e-mail to at [email or web address]    Will anyone else be joining the visit? No  (If patient encounters technical issues they should call [phone])    How would you like to obtain your AVS? MyChart    Are changes needed to the allergy or medication list? No    Rooming Documentation: Unable to complete qnrs due to time.    Reason for visit: RECHJOSÉ LUIS Marshall

## 2024-02-27 NOTE — PROGRESS NOTES
[facility] Addiction Medicine    A/P                                                    ASSESSMENT/PLAN    1. Chronic pain due to neoplasm  Overall stable, continue Suboxone 8-2mg TID.  No change.  Reviewed that previous scripts were bridges to scheduled visits and that this script would be full month.  Reviewed that when she moves to [place] I am happy to help her find provider options.    - buprenorphine-naloxone (SUBOXONE) 8-2 MG SUBL sublingual tablet; Place 1 tablet under the tongue 2 times daily. May also place 0.5-1 tablets daily as needed for severe pain.  Dispense: 90 tablet; Refill: 0  - Adult Mental Health  Referral; Future    2. Anxiety  Needs improvement.  She is no longer taking clonazepam due to side effects - reviewed that likely her acute illness in [place] contributed to over sedation as well.  Discussed options for medication management - she specifically requests to see NP so this was specified in the referral.  Additionally, she expressed desire to work with a therapist - reviewed option for health psychology given her cancer diagnosis and related anxiety.    - Adult Mental Health  Referral; Future  - Adult Mental Health  Referral; Future        PDMP Review         Value Time User    State PDMP site checked  Yes 2/27/2024 10:36 PM Haley Wong, DO              RTC  Return in 4 weeks (on 3/26/2024) for Follow up, in person - 3/26 @ 1:30pm.      Counseled the patient on the importance of having a recovery program in addition to medication to manage recovery.  Components include avoiding isolating, having willingness to change, avoiding triggers and managing cravings. Encouraged having some type of sober network and practicing honesty with trusted support person(s). Encouraged other services such as counseling, 12 step or other self-help organizations.      Opioid warning reviewed.  Risk of overdose following a period of abstinence due to decrease tolerance was  discussed including risk of death.  Strongly recommended abstain from alcohol, benzodiazepines, THC, opioids and other drugs of abuse.  Increased risk of return to opioid use after use of these substances discussed.  Increased risk of overdose/death with use of other substances particularly benzodiazepines/alcohol reviewed.        SUBJECTIVE                                                      Video-Visit Details  Type of service:  Video Visit  Video Start Time: 2:10 PM  Video End Time: 2:41 PM  Originating Location (pt. Location): Home  Distant Location (provider location):  [facility]   Platform used for Video Visit: Radha Nicole is a 69 year old female with PMHx of stage IV breast cancer w/ bony metastases, asthma, HTN, diverticulitis, anxiety, opioid dependence, and history of unintentional opioid and benzodiazepine overdose (July 2021) who presents to clinic today for follow up.      Brief history (from chart review):  Patient was initially seen by addiction medicine service when admitted for encephalopathy 2/2 unintentional overdose of opiates and benzodiazepines in July 2021.  She was diagnosed with metastatic breast cancer in March 2021 and was taking opioids since April 2021 for cancer related pain.  She also has long standing history of anxiety and had been on benzodiazepines for 7-8 years prior to hospitalization in July 2021 at which time she was weaned off with phenobarbital taper.     She has continue Suboxone 8-2mg BID to TID for management of her cancer related pain.         Plan from most recent office visit (10/10/23):  1. Chronic pain syndrome  2. Chronic pain due to neoplasm  Stable.  Continue Suboxone 8-2mg as ordered.    - buprenorphine-naloxone (SUBOXONE) 8-2 MG SUBL sublingual tablet; Place 1 tablet under the tongue 2 times daily. May also place 0.5-1 tablets daily as needed for severe pain.  Dispense: 90 tablet; Refill: 1  - Drugs of Abuse Screen Urine (POC CUPS)  "POCT     3. Anxiety  Discussed her worsening anxiety and conversation with PCP regarding possibly resuming clonazepam.  Reviewed risk of combining with buprenorphine, however we also discussed how much her anxiety is impacting her ability to function.  Consideration of her functioning and diagnosis was given.  Reviewed that recommendation would be to limit use.  I did agree that I will call PCP to discuss further.      TODAY'S VISIT  HPI Feb 27, 2024  - Had good time in [place] - planning to move down to [place]  - Was admitted to [facility] in [place] - stopped clonazepam (\"I never want to take that again\")  - Working on starting a new business  - Feels her memory is a bit off - delayed recall, getting better since stopping clonazepam  - Anxiety is very high - started using crystals and this is helpful, wondering about alternative medications  - Open to seeing psychiatry NP  -           4/13/2023    12:57 PM 10/10/2023     1:00 PM 1/24/2024     1:42 PM   PHQ   PHQ-9 Total Score 2 2 2   Q9: Thoughts of better off dead/self-harm past 2 weeks Not at all Not at all Not at all           3/29/2022     1:00 PM 4/13/2023    12:57 PM 10/10/2023     1:00 PM   MOHINDER-7 SCORE   Total Score  3 (minimal anxiety)    Total Score 3 3 3       OBJECTIVE                                                    PHYSICAL EXAM:  There were no vitals taken for this visit.    GENERAL: healthy, alert and no distress  EYES: Eyes grossly normal to inspection  RESP: No respiratory distress  NEURO: mentation intact and speech normal  MENTAL STATUS EXAM  Appearance/Behavior: No appearant distress  Speech: Normal  Mood/Affect: anxiety  Insight: adequate    LAB  No results found for any visits on 02/27/24.      HISTORY                                                    Problem list reviewed & adjusted, as indicated.  Patient Active Problem List   Diagnosis    Malignant neoplasm of lower-outer quadrant of right breast of female, estrogen receptor positive (H)    Asthma "    Combined forms of age-related cataract of right eye    Diverticulitis of large intestine without perforation or abscess without bleeding    Episodic mood disorder (H24)    Hyperlipidemia    Pain medication agreement    Panic disorder without agoraphobia    Post traumatic stress disorder    Dehydration    Encephalopathy    Elevated troponin level not due to acute coronary syndrome    Acute kidney injury (H24)    Chronic pain due to neoplasm    Uncomplicated opioid dependence (H)    Hypokalemia    Non-traumatic rhabdomyolysis    Essential hypertension    Anemia    Nausea and vomiting, intractability of vomiting not specified, unspecified vomiting type         MEDICATION LIST (prior to visit)  albuterol (PROAIR HFA;PROVENTIL HFA;VENTOLIN HFA) 90 mcg/actuation inhaler, Inhale 1-2 puffs into the lungs every 4 hours as needed for shortness of breath / dyspnea or wheezing   B,C/folic/zinc/copper ox/vit E (STRESS B-COMPLEX ORAL), [B,C/FOLIC/ZINC/COPPER OX/VIT E (STRESS B-COMPLEX ORAL)] Take 1 tablet by mouth daily. Stress supplement  cetirizine (ZYRTEC) 10 MG tablet, Take 1 tablet (10 mg) by mouth daily  cholecalciferol 125 MCG (5000 UT) CAPS, Take 1 capsule (5,000 Units) by mouth daily  cyclobenzaprine (FLEXERIL) 5 MG tablet, Take 1 tablet (5 mg) by mouth 2 times daily as needed for muscle spasms  docusate sodium (COLACE) 100 MG capsule, Take 1 capsule (100 mg) by mouth 2 times daily as needed for constipation TAKE 1 CAPSULE(100 MG) BY MOUTH TWICE DAILY  dronabinol (MARINOL) 10 MG capsule, Take 10 mg by mouth 2 times daily  ferrous sulfate (FEROSUL) 325 (65 Fe) MG tablet, Take 1 tablet (325 mg) by mouth daily (with breakfast)  fluticasone propionate (FLONASE) 50 mcg/actuation nasal spray, [FLUTICASONE PROPIONATE (FLONASE) 50 MCG/ACTUATION NASAL SPRAY] 1 spray into each nostril daily.   fluticasone-vilanterol (BREO ELLIPTA) 100-25 MCG/ACT inhaler, Inhale 1 puff into the lungs daily  gabapentin (NEURONTIN) 300 MG capsule,  Take 300 mg by mouth 3 times daily  guaiFENesin (MUCINEX) 600 MG 12 hr tablet, Take 600 mg by mouth 2 times daily  IBRANCE 125 MG tablet, Take 125mg daily for 3 weeks, then off for 1 week  letrozole (FEMARA) 2.5 MG tablet, Take 2.5 mg by mouth daily  Lidocaine (LIDOCARE) 4 % Patch, Place 2 patches onto the skin every 24 hours To prevent lidocaine toxicity, patient should be patch free for 12 hrs daily.  melatonin 10 mg Tab, Take 10 mg by mouth nightly as needed for sleep  melatonin 3 MG tablet, Take 1 tablet (3 mg) by mouth nightly as needed for sleep  mirtazapine (REMERON) 15 MG tablet, Take 1 tablet (15 mg) by mouth At Bedtime  naloxone (NARCAN) 4 MG/0.1ML nasal spray, Spray 1 spray (4 mg) into one nostril alternating nostrils once as needed for opioid reversal every 2-3 minutes until assistance arrives  ondansetron (ZOFRAN) 8 MG tablet, Take 8 mg by mouth 3 times daily as needed  polyethylene glycol (MIRALAX) 17 GM/Dose powder, Take 17 g (1 capful) by mouth daily as needed for constipation  propranoloL (INDERAL) 10 MG tablet, Take 10 mg by mouth 3 times daily as needed (anxiety or palpitations)  SYMBICORT 80-4.5 mcg/actuation inhaler, Inhale 2 puffs into the lungs 2 times daily  vilazodone (VIIBRYD) 10 MG TABS tablet,   vitamin E 100 UNIT capsule, Take 500 Units by mouth twice a week  ascorbic acid, vitamin C, (VITAMIN C) 1000 MG tablet, [ASCORBIC ACID, VITAMIN C, (VITAMIN C) 1000 MG TABLET] Take 1,000 mg by mouth daily. (Patient not taking: Reported on 10/10/2023)  [DISCONTINUED] ipratropium-albuteroL (DUO-NEB) 0.5-2.5 mg/3 mL nebulizer, [IPRATROPIUM-ALBUTEROL (DUO-NEB) 0.5-2.5 MG/3 ML NEBULIZER] Take 3 mL by nebulization 4 (four) times a day as needed.    No current facility-administered medications on file prior to visit.      MEDICATION LIST (after visit)  Current Outpatient Medications   Medication    albuterol (PROAIR HFA;PROVENTIL HFA;VENTOLIN HFA) 90 mcg/actuation inhaler    B,C/folic/zinc/copper ox/vit E  (STRESS B-COMPLEX ORAL)    buprenorphine-naloxone (SUBOXONE) 8-2 MG SUBL sublingual tablet    cetirizine (ZYRTEC) 10 MG tablet    cholecalciferol 125 MCG (5000 UT) CAPS    cyclobenzaprine (FLEXERIL) 5 MG tablet    docusate sodium (COLACE) 100 MG capsule    dronabinol (MARINOL) 10 MG capsule    ferrous sulfate (FEROSUL) 325 (65 Fe) MG tablet    fluticasone propionate (FLONASE) 50 mcg/actuation nasal spray    fluticasone-vilanterol (BREO ELLIPTA) 100-25 MCG/ACT inhaler    gabapentin (NEURONTIN) 300 MG capsule    guaiFENesin (MUCINEX) 600 MG 12 hr tablet    IBRANCE 125 MG tablet    letrozole (FEMARA) 2.5 MG tablet    Lidocaine (LIDOCARE) 4 % Patch    melatonin 10 mg Tab    melatonin 3 MG tablet    mirtazapine (REMERON) 15 MG tablet    naloxone (NARCAN) 4 MG/0.1ML nasal spray    ondansetron (ZOFRAN) 8 MG tablet    polyethylene glycol (MIRALAX) 17 GM/Dose powder    propranoloL (INDERAL) 10 MG tablet    SYMBICORT 80-4.5 mcg/actuation inhaler    vilazodone (VIIBRYD) 10 MG TABS tablet    vitamin E 100 UNIT capsule    ascorbic acid, vitamin C, (VITAMIN C) 1000 MG tablet     No current facility-administered medications for this visit.         Allergies   Allergen Reactions    Iodinated Contrast Media [Iodinated Contrast Media] Unknown     Heart stopped and sick for multiple days after    Penicillins Swelling     Throat swelling as an infant    Tylenol [Acetaminophen] Other (See Comments)     With codeine only .Pt states gives her upset stomach for days.    Venlafaxine Hcl Er Nausea and Vomiting     Vomited 20 min after taking XR, OK with IR    Codeine Hives and Itching       Haley Wong, DO  [facility] Addiction Medicine  [facility]  [phone]

## 2024-02-27 NOTE — PROGRESS NOTES
"Virtual Visit Details    Type of service:  Video Visit   Video Start Time: {video visit start/end time for provider to select:380007}  Video End Time:{video visit start/end time for provider to select:084226}    Originating Location (pt. Location): {video visit patient location:283829::\"Home\"}  {PROVIDER LOCATION On-site should be selected for visits conducted from your clinic location or adjoining [facility] hospital, academic office, or other nearby [facility] building. Off-site should be selected for all other provider locations, including home:048732}  Distant Location (provider location):  {virtual location provider:490594}  Platform used for Video Visit: {Virtual Visit Platforms:636996::\"Matchbook\"}  "

## 2024-02-29 ENCOUNTER — TRANSFERRED RECORDS (OUTPATIENT)
Dept: HEALTH INFORMATION MANAGEMENT | Facility: CLINIC | Age: 69
End: 2024-02-29
Payer: COMMERCIAL

## 2024-03-01 ENCOUNTER — TRANSCRIBE ORDERS (OUTPATIENT)
Dept: OTHER | Age: 69
End: 2024-03-01

## 2024-03-01 DIAGNOSIS — F41.9 ANXIETY: Primary | ICD-10-CM

## 2024-03-01 DIAGNOSIS — G89.3 CHRONIC PAIN DUE TO NEOPLASM: ICD-10-CM

## 2024-03-19 ENCOUNTER — VIRTUAL VISIT (OUTPATIENT)
Dept: PSYCHOLOGY | Facility: CLINIC | Age: 69
End: 2024-03-19
Attending: FAMILY MEDICINE
Payer: COMMERCIAL

## 2024-03-19 DIAGNOSIS — F41.1 GENERALIZED ANXIETY DISORDER: ICD-10-CM

## 2024-03-19 DIAGNOSIS — C50.511 MALIGNANT NEOPLASM OF LOWER-OUTER QUADRANT OF RIGHT FEMALE BREAST, UNSPECIFIED ESTROGEN RECEPTOR STATUS (H): Primary | ICD-10-CM

## 2024-03-19 DIAGNOSIS — F33.0 MILD EPISODE OF RECURRENT MAJOR DEPRESSIVE DISORDER (H): ICD-10-CM

## 2024-03-19 PROCEDURE — 90791 PSYCH DIAGNOSTIC EVALUATION: CPT | Mod: 93 | Performed by: PSYCHOLOGIST

## 2024-03-26 NOTE — PROGRESS NOTES
IDENTIFYING INFORMATION   Jarrell Nicole is a 70-year-old who presented today because she is  dying of breast and bone cancer . She was seen virtually via the telephone due IT issues on my end.   PRESENTING PROBLEM   Jarrell Nicole was diagnosed with Stage IV breast cancer and Stage IV bone cancer. She began the session explaining that she had pursued counseling because she is dying or has a   limited life expectancy  She is distressed by her children s response to her cancer which she describes as  too dramatic and gloomy .     CURRENT SITUATION  Jarrell Nicole lives alone in a rented apartment in [place], [place].   EDUCATION   Jarrell Nicole graduated from high school in 1974 in [place], [place]. She has a BA in psychology and a BA in business from the [employer].   MARITAL HISTORY   Jarrell Nicole is not .  She has four children: Maxi age 49, Sandra aged 48, Susanna aged 43, and Rebekah aged 41 and several grandchildren. Ms. Nicole does not have a relationship with daughter, Susanna, because Susanna was/is addicted to  meth  and used her kids as  lookouts for the  . Ms. Nicole does not have a relationship with her youngest, daughter Rebekah nor with her grandchildren. Her son, Maxi, lives in [place], [place] and does not work due to disability which Ms. Nicole attributes to his miliary service in [place]. Maxi is not  but has  six to ten children . He has  seven different [facility]  Ms. Nicole s daughter, Sandra, lives in [place] and wants her mother to move to [place] to live with her.  Ms. Nicole categorically rejects this idea. She does not want to live in [place] or with Sandra.  Some years ago Sandra had a good job at [employer] but lost it because she was found guilty of  welfare fraud  Ms. Nicole noted that she has had problems  with her children for some time. She has always tried to help her children as best she could but in general it  has had limited success. For example, she noted that she has been focused on her cancer and dealing with it and treatment. But the result is that while she was distracted by her cancer  problems, her children (Sandra and Maxi) have not been doing well.  Ms. Nicole wants to  die with dignity  which, for her, is rejecting continued cancer treatment except for comfort care. Maxi and Sandra are opposed to this plan. Ms. Nicole interprets their response as yet again they are focused on their feelings and what they want--not how she feels or what she wants which means they do not know or care about her. Ms. Nicole described her children as a  major stressor  in her life especially now as she battles cancer. Ms. Nicole has  always tried to help children  but this has become more difficult for her as her cancer has demanded more of her time and attention. As a result, she has had less time for her (middle-aged) children and their problems. After her children learned about her cancer they have become more difficult specifically about her desire to  die with dignity on her own terms . Her children are upset with her and are more focused on their own feelings and not her feelings. Now Ms. Nicole is upset because her children have been more difficult since her cancer diagnosis and prognosis. It has made her relationship with them more difficult.  While she has offered to go to counseling with her children but they rejected that idea as well. Ms. Nicole wants to  die with dignity  but her children are not on the same page and now their opposition is impacting Ms. Nicole s ability to take care of herself. She feels her children are focused on their feelings and not hers.    Some years ago Ms. Nicole was accused of misusing her prescription pain medication. major mistake was made by a pharmacist and  was put through a lot of drama as she was blamed for misusing the medication her and/or stealing drugs. After some  years Ms. Nicole was able to prove these accusations were false (it was a pharmacy error) but the accusation remains in her chart and this upsets her. This issue will be referred to the proper [facility] department. She is upset  that the [facility] has kept this felony in her medical chart when it should have been removed years ago.   FAMILY-OF- ORIGIN  Jarrell Nicole the raised in a working-class family in [place], [place]. Her parents are alive and  in 2007.   PSYCHOLOGICAL PROFILE   Leonor Osborn was oriented to time, person, and place. There was no evidence of disturbed thought, content, or process either observed or reported. Affect was variable and appropriate. Intelligence was not formally but appeared to be above average based on education. Insight and judgment appeared to be good. She denies any problems with depression or anxiety. She denied any previous counseling or mental health treatment. She denied any legal difficulties and/or addiction issues. She estimates she typically has 2 to 4 drinks weekly.   ASSESSMENT DSM-V    Axis I: Generalized anxiety disorder; Mild depression due to known medical condition- Stage IV breast cancer and Stage IV bone cancer    Axis II: Deferred     Axis III: Carcinoma of breast left     Axis IV: Deferred     Axis V: GAF 83   TIME: 60 minutes intake psychotherapy     PLAN:      Address following issues in psycho-oncology issues in psychotherapy     Address boundary issues with her children    Address anxiety with CBT and cognitive restructuring     Telephone/video visits are billed at different rates depending on your insurance coverage. During this emergency period, for some insurers they may be billed the same as an in-person visit. Please reach out to your insurance provider with any questions.     If during the course of the call the provider feels a telephone visit is not appropriate, you will not be charged for this service.  Patient has given verbal consent  for Telephone/Video visit. Yes  Phone call duration: 60 minutes Intake Psychotherapy    Valery Best, PhD  Licensed Psychologist   3-

## 2024-03-26 NOTE — PROCEDURES
IDENTIFYING INFORMATION   Jarrell Nicole is a 70-year-old who presented today because she is  dying of breast and bone cancer . She was seen virtually via the telephone due IT issues on my end.   PRESENTING PROBLEM   Jarrell Nicole was diagnosed with Stage IV breast cancer and Stage IV bone cancer. She began the session explaining that she had pursued counseling because she is dying or has a   limited life expectancy  She is distressed by her children s response to her cancer which she describes as  too dramatic and gloomy .     CURRENT SITUATION  Jarrell Nicole lives alone in a rented apartment in [place], [place].   EDUCATION   Jrarell Nicole graduated from high school in 1974 in [place], [place]. She has a BA in psychology and a BA in business from the [employer].   MARITAL HISTORY   Jarrell Nicole is not .  She has four children: Maxi age 49, Sandra aged 48, Susanna aged 43, and Rebekah aged 41 and several grandchildren. Ms. Nicole does not have a relationship with daughter, Susanna, because Susanna was/is addicted to  meth  and used her kids as  lookouts for the  . Ms. Nicole does not have a relationship with her youngest, daughter Rebekah nor with her grandchildren. Her son, Maxi, lives in [place], [place] and does not work due to disability which Ms. Nicole attributes to his miliary service in [place]. Maxi is not  but has  six to ten children . [facility] has  seven [facility]  Ms. Nicole s daughter, Sandra, lives in [place] and wants her mother to move to [place] to live with her.  Ms. Nicole categorically rejects this idea. She does not want to live in [place] or with Sandra.  Some years ago Sandra had a good job at [employer] but lost it because she was found guilty of  welfare fraud  Ms. Nicole noted that she has had problems  with her children for some time. She has always tried to help her children as best she could but in general it  has had limited success. For example, she noted that she has been focused on her cancer and dealing with it and treatment. But the result is that while she was distracted by her cancer  problems, her children (Sandra and Maxi) have not been doing well.  Ms. Nicole wants to  die with dignity  which, for her, is rejecting continued cancer treatment except for comfort care. Maxi and Sandra are opposed to this plan. Ms. Nicole interprets their response as yet again they are focused on their feelings and what they want--not how she feels or what she wants which means they do not know or care about her. Ms. Nicole described her children as a  major stressor  in her life especially now as she battles cancer. Ms. Nicole has  always tried to help children  but this has become more difficult for her as her cancer has demanded more of her time and attention. As a result, she has had less time for her (middle-aged) children and their problems. After her children learned about her cancer they have become more difficult specifically about her desire to  die with dignity on her own terms . Her children are upset with her and are more focused on their own feelings and not her feelings. Now Ms. Nicole is upset because her children have been more difficult since her cancer diagnosis and prognosis. It has made her relationship with them more difficult.  While she has offered to go to counseling with her children but they rejected that idea as well. Ms. Nicole wants to  die with dignity  but her children are not on the same page and now their opposition is impacting Ms. Nicole s ability to take care of herself. She feels her children are focused on their feelings and not hers.    Some years ago Ms. Nicole was accused of misusing her prescription pain medication. major mistake was made by a pharmacist and  was put through a lot of drama as she was blamed for misusing the medication her and/or stealing drugs. After some  years Ms. Nicole was able to prove these accusations were false (it was a pharmacy error) but the accusation remains in her chart and this upsets her. This issue will be referred to the proper [facility] department. She is upset  that the [facility] has kept this felony in her medical chart when it should have been removed years ago.   FAMILY-OF- ORIGIN  Jarrell Nicole the raised in a working-class family in [place], [place]. Her parents are alive and  in 2007.   PSYCHOLOGICAL PROFILE   Leonor Osborn was oriented to time, person, and place. There was no evidence of disturbed thought, content, or process either observed or reported. Affect was variable and appropriate. Intelligence was not formally but appeared to be above average based on education. Insight and judgment appeared to be good. She denies any problems with depression or anxiety. She denied any previous counseling or mental health treatment. She denied any legal difficulties and/or addiction issues. She estimates she typically has 2 to 4 drinks weekly.   ASSESSMENT DSM-V    Axis I: Generalized anxiety disorder; Mild depression due to known medical condition- Stage IV breast cancer and Stage IV bone cancer    Axis II: Deferred     Axis III: Carcinoma of breast left     Axis IV: Deferred     Axis V: GAF 83   TIME: 60 minutes intake psychotherapy   PLAN:      Address following issues in psychotherapy     Psychosexual adjustment following breast cancer      Address anxiety with CBT and cognitive restructuring     Telephone/video visits are billed at different rates depending on your insurance coverage. During this emergency period, for some insurers they may be billed the same as an in-person visit. Please reach out to your insurance provider with any questions.     If during the course of the call the provider feels a telephone visit is not appropriate, you will not be charged for this service.  Patient has given verbal consent for Telephone/Video  visit. Yes  Phone call duration: 60 minutes Intake Psychotherapy    Valery Best, PhD  Licensed Psychologist   3-

## 2024-03-30 ENCOUNTER — MYC REFILL (OUTPATIENT)
Dept: ADDICTION MEDICINE | Facility: CLINIC | Age: 69
End: 2024-03-30
Payer: COMMERCIAL

## 2024-03-30 DIAGNOSIS — G89.3 CHRONIC PAIN DUE TO NEOPLASM: ICD-10-CM

## 2024-03-30 RX ORDER — BUPRENORPHINE HYDROCHLORIDE AND NALOXONE HYDROCHLORIDE DIHYDRATE 8; 2 MG/1; MG/1
TABLET SUBLINGUAL
Qty: 90 TABLET | Refills: 0 | Status: SHIPPED | OUTPATIENT
Start: 2024-03-30 | End: 2024-04-29

## 2024-04-26 DIAGNOSIS — G89.3 CHRONIC PAIN DUE TO NEOPLASM: ICD-10-CM

## 2024-04-26 NOTE — TELEPHONE ENCOUNTER
Please reach out to patient to check on Suboxone needs since I had to reschedule yesterday (4/25) visit.  Per  she last filled on 3/30.  Thanks!    Haley Wong, DO on 4/26/2024 at 5:30 PM

## 2024-04-29 RX ORDER — BUPRENORPHINE HYDROCHLORIDE AND NALOXONE HYDROCHLORIDE DIHYDRATE 8; 2 MG/1; MG/1
TABLET SUBLINGUAL
Qty: 30 TABLET | Refills: 0 | Status: SHIPPED | OUTPATIENT
Start: 2024-04-29 | End: 2024-05-07

## 2024-04-29 NOTE — TELEPHONE ENCOUNTER
Called patient and yes she does need a refill of her suboxone.     Date of Last Office Visit: 2/27/24  Date of Next Office Visit: 5/7/24  No shows since last visit: 0  Cancellations since last visit: Provider initiated 4/25/24.     Medication requested: buprenorphine-naloxone (SUBOXONE) 8-2 MG SUBL sublingual tablet  Date last ordered: 3/30/24 Qty: 90 Refills: 0     [place] of [place] ?: Yes  Medication last sold date: 4/1/24 Qty filled: 90  [place] on [place] ?: Yes  If yes, is this a new medication?: no    Lapse in medication adherence greater than 5 days?: No  If yes, call patient and gather details: na  Medication refill request verified as identical to current order?: yes  Result of Last DAM, VPA, Li+ Level, CBC, or Carbamazepine Level (at or since last visit): N/A    Last visit treatment plan: Overall stable, continue Suboxone 8-2mg TID.  No change.  Reviewed that previous scripts were bridges to scheduled visits and that this script would be full month.  Reviewed that when she moves to [place] I am happy to help her find provider options.    - buprenorphine-naloxone (SUBOXONE) 8-2 MG SUBL sublingual tablet; Place 1 tablet under the tongue 2 times daily. May also place 0.5-1 tablets daily as needed for severe pain.  Dispense: 90 tablet; Refill: 0  - Adult Mental Health  Referral; Future     2. Anxiety  Needs improvement.  She is no longer taking clonazepam due to side effects - reviewed that likely her acute illness in [place] contributed to over sedation as well.  Discussed options for medication management - she specifically requests to see NP so this was specified in the referral.  Additionally, she expressed desire to work with a therapist - reviewed option for health psychology given her cancer diagnosis and related anxiety.    - Adult Mental Health  Referral; Future  - Adult Mental Health  Referral; Future  - Had good time in [place] - planning to move down to [place]  - Was admitted  "to [facility] in [place] - stopped clonazepam (\"I never want to take that again\")  - Working on starting a new business  - Feels her memory is a bit off - delayed recall, getting better since stopping clonazepam  - Anxiety is very high - started using crystals and this is helpful, wondering about alternative medications  - Open to seeing psychiatry NP  -     []Medication refilled per  Medication Refill in Ambulatory Care  policy.  [x]Medication unable to be refilled by RN due to criteria not met as indicated below:    []Eligibility - not seen in the last year   []Supervision - no future appointment   []Compliance - no shows, cancellations or lapse in therapy   []Verification - order discrepancy   [x]Controlled medication   []Medication not included in policy   []90-day supply request   []Other  "

## 2024-05-01 DIAGNOSIS — G89.4 CHRONIC PAIN SYNDROME: ICD-10-CM

## 2024-05-01 NOTE — TELEPHONE ENCOUNTER
Date of Last Office Visit: 2/27/24  Date of Next Office Visit: 5/7/24  No shows since last visit: 3/26/24  Cancellations since last visit: 3/26/24, 4/25/24    Medication requested: naloxone (NARCAN) 4 MG/0.1ML nasal spray  Date last ordered: 3/16/23 Qty:  Refills: 11     [place] of [place] ?: NA    Lapse in medication adherence greater than 5 days?: pt using as needed for opioid reversal, pharmacy was called and they reported that order entered 3/16/23 was canceled and no refills available   If yes, call patient and gather details: NA  Medication refill request verified as identical to current order?: yes  Result of Last DAM, VPA, Li+ Level, CBC, or Carbamazepine Level (at or since last visit): N/A    Last visit treatment plan:   ASSESSMENT/PLAN     1. Chronic pain due to neoplasm  Overall stable, continue Suboxone 8-2mg TID.  No change.  Reviewed that previous scripts were bridges to scheduled visits and that this script would be full month.  Reviewed that when she moves to [place] I am happy to help her find provider options.    - buprenorphine-naloxone (SUBOXONE) 8-2 MG SUBL sublingual tablet; Place 1 tablet under the tongue 2 times daily. May also place 0.5-1 tablets daily as needed for severe pain.  Dispense: 90 tablet; Refill: 0  - Adult Mental Health  Referral; Future     2. Anxiety  Needs improvement.  She is no longer taking clonazepam due to side effects - reviewed that likely her acute illness in [place] contributed to over sedation as well.  Discussed options for medication management - she specifically requests to see NP so this was specified in the referral.  Additionally, she expressed desire to work with a therapist - reviewed option for health psychology given her cancer diagnosis and related anxiety.    - Adult Mental Health  Referral; Future  - Adult Mental Health  Referral; Future     PDMP Review           Value Time User     State PDMP site checked  Yes 2/27/2024 10:36 PM Marvin  Haley DAVIS DO          RTC  Return in 4 weeks (on 3/26/2024) for Follow up, in person - 3/26 @ 1:30pm.        []Medication refilled per  Medication Refill in Ambulatory Care  policy.  [x]Medication unable to be refilled by RN due to criteria not met as indicated below:    []Eligibility - not seen in the last year   []Supervision - no future appointment   []Compliance - no shows, cancellations or lapse in therapy   []Verification - order discrepancy   []Controlled medication   [x]Medication not included in policy   []90-day supply request   []Other

## 2024-05-07 ENCOUNTER — TELEPHONE (OUTPATIENT)
Dept: BEHAVIORAL HEALTH | Facility: CLINIC | Age: 69
End: 2024-05-07

## 2024-05-07 DIAGNOSIS — G89.3 CHRONIC PAIN DUE TO NEOPLASM: ICD-10-CM

## 2024-05-07 RX ORDER — BUPRENORPHINE HYDROCHLORIDE AND NALOXONE HYDROCHLORIDE DIHYDRATE 8; 2 MG/1; MG/1
TABLET SUBLINGUAL
Qty: 30 TABLET | Refills: 0 | Status: SHIPPED | OUTPATIENT
Start: 2024-05-07 | End: 2024-05-14

## 2024-05-07 NOTE — TELEPHONE ENCOUNTER
Reason for call:  Other   Patient called regarding (reason for call): appointment  Additional comments: Pt ILS worker called needing to reschedule appt for 200pm today with Add Med. Pt has heart attack past Friday/Saturday and patient not able to participate today's appointment.     Pt agreed to 2pm telephone call with Dr Wong today    DANIEL Merrill [phone]  Phone number to reach patient:  Other phone number:  see [facility] number or Jarrell's number  Best Time:  any    Can we leave a detailed message on this number?  YES    Travel screening: Not Applicable

## 2024-05-07 NOTE — TELEPHONE ENCOUNTER
"Rescheduled - Per [facility]:    \"Correction it was her daughter who had a heart attack, she just cant do the appointment today.  Son is Suicidal and she is really having a hard time.\"     Date of Last Office Visit: 2/27/24  Date of Next Office Visit: 5/14/2024  No shows since last visit: 0  Cancellations since last visit: Provider initiated 4/25/24 and 5/7/2024 due to family issues.     Medication requested: buprenorphine-naloxone (SUBOXONE) 8-2 MG SUBL sublingual tablet  Date last ordered: 4/29/24 Qty: 90 Refills: 0     [place] of [place] ?: Yes  Medication last sold date: 4/1/24 Qty filled: 90  Other [place] ?: [place]  If yes, is this a new medication?: no     Lapse in medication adherence greater than 5 days?: No    Medication refill request verified as identical to current order?: yes  Result of Last DAM, VPA, Li+ Level, CBC, or Carbamazepine Level (at or since last visit): Last UDS 12/30/2024 +bup and THC     Last visit treatment plan: Overall stable, continue Suboxone 8-2mg TID.  No change.  Reviewed that previous scripts were bridges to scheduled visits and that this script would be full month.  Reviewed that when she moves to [place] I am happy to help her find provider options.    - buprenorphine-naloxone (SUBOXONE) 8-2 MG SUBL sublingual tablet; Place 1 tablet under the tongue 2 times daily. May also place 0.5-1 tablets daily as needed for severe pain.  Dispense: 90 tablet; Refill: 0  - Adult Mental Health  Referral; Future     2. Anxiety  Needs improvement.  She is no longer taking clonazepam due to side effects - reviewed that likely her acute illness in [place] contributed to over sedation as well.  Discussed options for medication management - she specifically requests to see NP so this was specified in the referral.  Additionally, she expressed desire to work with a therapist - reviewed option for health psychology given her cancer diagnosis and related anxiety.    - Adult Mental Health " " Referral; Future  - Adult Mental Health  Referral; Future  - Had good time in [place] - planning to move down to [place]  - Was admitted to [facility] in [place] - stopped clonazepam (\"I never want to take that again\")  - Working on starting a new business  - Feels her memory is a bit off - delayed recall, getting better since stopping clonazepam  - Anxiety is very high - started using crystals and this is helpful, wondering about alternative medications  - Open to seeing psychiatry NP  -      []Medication refilled per  Medication Refill in Ambulatory Care  policy.  [x]Medication unable to be refilled by RN due to criteria not met as indicated below:               []Eligibility - not seen in the last year              []Supervision - no future appointment              [x]Compliance - no shows, cancellations or lapse in therapy              []Verification - order discrepancy              [x]Controlled medication              []Medication not included in policy              []90-day supply request  "

## 2024-05-14 ENCOUNTER — OFFICE VISIT (OUTPATIENT)
Dept: ADDICTION MEDICINE | Facility: CLINIC | Age: 69
End: 2024-05-14
Payer: COMMERCIAL

## 2024-05-14 VITALS — OXYGEN SATURATION: 96 % | HEART RATE: 80 BPM | DIASTOLIC BLOOD PRESSURE: 73 MMHG | SYSTOLIC BLOOD PRESSURE: 157 MMHG

## 2024-05-14 DIAGNOSIS — Z51.81 ENCOUNTER FOR THERAPEUTIC DRUG LEVEL MONITORING: ICD-10-CM

## 2024-05-14 DIAGNOSIS — G89.3 CHRONIC PAIN DUE TO NEOPLASM: Primary | ICD-10-CM

## 2024-05-14 DIAGNOSIS — F12.90 CANNABIS USE, UNCOMPLICATED: ICD-10-CM

## 2024-05-14 LAB
AMPHETAMINE QUAL URINE POCT: NEGATIVE
BARBITURATE QUAL URINE POCT: NEGATIVE
BENZODIAZEPINE QUAL URINE POCT: NEGATIVE
BUPRENORPHINE QUAL URINE POCT: ABNORMAL
COCAINE QUAL URINE POCT: NEGATIVE
CREATININE QUAL URINE POCT: ABNORMAL
INTERNAL QC QUAL URINE POCT: ABNORMAL
MDMA QUAL URINE POCT: NEGATIVE
METHADONE QUAL URINE POCT: NEGATIVE
METHAMPHETAMINE QUAL URINE POCT: NEGATIVE
OPIATE QUAL URINE POCT: NEGATIVE
OXYCODONE QUAL URINE POCT: NEGATIVE
PH QUAL URINE POCT: ABNORMAL
PHENCYCLIDINE QUAL URINE POCT: NEGATIVE
POCT KIT EXPIRATION DATE: ABNORMAL
POCT KIT LOT NUMBER: ABNORMAL
SPECIFIC GRAVITY POCT: 1
TEMPERATURE URINE POCT: ABNORMAL
THC QUAL URINE POCT: ABNORMAL

## 2024-05-14 PROCEDURE — G0480 DRUG TEST DEF 1-7 CLASSES: HCPCS | Mod: 90 | Performed by: FAMILY MEDICINE

## 2024-05-14 PROCEDURE — G2211 COMPLEX E/M VISIT ADD ON: HCPCS | Performed by: FAMILY MEDICINE

## 2024-05-14 PROCEDURE — 99214 OFFICE O/P EST MOD 30 MIN: CPT | Performed by: FAMILY MEDICINE

## 2024-05-14 PROCEDURE — 80305 DRUG TEST PRSMV DIR OPT OBS: CPT | Performed by: FAMILY MEDICINE

## 2024-05-14 PROCEDURE — 99000 SPECIMEN HANDLING OFFICE-LAB: CPT | Performed by: FAMILY MEDICINE

## 2024-05-14 RX ORDER — BUPRENORPHINE HYDROCHLORIDE AND NALOXONE HYDROCHLORIDE DIHYDRATE 8; 2 MG/1; MG/1
TABLET SUBLINGUAL
Qty: 90 TABLET | Refills: 1 | Status: SHIPPED | OUTPATIENT
Start: 2024-05-14 | End: 2024-07-16

## 2024-05-14 ASSESSMENT — ANXIETY QUESTIONNAIRES
6. BECOMING EASILY ANNOYED OR IRRITABLE: NOT AT ALL
GAD7 TOTAL SCORE: 3
4. TROUBLE RELAXING: SEVERAL DAYS
1. FEELING NERVOUS, ANXIOUS, OR ON EDGE: SEVERAL DAYS
3. WORRYING TOO MUCH ABOUT DIFFERENT THINGS: SEVERAL DAYS
7. FEELING AFRAID AS IF SOMETHING AWFUL MIGHT HAPPEN: NOT AT ALL
2. NOT BEING ABLE TO STOP OR CONTROL WORRYING: NOT AT ALL
5. BEING SO RESTLESS THAT IT IS HARD TO SIT STILL: NOT AT ALL
GAD7 TOTAL SCORE: 3

## 2024-05-14 ASSESSMENT — PATIENT HEALTH QUESTIONNAIRE - PHQ9: SUM OF ALL RESPONSES TO PHQ QUESTIONS 1-9: 5

## 2024-05-14 NOTE — PATIENT INSTRUCTIONS
Patient Education   Addiction Medicine  What to Expect  Here's what to expect from our Addiction Medicine program.  About Addiction Medicine  Addiction Medicine clinics help you with substance use problems. You set your own goals. We try to help you reach your goals. Your care plan can include:  Medicine  Creating a recovery plan  Helping you find local resources  Helping with treatment options  Clinic phone number and addresses  Clinic Phone: [phone]  Mental Health and Addiction Clinic  [facility]  [address], [address]  [place] [address]  [facility]  [address], [address]  [place] [address]  Walk-in services  We offer walk-in care for patients at the Recovery Clinic. This is only for patients with Opioid Use Disorder (OUD). Anyone with OUD is welcome. Our providers will refer you to the Recovery Clinic if you're struggling to keep up with your medicines or appointments.  Recovery Clinic ([facility])  [address], [address]  [place] [address]  Phone: [phone]  The Recovery Clinic is open for walk-ins Monday to Friday 9 a.m. to 11:30 a.m. and 12:30 p.m. to 3 p.m.  How it works  Come to your visits every time. The treatment works better when you do.   You can have as many visits as you need. When you're better, we'll refer you back to being cared for by your family doctor.   If you need it, we'll send you to doctors, psychiatrists, therapists, and other providers. We focus on treating addiction. We don't treat other problems, like managing other medicines or non-addiction issues.  About visits  Urine drug testing  We'll often test your pee (urine) for drugs. This is the only way we can know for sure whether or not you're using drugs. It helps us treat you without judgement.   Suboxone (buprenorphine)  If you're taking buprenorphine, you'll have a lot of visits at first. If your problem is getting worse, or you're  "using substances, we may schedule you for extra visits.   Cancelling visits  If you can't come to your visit, please call us right away at [phone]. If you don't cancel at least 24 hours (1 full day) before your visit, that's \"late cancellation.\"  Being late to visits  If you come late, you may not be seen. This will count as a \"no-show.\"  Please call the clinic if you're running late. This will help us plan, but it doesn't mean you'll be seen.   Being late is:  More than 15 minutes late for a return visit.  More than 30 minutes late for your first visit.  If you cancel late or don't show up 2 times within 6 months, we may transfer you to another clinic.   Getting help between visits  If you need help between visits, you can call us Monday to Friday from 8 a.m. to 4:30 p.m. at [phone]. You can also send us a message on [employer].  Medicine refills  If you miss or cancel a visit, you can still ask for a refill. But we can only refill your medicines if you've made a new appointment.  Please call your pharmacy for medicine refills. If you have a question about your refill, call us at [phone].  It takes up to 2 business days to refill your drugs. Let us know 2 to 3 days before you run out. Don't call more than 1 week before you run out. That's too early.   Please make sure we have your right phone number.  If we have a problem with your refill, we'll call you. If we call you, please call us back right away. If you don't, you may not get your medicines quickly.   Call your pharmacy to find out if your medicines are ready.   Keep your medicines in a safe place. Keep them away from pets and children. If your medicines are lost or stolen, we usually don't replace them. We recommend you file a police report if your medicines are stolen. Your insurance may not pay for early refills, even if you have a prescription.  Forms  Please give us at least 3 business days to fill out any forms. Bring the forms to your " visits if you can. We may refer you to other members of your care team to complete the forms.   Emergency care   Call 911 or go to the nearest emergency room if your life or someone else's life is in danger.  Call 988 anytime for the Suicide and Crisis Lifeline.  If you need care when we're closed, call your family doctor to see if they can help. You can also go to urgent care or an emergency room. [facility] emergency rooms may be able to give you buprenorphine or other medicine refills.  Thank you for choosing us for your care.  For informational purposes only. Not to replace the advice of your health care provider. Copyright   2023 [facility]. All rights reserved. [employer] [id number] - REV 05/23.     Buprenorphine Tips:  Make sure you are letting your buprenorphine tablets or films dissolve completely under your tongue so you know you are getting all the medication.  Don't eat, drink, or talk while taking your buprenorphine.  Avoid nicotine for 15-30 minutes before taking buprenorphine - this can cause the blood vessels to constrict and you may not absorb the medication as well.  To avoid potential dental issues related to buprenorphine rinse your mouth with water after taking your dose.  Avoid brushing your teeth for 1 hour after taking a dose.     Constipation is the most common side effect of buprenorphine.  Here are some simple things you can try to ease constipation.  Eating more fiber (fruits, vegetable, and whole grains) and drinking enough fluid (urine should be light yellow).    Take Miralax (polyethylene gylcol).  Use 1 capful daily until you start to have loose stools and then decrease use.  You can also try Colace (docusate) 100mg twice daily as needed.  These medications can be prescribed or purchased OTC.  Let your provider know if you are still struggling with constipation.    What to do if you experience nausea or upset stomach after taking buprenorphine:  Make sure you are  letting it dissolve completely.  After the medication has dissolved try spitting out your saliva instead of swallowing it.    Other common side effects include:   - sleepiness/drowsiness OR trouble sleeping  - headaches    Please discuss any side effects with your provider.    Important safety info:  Ensure your medication is stored in a safe place out of sight and out of reach from kids and pets, ideally locked away.   Do not combine buprenorphine with other sedating substances or medications such as alcohol, benzodiazepines (Xanax or alprazolam for example), or other opioids.  Only take medications as prescribed.

## 2024-05-14 NOTE — PROGRESS NOTES
"[facility] - Addiction Medicine       Rooming information: Pt is overwhelmed with home stress and CA diagnosis , daughter is not doing well after hear attach.     Point of care urine drug screen positive for:  Lab Results   Component Value Date    BUP Screen Positive (A) 05/14/2024    BZO Negative 05/14/2024    BAR Negative 05/14/2024    SERENE Negative 05/14/2024    MAMP Negative 05/14/2024    AMP Negative 05/14/2024    MDMA Negative 05/14/2024    MTD Negative 05/14/2024    VBA223 Negative 05/14/2024    OXY Negative 05/14/2024    PCP Negative 05/14/2024    THC Screen Positive (A) 05/14/2024    TEMP 94 F 05/14/2024    SGPOCT 1.005 05/14/2024       *POC urine drug screen does not screen for Fentanyl    PHQ-9 Scores:       10/10/2023     1:00 PM 1/24/2024     1:42 PM 5/14/2024     1:00 PM   PHQ   PHQ-9 Total Score 2 2 5   Q9: Thoughts of better off dead/self-harm past 2 weeks Not at all Not at all Not at all     MOHINDER-7 Scores:      3/29/2022     1:00 PM 4/13/2023    12:57 PM 10/10/2023     1:00 PM   MOHINDER-7 SCORE   Total Score  3 (minimal anxiety)    Total Score 3 3 3       Any other recent substance use:    Medical Cannabis     NICOTINE-NA  If using nicotine, ready to quit? NA    Side effects related to medications pt would like to discuss with provider (constipation, dry mouth, HA, GI upset, sedation?) Yes dry mouth    Narcan currently available: Yes    Primary care provider: Radha Oakley MD     Mental health provider: \" I will never again \" (follow up on MH referral if needed)    Any housing, insurance deficits?: NA    Contact information up to date? NA    3rd Party Involvement NA (please obtain SAM if pt would like to include)    Dolores St LPN  May 14, 2024  1:10 PM  "

## 2024-05-14 NOTE — PROGRESS NOTES
[facility] Addiction Medicine    A/P                                                    ASSESSMENT/PLAN    1. Chronic pain due to neoplasm  Stable.  Continue Suboxone 8-2mg BID to TID (3rd dose prn pain).  Has upcoming follow-up with oncology and PET scan.  Has Narcan.    - Drugs of Abuse Screen Urine (POC CUPS) POCT; Standing  - Drugs of Abuse Screen Urine (POC CUPS) POCT  - buprenorphine-naloxone (SUBOXONE) 8-2 MG SUBL sublingual tablet; Place 1 tablet under the tongue 2 times daily. May also place 0.5-1 tablets daily as needed for severe pain.  Dispense: 90 tablet; Refill: 1  - Buprenorphine & Metabolite Screen; Future  - Buprenorphine & Metabolite Screen    2. Encounter for therapeutic drug level monitoring  As expected.  - Drugs of Abuse Screen Urine (POC CUPS) POCT; Standing  - Drugs of Abuse Screen Urine (POC CUPS) POCT  - Buprenorphine & Metabolite Screen; Future  - Buprenorphine & Metabolite Screen    3. Cannabis use, uncomplicated  Ongoing cannabis use - helpful for nausea and appetite.        PDMP Review         Value Time User    State PDMP site checked  Yes 5/14/2024  1:09 PM Haley Wong DO              RTC  Return in 9 weeks (on 7/16/2024) for Follow up, in person - 3 or 3:30pm.    The longitudinal plan of care for the diagnosis(es)/condition(s) as documented were addressed during this visit. Due to the added complexity in care, I will continue to support Jarrell in the subsequent management and with ongoing continuity of care.    Counseled the patient on the importance of having a recovery program in addition to medication to manage recovery.  Components include avoiding isolating, having willingness to change, avoiding triggers and managing cravings. Encouraged having some type of sober network and practicing honesty with trusted support person(s). Encouraged other services such as counseling, 12 step or other self-help organizations.      Opioid warning reviewed.  Risk of  overdose following a period of abstinence due to decrease tolerance was discussed including risk of death.  Strongly recommended abstain from alcohol, benzodiazepines, THC, opioids and other drugs of abuse.  Increased risk of return to opioid use after use of these substances discussed.  Increased risk of overdose/death with use of other substances particularly benzodiazepines/alcohol reviewed.        SUBJECTIVE                                                      Jarrell Nicole is a 69 year old female with PMHx of stage IV breast cancer w/ bony metastases, asthma, HTN, diverticulitis, anxiety, opioid dependence, and history of unintentional opioid and benzodiazepine overdose (July 2021) who presents to clinic today for follow up.      Brief history (from chart review):  Patient was initially seen by addiction medicine service when admitted for encephalopathy 2/2 unintentional overdose of opiates and benzodiazepines in July 2021.  She was diagnosed with metastatic breast cancer in March 2021 and was taking opioids since April 2021 for cancer related pain.  She also has long standing history of anxiety and had been on benzodiazepines for 7-8 years prior to hospitalization in July 2021 at which time she was weaned off with phenobarbital taper.     She has continued Suboxone 8-2mg BID to TID for management of her cancer related pain.        Plan from most recent office visit (2/27/24):  1. Chronic pain due to neoplasm  Overall stable, continue Suboxone 8-2mg TID.  No change.  Reviewed that previous scripts were bridges to scheduled visits and that this script would be full month.  Reviewed that when she moves to [place] I am happy to help her find provider options.    - buprenorphine-naloxone (SUBOXONE) 8-2 MG SUBL sublingual tablet; Place 1 tablet under the tongue 2 times daily. May also place 0.5-1 tablets daily as needed for severe pain.  Dispense: 90 tablet; Refill: 0  - Adult Mental Health  Referral;  "Future     2. Anxiety  Needs improvement.  She is no longer taking clonazepam due to side effects - reviewed that likely her acute illness in [place] contributed to over sedation as well.  Discussed options for medication management - she specifically requests to see NP so this was specified in the referral.  Additionally, she expressed desire to work with a therapist - reviewed option for health psychology given her cancer diagnosis and related anxiety.    - Adult Mental Health  Referral; Future  - Adult Mental Health  Referral; Future    TODAY'S VISIT  HPI May 14, 2024  - Daughter had \"major cardiac event\" on 5/5, she is still in the hospital  - Holidays = feeling \"gutted\"   - Still planning to move to [place], [place] - timeline is unsure at this time  - Losing weight - poor appetite, frequent nausea/vomiting - cannabis is helpful for her appetite and nausea  - No constipation  - Son is cancer free, struggling with mental health though  - Clavicle pain is worsening - worried she broke it again - considering acupuncture again  - Oncology follow-up in June   - Has new ILS and PCA workers        10/10/2023     1:00 PM 1/24/2024     1:42 PM 5/14/2024     1:00 PM   PHQ   PHQ-9 Total Score 2 2 5   Q9: Thoughts of better off dead/self-harm past 2 weeks Not at all Not at all Not at all           4/13/2023    12:57 PM 10/10/2023     1:00 PM 5/14/2024     1:00 PM   MOHINDER-7 SCORE   Total Score 3 (minimal anxiety)     Total Score 3 3 3       OBJECTIVE                                                    PHYSICAL EXAM:  BP (!) 157/73 (BP Location: Right arm, Patient Position: Sitting, Cuff Size: Adult Regular)   Pulse 80   SpO2 96%     GENERAL: healthy, alert and no distress  EYES: Eyes grossly normal to inspection, sclerae normal  RESP: No respiratory distress, no coughing or wheezing  MS: no gross musculoskeletal defects noted  SKIN: no pallor or jaundice  NEURO: Normal gait, mentation intact and speech " normal  MENTAL STATUS EXAM  Appearance/Behavior: No appearant distress  Speech: Normal (hyperverbal, redirectable)  Mood/Affect: anxious, tearful at times (discussing family, health)  Insight: Fair    LAB  Results for orders placed or performed in visit on 05/14/24   Buprenorphine & Metabolite Screen     Status: None   Result Value Ref Range    Buprenorphine, Urn, Quant 3 ng/mL    Norbuprenorphine, Urn, Quant 206 ng/mL    Buprenorphine Gluc, Urn, Quant 132 ng/mL    Norbuprenorphine Gluc, Urn, Quant 799 ng/mL    Naloxone, Urn, Quant <100 ng/mL   Drugs of Abuse Screen Urine (POC CUPS) POCT     Status: Abnormal   Result Value Ref Range    POCT Kit Lot Number [id number]     POCT Kit Expiration Date 2026-02-28     Temperature Urine POCT 94 F 90 F, 92 F, 94 F, 96 F, 98 F, 100 F    Specific Parker POCT 1.005 1.005, 1.015, 1.025    pH Qual Urine POCT 7 pH 4 pH, 5 pH, 7 pH, 9 pH    Creatinine Qual Urine POCT 20 mg/dL 20 mg/dL, 50 mg/dL, 100 mg/dL, 200 mg/dL    Internal QC Qual Urine POCT Valid Valid    Amphetamine Qual Urine POCT Negative Negative    Barbiturate Qual Urine POCT Negative Negative    Buprenorphine Qual Urine POCT Screen Positive (A) Negative    Benzodiazepine Qual Urine POCT Negative Negative    Cocaine Qual Urine POCT Negative Negative    Methamphetamine Qual Urine POCT Negative Negative    MDMA Qual Urine POCT Negative Negative    Methadone Qual Urine POCT Negative Negative    Opiate Qual Urine POCT Negative Negative    Oxycodone Qual Urine POCT Negative Negative    Phencyclidine Qual Urine POCT Negative Negative    THC Qual Urine POCT Screen Positive (A) Negative         HISTORY                                                    Problem list reviewed & adjusted, as indicated.  Patient Active Problem List   Diagnosis    Malignant neoplasm of lower-outer quadrant of right breast of female, estrogen receptor positive (H)    Asthma    Combined forms of age-related cataract of right eye    Diverticulitis of  large intestine without perforation or abscess without bleeding    Episodic mood disorder (H24)    Hyperlipidemia    Pain medication agreement    Panic disorder without agoraphobia    Post traumatic stress disorder    Dehydration    Encephalopathy    Elevated troponin level not due to acute coronary syndrome    Acute kidney injury (H24)    Chronic pain due to neoplasm    Uncomplicated opioid dependence (H)    Hypokalemia    Non-traumatic rhabdomyolysis    Essential hypertension    Anemia    Nausea and vomiting, intractability of vomiting not specified, unspecified vomiting type         MEDICATION LIST (prior to visit)  Current Outpatient Medications   Medication Sig Dispense Refill    buprenorphine-naloxone (SUBOXONE) 8-2 MG SUBL sublingual tablet Place 1 tablet under the tongue 2 times daily. May also place 0.5-1 tablets daily as needed for severe pain. 90 tablet 1    albuterol (PROAIR HFA;PROVENTIL HFA;VENTOLIN HFA) 90 mcg/actuation inhaler Inhale 1-2 puffs into the lungs every 4 hours as needed for shortness of breath / dyspnea or wheezing       ascorbic acid, vitamin C, (VITAMIN C) 1000 MG tablet [ASCORBIC ACID, VITAMIN C, (VITAMIN C) 1000 MG TABLET] Take 1,000 mg by mouth daily. (Patient not taking: Reported on 10/10/2023)      B,C/folic/zinc/copper ox/vit E (STRESS B-COMPLEX ORAL) [B,C/FOLIC/ZINC/COPPER OX/VIT E (STRESS B-COMPLEX ORAL)] Take 1 tablet by mouth daily. Stress supplement      cetirizine (ZYRTEC) 10 MG tablet Take 1 tablet (10 mg) by mouth daily 90 tablet 0    cholecalciferol 125 MCG (5000 UT) CAPS Take 1 capsule (5,000 Units) by mouth daily 90 capsule 0    cyclobenzaprine (FLEXERIL) 5 MG tablet Take 1 tablet (5 mg) by mouth 2 times daily as needed for muscle spasms 60 tablet 1    docusate sodium (COLACE) 100 MG capsule Take 1 capsule (100 mg) by mouth 2 times daily as needed for constipation TAKE 1 CAPSULE(100 MG) BY MOUTH TWICE DAILY 180 capsule 1    dronabinol (MARINOL) 10 MG capsule Take 10 mg  by mouth 2 times daily      ferrous sulfate (FEROSUL) 325 (65 Fe) MG tablet Take 1 tablet (325 mg) by mouth daily (with breakfast) 30 tablet 1    fluticasone propionate (FLONASE) 50 mcg/actuation nasal spray [FLUTICASONE PROPIONATE (FLONASE) 50 MCG/ACTUATION NASAL SPRAY] 1 spray into each nostril daily.       fluticasone-vilanterol (BREO ELLIPTA) 100-25 MCG/ACT inhaler Inhale 1 puff into the lungs daily      gabapentin (NEURONTIN) 300 MG capsule Take 300 mg by mouth 3 times daily      guaiFENesin (MUCINEX) 600 MG 12 hr tablet Take 600 mg by mouth 2 times daily      IBRANCE 125 MG tablet Take 125mg daily for 3 weeks, then off for 1 week      letrozole (FEMARA) 2.5 MG tablet Take 2.5 mg by mouth daily      Lidocaine (LIDOCARE) 4 % Patch Place 2 patches onto the skin every 24 hours To prevent lidocaine toxicity, patient should be patch free for 12 hrs daily. 30 patch 0    melatonin 10 mg Tab Take 10 mg by mouth nightly as needed for sleep      melatonin 3 MG tablet Take 1 tablet (3 mg) by mouth nightly as needed for sleep 30 tablet 2    mirtazapine (REMERON) 15 MG tablet Take 1 tablet (15 mg) by mouth At Bedtime 30 tablet 1    naloxone (NARCAN) 4 MG/0.1ML nasal spray SPRAY 1 SPRAY INTO 1 NOSTRIL ALTERNATING NOSTRILS ONCE AS NEEDED FOR OPIOID REVERSAL EVERY 2-3 MINUTES UNTIL ASSISTANCE ARRIVES. 2 each 11    ondansetron (ZOFRAN) 8 MG tablet Take 8 mg by mouth 3 times daily as needed      polyethylene glycol (MIRALAX) 17 GM/Dose powder Take 17 g (1 capful) by mouth daily as needed for constipation 850 g 1    propranoloL (INDERAL) 10 MG tablet Take 10 mg by mouth 3 times daily as needed (anxiety or palpitations)      SYMBICORT 80-4.5 mcg/actuation inhaler Inhale 2 puffs into the lungs 2 times daily      vilazodone (VIIBRYD) 10 MG TABS tablet       vitamin E 100 UNIT capsule Take 500 Units by mouth twice a week       No current facility-administered medications for this visit.       MEDICATION LIST (after visit)  Current  Outpatient Medications   Medication Sig Dispense Refill    buprenorphine-naloxone (SUBOXONE) 8-2 MG SUBL sublingual tablet Place 1 tablet under the tongue 2 times daily. May also place 0.5-1 tablets daily as needed for severe pain. 90 tablet 1    albuterol (PROAIR HFA;PROVENTIL HFA;VENTOLIN HFA) 90 mcg/actuation inhaler Inhale 1-2 puffs into the lungs every 4 hours as needed for shortness of breath / dyspnea or wheezing       ascorbic acid, vitamin C, (VITAMIN C) 1000 MG tablet [ASCORBIC ACID, VITAMIN C, (VITAMIN C) 1000 MG TABLET] Take 1,000 mg by mouth daily. (Patient not taking: Reported on 10/10/2023)      B,C/folic/zinc/copper ox/vit E (STRESS B-COMPLEX ORAL) [B,C/FOLIC/ZINC/COPPER OX/VIT E (STRESS B-COMPLEX ORAL)] Take 1 tablet by mouth daily. Stress supplement      cetirizine (ZYRTEC) 10 MG tablet Take 1 tablet (10 mg) by mouth daily 90 tablet 0    cholecalciferol 125 MCG (5000 UT) CAPS Take 1 capsule (5,000 Units) by mouth daily 90 capsule 0    cyclobenzaprine (FLEXERIL) 5 MG tablet Take 1 tablet (5 mg) by mouth 2 times daily as needed for muscle spasms 60 tablet 1    docusate sodium (COLACE) 100 MG capsule Take 1 capsule (100 mg) by mouth 2 times daily as needed for constipation TAKE 1 CAPSULE(100 MG) BY MOUTH TWICE DAILY 180 capsule 1    dronabinol (MARINOL) 10 MG capsule Take 10 mg by mouth 2 times daily      ferrous sulfate (FEROSUL) 325 (65 Fe) MG tablet Take 1 tablet (325 mg) by mouth daily (with breakfast) 30 tablet 1    fluticasone propionate (FLONASE) 50 mcg/actuation nasal spray [FLUTICASONE PROPIONATE (FLONASE) 50 MCG/ACTUATION NASAL SPRAY] 1 spray into each nostril daily.       fluticasone-vilanterol (BREO ELLIPTA) 100-25 MCG/ACT inhaler Inhale 1 puff into the lungs daily      gabapentin (NEURONTIN) 300 MG capsule Take 300 mg by mouth 3 times daily      guaiFENesin (MUCINEX) 600 MG 12 hr tablet Take 600 mg by mouth 2 times daily      IBRANCE 125 MG tablet Take 125mg daily for 3 weeks, then off  for 1 week      letrozole (FEMARA) 2.5 MG tablet Take 2.5 mg by mouth daily      Lidocaine (LIDOCARE) 4 % Patch Place 2 patches onto the skin every 24 hours To prevent lidocaine toxicity, patient should be patch free for 12 hrs daily. 30 patch 0    melatonin 10 mg Tab Take 10 mg by mouth nightly as needed for sleep      melatonin 3 MG tablet Take 1 tablet (3 mg) by mouth nightly as needed for sleep 30 tablet 2    mirtazapine (REMERON) 15 MG tablet Take 1 tablet (15 mg) by mouth At Bedtime 30 tablet 1    naloxone (NARCAN) 4 MG/0.1ML nasal spray SPRAY 1 SPRAY INTO 1 NOSTRIL ALTERNATING NOSTRILS ONCE AS NEEDED FOR OPIOID REVERSAL EVERY 2-3 MINUTES UNTIL ASSISTANCE ARRIVES. 2 each 11    ondansetron (ZOFRAN) 8 MG tablet Take 8 mg by mouth 3 times daily as needed      polyethylene glycol (MIRALAX) 17 GM/Dose powder Take 17 g (1 capful) by mouth daily as needed for constipation 850 g 1    propranoloL (INDERAL) 10 MG tablet Take 10 mg by mouth 3 times daily as needed (anxiety or palpitations)      SYMBICORT 80-4.5 mcg/actuation inhaler Inhale 2 puffs into the lungs 2 times daily      vilazodone (VIIBRYD) 10 MG TABS tablet       vitamin E 100 UNIT capsule Take 500 Units by mouth twice a week       No current facility-administered medications for this visit.         Allergies   Allergen Reactions    Iodinated Contrast Media [Iodinated Contrast Media] Unknown     Heart stopped and sick for multiple days after    Penicillins Swelling     Throat swelling as an infant    Tylenol [Acetaminophen] Other (See Comments)     With codeine only .Pt states gives her upset stomach for days.    Venlafaxine Hcl Er Nausea and Vomiting     Vomited 20 min after taking XR, OK with IR    Codeine Hives and Itching       Haley Wong, [facility] Addiction Medicine  [facility]  [phone]

## 2024-05-17 LAB
BUPRENORPHINE UR-MCNC: 132 NG/ML
BUPRENORPHINE UR-MCNC: 3 NG/ML
NALOXONE UR CFM-MCNC: <100 NG/ML
NORBUPRENORPHINE UR CFM-MCNC: 799 NG/ML
NORBUPRENORPHINE UR-MCNC: 206 NG/ML

## 2024-06-18 ENCOUNTER — TRANSFERRED RECORDS (OUTPATIENT)
Dept: HEALTH INFORMATION MANAGEMENT | Facility: CLINIC | Age: 69
End: 2024-06-18
Payer: COMMERCIAL

## 2024-06-26 ENCOUNTER — TELEPHONE (OUTPATIENT)
Dept: BEHAVIORAL HEALTH | Facility: CLINIC | Age: 69
End: 2024-06-26
Payer: COMMERCIAL

## 2024-06-26 NOTE — TELEPHONE ENCOUNTER
Reason for call:  Medication   If this is a refill request, has the caller requested the refill from the pharmacy already? No  Will the patient be using a [facility]? No  Name of the pharmacy and phone number for the current request: [id number] [place] [address] AT [facility] [address]  [phone]     Name of the medication requested: buprenorphine-naloxone (SUBOXONE) 8-2 MG SUBL sublingual tablet (period 1, part 1)     Other request: n/a    Phone number to reach patient:  Cell number on file:    Telephone Information:   Mobile [phone]       Best Time:  ASAP    Can we leave a detailed message on this number?  YES    Travel screening: Not Applicable

## 2024-06-27 NOTE — TELEPHONE ENCOUNTER
RN received notification from the [facility] Coordinators that this patient had requested a refill of her buprenorphine-naloxone (SUBOXONE) 8-2 MG SUBL sublingual tablet      RN researched the EHR and the [place] .      2.  RN [facility]oned [id number], [place] - [address] AT [facility] [address] and spoke to the Pharmacist.  The Pharmacist indicated that she has a refill on file from her 5/14//24 prescription written by Dr. Wong.      3.  RN phoned and spoke with the patient.  She was delighted and will  her medication later today after her dental appointment.      Carlitos Lopez RN on 6/27/2024 at 12:08 PM

## 2024-07-03 ENCOUNTER — TRANSFERRED RECORDS (OUTPATIENT)
Dept: HEALTH INFORMATION MANAGEMENT | Facility: CLINIC | Age: 69
End: 2024-07-03
Payer: COMMERCIAL

## 2024-07-05 ENCOUNTER — TRANSFERRED RECORDS (OUTPATIENT)
Dept: HEALTH INFORMATION MANAGEMENT | Facility: CLINIC | Age: 69
End: 2024-07-05

## 2024-07-07 DIAGNOSIS — T40.2X5A THERAPEUTIC OPIOID-INDUCED CONSTIPATION (OIC): ICD-10-CM

## 2024-07-07 DIAGNOSIS — K59.03 THERAPEUTIC OPIOID-INDUCED CONSTIPATION (OIC): ICD-10-CM

## 2024-07-08 RX ORDER — DOCUSATE SODIUM 100 MG/1
CAPSULE, LIQUID FILLED ORAL
Qty: 60 CAPSULE | Refills: 0 | Status: SHIPPED | OUTPATIENT
Start: 2024-07-08

## 2024-07-08 NOTE — TELEPHONE ENCOUNTER
Date of Last Office Visit: 5/14/24  Date of Next Office Visit:  7/16/24  No shows since last visit: No  More than 2 cancellations since last visit? No    []Medication refilled per  Medication Refill in Ambulatory Care  policy.  [x]Medication unable to be refilled by RN due to criteria not met as indicated below:    []Eligibility: has not had a provider visit within last 6 months   []Supervision: no future appointment; < 7 days before next appointment   []Compliance: no shows; cancellations; lapse in therapy   []Verification: order discrepancy; may need modification...   []90-day supply request   []Advanced refill request: > 7 days before refill date   []Controlled medication   []Medication not included in policy   []Review: new med; med adjusted ? 30 days; safety alert; requires lab monitoring...   []Scope of Practice: refill request processed by LPN/MA   [x]Other:     - Medication not in 5/14/24  Assessment and plan    - She reports still taking Colace as prescribed. She had extra Colace from her last hospital stay. She reports she had no concerns about this medication and no other health updates for Jeramy DO at this time. She reports taking last dose this morning and has enough supply to get her to the end of the week. She requests this to be filled.    Medication(s) requested:     -   docusate sodium (COLACE) 100 MG capsule   Date last ordered: 8/15/23  Qty: 180  Refills: 0  Appropriate for refill? Provider to review: Denies gap in therapy. Provider to review    Any Controlled Substance(s)? No    Requested medication(s) verified as identical to current order? Yes    Any lapse in adherence to medication(s) greater than 5 days? No      Action required? no.    Last visit treatment plan:   5/14/24  A/P                                                    ASSESSMENT/PLAN     1. Chronic pain due to neoplasm  Stable.  Continue Suboxone 8-2mg BID to TID (3rd dose prn pain).  Has upcoming follow-up with oncology and PET  scan.  Has Narcan.    - Drugs of Abuse Screen Urine (POC CUPS) POCT; Standing  - Drugs of Abuse Screen Urine (POC CUPS) POCT  - buprenorphine-naloxone (SUBOXONE) 8-2 MG SUBL sublingual tablet; Place 1 tablet under the tongue 2 times daily. May also place 0.5-1 tablets daily as needed for severe pain.  Dispense: 90 tablet; Refill: 1  - Buprenorphine & Metabolite Screen; Future  - Buprenorphine & Metabolite Screen     2. Encounter for therapeutic drug level monitoring  As expected.  - Drugs of Abuse Screen Urine (POC CUPS) POCT; Standing  - Drugs of Abuse Screen Urine (POC CUPS) POCT  - Buprenorphine & Metabolite Screen; Future  - Buprenorphine & Metabolite Screen     3. Cannabis use, uncomplicated  Ongoing cannabis use - helpful for nausea and appetite.          PDMP Review           Value Time User     State PDMP site checked  Yes 5/14/2024  1:09 PM Haley Wong DO                   RTC  Return in 9 weeks (on 7/16/2024) for Follow up, in person - 3 or 3:30pm.    MEDICATION LIST (prior to visit)       docusate sodium (COLACE) 100 MG capsule Take 1 capsule (100 mg) by mouth 2 times daily as needed for constipation TAKE 1 CAPSULE(100 MG) BY MOUTH TWICE DAILY 180 capsule 1       Any medication(s) require lab monitoring? No

## 2024-07-16 ENCOUNTER — VIRTUAL VISIT (OUTPATIENT)
Dept: ADDICTION MEDICINE | Facility: CLINIC | Age: 69
End: 2024-07-16
Payer: COMMERCIAL

## 2024-07-16 DIAGNOSIS — G89.3 CHRONIC PAIN DUE TO NEOPLASM: ICD-10-CM

## 2024-07-16 PROCEDURE — G2211 COMPLEX E/M VISIT ADD ON: HCPCS | Mod: 95 | Performed by: FAMILY MEDICINE

## 2024-07-16 PROCEDURE — 99214 OFFICE O/P EST MOD 30 MIN: CPT | Mod: 95 | Performed by: FAMILY MEDICINE

## 2024-07-16 RX ORDER — BUPRENORPHINE HYDROCHLORIDE AND NALOXONE HYDROCHLORIDE DIHYDRATE 8; 2 MG/1; MG/1
TABLET SUBLINGUAL
Qty: 90 TABLET | Refills: 1 | Status: SHIPPED | OUTPATIENT
Start: 2024-07-27 | End: 2024-10-04

## 2024-07-16 NOTE — PROGRESS NOTES
[facility] Addiction Medicine    A/P                                                    ASSESSMENT/PLAN    1. Chronic pain due to neoplasm  Needs improvement.  Increased back pain due to new metastasis.  Planning for radiation, starting next week.  Continue Suboxone, up to 24mg daily, reviewed that often dividing into smaller and more frequent doses can provide additional analgesic benefit.    - buprenorphine-naloxone (SUBOXONE) 8-2 MG SUBL sublingual tablet; Place 1 tablet under the tongue 2 times daily. May also place 0.5-1 tablets daily as needed for severe pain.  Dispense: 90 tablet; Refill: 1        PDMP Review         Value Time User    State PDMP site checked  Yes 7/16/2024  3:32 PM Haley Wong,               RTC  Return in 9 weeks (on 9/17/2024) for Follow up, using a video visit - 9/17 @ 12pm.    The longitudinal plan of care for the diagnosis(es)/condition(s) as documented were addressed during this visit. Due to the added complexity in care, I will continue to support Jarrell in the subsequent management and with ongoing continuity of care.      Counseled the patient on the importance of having a recovery program in addition to medication to manage recovery.  Components include avoiding isolating, having willingness to change, avoiding triggers and managing cravings. Encouraged having some type of sober network and practicing honesty with trusted support person(s). Encouraged other services such as counseling, 12 step or other self-help organizations.      Opioid warning reviewed.  Risk of overdose following a period of abstinence due to decrease tolerance was discussed including risk of death.  Strongly recommended abstain from alcohol, benzodiazepines, THC, opioids and other drugs of abuse.  Increased risk of return to opioid use after use of these substances discussed.  Increased risk of overdose/death with use of other substances particularly benzodiazepines/alcohol  reviewed.        SUBJECTIVE                                                        Video-Visit Details  Type of service:  Video Visit  Video Start Time: 3:13 PM  Video End Time: 3:44 PM  Originating Location (pt. Location): Home  Distant Location (provider location):  [facility]   Platform used for Video Visit: Radha Nicole is a 69 year old female with PMHx of stage IV breast cancer w/ bony metastases, asthma, HTN, diverticulitis, anxiety, opioid dependence, and history of unintentional opioid and benzodiazepine overdose (July 2021) who presents to clinic today for follow up.       Brief history (from chart review):  Patient was initially seen by addiction medicine service when admitted for encephalopathy 2/2 unintentional overdose of opiates and benzodiazepines in July 2021.  She was diagnosed with metastatic breast cancer in March 2021 and was taking opioids since April 2021 for cancer related pain.  She also has long standing history of anxiety and had been on benzodiazepines for 7-8 years prior to hospitalization in July 2021 at which time she was weaned off with phenobarbital taper.     She has continued Suboxone 8-2mg BID to TID for management of her cancer related pain.        Plan from most recent office visit (5/14/24) :  1. Chronic pain due to neoplasm  Stable.  Continue Suboxone 8-2mg BID to TID (3rd dose prn pain).  Has upcoming follow-up with oncology and PET scan.  Has Narcan.    - Drugs of Abuse Screen Urine (POC CUPS) POCT; Standing  - Drugs of Abuse Screen Urine (POC CUPS) POCT  - buprenorphine-naloxone (SUBOXONE) 8-2 MG SUBL sublingual tablet; Place 1 tablet under the tongue 2 times daily. May also place 0.5-1 tablets daily as needed for severe pain.  Dispense: 90 tablet; Refill: 1  - Buprenorphine & Metabolite Screen; Future  - Buprenorphine & Metabolite Screen     2. Encounter for therapeutic drug level monitoring  As expected.  - Drugs of Abuse Screen Urine (POC  CUPS) POCT; Standing  - Drugs of Abuse Screen Urine (POC CUPS) POCT  - Buprenorphine & Metabolite Screen; Future  - Buprenorphine & Metabolite Screen     3. Cannabis use, uncomplicated  Ongoing cannabis use - helpful for nausea and appetite.     TODAY'S VISIT  HPI Jul 16, 2024  - New cancer lesion on L1 - will be getting radiation starting next week - lorazepam before treatment ( reviewed)  - Daughter Sandra is coming up from [place] to help  - Taking Suboxone TID  - Very stressed about current political environment (world and national), emotional rollercoaster  - Shares how she holds on to hope and belief that good triumphs evil  - Daughter (Susanna) is recovering from major MI  - Crocheting an Uzbek for her daughter  - Grandson with cancer (out of state) - this is a stressor          10/10/2023     1:00 PM 1/24/2024     1:42 PM 5/14/2024     1:00 PM   PHQ   PHQ-9 Total Score 2 2 5   Q9: Thoughts of better off dead/self-harm past 2 weeks Not at all Not at all Not at all           4/13/2023    12:57 PM 10/10/2023     1:00 PM 5/14/2024     1:00 PM   MOHINDER-7 SCORE   Total Score 3 (minimal anxiety)     Total Score 3 3 3       OBJECTIVE                                                    PHYSICAL EXAM:  There were no vitals taken for this visit.    GENERAL: healthy, alert and no distress  EYES: Eyes grossly normal to inspection  RESP: No respiratory distress  NEURO: mentation intact and speech normal  MENTAL STATUS EXAM  Appearance/Behavior: No appearant distress  Speech: Normal  Mood/Affect: depressed and anxious  Insight: Fair    LAB  No results found for any visits on 07/16/24.      HISTORY                                                    Problem list reviewed & adjusted, as indicated.  Patient Active Problem List   Diagnosis    Malignant neoplasm of lower-outer quadrant of right breast of female, estrogen receptor positive (H)    Asthma    Combined forms of age-related cataract of right eye    Diverticulitis of  large intestine without perforation or abscess without bleeding    Episodic mood disorder (H24)    Hyperlipidemia    Pain medication agreement    Panic disorder without agoraphobia    Post traumatic stress disorder    Dehydration    Encephalopathy    Elevated troponin level not due to acute coronary syndrome    Acute kidney injury (H24)    Chronic pain due to neoplasm    Uncomplicated opioid dependence (H)    Hypokalemia    Non-traumatic rhabdomyolysis    Essential hypertension    Anemia    Nausea and vomiting, intractability of vomiting not specified, unspecified vomiting type         MEDICATION LIST (prior to visit)  Current Outpatient Medications   Medication Sig Dispense Refill    albuterol (PROAIR HFA;PROVENTIL HFA;VENTOLIN HFA) 90 mcg/actuation inhaler Inhale 1-2 puffs into the lungs every 4 hours as needed for shortness of breath / dyspnea or wheezing       B,C/folic/zinc/copper ox/vit E (STRESS B-COMPLEX ORAL) [B,C/FOLIC/ZINC/COPPER OX/VIT E (STRESS B-COMPLEX ORAL)] Take 1 tablet by mouth daily. Stress supplement      buprenorphine-naloxone (SUBOXONE) 8-2 MG SUBL sublingual tablet Place 1 tablet under the tongue 2 times daily. May also place 0.5-1 tablets daily as needed for severe pain. 90 tablet 1    cetirizine (ZYRTEC) 10 MG tablet Take 1 tablet (10 mg) by mouth daily 90 tablet 0    cholecalciferol 125 MCG (5000 UT) CAPS Take 1 capsule (5,000 Units) by mouth daily 90 capsule 0    cyclobenzaprine (FLEXERIL) 5 MG tablet Take 1 tablet (5 mg) by mouth 2 times daily as needed for muscle spasms 60 tablet 1    docusate sodium (COLACE) 100 MG capsule TAKE 1 CAPSULE(100 MG) BY MOUTH TWICE DAILY AS NEEDED FOR CONSTIPATION 60 capsule 0    dronabinol (MARINOL) 10 MG capsule Take 10 mg by mouth 2 times daily      ferrous sulfate (FEROSUL) 325 (65 Fe) MG tablet Take 1 tablet (325 mg) by mouth daily (with breakfast) 30 tablet 1    fluticasone propionate (FLONASE) 50 mcg/actuation nasal spray [FLUTICASONE PROPIONATE  (FLONASE) 50 MCG/ACTUATION NASAL SPRAY] 1 spray into each nostril daily.       fluticasone-vilanterol (BREO ELLIPTA) 100-25 MCG/ACT inhaler Inhale 1 puff into the lungs daily      gabapentin (NEURONTIN) 300 MG capsule Take 300 mg by mouth 3 times daily      guaiFENesin (MUCINEX) 600 MG 12 hr tablet Take 600 mg by mouth 2 times daily      IBRANCE 125 MG tablet Take 125mg daily for 3 weeks, then off for 1 week      letrozole (FEMARA) 2.5 MG tablet Take 2.5 mg by mouth daily      Lidocaine (LIDOCARE) 4 % Patch Place 2 patches onto the skin every 24 hours To prevent lidocaine toxicity, patient should be patch free for 12 hrs daily. 30 patch 0    naloxone (NARCAN) 4 MG/0.1ML nasal spray SPRAY 1 SPRAY INTO 1 NOSTRIL ALTERNATING NOSTRILS ONCE AS NEEDED FOR OPIOID REVERSAL EVERY 2-3 MINUTES UNTIL ASSISTANCE ARRIVES. 2 each 11    ondansetron (ZOFRAN) 8 MG tablet Take 8 mg by mouth 3 times daily as needed      polyethylene glycol (MIRALAX) 17 GM/Dose powder Take 17 g (1 capful) by mouth daily as needed for constipation 850 g 1    propranoloL (INDERAL) 10 MG tablet Take 10 mg by mouth 3 times daily as needed (anxiety or palpitations)      vilazodone (VIIBRYD) 10 MG TABS tablet       vitamin E 100 UNIT capsule Take 500 Units by mouth twice a week      ascorbic acid, vitamin C, (VITAMIN C) 1000 MG tablet [ASCORBIC ACID, VITAMIN C, (VITAMIN C) 1000 MG TABLET] Take 1,000 mg by mouth daily. (Patient not taking: Reported on 10/10/2023)      melatonin 10 mg Tab Take 10 mg by mouth nightly as needed for sleep (Patient not taking: Reported on 7/16/2024)      melatonin 3 MG tablet Take 1 tablet (3 mg) by mouth nightly as needed for sleep (Patient not taking: Reported on 7/16/2024) 30 tablet 2    mirtazapine (REMERON) 15 MG tablet Take 1 tablet (15 mg) by mouth At Bedtime (Patient not taking: Reported on 7/16/2024) 30 tablet 1    SYMBICORT 80-4.5 mcg/actuation inhaler Inhale 2 puffs into the lungs 2 times daily (Patient not taking:  Reported on 7/16/2024)       No current facility-administered medications for this visit.       MEDICATION LIST (after visit)  Current Outpatient Medications   Medication Sig Dispense Refill    albuterol (PROAIR HFA;PROVENTIL HFA;VENTOLIN HFA) 90 mcg/actuation inhaler Inhale 1-2 puffs into the lungs every 4 hours as needed for shortness of breath / dyspnea or wheezing       B,C/folic/zinc/copper ox/vit E (STRESS B-COMPLEX ORAL) [B,C/FOLIC/ZINC/COPPER OX/VIT E (STRESS B-COMPLEX ORAL)] Take 1 tablet by mouth daily. Stress supplement      buprenorphine-naloxone (SUBOXONE) 8-2 MG SUBL sublingual tablet Place 1 tablet under the tongue 2 times daily. May also place 0.5-1 tablets daily as needed for severe pain. 90 tablet 1    cetirizine (ZYRTEC) 10 MG tablet Take 1 tablet (10 mg) by mouth daily 90 tablet 0    cholecalciferol 125 MCG (5000 UT) CAPS Take 1 capsule (5,000 Units) by mouth daily 90 capsule 0    cyclobenzaprine (FLEXERIL) 5 MG tablet Take 1 tablet (5 mg) by mouth 2 times daily as needed for muscle spasms 60 tablet 1    docusate sodium (COLACE) 100 MG capsule TAKE 1 CAPSULE(100 MG) BY MOUTH TWICE DAILY AS NEEDED FOR CONSTIPATION 60 capsule 0    dronabinol (MARINOL) 10 MG capsule Take 10 mg by mouth 2 times daily      ferrous sulfate (FEROSUL) 325 (65 Fe) MG tablet Take 1 tablet (325 mg) by mouth daily (with breakfast) 30 tablet 1    fluticasone propionate (FLONASE) 50 mcg/actuation nasal spray [FLUTICASONE PROPIONATE (FLONASE) 50 MCG/ACTUATION NASAL SPRAY] 1 spray into each nostril daily.       fluticasone-vilanterol (BREO ELLIPTA) 100-25 MCG/ACT inhaler Inhale 1 puff into the lungs daily      gabapentin (NEURONTIN) 300 MG capsule Take 300 mg by mouth 3 times daily      guaiFENesin (MUCINEX) 600 MG 12 hr tablet Take 600 mg by mouth 2 times daily      IBRANCE 125 MG tablet Take 125mg daily for 3 weeks, then off for 1 week      letrozole (FEMARA) 2.5 MG tablet Take 2.5 mg by mouth daily      Lidocaine (LIDOCARE) 4  % Patch Place 2 patches onto the skin every 24 hours To prevent lidocaine toxicity, patient should be patch free for 12 hrs daily. 30 patch 0    naloxone (NARCAN) 4 MG/0.1ML nasal spray SPRAY 1 SPRAY INTO 1 NOSTRIL ALTERNATING NOSTRILS ONCE AS NEEDED FOR OPIOID REVERSAL EVERY 2-3 MINUTES UNTIL ASSISTANCE ARRIVES. 2 each 11    ondansetron (ZOFRAN) 8 MG tablet Take 8 mg by mouth 3 times daily as needed      polyethylene glycol (MIRALAX) 17 GM/Dose powder Take 17 g (1 capful) by mouth daily as needed for constipation 850 g 1    propranoloL (INDERAL) 10 MG tablet Take 10 mg by mouth 3 times daily as needed (anxiety or palpitations)      vilazodone (VIIBRYD) 10 MG TABS tablet       vitamin E 100 UNIT capsule Take 500 Units by mouth twice a week      ascorbic acid, vitamin C, (VITAMIN C) 1000 MG tablet [ASCORBIC ACID, VITAMIN C, (VITAMIN C) 1000 MG TABLET] Take 1,000 mg by mouth daily. (Patient not taking: Reported on 10/10/2023)      melatonin 10 mg Tab Take 10 mg by mouth nightly as needed for sleep (Patient not taking: Reported on 7/16/2024)      melatonin 3 MG tablet Take 1 tablet (3 mg) by mouth nightly as needed for sleep (Patient not taking: Reported on 7/16/2024) 30 tablet 2    mirtazapine (REMERON) 15 MG tablet Take 1 tablet (15 mg) by mouth At Bedtime (Patient not taking: Reported on 7/16/2024) 30 tablet 1    SYMBICORT 80-4.5 mcg/actuation inhaler Inhale 2 puffs into the lungs 2 times daily (Patient not taking: Reported on 7/16/2024)       No current facility-administered medications for this visit.         Allergies   Allergen Reactions    Iodinated Contrast Media [Iodinated Contrast Media] Unknown     Heart stopped and sick for multiple days after    Penicillins Swelling     Throat swelling as an infant    Tylenol [Acetaminophen] Other (See Comments)     With codeine only .Pt states gives her upset stomach for days.    Venlafaxine Hcl Er Nausea and Vomiting     Vomited 20 min after taking XR, OK with IR     Codeine Hives and Itching       Haley Wong, [facility] Addiction Medicine  [facility]  [phone]

## 2024-07-16 NOTE — NURSING NOTE
Current patient location: [address]  [address]  [place] [address]    Is the patient currently in the state of [place]? YES    Visit mode:VIDEO    If the visit is dropped, the patient can be reconnected by: VIDEO VISIT: Text to cell phone:   Telephone Information:   Mobile [phone]       Will anyone else be joining the visit? NO  (If patient encounters technical issues they should call [phone])    How would you like to obtain your AVS? MyChart    Are changes needed to the allergy or medication list? No    Are refills needed on medications prescribed by this physician? YES    Reason for visit: RECHECK    De ORDONEZ

## 2024-07-26 ENCOUNTER — TRANSFERRED RECORDS (OUTPATIENT)
Dept: HEALTH INFORMATION MANAGEMENT | Facility: CLINIC | Age: 69
End: 2024-07-26
Payer: COMMERCIAL

## 2024-08-06 ENCOUNTER — TRANSFERRED RECORDS (OUTPATIENT)
Dept: HEALTH INFORMATION MANAGEMENT | Facility: CLINIC | Age: 69
End: 2024-08-06
Payer: COMMERCIAL

## 2024-08-19 ENCOUNTER — TRANSFERRED RECORDS (OUTPATIENT)
Dept: HEALTH INFORMATION MANAGEMENT | Facility: CLINIC | Age: 69
End: 2024-08-19
Payer: COMMERCIAL

## 2024-09-10 DIAGNOSIS — G89.4 CHRONIC PAIN SYNDROME: ICD-10-CM

## 2024-09-10 NOTE — TELEPHONE ENCOUNTER
Date of Last Office Visit: 7/16/24  Date of Next Office Visit:  9/17/24  No shows since last visit: No  More than one patient-initiated cancellation (with reschedule) since last seen in clinic? No    []Medication refilled per  Medication Refill in Ambulatory Care  policy.  [x]Medication unable to be refilled by RN due to criteria not met as indicated below:    []Eligibility: has not had a provider visit within last 6 months   []Supervision: no future appointment; < 7 days before next appointment   []Compliance: no shows; cancellations; lapse in therapy   []Verification: order discrepancy; may need modification...   [] > 30-day supply request   []Advanced refill request: > 7 days before refill date   []Controlled medication   [x]Medication not included in policy   []Review: new med; med adjusted ? 30 days; safety alert; requires lab monitoring...   []Scope of Practice: refill request processed by LPN/MA   []Other:      Medication(s) requested:     -  naloxone (NARCAN) 4 MG/0.1ML nasal spray   Date last ordered: 5/1/24  Qty: 2 each  Refills: 11      Any Controlled Substance(s)? No      Requested medication(s) verified as identical to current order? Yes    Any lapse in adherence to medication(s) greater than 5 days? N/A     Additional action taken? routed encounter to provider for review.      Last visit treatment plan:          A/P                                                    ASSESSMENT/PLAN     1. Chronic pain due to neoplasm  Needs improvement.  Increased back pain due to new metastasis.  Planning for radiation, starting next week.  Continue Suboxone, up to 24mg daily, reviewed that often dividing into smaller and more frequent doses can provide additional analgesic benefit.    - buprenorphine-naloxone (SUBOXONE) 8-2 MG SUBL sublingual tablet; Place 1 tablet under the tongue 2 times daily. May also place 0.5-1 tablets daily as needed for severe pain.  Dispense: 90 tablet; Refill: 1       Any medication(s)  require lab monitoring? No

## 2024-10-04 ENCOUNTER — VIRTUAL VISIT (OUTPATIENT)
Dept: ADDICTION MEDICINE | Facility: CLINIC | Age: 69
End: 2024-10-04
Payer: COMMERCIAL

## 2024-10-04 VITALS — BODY MASS INDEX: 21.68 KG/M2 | HEIGHT: 64 IN | WEIGHT: 127 LBS

## 2024-10-04 DIAGNOSIS — G89.3 CHRONIC PAIN DUE TO NEOPLASM: Primary | ICD-10-CM

## 2024-10-04 PROCEDURE — 99215 OFFICE O/P EST HI 40 MIN: CPT | Mod: 95 | Performed by: FAMILY MEDICINE

## 2024-10-04 PROCEDURE — G2211 COMPLEX E/M VISIT ADD ON: HCPCS | Mod: 95 | Performed by: FAMILY MEDICINE

## 2024-10-04 RX ORDER — LIDOCAINE 50 MG/G
1 PATCH TOPICAL EVERY 24 HOURS
Qty: 30 PATCH | Refills: 1 | Status: SHIPPED | OUTPATIENT
Start: 2024-10-04

## 2024-10-04 RX ORDER — BUPRENORPHINE HYDROCHLORIDE AND NALOXONE HYDROCHLORIDE DIHYDRATE 8; 2 MG/1; MG/1
TABLET SUBLINGUAL
Qty: 90 TABLET | Refills: 1 | Status: SHIPPED | OUTPATIENT
Start: 2024-10-04

## 2024-10-04 RX ORDER — ANASTROZOLE 1 MG/1
1 TABLET ORAL DAILY
COMMUNITY
Start: 2024-08-19

## 2024-10-04 ASSESSMENT — PAIN SCALES - GENERAL: PAINLEVEL: SEVERE PAIN (7)

## 2024-10-04 NOTE — PROGRESS NOTES
"Virtual Visit Details    Type of service:  Video Visit   Video Start Time: {video visit start/end time for provider to select:431868}  Video End Time:{video visit start/end time for provider to select:779949}    Originating Location (pt. Location): {video visit patient location:229609::\"Home\"}  {PROVIDER LOCATION On-site should be selected for visits conducted from your clinic location or adjoining [facility] hospital, academic office, or other nearby [facility] building. Off-site should be selected for all other provider locations, including home:220964}  Distant Location (provider location):  {virtual location provider:678632}  Platform used for Video Visit: {Virtual Visit Platforms:919795::\"[employer]\"}  "

## 2024-10-04 NOTE — NURSING NOTE
Current patient location: [address]  [address]  [place] [address]    Is the patient currently in the state of [place]? YES    Visit mode:VIDEO    If the visit is dropped, the patient can be reconnected by: VIDEO VISIT: Text to cell phone:   Telephone Information:   Mobile [phone]       Will anyone else be joining the visit? NO  (If patient encounters technical issues they should call [phone]    Are changes needed to the allergy or medication list? No    Are refills needed on medications prescribed by this physician? Discuss with provider    Rooming Documentation:  Unable to complete questionnaire(s) due to time Unable to ask patient.     Reason for visit: RECHECK    Sudha ORDONEZ     [facility] AT THE [facility] MED SURG  Emergency Department        Pt Name: Ashely Lozoya  MRN: [id number]  [place] 1946  Date of evaluation: 1/19/21    CHIEF COMPLAINT       Chief Complaint   Patient presents with    Positive For Covid-19     positive 1/15, symptoms started 1/12    Shortness of Breath     patient has increased sob and loss of appetite    Anorexia       HISTORY OF PRESENT ILLNESS  (Location/Symptom, Timing/Onset, Context/Setting, Quality, Duration, ModifyingFactors, Severity.)      Ashely Lozoya is a 69 y.o. male who presents with a chief complaint of worsening dyspnea and cough over the last 2 days. The patient became ill on January 12 with fever, malaise, myalgias, and cough. His respiratory difficulty increased on the 14th and he was tested and returned SARS-CoV-2 positive. The patient since then has been doing okay up until yesterday when he started having increased dyspnea and dyspnea on exertion. He denies anosmia or loss of taste. The patient denies diarrhea, constipation, melena, hematochezia, or hematemesis. The patient endorses a low-grade fever. He has had a cough which has been productive of clear mucus only. No hemoptysis. No risk factors for venous thromboembolic disease other than COVID-19. PAST MEDICAL / SURGICAL / SOCIAL / FAMILY HISTORY      has a past medical history of Asthma, Hyperlipidemia, Hypertension, and Type 2 diabetes mellitus without complication ([facility] [address] has a past surgical history that includes hernia repair and pr colonoscopy flx dx w/collj spec when pfrmd (N/A, 10/5/2018).        Social History     Socioeconomic History    Marital status:      Spouse name: Not on file    Number of children: Not on file    Years of education: Not on file    Highest education level: Not on file   Occupational History    Not on file   Social Needs    Financial resource strain: Not on file    Food insecurity     Worry: Not on file     Inability: Not on file  Transportation needs     Medical: Not on file     Non-medical: Not on file   Tobacco Use    Smoking status: Former Smoker     Quit date: 2009     Years since quittin.3    Smokeless tobacco: Never Used   Substance and Sexual Activity    Alcohol use: Yes     Comment: rare    Drug use: Not on file    Sexual activity: Not on file   Lifestyle    Physical activity     Days per week: Not on file     Minutes per session: Not on file    Stress: Not on file   Relationships    Social connections     Talks on phone: Not on file     Gets together: Not on file     Attends Jainism service: Not on file     Active member of club or organization: Not on file     Attends meetings of clubs or organizations: Not on file     Relationship status: Not on file    Intimate partner violence     Fear of current or ex partner: Not on file     Emotionally abused: Not on file     Physically abused: Not on file     Forced sexual activity: Not on file   Other Topics Concern    Not on file   Social History Narrative    Not on file       Family History   Problem Relation Age of Onset    Stroke Mother     Heart Disease Father        Allergies:  Patient has no known allergies. Home Medications:  Prior to Admission medications    Medication Sig Start Date End Date Taking?  Authorizing Provider   magnesium oxide (MAG-OX) 400 MG tablet Take 400 mg by mouth daily   Yes Historical Provider, MD   pantoprazole (PROTONIX) 40 MG tablet Take 40 mg by mouth daily   Yes Historical Provider, MD   albuterol sulfate HFA (VENTOLIN HFA) 108 (90 Base) MCG/ACT inhaler Inhale 2 puffs into the lungs every 6 hours as needed for Wheezing   Yes Historical Provider, MD   cetirizine (ZYRTEC) 10 MG tablet Take 10 mg by mouth daily   Yes Historical Provider, MD   dextromethorphan-guaiFENesin (MUCINEX DM)  MG per extended release tablet Take 1 tablet by mouth every 12 hours as needed for Cough or Congestion   Yes Historical Provider, MD Multiple Vitamins-Minerals (PRESERVISION AREDS PO) Take 2 tablets by mouth nightly   Yes Historical Provider, MD   acetaminophen (TYLENOL) 500 MG tablet Take 1,000 mg by mouth every 6 hours as needed for Pain or Fever   Yes Historical Provider, MD   metFORMIN (GLUCOPHAGE) 500 MG tablet Take 500 mg by mouth daily (with breakfast)  8/24/18  Yes Historical Provider, MD   atorvastatin (LIPITOR) 10 MG tablet Take 20 mg by mouth nightly  8/27/18  Yes Historical Provider, MD   lisinopril (PRINIVIL;ZESTRIL) 2.5 MG tablet Take 2.5 mg by mouth daily   Yes Historical Provider, MD   tamsulosin (FLOMAX) 0.4 MG capsule Take 0.4 mg by mouth nightly    Yes Historical Provider, MD   mometasone (ASMANEX 120 METERED DOSES) 220 MCG/INH inhaler Inhale 2 puffs into the lungs daily   Yes Historical Provider, MD   fluticasone (FLONASE) 50 MCG/ACT nasal spray 1 spray by Each Nare route daily   Yes Historical Provider, MD   aspirin 81 MG tablet Take 81 mg by mouth daily   Yes Historical Provider, MD   zinc gluconate 50 MG tablet Take 50 mg by mouth daily   Yes Historical Provider, MD   Cholecalciferol (VITAMIN D3) 5000 units TABS Take 5,000 Units by mouth daily   Yes Historical Provider, MD     I reviewed the patient's past medical and surgical history gathered by the nurses as well as all medications and allergies. I reviewed the patient's family history gathered by the nurses. I also reviewed the social history gathered by the nurses for smoking, vaping, and alcohol use, as well as drugs of abuse. Nursing notes are reviewed by me and greatly appreciated. REVIEW OF SYSTEMS    (2-9 systems for level 4, 10 or more for level 5)      Review of Systems   Constitutional: Positive for activity change, appetite change, fatigue and fever. HENT: Positive for congestion. Negative for sore throat and trouble swallowing. Eyes: Negative for pain and redness. Respiratory: Positive for cough, chest tightness and shortness of breath. Cardiovascular: Negative for chest pain. Gastrointestinal: Negative for abdominal distention, abdominal pain, nausea and vomiting. Genitourinary: Negative for decreased urine volume, dysuria and urgency. Musculoskeletal: Negative for arthralgias, back pain and myalgias. Skin: Negative for rash and wound. Neurological: Negative for facial asymmetry, speech difficulty and light-headedness. Hematological: Negative for adenopathy. Psychiatric/Behavioral: Negative for behavioral problems, decreased concentration, dysphoric mood and hallucinations. The patient is not hyperactive. All other systems reviewed and are negative. PHYSICAL EXAM   (up to 7 for level 4, 8 or more for level 5)     INITIAL VITALS:   /89   Pulse 82   Temp 99 °F (37.2 °C) (Oral)   Resp 21   Ht 5' 10\" (1.778 m)   Wt 260 lb (117.9 kg)   SpO2 94%   BMI 37.31 kg/m²     Physical Exam  Vitals signs and nursing note reviewed. Constitutional:       General: He is in acute distress. Appearance: He is well-developed. He is obese. He is ill-appearing. He is not diaphoretic. HENT:      Head: Normocephalic and atraumatic. Mouth/Throat:      Mouth: Mucous membranes are moist.      Pharynx: Oropharynx is clear. Eyes:      Extraocular Movements: Extraocular movements intact. Pupils: Pupils are equal, round, and reactive to light. Neck:      Musculoskeletal: Normal range of motion and neck supple. Cardiovascular:      Rate and Rhythm: Normal rate and regular rhythm. Pulmonary:      Effort: Pulmonary effort is normal. Tachypnea present. No accessory muscle usage or respiratory distress. Breath sounds: Examination of the right-upper field reveals wheezing and rhonchi. Examination of the left-upper field reveals wheezing and rhonchi. Examination of the right-middle field reveals rhonchi. Examination of the left-middle field reveals rhonchi. Examination of the right-lower field reveals decreased breath sounds, rhonchi and rales. Examination of the left-lower field reveals decreased breath sounds, rhonchi and rales. Decreased breath sounds, wheezing, rhonchi and rales present. Chest:      Chest wall: No tenderness or crepitus. Abdominal:      Palpations: Abdomen is soft. There is no mass. Tenderness: There is no abdominal tenderness. Musculoskeletal: Normal range of motion. Right lower leg: No edema. Left lower leg: No edema. Skin:     Findings: No ecchymosis or erythema. Neurological:      General: No focal deficit present. Mental Status: He is alert and oriented to person, place, and time. Cranial Nerves: No cranial nerve deficit. Motor: No weakness. Psychiatric:         Mood and Affect: Mood normal.         Behavior: Behavior normal.         DIFFERENTIAL  DIAGNOSIS     Differential diagnosis includes but is not limited to: Pulmonary embolism, pneumonia, chest wall pain, myocardial ischemia, pneumothorax, hemothorax, asthma, COPD exacerbation, restrictive lung disease, interstitial fibrosis, bronchitis, cardiomyopathy, CHF, SARS-CoV-2, noncardiogenic pulmonary edema, ARDS, pneumocystis carinii pneumonia, sarcoidosis, primary and metastatic lung cancer, mediastinal cancer, Boerhaave's syndrome, asbestosis, amyloidosis, among others.       PLAN (LABS / IMAGING / EKG):  Orders Placed This Encounter   Procedures    Critical Care    Culture, Blood 1    Culture, Blood 2    XR CHEST PORTABLE    Basic Metabolic Panel    Brain Natriuretic Peptide    CBC Auto Differential    Troponin    Blood gas, venous    Lactate, Sepsis    CBC Auto Differential  Comprehensive Metabolic Panel w/ Reflex to MG    Protime-INR    APTT    Fibrinogen    C-Reactive Protein    Lactate Dehydrogenase    Ferritin    D-Dimer, Quantitative    Hepatic function panel    Vital signs    Telemetry monitoring    PPE Instructions    Prone Positioning (as tolerated)    Telemetry Monitoring    Full code    Pharmacy to Dose: Other - See Comments: Remdesivir    Droplet Plus Isolation    Nasal Cannula Oxygen    Initiate Oxygen Therapy Protocol    HHN Treatment    Heated/ Humidified High Flow Nasal Cannula    Acapella    EKG 12 Lead    PATIENT STATUS (FROM ED OR OR/PROCEDURAL) Inpatient       MEDICATIONS ORDERED:  Orders Placed This Encounter   Medications    sodium chloride flush 0.9 % injection 10 mL    sodium chloride flush 0.9 % injection 10 mL    cefTRIAXone (ROCEPHIN) 1 g in sterile water 10 mL IV syringe     Order Specific Question:   Antimicrobial Indications     Answer: Other     Order Specific Question:   Other Abx Indication     Answer: Suspected Sepsis of Pulmonary Origin - Community Acquired    azithromycin (ZITHROMAX) 500 mg in D5W 250ml addavial     Order Specific Question:   Antimicrobial Indications     Answer: Other     Order Specific Question:   Other Abx Indication     Answer:    Suspected Sepsis of Pulmonary Origin - Community Acquired    OR Linked Order Group     acetaminophen (TYLENOL) tablet 650 mg     acetaminophen (TYLENOL) suppository 650 mg    enoxaparin (LOVENOX) injection 60 mg    methylPREDNISolone sodium (SOLU-MEDROL) injection 40 mg    aspirin EC tablet 81 mg    atorvastatin (LIPITOR) tablet 20 mg    cetirizine (ZYRTEC) tablet 10 mg    Vitamin D (CHOLECALCIFEROL) tablet 5,000 Units    dextromethorphan-guaiFENesin (MUCINEX DM)  MG per extended release tablet 1 tablet    fluticasone (FLONASE) 50 MCG/ACT nasal spray 1 spray    lisinopril (PRINIVIL;ZESTRIL) tablet 2.5 mg    magnesium oxide (MAG-OX) tablet 400 mg  metFORMIN (GLUCOPHAGE) tablet 500 mg    pantoprazole (PROTONIX) tablet 40 mg    tamsulosin (FLOMAX) capsule 0.4 mg    zinc sulfate (ZINCATE) capsule 50 mg    DISCONTD: ipratropium-albuterol (DUONEB) nebulizer solution 1 ampule    ipratropium-albuterol (DUONEB) nebulizer solution 1 ampule    FOLLOWED BY Linked Order Group     remdesivir 200 mg in sodium chloride 0.9 % 250 mL IVPB      Order Specific Question:   Antimicrobial Indications      Answer:   Pneumonia (CAP)     remdesivir 100 mg in sodium chloride 0.9 % 250 mL IVPB      Order Specific Question:   Antimicrobial Indications      Answer:   Pneumonia (CAP)    0.9 % sodium chloride bolus       DIAGNOSTIC RESULTS / EMERGENCY DEPARTMENT COURSE / MDM     LABS:  Results for orders placed or performed during the hospital encounter of 01/19/21   Culture, Blood 1    Specimen: Blood   Result Value Ref Range    Specimen Description . BLOOD     Special Requests 20ML RAC     Culture PENDING    Basic Metabolic Panel   Result Value Ref Range    Glucose 199 (H) 70 - 99 mg/dL    BUN 20 8 - 23 mg/dL    CREATININE 0.96 0.70 - 1.20 mg/dL    Bun/Cre Ratio 21 (H) 9 - 20    Calcium 8.9 8.6 - 10.4 mg/dL    Sodium 131 (L) 135 - 144 mmol/L    Potassium 4.0 3.7 - 5.3 mmol/L    Chloride 94 (L) 98 - 107 mmol/L    CO2 21 20 - 31 mmol/L    Anion Gap 16 9 - 17 mmol/L    GFR Non-African American >60 >60 mL/min    GFR African American >60 >60 mL/min    GFR Comment          GFR Staging         Brain Natriuretic Peptide   Result Value Ref Range    Pro- (H) <300 pg/mL    BNP Interpretation Pro-BNP Reference Range:    CBC Auto Differential   Result Value Ref Range    WBC 8.1 3.5 - 11.3 k/uL    RBC 4.83 4.21 - 5.77 m/uL    Hemoglobin 13.3 13.0 - 17.0 g/dL    Hematocrit 40.3 (L) 40.7 - 50.3 %    MCV 83.4 82.6 - 102.9 fL    MCH 27.5 25.2 - 33.5 pg    MCHC 33.0 28.4 - 34.8 g/dL    RDW 12.5 11.8 - 14.4 %    Platelets 645 138 - 035 k/uL    MPV 8.8 8.1 - 13.5 fL NRBC Automated 0.0 0.0 per 100 WBC    Differential Type NOT REPORTED     WBC Morphology NOT REPORTED     RBC Morphology NOT REPORTED     Platelet Estimate NOT REPORTED     Seg Neutrophils 90 (H) 36 - 65 %    Lymphocytes 3 (L) 24 - 43 %    Monocytes 7 3 - 12 %    Eosinophils % 0 (L) 1 - 4 %    Immature Granulocytes 0 0 %    Basophils 0 0 - 2 %    Segs Absolute 7.29 1.50 - 8.10 k/uL    Absolute Lymph # 0.24 (L) 1.10 - 3.70 k/uL    Absolute Mono # 0.57 0.10 - 1.20 k/uL    Absolute Eos # 0.00 0.00 - 0.44 k/uL    Absolute Immature Granulocyte 0.00 0.00 - 0.30 k/uL    Basophils Absolute 0.00 0.0 - 0.2 k/uL    Morphology Normal    Troponin   Result Value Ref Range    Troponin, High Sensitivity 8 0 - 22 ng/L    Troponin T NOT REPORTED <0.03 ng/mL    Troponin Interp NOT REPORTED    Blood gas, venous   Result Value Ref Range    pH, José 7.404 7.32 - 7.42    pCO2, José 33.2 (L) 39 - 55    pO2, José 30.8 30.0 - 50.0    HCO3, Venous 20.3 (L) 24.0 - 30.0 mmol/L    Positive Base Excess, José NOT REPORTED 0.0 - 2.0 mmol/L    Negative Base Excess, José 3.5 (H) 0.0 - 2.0 mmol/L    O2 Sat, José 59.9 (L) 60.0 - 85.0 %    Total Hb NOT REPORTED 12.0 - 16.0 g/dl    Oxyhemoglobin NOT REPORTED 95.0 - 98.0 %    Carboxyhemoglobin NOT REPORTED 0.0 - 5.0 %    Methemoglobin NOT REPORTED 0.0 - 1.9 %    Pt Temp 37.0     pH, José, Temp Adj NOT REPORTED 7.320 - 7.420    pCO2, José, Temp Adj NOT REPORTED 39.0 - 55.0 mmHg    pO2, José, Temp Adj NOT REPORTED 30.0 - 50.0 mmHg    O2 Device/Flow/% O2 Mask     Respiratory Rate 20     Sandor Test NOT REPORTED     Sample Site NOT REPORTED     Pt.  Position SEMI-FOWLERS     Mode NOT REPORTED     Set Rate NOT REPORTED     Total Rate NOT REPORTED     VT NOT REPORTED     FIO2 100     Peep/Cpap NOT REPORTED     PSV NOT REPORTED     Text for Respiratory DRAWN PER ED RN     NOTIFICATION NOT REPORTED     NOTIFICATION TIME NOT REPORTED    Lactate, Sepsis   Result Value Ref Range Lactic Acid, Sepsis 3.0 (H) 0.5 - 1.9 mmol/L    Lactic Acid, Sepsis, Whole Blood NOT REPORTED 0.5 - 1.9 mmol/L   Lactate, Sepsis   Result Value Ref Range    Lactic Acid, Sepsis 1.7 0.5 - 1.9 mmol/L    Lactic Acid, Sepsis, Whole Blood NOT REPORTED 0.5 - 1.9 mmol/L   EKG 12 Lead   Result Value Ref Range    Ventricular Rate 85 BPM    Atrial Rate 85 BPM    P-R Interval 178 ms    QRS Duration 80 ms    Q-T Interval 372 ms    QTc Calculation (Bazett) 442 ms    P Axis -25 degrees    R Axis 9 degrees    T Axis -28 degrees       IMPRESSION:   1. Acute hypoxemic respiratory failure (HCC)    2. Pneumonia due to severe acute respiratory syndrome coronavirus 2 (SARS-CoV-2)          RADIOLOGY:        EKG:  Electrocardiogram:  EKG # 1  Ordered, reviewed, and independently interpreted by the EP. Time Interpreted: 2109 hrs. EKG interpreted by me in the absence of the cardiologist contemporaneously with patient care shows normal sinus rhythm rate of 85 bpm, normal CT 0.18, normal QTC 0.44, normal axis +9. No STEMI. No bundle branch block pattern. No acute ischemia. POC ULTRASOUND:  N/A    EMERGENCY DEPARTMENT COURSE:    Time: 10:00 PM EST    Discussed all findings with the patient and his son. The patient is agreeable to admission for further evaluation and management and respiratory care and therapy. The patient will need high flow O2; he desaturates with any nasal cannula administration. Case discussed with Dr. Sara Delong, who agrees to admit the patient for further evaluation and management. PROCEDURES:  N/A    CONSULTS:  IP CONSULT TO PHARMACY    CRITICAL CARE: Yes  30-74 Minutes  Critical Care  Performed by: Lyndsay Victor MD  Authorized by:  Lyndsay Victor MD     Critical care provider statement:     Critical care time (minutes):  45    Critical care time was exclusive of:  Separately billable procedures and treating other patients and teaching time Critical care was necessary to treat or prevent imminent or life-threatening deterioration of the following conditions:  Respiratory failure and sepsis    Critical care was time spent personally by me on the following activities:  Development of treatment plan with patient or surrogate, discussions with consultants, discussions with primary provider, evaluation of patient's response to treatment, examination of patient, obtaining history from patient or surrogate, ordering and performing treatments and interventions, ordering and review of laboratory studies, ordering and review of radiographic studies, pulse oximetry, re-evaluation of patient's condition and review of old charts    MDM  Number of Diagnoses or Management Options     Amount and/or Complexity of Data Reviewed  Clinical lab tests: ordered and reviewed  Tests in the radiology section of CPT®: ordered and reviewed  Tests in the medicine section of CPT®: ordered and reviewed  Decide to obtain previous medical records or to obtain history from someone other than the patient: yes  Obtain history from someone other than the patient: yes (Son)  Review and summarize past medical records: yes  Discuss the patient with other providers: yes  Independent visualization of images, tracings, or specimens: yes    Risk of Complications, Morbidity, and/or Mortality  Presenting problems: high  Diagnostic procedures: high  Management options: high    Critical Care  Total time providing critical care: 30-74 minutes    Patient Progress  Patient progress: stable          FINAL IMPRESSION      1. Acute hypoxemic respiratory failure (HCC)    2. Pneumonia due to severe acute respiratory syndrome coronavirus 2 (SARS-CoV-2)          DISPOSITION / PLAN     DISPOSITION Decision To Admit 01/19/2021 09:56:09 PM      PATIENT REFERRED TO:  No follow-up provider specified.     DISCHARGE MEDICATIONS:  Current Discharge Medication List          Ephraim Bence, MD, FACEP  9:56 PM Attending Emergency Physician  Critical access hospital AT THE [facility] MED SURG    (Please note that portions of this note were completed with a voice recognition program.  Vergia Stake made to edit the dictations but occasionally words are mis-transcribed.)             Armando Huang MD  01/20/21 0151

## 2024-10-04 NOTE — PROGRESS NOTES
[facility] Addiction Medicine    A/P                                                    ASSESSMENT/PLAN    1. Chronic pain due to neoplasm  Needs improvement.  She will continue Suboxone, no change.  Trial of lidocaine patch on painful clavicle, reviewed proper use.  Encouraged her to meet with palliative care provider at [facility] to discuss other recommendations for managing pain and discomfort, she is agreeable.     - buprenorphine-naloxone (SUBOXONE) 8-2 MG SUBL sublingual tablet; Place 1 tablet under the tongue 2 times daily. May also place 0.5-1 tablets daily as needed for severe pain.  Dispense: 90 tablet; Refill: 1  - lidocaine (LIDODERM) 5 % patch; Place 1 patch over 12 hours onto the skin every 24 hours. To prevent lidocaine toxicity, patient should be patch free for 12 hrs daily.  Dispense: 30 patch; Refill: 1    Additionally, she is quite upset today about her son's mental health struggles.  Provided her with phone number for [facility] and other local mobile crisis teams for assistance.     PDMP Review         Value Time User    State PDMP site checked  Yes 10/4/2024 10:23 AM Haley Wong DO              RTC  Return in about 2 months (around 12/4/2024) for Follow up, in person.    The longitudinal plan of care for the diagnosis(es)/condition(s) as documented were addressed during this visit. Due to the added complexity in care, I will continue to support Jarrell in the subsequent management and with ongoing continuity of care.    SUBJECTIVE                                                        Video-Visit Details  Type of service:  Video Visit  Video Start Time: 10:34 AM  Video End Time: 11:22 AM  Originating Location (pt. Location): Home  Distant Location (provider location):  [facility]   Platform used for Video Visit: Radha Nicole is a 69 year old female with PMHx of stage IV breast cancer w/ bony metastases, asthma, HTN, diverticulitis, anxiety,  opioid dependence, and history of unintentional opioid and benzodiazepine overdose (July 2021) who presents to clinic today for follow up.       Brief history (from chart review):  Patient was initially seen by addiction medicine service when admitted for encephalopathy 2/2 unintentional overdose of opiates and benzodiazepines in July 2021.  She was diagnosed with metastatic breast cancer in March 2021 and was taking opioids since April 2021 for cancer related pain.  She also has long standing history of anxiety and had been on benzodiazepines for 7-8 years prior to hospitalization in July 2021 at which time she was weaned off with phenobarbital taper.     She has continued Suboxone 8-2mg BID to TID for management of her cancer related pain.                          Plan from most recent office visit (7/16/24):  1. Chronic pain due to neoplasm  Needs improvement.  Increased back pain due to new metastasis.  Planning for radiation, starting next week.  Continue Suboxone, up to 24mg daily, reviewed that often dividing into smaller and more frequent doses can provide additional analgesic benefit.    - buprenorphine-naloxone (SUBOXONE) 8-2 MG SUBL sublingual tablet; Place 1 tablet under the tongue 2 times daily. May also place 0.5-1 tablets daily as needed for severe pain.  Dispense: 90 tablet; Refill: 1    TODAY'S VISIT  HPI Oct 4, 2024  - Rough morning - son is struggling with mental health (suicidal), sister is not talking to her  - Reflects on past history of going to rehab  - Continues express political concerns  - Having a lot of pain in clavicle, worried her cancer is  worse, has pending scans - using lidocaine cream which is helpful, wondering about prescription options  - Concerned about her ongoing nausea and low appetite leading to changes in her body, pain sitting now that she has lost so much weight  - Taking Suboxone, helpful for pain, no side effects  - Daughter Susanna invited her to birthday party for  "daughter Shobha - feeling conflicted    Last filled Suboxone 8/28 and 7/25.          10/10/2023     1:00 PM 1/24/2024     1:42 PM 5/14/2024     1:00 PM   PHQ   PHQ-9 Total Score 2 2 5   Q9: Thoughts of better off dead/self-harm past 2 weeks Not at all Not at all Not at all           4/13/2023    12:57 PM 10/10/2023     1:00 PM 5/14/2024     1:00 PM   MOHINDER-7 SCORE   Total Score 3 (minimal anxiety)     Total Score 3 3 3       OBJECTIVE                                                    PHYSICAL EXAM:  Ht 1.626 m (5' 4\")   Wt 57.6 kg (127 lb)   BMI 21.80 kg/m      GENERAL: healthy, alert and no distress  EYES: Eyes grossly normal to inspection, sclerae normal  RESP: No respiratory distress  SKIN: no pallor or jaundice  NEURO: mentation intact and speech normal  MENTAL STATUS EXAM  Appearance/Behavior: No appearant distress  Speech: Normal  Mood/Affect: depressed and anxious  Insight: Fair    LAB  No results found for any visits on 10/04/24.      HISTORY                                                    Problem list reviewed & adjusted, as indicated.  Patient Active Problem List   Diagnosis    Malignant neoplasm of lower-outer quadrant of right breast of female, estrogen receptor positive (H)    Asthma    Combined forms of age-related cataract of right eye    Diverticulitis of large intestine without perforation or abscess without bleeding    Episodic mood disorder (H)    Hyperlipidemia    Pain medication agreement    Panic disorder without agoraphobia    Post traumatic stress disorder    Dehydration    Encephalopathy    Elevated troponin level not due to acute coronary syndrome    Acute kidney injury (H)    Chronic pain due to neoplasm    Uncomplicated opioid dependence (H)    Hypokalemia    Non-traumatic rhabdomyolysis    Essential hypertension    Anemia    Nausea and vomiting, intractability of vomiting not specified, unspecified vomiting type         MEDICATION LIST (prior to visit)  Current Outpatient Medications "   Medication Sig Dispense Refill    anastrozole (ARIMIDEX) 1 MG tablet Take 1 tablet by mouth daily.      buprenorphine-naloxone (SUBOXONE) 8-2 MG SUBL sublingual tablet Place 1 tablet under the tongue 2 times daily. May also place 0.5-1 tablets daily as needed for severe pain. 90 tablet 1    lidocaine (LIDODERM) 5 % patch Place 1 patch over 12 hours onto the skin every 24 hours. To prevent lidocaine toxicity, patient should be patch free for 12 hrs daily. 30 patch 1    albuterol (PROAIR HFA;PROVENTIL HFA;VENTOLIN HFA) 90 mcg/actuation inhaler Inhale 1-2 puffs into the lungs every 4 hours as needed for shortness of breath / dyspnea or wheezing       ascorbic acid, vitamin C, (VITAMIN C) 1000 MG tablet [ASCORBIC ACID, VITAMIN C, (VITAMIN C) 1000 MG TABLET] Take 1,000 mg by mouth daily. (Patient not taking: Reported on 10/10/2023)      B,C/folic/zinc/copper ox/vit E (STRESS B-COMPLEX ORAL) [B,C/FOLIC/ZINC/COPPER OX/VIT E (STRESS B-COMPLEX ORAL)] Take 1 tablet by mouth daily. Stress supplement      cetirizine (ZYRTEC) 10 MG tablet Take 1 tablet (10 mg) by mouth daily 90 tablet 0    cholecalciferol 125 MCG (5000 UT) CAPS Take 1 capsule (5,000 Units) by mouth daily 90 capsule 0    cyclobenzaprine (FLEXERIL) 5 MG tablet Take 1 tablet (5 mg) by mouth 2 times daily as needed for muscle spasms 60 tablet 1    docusate sodium (COLACE) 100 MG capsule TAKE 1 CAPSULE(100 MG) BY MOUTH TWICE DAILY AS NEEDED FOR CONSTIPATION 60 capsule 0    dronabinol (MARINOL) 10 MG capsule Take 10 mg by mouth 2 times daily      ferrous sulfate (FEROSUL) 325 (65 Fe) MG tablet Take 1 tablet (325 mg) by mouth daily (with breakfast) 30 tablet 1    fluticasone propionate (FLONASE) 50 mcg/actuation nasal spray [FLUTICASONE PROPIONATE (FLONASE) 50 MCG/ACTUATION NASAL SPRAY] 1 spray into each nostril daily.       fluticasone-vilanterol (BREO ELLIPTA) 100-25 MCG/ACT inhaler Inhale 1 puff into the lungs daily      gabapentin (NEURONTIN) 300 MG capsule Take  300 mg by mouth 3 times daily      guaiFENesin (MUCINEX) 600 MG 12 hr tablet Take 600 mg by mouth 2 times daily      IBRANCE 125 MG tablet Take 125mg daily for 3 weeks, then off for 1 week      Lidocaine (LIDOCARE) 4 % Patch Place 2 patches onto the skin every 24 hours To prevent lidocaine toxicity, patient should be patch free for 12 hrs daily. 30 patch 0    melatonin 10 mg Tab Take 10 mg by mouth nightly as needed for sleep (Patient not taking: Reported on 7/16/2024)      melatonin 3 MG tablet Take 1 tablet (3 mg) by mouth nightly as needed for sleep (Patient not taking: Reported on 7/16/2024) 30 tablet 2    mirtazapine (REMERON) 15 MG tablet Take 1 tablet (15 mg) by mouth At Bedtime (Patient not taking: Reported on 7/16/2024) 30 tablet 1    naloxone (NARCAN) 4 MG/0.1ML nasal spray SPRAY 1 SPRAY INTO 1 NOSTRIL ALTERNATING NOSTRILS ONCE AS NEEDED FOR OPIOID REVERSAL EVERY 2-3 MINUTES UNTIL ASSISTANCE ARRIVES. 2 each 11    ondansetron (ZOFRAN) 8 MG tablet Take 8 mg by mouth 3 times daily as needed      polyethylene glycol (MIRALAX) 17 GM/Dose powder Take 17 g (1 capful) by mouth daily as needed for constipation 850 g 1    propranoloL (INDERAL) 10 MG tablet Take 10 mg by mouth 3 times daily as needed (anxiety or palpitations)      SYMBICORT 80-4.5 mcg/actuation inhaler Inhale 2 puffs into the lungs 2 times daily (Patient not taking: Reported on 7/16/2024)      vilazodone (VIIBRYD) 10 MG TABS tablet       vitamin E 100 UNIT capsule Take 500 Units by mouth twice a week       No current facility-administered medications for this visit.       MEDICATION LIST (after visit)  Current Outpatient Medications   Medication Sig Dispense Refill    anastrozole (ARIMIDEX) 1 MG tablet Take 1 tablet by mouth daily.      buprenorphine-naloxone (SUBOXONE) 8-2 MG SUBL sublingual tablet Place 1 tablet under the tongue 2 times daily. May also place 0.5-1 tablets daily as needed for severe pain. 90 tablet 1    lidocaine (LIDODERM) 5 %  patch Place 1 patch over 12 hours onto the skin every 24 hours. To prevent lidocaine toxicity, patient should be patch free for 12 hrs daily. 30 patch 1    albuterol (PROAIR HFA;PROVENTIL HFA;VENTOLIN HFA) 90 mcg/actuation inhaler Inhale 1-2 puffs into the lungs every 4 hours as needed for shortness of breath / dyspnea or wheezing       ascorbic acid, vitamin C, (VITAMIN C) 1000 MG tablet [ASCORBIC ACID, VITAMIN C, (VITAMIN C) 1000 MG TABLET] Take 1,000 mg by mouth daily. (Patient not taking: Reported on 10/10/2023)      B,C/folic/zinc/copper ox/vit E (STRESS B-COMPLEX ORAL) [B,C/FOLIC/ZINC/COPPER OX/VIT E (STRESS B-COMPLEX ORAL)] Take 1 tablet by mouth daily. Stress supplement      cetirizine (ZYRTEC) 10 MG tablet Take 1 tablet (10 mg) by mouth daily 90 tablet 0    cholecalciferol 125 MCG (5000 UT) CAPS Take 1 capsule (5,000 Units) by mouth daily 90 capsule 0    cyclobenzaprine (FLEXERIL) 5 MG tablet Take 1 tablet (5 mg) by mouth 2 times daily as needed for muscle spasms 60 tablet 1    docusate sodium (COLACE) 100 MG capsule TAKE 1 CAPSULE(100 MG) BY MOUTH TWICE DAILY AS NEEDED FOR CONSTIPATION 60 capsule 0    dronabinol (MARINOL) 10 MG capsule Take 10 mg by mouth 2 times daily      ferrous sulfate (FEROSUL) 325 (65 Fe) MG tablet Take 1 tablet (325 mg) by mouth daily (with breakfast) 30 tablet 1    fluticasone propionate (FLONASE) 50 mcg/actuation nasal spray [FLUTICASONE PROPIONATE (FLONASE) 50 MCG/ACTUATION NASAL SPRAY] 1 spray into each nostril daily.       fluticasone-vilanterol (BREO ELLIPTA) 100-25 MCG/ACT inhaler Inhale 1 puff into the lungs daily      gabapentin (NEURONTIN) 300 MG capsule Take 300 mg by mouth 3 times daily      guaiFENesin (MUCINEX) 600 MG 12 hr tablet Take 600 mg by mouth 2 times daily      IBRANCE 125 MG tablet Take 125mg daily for 3 weeks, then off for 1 week      Lidocaine (LIDOCARE) 4 % Patch Place 2 patches onto the skin every 24 hours To prevent lidocaine toxicity, patient should be  patch free for 12 hrs daily. 30 patch 0    melatonin 10 mg Tab Take 10 mg by mouth nightly as needed for sleep (Patient not taking: Reported on 7/16/2024)      melatonin 3 MG tablet Take 1 tablet (3 mg) by mouth nightly as needed for sleep (Patient not taking: Reported on 7/16/2024) 30 tablet 2    mirtazapine (REMERON) 15 MG tablet Take 1 tablet (15 mg) by mouth At Bedtime (Patient not taking: Reported on 7/16/2024) 30 tablet 1    naloxone (NARCAN) 4 MG/0.1ML nasal spray SPRAY 1 SPRAY INTO 1 NOSTRIL ALTERNATING NOSTRILS ONCE AS NEEDED FOR OPIOID REVERSAL EVERY 2-3 MINUTES UNTIL ASSISTANCE ARRIVES. 2 each 11    ondansetron (ZOFRAN) 8 MG tablet Take 8 mg by mouth 3 times daily as needed      polyethylene glycol (MIRALAX) 17 GM/Dose powder Take 17 g (1 capful) by mouth daily as needed for constipation 850 g 1    propranoloL (INDERAL) 10 MG tablet Take 10 mg by mouth 3 times daily as needed (anxiety or palpitations)      SYMBICORT 80-4.5 mcg/actuation inhaler Inhale 2 puffs into the lungs 2 times daily (Patient not taking: Reported on 7/16/2024)      vilazodone (VIIBRYD) 10 MG TABS tablet       vitamin E 100 UNIT capsule Take 500 Units by mouth twice a week       No current facility-administered medications for this visit.         Allergies   Allergen Reactions    Iodinated Contrast Media [Iodinated Contrast Media] Unknown     Heart stopped and sick for multiple days after    Penicillins Swelling     Throat swelling as an infant    Tylenol [Acetaminophen] Other (See Comments)     With codeine only .Pt states gives her upset stomach for days.    Venlafaxine Hcl Er Nausea and Vomiting     Vomited 20 min after taking XR, OK with IR    Codeine Hives and Itching     I spent a total of 48 minutes on the day of the visit.   Time spent by me doing chart review, history and exam, documentation and further activities per the note    Haley Wong [facility] Addiction Medicine  [facility]  [facility]  [phone]

## 2024-10-16 ENCOUNTER — TRANSFERRED RECORDS (OUTPATIENT)
Dept: HEALTH INFORMATION MANAGEMENT | Facility: CLINIC | Age: 69
End: 2024-10-16
Payer: COMMERCIAL

## 2024-10-19 ENCOUNTER — HEALTH MAINTENANCE LETTER (OUTPATIENT)
Age: 69
End: 2024-10-19

## 2024-11-11 NOTE — TELEPHONE ENCOUNTER
Called Dr Oakley's office. Discussed patient's anxiety concerns, history.  Given how anxiety is impacting her ability to function and her quality of life, reasonable for Dr Oakley to retrial low dose of clonazepam.  Discussed that risk with Suboxone and Clonazepam is still present, however, it is significantly less than her risk when she was on full opioid agonists.      Haley Wong, DO on 10/12/2023 at 11:16 AM         Rx Refill Note  Requested Prescriptions     Pending Prescriptions Disp Refills    Insulin Glargine, 2 Unit Dial, (Toujeo Max SoloStar) 300 UNIT/ML solution pen-injector injection [Pharmacy Med Name: TOUJEO MAX SOLOSTAR 300U/ML PEN 3ML] 12 mL 11     Sig: ADMINISTER 120 UNITS UNDER THE SKIN DAILY AS DIRECTED          Last office visit with prescribing clinician: 10/29/2024     Next office visit with prescribing clinician: 3/10/2025   }    Gilbert Ramos MA  11/11/24, 09:13 EST

## 2024-11-20 ENCOUNTER — TELEPHONE (OUTPATIENT)
Dept: ADDICTION MEDICINE | Facility: CLINIC | Age: 69
End: 2024-11-20
Payer: COMMERCIAL

## 2024-11-20 DIAGNOSIS — G89.3 CHRONIC PAIN DUE TO NEOPLASM: ICD-10-CM

## 2024-11-20 RX ORDER — BUPRENORPHINE HYDROCHLORIDE AND NALOXONE HYDROCHLORIDE DIHYDRATE 8; 2 MG/1; MG/1
TABLET SUBLINGUAL
Qty: 21 TABLET | Refills: 0 | Status: SHIPPED | OUTPATIENT
Start: 2024-12-02

## 2024-11-20 NOTE — TELEPHONE ENCOUNTER
RN called patient and notified her of the next script with a start date of 12/2/2024. Patient was appreciative. Patient was coughing profusely during talha.     Jayla Zimmerman RN on 11/20/2024 at 4:00 PM

## 2024-11-20 NOTE — TELEPHONE ENCOUNTER
Patient rescheduled visit from 11/19 to 12/6.  Will need bridge to visit so I have sent in 7 days of meds to fill 12/2.  Please let her know she should be able to pick this up around then and this will cover her until we meet on 12/6.      Haley Wong, DO on 11/20/2024 at 5:37 AM

## 2024-12-06 ENCOUNTER — OFFICE VISIT (OUTPATIENT)
Dept: ADDICTION MEDICINE | Facility: CLINIC | Age: 69
End: 2024-12-06
Payer: COMMERCIAL

## 2024-12-06 VITALS — DIASTOLIC BLOOD PRESSURE: 66 MMHG | OXYGEN SATURATION: 96 % | SYSTOLIC BLOOD PRESSURE: 131 MMHG | HEART RATE: 60 BPM

## 2024-12-06 DIAGNOSIS — Z59.71 INSURANCE COVERAGE PROBLEMS: ICD-10-CM

## 2024-12-06 DIAGNOSIS — Z51.81 ENCOUNTER FOR THERAPEUTIC DRUG LEVEL MONITORING: ICD-10-CM

## 2024-12-06 DIAGNOSIS — G89.3 CHRONIC PAIN DUE TO NEOPLASM: Primary | ICD-10-CM

## 2024-12-06 PROCEDURE — 80305 DRUG TEST PRSMV DIR OPT OBS: CPT | Performed by: FAMILY MEDICINE

## 2024-12-06 PROCEDURE — G2211 COMPLEX E/M VISIT ADD ON: HCPCS | Performed by: FAMILY MEDICINE

## 2024-12-06 PROCEDURE — 99214 OFFICE O/P EST MOD 30 MIN: CPT | Performed by: FAMILY MEDICINE

## 2024-12-06 RX ORDER — BUPRENORPHINE HYDROCHLORIDE AND NALOXONE HYDROCHLORIDE DIHYDRATE 8; 2 MG/1; MG/1
TABLET SUBLINGUAL
Qty: 90 TABLET | Refills: 1 | Status: SHIPPED | OUTPATIENT
Start: 2024-12-06

## 2024-12-06 NOTE — NURSING NOTE
[facility] - Addiction Medicine       Rooming information:    Point of care urine drug screen positive for:  Lab Results   Component Value Date    BUP Screen Positive (A) 05/14/2024    BZO Negative 05/14/2024    BAR Negative 05/14/2024    SERENE Negative 05/14/2024    MAMP Negative 05/14/2024    AMP Negative 05/14/2024    MDMA Negative 05/14/2024    MTD Negative 05/14/2024    ISI627 Negative 05/14/2024    OXY Negative 05/14/2024    PCP Negative 05/14/2024    THC Screen Positive (A) 05/14/2024    TEMP 94 F 05/14/2024    SGPOCT 1.005 05/14/2024       *POC urine drug screen does not screen for Fentanyl    PHQ-9 Scores:       10/10/2023     1:00 PM 1/24/2024     1:42 PM 5/14/2024     1:00 PM   PHQ   PHQ-9 Total Score 2 2 5   Q9: Thoughts of better off dead/self-harm past 2 weeks Not at all Not at all  Not at all       Patient-reported     MOHINDER-7 Scores:      4/13/2023    12:57 PM 10/10/2023     1:00 PM 5/14/2024     1:00 PM   MOHINDER-7 SCORE   Total Score 3 (minimal anxiety)     Total Score 3 3 3       Any other recent substance use:     Cannabis     NICOTINE-No  If using nicotine, ready to quit? No      Side effects related to medications pt would like to discuss with provider (constipation, dry mouth, HA, GI upset, sedation?) Yes dry mouth    Narcan currently available: Yes    Primary care provider: Radha Oakley MD     Mental health provider: no (follow up on MH referral if needed)    Any housing, insurance deficits?: denies    Contact information up to date? On file    3rd Party Involvement none today (please obtain SAM if pt would like to include)      Anne Hoffman MA  December 6, 2024  11:31 AM

## 2024-12-06 NOTE — PROGRESS NOTES
[facility] Addiction Medicine    A/P                                                    ASSESSMENT/PLAN    1. Chronic pain due to neoplasm (Primary)  Stable overall.  Continue Suboxone, no change.  She continues to follow-up regularly with oncology.  I asked to let me know if she need assistance with refills to navigate travel dates.    - Drugs of Abuse Screen Urine (POC CUPS) POCT  - buprenorphine-naloxone (SUBOXONE) 8-2 MG SUBL sublingual tablet; Place 1 tablet under the tongue 2 times daily. May also place 0.5-1 tablets daily as needed for severe pain.  Dispense: 90 tablet; Refill: 1    2. Encounter for therapeutic drug level monitoring  - Drugs of Abuse Screen Urine (POC CUPS) POCT    3. Insurance coverage problems  She is concerned about her insurance coverage in new year, care coordination referral placed.  - Specialty Care Coordination Referral; Future          PDMP Review         Value Time User    State PDMP site checked  Yes 12/6/2024 12:03 PM Haley Wong,               RTC  Return in about 2 months (around 2/6/2025) for Follow up, using a video visit.    The longitudinal plan of care for the diagnosis(es)/condition(s) as documented were addressed during this visit. Due to the added complexity in care, I will continue to support Jarrell in the subsequent management and with ongoing continuity of care.      Counseled the patient on the importance of having a recovery program in addition to medication to manage recovery.  Components include avoiding isolating, having willingness to change, avoiding triggers and managing cravings. Encouraged having some type of sober network and practicing honesty with trusted support person(s). Encouraged other services such as counseling, 12 step or other self-help organizations.      Opioid warning reviewed.  Risk of overdose following a period of abstinence due to decrease tolerance was discussed including risk of death.  Strongly recommended abstain  from alcohol, benzodiazepines, THC, opioids and other drugs of abuse.  Increased risk of return to opioid use after use of these substances discussed.  Increased risk of overdose/death with use of other substances particularly benzodiazepines/alcohol reviewed.        SUBJECTIVE                                                        Jarrell Nicole is a 69 year old female with PMHx of stage IV breast cancer w/ bony metastases, asthma, HTN, diverticulitis, anxiety, opioid dependence, and history of unintentional opioid and benzodiazepine overdose (July 2021) who presents to clinic today for follow up.       Brief history (from chart review):  Patient was initially seen by addiction medicine service when admitted for encephalopathy 2/2 unintentional overdose of opiates and benzodiazepines in July 2021.  She was diagnosed with metastatic breast cancer in March 2021 and was taking opioids since April 2021 for cancer related pain.  She also has long standing history of anxiety and had been on benzodiazepines for 7-8 years prior to hospitalization in July 2021 at which time she was weaned off with phenobarbital taper.     She has continued Suboxone 8-2mg BID to TID for management of her cancer related pain.                          Plan from most recent office visit (10/4/24):  1. Chronic pain due to neoplasm  Needs improvement.  She will continue Suboxone, no change.  Trial of lidocaine patch on painful clavicle, reviewed proper use.  Encouraged her to meet with palliative care provider at [facility] to discuss other recommendations for managing pain and discomfort, she is agreeable.     - buprenorphine-naloxone (SUBOXONE) 8-2 MG SUBL sublingual tablet; Place 1 tablet under the tongue 2 times daily. May also place 0.5-1 tablets daily as needed for severe pain.  Dispense: 90 tablet; Refill: 1  - lidocaine (LIDODERM) 5 % patch; Place 1 patch over 12 hours onto the skin every 24 hours. To prevent lidocaine toxicity,  patient should be patch free for 12 hrs daily.  Dispense: 30 patch; Refill: 1    TODAY'S VISIT  HPI Dec 6, 2024  - Will be traveling to [place] to visit her daughter  - Worried about losing her insurance, she would appreciate assistance in navigating this  - [facility] worker  - Taking Suboxone as prescribed  - Pain worse some days, lidocaine patches helpful        10/10/2023     1:00 PM 1/24/2024     1:42 PM 5/14/2024     1:00 PM   PHQ   PHQ-9 Total Score 2 2 5   Q9: Thoughts of better off dead/self-harm past 2 weeks Not at all Not at all Not at all           4/13/2023    12:57 PM 10/10/2023     1:00 PM 5/14/2024     1:00 PM   MOHINDER-7 SCORE   Total Score 3 (minimal anxiety)     Total Score 3 3 3       OBJECTIVE                                                    PHYSICAL EXAM:  /66   Pulse 60   SpO2 96%     GENERAL: healthy, alert and no distress  EYES: Eyes grossly normal to inspection, sclerae normal  RESP: No respiratory distress, no coughing or wheezing  MS: no gross musculoskeletal defects noted  SKIN: no pallor or jaundice  NEURO: Normal gait, mentation intact and speech normal  MENTAL STATUS EXAM  Appearance/Behavior: No appearant distress  Speech: Normal  Mood/Affect: anxiety  Insight: Adequate    LAB  Results for orders placed or performed in visit on 12/06/24   Drugs of Abuse Screen Urine (POC CUPS) POCT     Status: Abnormal   Result Value Ref Range    POCT Kit Lot Number [id number]     POCT Kit Expiration Date 02/28/2026     Temperature Urine POCT 90 F 90 F, 92 F, 94 F, 96 F, 98 F, 100 F    Specific [place] POCT 1.015 1.005, 1.015, 1.025    pH Qual Urine POCT 7 pH 4 pH, 5 pH, 7 pH, 9 pH    Creatinine Qual Urine POCT 100 mg/dL 20 mg/dL, 50 mg/dL, 100 mg/dL, 200 mg/dL    Internal QC Qual Urine POCT Valid Valid    Amphetamine Qual Urine POCT Negative Negative    Barbiturate Qual Urine POCT Negative Negative    Buprenorphine Qual Urine POCT Screen Positive (A) Negative    Benzodiazepine Qual Urine POCT  Negative Negative    Cocaine Qual Urine POCT Negative Negative    Methamphetamine Qual Urine POCT Negative Negative    MDMA Qual Urine POCT Negative Negative    Methadone Qual Urine POCT Negative Negative    Opiate Qual Urine POCT Negative Negative    Oxycodone Qual Urine POCT Negative Negative    Phencyclidine Qual Urine POCT Negative Negative    THC Qual Urine POCT Screen Positive (A) Negative         HISTORY                                                    Problem list reviewed & adjusted, as indicated.  Patient Active Problem List   Diagnosis    Malignant neoplasm of lower-outer quadrant of right breast of female, estrogen receptor positive (H)    Asthma    Combined forms of age-related cataract of right eye    Diverticulitis of large intestine without perforation or abscess without bleeding    Episodic mood disorder    Hyperlipidemia    Pain medication agreement    Panic disorder without agoraphobia    Post traumatic stress disorder    Dehydration    Encephalopathy    Elevated troponin level not due to acute coronary syndrome    Acute kidney injury    Chronic pain due to neoplasm    Uncomplicated opioid dependence (H)    Hypokalemia    Non-traumatic rhabdomyolysis    Essential hypertension    Anemia    Nausea and vomiting, intractability of vomiting not specified, unspecified vomiting type         MEDICATION LIST (prior to visit)  Current Outpatient Medications   Medication Sig Dispense Refill    albuterol (PROAIR HFA;PROVENTIL HFA;VENTOLIN HFA) 90 mcg/actuation inhaler Inhale 1-2 puffs into the lungs every 4 hours as needed for shortness of breath / dyspnea or wheezing       anastrozole (ARIMIDEX) 1 MG tablet Take 1 tablet by mouth daily.      ascorbic acid, vitamin C, (VITAMIN C) 1000 MG tablet Take 1,000 mg by mouth daily.      B,C/folic/zinc/copper ox/vit E (STRESS B-COMPLEX ORAL) [B,C/FOLIC/ZINC/COPPER OX/VIT E (STRESS B-COMPLEX ORAL)] Take 1 tablet by mouth daily. Stress supplement       buprenorphine-naloxone (SUBOXONE) 8-2 MG SUBL sublingual tablet Place 1 tablet under the tongue 2 times daily. May also place 0.5-1 tablets daily as needed for severe pain. 90 tablet 1    cetirizine (ZYRTEC) 10 MG tablet Take 1 tablet (10 mg) by mouth daily 90 tablet 0    cholecalciferol 125 MCG (5000 UT) CAPS Take 1 capsule (5,000 Units) by mouth daily 90 capsule 0    cyclobenzaprine (FLEXERIL) 5 MG tablet Take 1 tablet (5 mg) by mouth 2 times daily as needed for muscle spasms 60 tablet 1    docusate sodium (COLACE) 100 MG capsule TAKE 1 CAPSULE(100 MG) BY MOUTH TWICE DAILY AS NEEDED FOR CONSTIPATION 60 capsule 0    dronabinol (MARINOL) 10 MG capsule Take 10 mg by mouth 2 times daily      ferrous sulfate (FEROSUL) 325 (65 Fe) MG tablet Take 1 tablet (325 mg) by mouth daily (with breakfast) 30 tablet 1    fluticasone propionate (FLONASE) 50 mcg/actuation nasal spray [FLUTICASONE PROPIONATE (FLONASE) 50 MCG/ACTUATION NASAL SPRAY] 1 spray into each nostril daily.       fluticasone-vilanterol (BREO ELLIPTA) 100-25 MCG/ACT inhaler Inhale 1 puff into the lungs daily      gabapentin (NEURONTIN) 300 MG capsule Take 300 mg by mouth 3 times daily      guaiFENesin (MUCINEX) 600 MG 12 hr tablet Take 600 mg by mouth 2 times daily      IBRANCE 125 MG tablet Take 125mg daily for 3 weeks, then off for 1 week      Lidocaine (LIDOCARE) 4 % Patch Place 2 patches onto the skin every 24 hours To prevent lidocaine toxicity, patient should be patch free for 12 hrs daily. 30 patch 0    melatonin 10 mg Tab Take 10 mg by mouth nightly as needed for sleep.      melatonin 3 MG tablet Take 1 tablet (3 mg) by mouth nightly as needed for sleep 30 tablet 2    mirtazapine (REMERON) 15 MG tablet Take 1 tablet (15 mg) by mouth At Bedtime 30 tablet 1    naloxone (NARCAN) 4 MG/0.1ML nasal spray SPRAY 1 SPRAY INTO 1 NOSTRIL ALTERNATING NOSTRILS ONCE AS NEEDED FOR OPIOID REVERSAL EVERY 2-3 MINUTES UNTIL ASSISTANCE ARRIVES. 2 each 11    ondansetron  (ZOFRAN) 8 MG tablet Take 8 mg by mouth 3 times daily as needed      polyethylene glycol (MIRALAX) 17 GM/Dose powder Take 17 g (1 capful) by mouth daily as needed for constipation 850 g 1    propranoloL (INDERAL) 10 MG tablet Take 10 mg by mouth 3 times daily as needed (anxiety or palpitations)      SYMBICORT 80-4.5 mcg/actuation inhaler Inhale 2 puffs into the lungs 2 times daily.      vilazodone (VIIBRYD) 10 MG TABS tablet       vitamin E 100 UNIT capsule Take 500 Units by mouth twice a week      lidocaine (LIDODERM) 5 % patch Place 1 patch over 12 hours onto the skin every 24 hours. To prevent lidocaine toxicity, patient should be patch free for 12 hrs daily. 30 patch 1     No current facility-administered medications for this visit.       MEDICATION LIST (after visit)  Current Outpatient Medications   Medication Sig Dispense Refill    albuterol (PROAIR HFA;PROVENTIL HFA;VENTOLIN HFA) 90 mcg/actuation inhaler Inhale 1-2 puffs into the lungs every 4 hours as needed for shortness of breath / dyspnea or wheezing       anastrozole (ARIMIDEX) 1 MG tablet Take 1 tablet by mouth daily.      ascorbic acid, vitamin C, (VITAMIN C) 1000 MG tablet Take 1,000 mg by mouth daily.      B,C/folic/zinc/copper ox/vit E (STRESS B-COMPLEX ORAL) [B,C/FOLIC/ZINC/COPPER OX/VIT E (STRESS B-COMPLEX ORAL)] Take 1 tablet by mouth daily. Stress supplement      buprenorphine-naloxone (SUBOXONE) 8-2 MG SUBL sublingual tablet Place 1 tablet under the tongue 2 times daily. May also place 0.5-1 tablets daily as needed for severe pain. 90 tablet 1    cetirizine (ZYRTEC) 10 MG tablet Take 1 tablet (10 mg) by mouth daily 90 tablet 0    cholecalciferol 125 MCG (5000 UT) CAPS Take 1 capsule (5,000 Units) by mouth daily 90 capsule 0    cyclobenzaprine (FLEXERIL) 5 MG tablet Take 1 tablet (5 mg) by mouth 2 times daily as needed for muscle spasms 60 tablet 1    docusate sodium (COLACE) 100 MG capsule TAKE 1 CAPSULE(100 MG) BY MOUTH TWICE DAILY AS NEEDED  FOR CONSTIPATION 60 capsule 0    dronabinol (MARINOL) 10 MG capsule Take 10 mg by mouth 2 times daily      ferrous sulfate (FEROSUL) 325 (65 Fe) MG tablet Take 1 tablet (325 mg) by mouth daily (with breakfast) 30 tablet 1    fluticasone propionate (FLONASE) 50 mcg/actuation nasal spray [FLUTICASONE PROPIONATE (FLONASE) 50 MCG/ACTUATION NASAL SPRAY] 1 spray into each nostril daily.       fluticasone-vilanterol (BREO ELLIPTA) 100-25 MCG/ACT inhaler Inhale 1 puff into the lungs daily      gabapentin (NEURONTIN) 300 MG capsule Take 300 mg by mouth 3 times daily      guaiFENesin (MUCINEX) 600 MG 12 hr tablet Take 600 mg by mouth 2 times daily      IBRANCE 125 MG tablet Take 125mg daily for 3 weeks, then off for 1 week      Lidocaine (LIDOCARE) 4 % Patch Place 2 patches onto the skin every 24 hours To prevent lidocaine toxicity, patient should be patch free for 12 hrs daily. 30 patch 0    melatonin 10 mg Tab Take 10 mg by mouth nightly as needed for sleep.      melatonin 3 MG tablet Take 1 tablet (3 mg) by mouth nightly as needed for sleep 30 tablet 2    mirtazapine (REMERON) 15 MG tablet Take 1 tablet (15 mg) by mouth At Bedtime 30 tablet 1    naloxone (NARCAN) 4 MG/0.1ML nasal spray SPRAY 1 SPRAY INTO 1 NOSTRIL ALTERNATING NOSTRILS ONCE AS NEEDED FOR OPIOID REVERSAL EVERY 2-3 MINUTES UNTIL ASSISTANCE ARRIVES. 2 each 11    ondansetron (ZOFRAN) 8 MG tablet Take 8 mg by mouth 3 times daily as needed      polyethylene glycol (MIRALAX) 17 GM/Dose powder Take 17 g (1 capful) by mouth daily as needed for constipation 850 g 1    propranoloL (INDERAL) 10 MG tablet Take 10 mg by mouth 3 times daily as needed (anxiety or palpitations)      SYMBICORT 80-4.5 mcg/actuation inhaler Inhale 2 puffs into the lungs 2 times daily.      vilazodone (VIIBRYD) 10 MG TABS tablet       vitamin E 100 UNIT capsule Take 500 Units by mouth twice a week      lidocaine (LIDODERM) 5 % patch Place 1 patch over 12 hours onto the skin every 24 hours. To  prevent lidocaine toxicity, patient should be patch free for 12 hrs daily. 30 patch 1     No current facility-administered medications for this visit.         Allergies   Allergen Reactions    Iodinated Contrast Media [Iodinated Contrast Media] Unknown     Heart stopped and sick for multiple days after    Penicillins Swelling     Throat swelling as an infant    Tylenol [Acetaminophen] Other (See Comments)     With codeine only .Pt states gives her upset stomach for days.    Venlafaxine Hcl Er Nausea and Vomiting     Vomited 20 min after taking XR, OK with IR    Codeine Hives and Itching       Haley Wong, [facility] Addiction Medicine  [place] [facility]  [phone]

## 2024-12-10 ENCOUNTER — PATIENT OUTREACH (OUTPATIENT)
Dept: CARE COORDINATION | Facility: CLINIC | Age: 69
End: 2024-12-10
Payer: COMMERCIAL

## 2024-12-11 NOTE — PROGRESS NOTES
Clinic Care Coordination Contact  [facility]/Voicemail    Clinical Data: Care Coordinator Outreach    Outreach Documentation Number of Outreach Attempt   12/11/2024   2:33 PM 1       Left message on patient's voicemail with call back information and requested return call.      Plan: Care Coordinator will try to reach patient again in 3-5 business days.    EDY Rojas? Social Work Care Coordinator   [facility]  [email or web address]? [email or web address]    Phone: [phone]  she/her

## 2024-12-11 NOTE — PROGRESS NOTES
Clinic Care Coordination Contact    TRUPTI STEPHENS briefly spoke to pt who stated she was not available to talk through referral today.     Plan: TRUPTI STEPHENS will reach out to pt tomorrow 12/11.    EDY Rojas? Social Work Care Coordinator   [facility]  [email or web address]? [email or web address]    Phone: [phone]  she/her

## 2024-12-17 ENCOUNTER — PATIENT OUTREACH (OUTPATIENT)
Dept: CARE COORDINATION | Facility: CLINIC | Age: 69
End: 2024-12-17
Payer: COMMERCIAL

## 2024-12-17 NOTE — PROGRESS NOTES
Clinic Care Coordination Contact  Clinic Care Coordination Contact  OUTREACH    Referral Information:  Referral Source: Other, specify (Haley Wong, )    Primary Diagnosis: Behavioral Health    Chief Complaint   Patient presents with    Clinic Care Coordination - Initial        Universal Utilization:      Utilization      No Show Count (past year)  1             ED Visits  0             Hospital Admissions  0                    Current as of: 12/15/2024  9:22 AM                Clinical Concerns:  Current Medical Concerns:      Current Behavioral Concerns:       Education Provided to patient: TRUPTI STEPHENS reached out to pt to talk through referral. Pt states that she is extremely frustrated by the lack of support she is getting for state funded coverage. She states she has a hearing on Thursday for some concerns and needing legal help. TRUPTI STEPHENS spoke to pt about legal support and states she can send her resources. Pt agreed to have these sent to email.      Health Maintenance Reviewed:    Clinical Pathway:    Medication Management:  Medication review status:      Functional Status:       Living Situation:  Current living arrangement:: I live alone    Lifestyle & Psychosocial Needs:    Social Drivers of Health     Food Insecurity: Food Insecurity Present (10/15/2024)    Received from [facility]    Food Insecurity     Do you worry your food will run out before you are able to buy more?: 2   Depression: Not at risk (5/14/2024)    PHQ-2     PHQ-2 Score: 1   Housing Stability: Low Risk  (10/15/2024)    Received from [facility]    Housing Stability     What is your housing situation today?: 1   Tobacco Use: Medium Risk (10/4/2024)    Patient History     Smoking Tobacco Use: Former     Smokeless Tobacco Use: Never     Passive Exposure: Not on file   Financial Resource Strain: Medium Risk (9/18/2024)    Received from [facility]    Financial Resource Strain     Difficulty of Paying Living Expenses: Not on file     Difficulty of Paying Living Expenses: 3   Alcohol Use: Not on file   Transportation Needs: No Transportation Needs (10/15/2024)    Received from [facility]    Transportation Needs     Does lack of transportation keep you from medical appointments?: 1     Does lack of transportation keep you from work, meetings or getting things that you need?: 1   Physical Activity: Not on file   Interpersonal Safety: Not on file   Stress: Not on file   Social Connections: Socially Integrated (10/15/2024)    Received from [facility]    Social Connections     Do you often feel lonely or isolated from those around you?: 0   Health Literacy: Not on file        Resources and Interventions:  Current Resources:      Community Resources: County Worker        Employment Status: disabled       [facility]   https://mylegalaid.org/      [place]Help[place].org   https://www.lawhelpmn.org/   -help with getting a  & learn about rights/legal problems      Volunteer  Network   https://www.Appknoxnmn.org/   -volunteer laywers     [place] Legal Advice Online   https://www.projusticemn.org/mlao/   - Clients who meet the eligibility criteria can sign up and post a question. Attorneys can review & offer clients brief legal advice     [place] [employer] Self Help Center   https://www.mncoLovelace Women's Hospitals.gov/selfhelp/   -multiple legal resource       Referrals Placed: Community Resources, Other  (legal resources)    The patient consented via Verbal consent to have contact information and resources sent via email in an unencrypted manner.     Care Plan: TRUPTI CC will establish care plan(s) with pt at next encounter if they would like to enroll in Care Coordination     Patient/Caregiver understanding: Patient verbalized understanding, engaged in AIDET communication during patient  encounter.    Outreach Frequency: 2 weeks, more frequently as needed  Future Appointments                In 2 months Haley Wong DO [facility] [place]            Plan:  CC will follow up with pt in 2 weeks    EDY Rojas? Social Work Care Coordinator   [facility] [email or web address]? [email or web address]    Phone: [phone]  she/her

## 2025-01-08 DIAGNOSIS — G89.3 CHRONIC PAIN DUE TO NEOPLASM: ICD-10-CM

## 2025-01-08 RX ORDER — LIDOCAINE 50 MG/G
1 PATCH TOPICAL EVERY 24 HOURS
Qty: 30 PATCH | Refills: 1 | Status: SHIPPED | OUTPATIENT
Start: 2025-01-08

## 2025-01-08 NOTE — TELEPHONE ENCOUNTER
Date of Last Office Visit: 12/06/24  Date of Next Office Visit:  02/25/25  No shows since last visit: No  More than one patient-initiated cancellation (with reschedule) since last seen in clinic? No    []Medication refilled per  Medication Refill in Ambulatory Care  policy.  []Medication unable to be refilled by RN due to criteria not met as indicated below:    []Eligibility: has not had a provider visit within last 6 months   []Supervision: no future appointment; < 7 days before next appointment   []Compliance: no shows; cancellations; lapse in therapy   []Verification: order discrepancy; may need modification...   [] > 30-day supply request   []Advanced refill request: > 7 days before refill date   []Controlled medication   [x]Medication not included in policy   []Review: new med; med adjusted <= 30 days; safety alert; requires lab monitoring...   [x]Scope of Practice: refill request processed by LPN/MA   [x]Other:      Medication(s) requested:     -  lidocaine (LIDODERM) 5 % patch   Date last ordered: 10/04/2024  Qty: 30  Refills: 1  Appropriate for refill? Yes          Any Controlled Substance(s)? No      Requested medication(s) verified as identical to current order? Yes    Any lapse in adherence to medication(s) greater than 5 days? No      Additional action taken? cued up medication/order(s) and routed encounter to provider for review.      Last visit treatment plan:   Return in about 2 months (around 2/6/2025)   buprenorphine-naloxone (SUBOXONE) 8-2 MG SUBL sublingual tablet   lidocaine (LIDODERM) 5 % patch       Any medication(s) require lab monitoring? No

## 2025-01-09 ENCOUNTER — TRANSFERRED RECORDS (OUTPATIENT)
Dept: HEALTH INFORMATION MANAGEMENT | Facility: CLINIC | Age: 70
End: 2025-01-09
Payer: COMMERCIAL

## 2025-01-14 ENCOUNTER — PATIENT OUTREACH (OUTPATIENT)
Dept: CARE COORDINATION | Facility: CLINIC | Age: 70
End: 2025-01-14
Payer: COMMERCIAL

## 2025-01-14 NOTE — PROGRESS NOTES
Clinic Care Coordination Contact  Follow Up Progress Note      Assessment: TRUPTI CC followed up with pt on how she is doing. Pt states she is doing okay. Pt states that having cancer has been hard on her mental health but is trying to stay positive. Pt states she has a section 8 voucher and is looking to get a new place. She also will be contacting [employer] to see if she can get more moving boxes. TRUPTI CC states she will send pt some housing search engine resources.     Care Gaps:    Health Maintenance Due   Topic Date Due    DEXA  Never done    LIPID  Never done    ASTHMA ACTION PLAN  Never done    ASTHMA CONTROL TEST  Never done    DEPRESSION ACTION PLAN  Never done    MAMMO SCREENING  Never done    CONTROLLED SUBSTANCE AGREEMENT FOR CHRONIC PAIN MANAGEMENT  Never done    HEPATITIS C SCREENING  Never done    Pneumococcal Vaccine: 50+ Years (1 of 2 - PCV) Never done    ZOSTER IMMUNIZATION (1 of 2) Never done    HEPATITIS A IMMUNIZATION (1 of 2 - Risk 2-dose series) Never done    RSV VACCINE (1 - Risk 60-74 years 1-dose series) Never done    MEDICARE ANNUAL WELLNESS VISIT  Never done    FALL RISK ASSESSMENT  Never done    COVID-19 Vaccine (3 - Pfizer risk series) 11/24/2021    URINE DRUG SCREEN  01/22/2023    LUNG CANCER SCREENING  05/04/2023    BMP  05/15/2023    INFLUENZA VACCINE (1) 09/01/2024       Care Plans      Intervention/Education provided during outreach:     [place] Housing Help  https://[employer].gov/index.html    Housing Link:  https://housinglink.org/    Public housing:  https://www.[place]/[facility]/[place]    Bridge to Benefits  https://www.bridgetobenefits.org/Housing_Transportation    [facility]   https://www.[facility].org/services/housing-and-assistance/affordable-housing    Craigslist or For Rent By Owner:   -Websites for rental opportunities with private landlords.  https://[employer].org/  https://www.[employer]/    [employer]:   -View and apply for  affordable housing opportunities throughout the [facility] area.  https://Medmonk.org/    [employer]  -The U.[place] Department of Agriculture maintains a rural vacancy list and many of the properties offer project-based subsidies (where the tenant pays 30 percent of their monthly income toward rent).   http://www.lifestyleinc.net/vacancy.asp       Outreach Frequency: monthly, more frequently as needed    The patient consented via Verbal consent to have contact information and resources sent via email in an unencrypted manner.    Plan:   Care Coordinator will follow up in one month     EDY Rojas? Social Work Care Coordinator   [facility] [email or web address]? [email or web address]    Phone: [phone]  she/her

## 2025-02-13 ENCOUNTER — PATIENT OUTREACH (OUTPATIENT)
Dept: CARE COORDINATION | Facility: CLINIC | Age: 70
End: 2025-02-13
Payer: COMMERCIAL

## 2025-02-13 NOTE — PROGRESS NOTES
Clinic Care Coordination Contact  Follow Up Progress Note      Assessment: TRUPTI STEPHENS followed up with pt to check in. Pt states that she has a section 8 voucher and continues to look for housing. Pt also asked for legal resources to be sent to her to help with some financial stress. TRUPTI CC and pt spoke about this and potential resources to help with this.     Pt also states she would like to see if acupuncture is available and will talk to provider about this.      Care Gaps:    Health Maintenance Due   Topic Date Due    DEXA  Never done    LIPID  Never done    ASTHMA ACTION PLAN  Never done    ASTHMA CONTROL TEST  Never done    DEPRESSION ACTION PLAN  Never done    MAMMO SCREENING  Never done    CONTROLLED SUBSTANCE AGREEMENT FOR CHRONIC PAIN MANAGEMENT  Never done    Pneumococcal Vaccine: 50+ Years (1 of 2 - PCV) Never done    ZOSTER IMMUNIZATION (1 of 2) Never done    HEPATITIS A IMMUNIZATION (1 of 2 - Risk 2-dose series) Never done    RSV VACCINE (1 - Risk 60-74 years 1-dose series) Never done    MEDICARE ANNUAL WELLNESS VISIT  Never done    FALL RISK ASSESSMENT  Never done    COVID-19 Vaccine (3 - Pfizer risk series) 11/24/2021    URINE DRUG SCREEN  01/22/2023    LUNG CANCER SCREENING  05/04/2023    BMP  05/15/2023    INFLUENZA VACCINE (1) 09/01/2024       Care Plans  Care Plan: Housing Instability       Problem: SDOH LACK OF STABLE HOUSING       Goal: Establish Stable Housing       Start Date: 2/13/2025 Expected End Date: 7/16/2025    This Visit's Progress: 10%    Priority: High    Note:     Barriers: availability and affordability of housing  Strengths: strong advocate of self   Patient expressed understanding of goal: yes  Action steps to achieve this goal:  1. I will look into housing resources   2. I will apply for housing that I am eligible for   3. I will follow up with TRUPTI STEPHENS as needed                                Intervention/Education provided during outreach:     Housing & Legal resources     Outreach  Frequency: monthly, more frequently as needed    The patient consented via Verbal consent to have contact information and resources sent via email in an unencrypted manner.    Plan:   Care Coordinator will follow up in one month.     EDY Rojas? Social Work Care Coordinator   [facility]  [email or web address]? [email or web address]    Phone: [phone]  she/her

## 2025-02-20 DIAGNOSIS — G89.3 CHRONIC PAIN DUE TO NEOPLASM: ICD-10-CM

## 2025-02-20 RX ORDER — BUPRENORPHINE HYDROCHLORIDE AND NALOXONE HYDROCHLORIDE DIHYDRATE 8; 2 MG/1; MG/1
TABLET SUBLINGUAL
Qty: 90 TABLET | Refills: 0 | Status: SHIPPED | OUTPATIENT
Start: 2025-02-20

## 2025-02-20 NOTE — TELEPHONE ENCOUNTER
RN phoned the patient and informed the patient that Dr. Wong has refilled her buprenorphine-naloxone (SUBOXONE) 8-2 MG SUBL sublingual tablet.  The patient thanked .      Carlitos Lopez RN on 2/20/2025 at 4:10 PM

## 2025-02-20 NOTE — TELEPHONE ENCOUNTER
Reason for call:  Medication refill    If this is a refill request, has the caller requested the refill from the pharmacy already? No    Will the patient be using a [facility]? No    Name of the pharmacy and phone number for the current request: [facility] [phone]    Name of the medication requested: Suboxone    Other request: None    Phone number to reach patient:  Home number on file [phone] (home)    Best Time:  Anytime    Can we leave a detailed message on this number?  YES    Travel screening: Not Applicable

## 2025-02-20 NOTE — TELEPHONE ENCOUNTER
Date of Last Office Visit: 12/6/24  Date of Next Office Visit:  3/4/25  No shows since last visit: No  More than one patient-initiated cancellation (with reschedule) since last seen in clinic? No    []Medication refilled per  Medication Refill in Ambulatory Care  policy.  [x]Medication unable to be refilled by RN due to criteria not met as indicated below:    []Eligibility: has not had a provider visit within last 6 months   []Supervision: no future appointment; < 7 days before next appointment   []Compliance: no shows; cancellations; lapse in therapy   []Verification: order discrepancy; may need modification...   [] > 30-day supply request   []Advanced refill request: > 7 days before refill date   [x]Controlled medication   []Medication not included in policy   []Review: new med; med adjusted <= 30 days; safety alert; requires lab monitoring...   []Scope of Practice: refill request processed by LPN/MA   []Other:      Medication(s) requested:     -  buprenorphine-naloxone (SUBOXONE) 8-2 MG SUBL sublingual tablet   Date last ordered: 12/6/24  Qty: 90  Refills: 1  Appropriate for refill? Yes    Any Controlled Substance(s)? Yes   [place]  checked? N/A      Requested medication(s) verified as identical to current order? Yes    Any lapse in adherence to medication(s) greater than 5 days? No      Additional action taken? cued up medication/order(s).      Last visit treatment plan:   1. Chronic pain due to neoplasm (Primary)  Stable overall.  Continue Suboxone, no change.  She continues to follow-up regularly with oncology.  I asked to let me know if she need assistance with refills to navigate travel dates.    - Drugs of Abuse Screen Urine (POC CUPS) POCT  - buprenorphine-naloxone (SUBOXONE) 8-2 MG SUBL sublingual tablet; Place 1 tablet under the tongue 2 times daily. May also place 0.5-1 tablets daily as needed for severe pain.  Dispense: 90 tablet; Refill: 1     2. Encounter for therapeutic drug level monitoring  - Drugs  "of Abuse Screen Urine (POC CUPS) POCT     3. Insurance coverage problems  She is concerned about her insurance coverage in new year, care coordination referral placed.  - Specialty Care Coordination Referral; Future  Return in about 2 months (around 2/6/2025) for Follow up   - Will be traveling to [place] to visit her daughter  - Worried about losing her insurance, she would appreciate assistance in navigating this  - Has county worker  - Taking Suboxone as prescribed  - Pain worse some days, lidocaine patches helpful    Any medication(s) require lab monitoring? Yes   ADDICTION MED   Last POC UDS Results:   Lab Results   Component Value Date    BUP Screen Positive (A) 12/06/2024    BZO Negative 12/06/2024    BAR Negative 12/06/2024    SERENE Negative 12/06/2024    MAMP Negative 12/06/2024    AMP Negative 12/06/2024    MDMA Negative 12/06/2024    MTD Negative 12/06/2024    HRL052 Negative 12/06/2024    OXY Negative 12/06/2024    PCP Negative 12/06/2024    THC Screen Positive (A) 12/06/2024    TEMP 90 F 12/06/2024    SGPOCT 1.015 12/06/2024         Last Drug Confirmation Results:   Creatinine Urine mg/dL   Date Value Ref Range Status   03/29/2022 53 mg/dL Final       Last Drugs of Abuse Screening: No results found for: \"THC13\", \"PCP13\", \"COC13\", \"MAMP13\", \"OPI13\", \"AMP13\", \"BZO13\", \"TCA13\", \"MTD13\", \"BAR13\", \"OXY13\", \"PPX13\", \"BUP13\"     Last Fentanyl Qualitative Urine: No results found for: \"UFENT\"     Last Ethyl Alcohol Level:   Alcohol, Blood   Date Value Ref Range Status   07/12/2021 <10 None detected mg/dL Final     Comment:     None Detected       Porsha Schwarz RN on 2/20/2025 at 2:19 PM               "

## 2025-02-20 NOTE — TELEPHONE ENCOUNTER
2/20/25: Reason for call:  Medication   If this is a refill request, has the caller requested the refill from the pharmacy already? Yes  Will the patient be using a [facility]? No  Name of the pharmacy and phone number for the current request: [facility], [phone]     Name of the medication requested: Suboxone    Other request: Pt stated that she has been very ill, in pain, and is concerned that a refill of her medication has not been sent to the pharmacy. Per pt, she only has two pills left and has been trying to get by with a half dose, but it's been challenging. She is asking for an update from provider and/or nurse about the status of the refill request.     Phone number to reach patient:  Cell number on file:    Telephone Information:   Mobile [phone]       Best Time:  ASAP    Can we leave a detailed message on this number?  YES    Travel screening: Not Applicable

## 2025-02-20 NOTE — TELEPHONE ENCOUNTER
Date of Last Office Visit: 12/6/24  Date of Next Office Visit:  3/4/25  No shows since last visit: No  More than one patient-initiated cancellation (with reschedule) since last seen in clinic? No    []Medication refilled per  Medication Refill in Ambulatory Care  policy.  [x]Medication unable to be refilled by RN due to criteria not met as indicated below:    []Eligibility: has not had a provider visit within last 6 months   []Supervision: no future appointment; < 7 days before next appointment   []Compliance: no shows; cancellations; lapse in therapy   []Verification: order discrepancy; may need modification...   [] > 30-day supply request   []Advanced refill request: > 7 days before refill date   [x]Controlled medication   []Medication not included in policy   []Review: new med; med adjusted <= 30 days; safety alert; requires lab monitoring...   []Scope of Practice: refill request processed by LPN/MA   []Other:      Medication(s) requested:     -  buprenorphine-naloxone (SUBOXONE) 8-2 MG SUBL sublingual tablet   Date last ordered: 12/6/24  Qty: 90  Refills: 1  Appropriate for refill? Provider to review.        Any Controlled Substance(s)? Yes   [place] N[place]      Requested medication(s) verified as identical to current order? Yes    Any lapse in adherence to medication(s) greater than 5 days? No      Additional action taken? routed encounter to provider for review.      Last visit treatment plan:   [facility]       [facility]     A/P                                                    ASSESSMENT/PLAN     1. Chronic pain due to neoplasm (Primary)  Stable overall.  Continue Suboxone, no change.  She continues to follow-up regularly with oncology.  I asked to let me know if she need assistance with refills to navigate travel dates.    - Drugs of Abuse Screen Urine (POC CUPS) POCT  - buprenorphine-naloxone (SUBOXONE) 8-2 MG SUBL sublingual tablet; Place 1 tablet under the  tongue 2 times daily. May also place 0.5-1 tablets daily as needed for severe pain.  Dispense: 90 tablet; Refill: 1     2. Encounter for therapeutic drug level monitoring  - Drugs of Abuse Screen Urine (POC CUPS) POCT     3. Insurance coverage problems  She is concerned about her insurance coverage in new year, care coordination referral placed.  - Specialty Care Coordination Referral; Future     RTC  Return in about 2 months (around 2/6/2025) for Follow up, using a video visit.    Any medication(s) require lab monitoring? No    Carlitos Lopez RN on 2/20/2025 at 3:21 PM

## 2025-03-04 ENCOUNTER — VIRTUAL VISIT (OUTPATIENT)
Dept: ADDICTION MEDICINE | Facility: CLINIC | Age: 70
End: 2025-03-04
Payer: COMMERCIAL

## 2025-03-04 DIAGNOSIS — G89.3 CHRONIC PAIN DUE TO NEOPLASM: Primary | ICD-10-CM

## 2025-03-04 DIAGNOSIS — F12.90 CANNABIS USE, UNCOMPLICATED: ICD-10-CM

## 2025-03-04 PROCEDURE — G2211 COMPLEX E/M VISIT ADD ON: HCPCS | Performed by: FAMILY MEDICINE

## 2025-03-04 PROCEDURE — 98006 SYNCH AUDIO-VIDEO EST MOD 30: CPT | Performed by: FAMILY MEDICINE

## 2025-03-04 RX ORDER — LIDOCAINE 50 MG/G
1 PATCH TOPICAL EVERY 24 HOURS
Qty: 30 PATCH | Refills: 1 | Status: SHIPPED | OUTPATIENT
Start: 2025-03-04

## 2025-03-04 RX ORDER — BUPRENORPHINE HYDROCHLORIDE AND NALOXONE HYDROCHLORIDE DIHYDRATE 8; 2 MG/1; MG/1
TABLET SUBLINGUAL
Qty: 90 TABLET | Refills: 1 | Status: SHIPPED | OUTPATIENT
Start: 2025-03-22

## 2025-03-04 ASSESSMENT — PAIN SCALES - GENERAL: PAINLEVEL_OUTOF10: SEVERE PAIN (8)

## 2025-03-04 NOTE — NURSING NOTE
Current patient location: [address]  [address]  [place] [address]    Is the patient currently in the state of [place]? YES    Visit mode: VIDEO    If the visit is dropped, the patient can be reconnected by:VIDEO VISIT: Text to cell phone:   Telephone Information:   Mobile [phone]       Will anyone else be joining the visit? NO  (If patient encounters technical issues they should call [phone])    Are changes needed to the allergy or medication list? Pt stated no changes to allergies and Pt stated no med changes    Are refills needed on medications prescribed by this physician? Discuss with provider    Rooming Documentation:  Patient preferred to not do any questionnaires.    Reason for visit: MICA ORDONEZ

## 2025-03-04 NOTE — PROGRESS NOTES
[facility] Addiction Medicine    A/P                                                    ASSESSMENT/PLAN    1. Chronic pain due to neoplasm (Primary)  Overall stable.  She has upcoming follow-up with oncology and will review recent PET scan.  Continue Suboxone, no change.  Continue lidocaine patches.  May benefit from additional work with palliative care and/or pain medicine.    - buprenorphine-naloxone (SUBOXONE) 8-2 MG SUBL sublingual tablet; Place 1 tablet under the tongue 2 times daily. May also place 0.5-1 tablets daily as needed for severe pain.  Dispense: 90 tablet; Refill: 1  - lidocaine (LIDODERM) 5 % patch; Place 1 patch over 12 hours onto the skin every 24 hours. To prevent lidocaine toxicity, patient should be patch free for 12 hrs daily.  Dispense: 30 patch; Refill: 1    2. Cannabis use, uncomplicated  Reports cannabis improves both pain and appetite (which is significant suppressed due to chemotherapy).          PDMP Review         Value Time User    State PDMP site checked  Yes 3/4/2025 12:58 PM Haley Wong,               RTC  Return in 8 weeks (on 4/29/2025) for 4/29 @ 1:30pm , using a video visit.    The longitudinal plan of care for the diagnosis(es)/condition(s) as documented were addressed during this visit. Due to the added complexity in care, I will continue to support Jarrell in the subsequent management and with ongoing continuity of care.        SUBJECTIVE                                                        Video-Visit Details  Type of service:  Video Visit  Video Start Time: 1:08 PM  Video End Time: 1:56 PM  Originating Location (pt. Location): Home  Distant Location (provider location):  [facility]   Platform used for Video Visit: Radha MISHRA Nicole is a 70 year old female with PMHx of stage IV breast cancer w/ bony metastases, asthma, HTN, diverticulitis, anxiety, opioid dependence, and history of unintentional opioid and benzodiazepine overdose  (July 2021) who presents to clinic today for follow up.       Brief history (from chart review):  Patient was initially seen by addiction medicine service when admitted for encephalopathy 2/2 unintentional overdose of opiates and benzodiazepines in July 2021.  She was diagnosed with metastatic breast cancer in March 2021 and was taking opioids since April 2021 for cancer related pain.  She also has long standing history of anxiety and had been on benzodiazepines for 7-8 years prior to hospitalization in July 2021 at which time she was weaned off with phenobarbital taper.     She has continued Suboxone 8-2mg BID to TID for management of her cancer related pain.      Plan from most recent office visit (12/6/24):  1. Chronic pain due to neoplasm (Primary)  Stable overall.  Continue Suboxone, no change.  She continues to follow-up regularly with oncology.  I asked to let me know if she need assistance with refills to navigate travel dates.    - Drugs of Abuse Screen Urine (POC CUPS) POCT  - buprenorphine-naloxone (SUBOXONE) 8-2 MG SUBL sublingual tablet; Place 1 tablet under the tongue 2 times daily. May also place 0.5-1 tablets daily as needed for severe pain.  Dispense: 90 tablet; Refill: 1     2. Encounter for therapeutic drug level monitoring  - Drugs of Abuse Screen Urine (POC CUPS) POCT     3. Insurance coverage problems  She is concerned about her insurance coverage in new year, care coordination referral placed.  - Specialty Care Coordination Referral; Future    TODAY'S VISIT  HPI Mar 4, 2025  - Very achy today, associates with weather changes  - Focusing on boundaries, family stressors and challenging dynamics   - Planning on moving into bigger place, also wants to join the [facility]  - Frustrated with [facility] and issues getting Suboxone refill recently  - Recent PET scan, follow-up with Dr Cali on 3/6 - she gets very anxious about results  - Very stressed with political climate and fear about cuts to health care    - Cannabis is helpful for her loss of appetite related to chemo  - Acknowledges that she is not depressed - does not feel PHQ and MOHINDER apply to people with certain traumas  - Making jewelry, reading - helps manage stress which is very high right now   - Still unsure of travel plans to [place]  - Neck pain she relates to sleep position - lidocaine patch is helpful (she does worry about cancer here)    Suboxone last filled 2/20, #90 (30 days)        10/10/2023     1:00 PM 1/24/2024     1:42 PM 5/14/2024     1:00 PM   PHQ   PHQ-9 Total Score 2 2 5   Q9: Thoughts of better off dead/self-harm past 2 weeks Not at all Not at all Not at all           4/13/2023    12:57 PM 10/10/2023     1:00 PM 5/14/2024     1:00 PM   MOHINDER-7 SCORE   Total Score 3 (minimal anxiety)     Total Score 3 3 3       OBJECTIVE                                                    PHYSICAL EXAM:  There were no vitals taken for this visit.    GENERAL: healthy, alert and no distress  EYES: Eyes grossly normal to inspection, sclerae normal  RESP: No respiratory distress  SKIN: no pallor or jaundice (though lighting is limited)  NEURO: mentation intact and speech normal  MENTAL STATUS EXAM  Appearance/Behavior: No appearant distress  Speech: Normal  Mood/Affect: anxious  Insight: Adequate    LAB  No results found for any visits on 03/04/25.      HISTORY                                                    Problem list reviewed & adjusted, as indicated.  Patient Active Problem List   Diagnosis    Malignant neoplasm of lower-outer quadrant of right breast of female, estrogen receptor positive (H)    Asthma    Combined forms of age-related cataract of right eye    Diverticulitis of large intestine without perforation or abscess without bleeding    Episodic mood disorder    Hyperlipidemia    Pain medication agreement    Panic disorder without agoraphobia    Post traumatic stress disorder    Dehydration    Encephalopathy    Elevated troponin level not due to acute  coronary syndrome    Acute kidney injury    Chronic pain due to neoplasm    Uncomplicated opioid dependence (H)    Hypokalemia    Non-traumatic rhabdomyolysis    Essential hypertension    Anemia    Nausea and vomiting, intractability of vomiting not specified, unspecified vomiting type         MEDICATION LIST (prior to visit)  Current Outpatient Medications   Medication Sig Dispense Refill    albuterol (PROAIR HFA;PROVENTIL HFA;VENTOLIN HFA) 90 mcg/actuation inhaler Inhale 1-2 puffs into the lungs every 4 hours as needed for shortness of breath / dyspnea or wheezing       [START ON 3/22/2025] buprenorphine-naloxone (SUBOXONE) 8-2 MG SUBL sublingual tablet Place 1 tablet under the tongue 2 times daily. May also place 0.5-1 tablets daily as needed for severe pain. 90 tablet 1    lidocaine (LIDODERM) 5 % patch Place 1 patch over 12 hours onto the skin every 24 hours. To prevent lidocaine toxicity, patient should be patch free for 12 hrs daily. 30 patch 1    anastrozole (ARIMIDEX) 1 MG tablet Take 1 tablet by mouth daily.      ascorbic acid, vitamin C, (VITAMIN C) 1000 MG tablet Take 1,000 mg by mouth daily.      B,C/folic/zinc/copper ox/vit E (STRESS B-COMPLEX ORAL) [B,C/FOLIC/ZINC/COPPER OX/VIT E (STRESS B-COMPLEX ORAL)] Take 1 tablet by mouth daily. Stress supplement      cetirizine (ZYRTEC) 10 MG tablet Take 1 tablet (10 mg) by mouth daily 90 tablet 0    cholecalciferol 125 MCG (5000 UT) CAPS Take 1 capsule (5,000 Units) by mouth daily 90 capsule 0    cyclobenzaprine (FLEXERIL) 5 MG tablet Take 1 tablet (5 mg) by mouth 2 times daily as needed for muscle spasms 60 tablet 1    docusate sodium (COLACE) 100 MG capsule TAKE 1 CAPSULE(100 MG) BY MOUTH TWICE DAILY AS NEEDED FOR CONSTIPATION 60 capsule 0    dronabinol (MARINOL) 10 MG capsule Take 10 mg by mouth 2 times daily      ferrous sulfate (FEROSUL) 325 (65 Fe) MG tablet Take 1 tablet (325 mg) by mouth daily (with breakfast) 30 tablet 1    fluticasone propionate  (FLONASE) 50 mcg/actuation nasal spray [FLUTICASONE PROPIONATE (FLONASE) 50 MCG/ACTUATION NASAL SPRAY] 1 spray into each nostril daily.       fluticasone-vilanterol (BREO ELLIPTA) 100-25 MCG/ACT inhaler Inhale 1 puff into the lungs daily      gabapentin (NEURONTIN) 300 MG capsule Take 300 mg by mouth 3 times daily      guaiFENesin (MUCINEX) 600 MG 12 hr tablet Take 600 mg by mouth 2 times daily      IBRANCE 125 MG tablet Take 125mg daily for 3 weeks, then off for 1 week      melatonin 10 mg Tab Take 10 mg by mouth nightly as needed for sleep.      melatonin 3 MG tablet Take 1 tablet (3 mg) by mouth nightly as needed for sleep 30 tablet 2    mirtazapine (REMERON) 15 MG tablet Take 1 tablet (15 mg) by mouth At Bedtime 30 tablet 1    naloxone (NARCAN) 4 MG/0.1ML nasal spray SPRAY 1 SPRAY INTO 1 NOSTRIL ALTERNATING NOSTRILS ONCE AS NEEDED FOR OPIOID REVERSAL EVERY 2-3 MINUTES UNTIL ASSISTANCE ARRIVES. 2 each 11    ondansetron (ZOFRAN) 8 MG tablet Take 8 mg by mouth 3 times daily as needed      polyethylene glycol (MIRALAX) 17 GM/Dose powder Take 17 g (1 capful) by mouth daily as needed for constipation 850 g 1    propranoloL (INDERAL) 10 MG tablet Take 10 mg by mouth 3 times daily as needed (anxiety or palpitations)      SYMBICORT 80-4.5 mcg/actuation inhaler Inhale 2 puffs into the lungs 2 times daily.      vilazodone (VIIBRYD) 10 MG TABS tablet       vitamin E 100 UNIT capsule Take 500 Units by mouth twice a week       No current facility-administered medications for this visit.       MEDICATION LIST (after visit)  Current Outpatient Medications   Medication Sig Dispense Refill    albuterol (PROAIR HFA;PROVENTIL HFA;VENTOLIN HFA) 90 mcg/actuation inhaler Inhale 1-2 puffs into the lungs every 4 hours as needed for shortness of breath / dyspnea or wheezing       [START ON 3/22/2025] buprenorphine-naloxone (SUBOXONE) 8-2 MG SUBL sublingual tablet Place 1 tablet under the tongue 2 times daily. May also place 0.5-1 tablets  daily as needed for severe pain. 90 tablet 1    lidocaine (LIDODERM) 5 % patch Place 1 patch over 12 hours onto the skin every 24 hours. To prevent lidocaine toxicity, patient should be patch free for 12 hrs daily. 30 patch 1    anastrozole (ARIMIDEX) 1 MG tablet Take 1 tablet by mouth daily.      ascorbic acid, vitamin C, (VITAMIN C) 1000 MG tablet Take 1,000 mg by mouth daily.      B,C/folic/zinc/copper ox/vit E (STRESS B-COMPLEX ORAL) [B,C/FOLIC/ZINC/COPPER OX/VIT E (STRESS B-COMPLEX ORAL)] Take 1 tablet by mouth daily. Stress supplement      cetirizine (ZYRTEC) 10 MG tablet Take 1 tablet (10 mg) by mouth daily 90 tablet 0    cholecalciferol 125 MCG (5000 UT) CAPS Take 1 capsule (5,000 Units) by mouth daily 90 capsule 0    cyclobenzaprine (FLEXERIL) 5 MG tablet Take 1 tablet (5 mg) by mouth 2 times daily as needed for muscle spasms 60 tablet 1    docusate sodium (COLACE) 100 MG capsule TAKE 1 CAPSULE(100 MG) BY MOUTH TWICE DAILY AS NEEDED FOR CONSTIPATION 60 capsule 0    dronabinol (MARINOL) 10 MG capsule Take 10 mg by mouth 2 times daily      ferrous sulfate (FEROSUL) 325 (65 Fe) MG tablet Take 1 tablet (325 mg) by mouth daily (with breakfast) 30 tablet 1    fluticasone propionate (FLONASE) 50 mcg/actuation nasal spray [FLUTICASONE PROPIONATE (FLONASE) 50 MCG/ACTUATION NASAL SPRAY] 1 spray into each nostril daily.       fluticasone-vilanterol (BREO ELLIPTA) 100-25 MCG/ACT inhaler Inhale 1 puff into the lungs daily      gabapentin (NEURONTIN) 300 MG capsule Take 300 mg by mouth 3 times daily      guaiFENesin (MUCINEX) 600 MG 12 hr tablet Take 600 mg by mouth 2 times daily      IBRANCE 125 MG tablet Take 125mg daily for 3 weeks, then off for 1 week      melatonin 10 mg Tab Take 10 mg by mouth nightly as needed for sleep.      melatonin 3 MG tablet Take 1 tablet (3 mg) by mouth nightly as needed for sleep 30 tablet 2    mirtazapine (REMERON) 15 MG tablet Take 1 tablet (15 mg) by mouth At Bedtime 30 tablet 1     naloxone (NARCAN) 4 MG/0.1ML nasal spray SPRAY 1 SPRAY INTO 1 NOSTRIL ALTERNATING NOSTRILS ONCE AS NEEDED FOR OPIOID REVERSAL EVERY 2-3 MINUTES UNTIL ASSISTANCE ARRIVES. 2 each 11    ondansetron (ZOFRAN) 8 MG tablet Take 8 mg by mouth 3 times daily as needed      polyethylene glycol (MIRALAX) 17 GM/Dose powder Take 17 g (1 capful) by mouth daily as needed for constipation 850 g 1    propranoloL (INDERAL) 10 MG tablet Take 10 mg by mouth 3 times daily as needed (anxiety or palpitations)      SYMBICORT 80-4.5 mcg/actuation inhaler Inhale 2 puffs into the lungs 2 times daily.      vilazodone (VIIBRYD) 10 MG TABS tablet       vitamin E 100 UNIT capsule Take 500 Units by mouth twice a week       No current facility-administered medications for this visit.         Allergies   Allergen Reactions    Iodinated Contrast Media [Iodinated Contrast Media] Unknown     Heart stopped and sick for multiple days after    Penicillins Swelling     Throat swelling as an infant    Tylenol [Acetaminophen] Other (See Comments)     With codeine only .Pt states gives her upset stomach for days.    Venlafaxine Hcl Er Nausea and Vomiting     Vomited 20 min after taking XR, OK with IR    Codeine Hives and Itching       Haley Wong, [facility] Addiction Medicine  [facility]  [phone]

## 2025-03-04 NOTE — PROGRESS NOTES
"Virtual Visit Details    Type of service:  Video Visit   Video Start Time: {video visit start/end time for provider to select:854604}  Video End Time:{video visit start/end time for provider to select:531373}    Originating Location (pt. Location): {video visit patient location:724409::\"Home\"}  {PROVIDER LOCATION On-site should be selected for visits conducted from your clinic location or adjoining [facility] hospital, academic office, or other nearby [facility] building. Off-site should be selected for all other provider locations, including home:644834}  Distant Location (provider location):  {virtual location provider:266774}  Platform used for Video Visit: {Virtual Visit Platforms:843724::\"[employer]\"}  "

## 2025-03-06 ENCOUNTER — TRANSFERRED RECORDS (OUTPATIENT)
Dept: HEALTH INFORMATION MANAGEMENT | Facility: CLINIC | Age: 70
End: 2025-03-06
Payer: COMMERCIAL

## 2025-03-17 ENCOUNTER — PATIENT OUTREACH (OUTPATIENT)
Dept: CARE COORDINATION | Facility: CLINIC | Age: 70
End: 2025-03-17
Payer: COMMERCIAL

## 2025-03-17 NOTE — PROGRESS NOTES
Clinic Care Coordination Contact  Follow Up Progress Note      Assessment: TRUPTI STEPHENS followed up with pt on how she was doing. Pt states that she is doing alright. She was wanting resources on getting a dresser or organizational furniture. TRUPTI STEPHENS and pt spoke about this. Pt also expressed interest in food resource and TRUPTI STEPHENS spoke to her about [employer]. Pt would like this sent to her via email.     Care Gaps:    Health Maintenance Due   Topic Date Due    DEXA  Never done    LIPID  Never done    ASTHMA ACTION PLAN  Never done    ASTHMA CONTROL TEST  Never done    DEPRESSION ACTION PLAN  Never done    MAMMO SCREENING  Never done    CONTROLLED SUBSTANCE AGREEMENT FOR CHRONIC PAIN MANAGEMENT  Never done    Pneumococcal Vaccine: 50+ Years (1 of 2 - PCV) Never done    ZOSTER IMMUNIZATION (1 of 2) Never done    HEPATITIS A IMMUNIZATION (1 of 2 - Risk 2-dose series) Never done    RSV VACCINE (1 - Risk 60-74 years 1-dose series) Never done    MEDICARE ANNUAL WELLNESS VISIT  Never done    FALL RISK ASSESSMENT  Never done    COVID-19 Vaccine (3 - Pfizer risk series) 11/24/2021    URINE DRUG SCREEN  01/22/2023    LUNG CANCER SCREENING  05/04/2023    BMP  05/15/2023    INFLUENZA VACCINE (1) 09/01/2024       Care Plans  Care Plan: Housing Instability       Problem: SDOH LACK OF STABLE HOUSING       Goal: Establish Stable Housing       Start Date: 2/13/2025 Expected End Date: 7/16/2025    This Visit's Progress: 10%    Priority: High    Note:     Barriers: availability and affordability of housing  Strengths: strong advocate of self   Patient expressed understanding of goal: yes  Action steps to achieve this goal:  1. I will look into housing resources   2. I will apply for housing that I am eligible for   3. I will follow up with TRUPTI STEPHENS as needed                                Intervention/Education provided during outreach:      Outreach Frequency: monthly, more frequently as needed    The patient consented via Verbal consent to have  contact information and resources sent via email in an unencrypted manner.    Plan:   Care Coordinator will follow up in one month     EDY Rojas? Social Work Care Coordinator   [facility]  [email or web address]? [email or web address]    Phone: [phone]  she/her

## 2025-04-16 ENCOUNTER — PATIENT OUTREACH (OUTPATIENT)
Dept: CARE COORDINATION | Facility: CLINIC | Age: 70
End: 2025-04-16
Payer: COMMERCIAL

## 2025-04-16 NOTE — PROGRESS NOTES
Clinic Care Coordination Contact  [facility]/Voicemail    Clinical Data: Care Coordinator Outreach    Outreach Documentation Number of Outreach Attempt   4/16/2025   4:01 PM 1       Left message on patient's voicemail with call back information and requested return call.      Plan: Care Coordinator will try to reach patient again in 10 business days.    EDY Rojas? Social Work Care Coordinator   [facility]  [email or web address]? [email or web address]    Phone: [phone]  she/her

## 2025-04-28 ENCOUNTER — TRANSFERRED RECORDS (OUTPATIENT)
Dept: HEALTH INFORMATION MANAGEMENT | Facility: CLINIC | Age: 70
End: 2025-04-28
Payer: COMMERCIAL

## 2025-04-29 ENCOUNTER — VIRTUAL VISIT (OUTPATIENT)
Dept: ADDICTION MEDICINE | Facility: CLINIC | Age: 70
End: 2025-04-29
Payer: COMMERCIAL

## 2025-04-29 DIAGNOSIS — G89.3 CHRONIC PAIN DUE TO NEOPLASM: Primary | Chronic | ICD-10-CM

## 2025-04-29 PROCEDURE — G2211 COMPLEX E/M VISIT ADD ON: HCPCS | Performed by: FAMILY MEDICINE

## 2025-04-29 PROCEDURE — 98007 SYNCH AUDIO-VIDEO EST HI 40: CPT | Performed by: FAMILY MEDICINE

## 2025-04-29 RX ORDER — BUPRENORPHINE HYDROCHLORIDE AND NALOXONE HYDROCHLORIDE DIHYDRATE 8; 2 MG/1; MG/1
TABLET SUBLINGUAL
Qty: 90 TABLET | Refills: 0 | Status: SHIPPED | OUTPATIENT
Start: 2025-04-29 | End: 2025-05-27

## 2025-04-29 RX ORDER — LIDOCAINE 50 MG/G
1 PATCH TOPICAL EVERY 24 HOURS
Qty: 30 PATCH | Refills: 0 | Status: SHIPPED | OUTPATIENT
Start: 2025-04-29 | End: 2025-05-27

## 2025-04-29 ASSESSMENT — PAIN SCALES - GENERAL: PAINLEVEL_OUTOF10: SEVERE PAIN (9)

## 2025-04-29 NOTE — PROGRESS NOTES
[facility] Addiction Medicine    A/P                                                    ASSESSMENT/PLAN    1. Chronic pain due to neoplasm (Primary)  Needs improvement.  Continue Suboxone for now, no dose change.  Reviewed her concerns about dental issues and bone density concerns of Suboxone at length.  Discussed importance of rinsing mouth after taking and avoiding brushing teeth for 1 hour after dose.  Discussed that all opioids carry risk of impacting bone density, this is not specific to Suboxone.  During our conversation it came up that she has been swallowing most of tablet rather than letting it dissolve.  I reviewed with there that buprenorphine is not absorbed through GI tract but rather through the muscosa so she will see improved pain control if she allows it dissolve completely.  She will make this change.  She would like to seek alternative pain management options.  Encouraged her to consider discussing with her oncology palliative care team.  She would like to see a pain clinic.  I suggested [facility] or working with PCP for referral to [facility] pain program as potential options.  Pain management will best be served by palliative care or pain medicine rather than addiction medicine, however, I was very clear to her that I hope she does not abruptly stop following up with me as the opioid withdrawal from Suboxone is miserable and I want to make sure we have a very clear plan for her if she decides to transition care or if we are co-managing care.  She agrees and will see me in 1 month.  - buprenorphine-naloxone (SUBOXONE) 8-2 MG SUBL sublingual tablet; Place 1 tablet under the tongue 2 times daily. May also place 0.5-1 tablets daily as needed for severe pain.  Dispense: 90 tablet; Refill: 0  - lidocaine (LIDODERM) 5 % patch; Place 1 patch over 12 hours onto the skin every 24 hours. To prevent lidocaine toxicity, patient should be patch free for 12 hrs daily.  Dispense: 30  patch; Refill: 0      PDMP Review         Value Time User    State PDMP site checked  Yes 4/29/2025  2:05 PM Haley Wong,               RTC  Return in about 4 weeks (around 5/27/2025).    The longitudinal plan of care for the diagnosis(es)/condition(s) as documented were addressed during this visit. Due to the added complexity in care, I will continue to support Jarrell in the subsequent management and with ongoing continuity of care.      Counseled the patient on the importance of having a recovery program in addition to medication to manage recovery.  Components include avoiding isolating, having willingness to change, avoiding triggers and managing cravings. Encouraged having some type of sober network and practicing honesty with trusted support person(s). Encouraged other services such as counseling, 12 step or other self-help organizations.      Opioid warning reviewed.  Risk of overdose following a period of abstinence due to decrease tolerance was discussed including risk of death.  Strongly recommended abstain from alcohol, benzodiazepines, THC, opioids and other drugs of abuse.  Increased risk of return to opioid use after use of these substances discussed.  Increased risk of overdose/death with use of other substances particularly benzodiazepines/alcohol reviewed.        SUBJECTIVE                                                        Video-Visit Details  Type of service:  Video Visit  Video Start Time: 1:36 PM  Video End Time: 2:22 PM  Originating Location (pt. Location): Home  Distant Location (provider location):  [facility]   Platform used for Video Visit: Radha    Jarrell Nicole is a 70 year old female with PMHx of stage IV breast cancer w/ bony metastases, asthma, HTN, diverticulitis, anxiety, opioid dependence, and history of unintentional opioid and benzodiazepine overdose (July 2021) who presents to clinic today for follow up.       Brief history (from chart  review):  Patient was initially seen by addiction medicine service when admitted for encephalopathy 2/2 unintentional overdose of opiates and benzodiazepines in July 2021.  She was diagnosed with metastatic breast cancer in March 2021 and was taking opioids since April 2021 for cancer related pain.  She also has long standing history of anxiety and had been on benzodiazepines for 7-8 years prior to hospitalization in July 2021 at which time she was weaned off with phenobarbital taper.     She has continued Suboxone 8-2mg BID to TID for management of her cancer related pain.        Plan from most recent office visit (3/4/25):  1. Chronic pain due to neoplasm (Primary)  Overall stable.  She has upcoming follow-up with oncology and will review recent PET scan.  Continue Suboxone, no change.  Continue lidocaine patches.  May benefit from additional work with palliative care and/or pain medicine.    - buprenorphine-naloxone (SUBOXONE) 8-2 MG SUBL sublingual tablet; Place 1 tablet under the tongue 2 times daily. May also place 0.5-1 tablets daily as needed for severe pain.  Dispense: 90 tablet; Refill: 1  - lidocaine (LIDODERM) 5 % patch; Place 1 patch over 12 hours onto the skin every 24 hours. To prevent lidocaine toxicity, patient should be patch free for 12 hrs daily.  Dispense: 30 patch; Refill: 1     2. Cannabis use, uncomplicated  Reports cannabis improves both pain and appetite (which is significant suppressed due to chemotherapy).      TODAY'S VISIT  HPI Apr 29, 2025  - Weaning Arimdex  - Does not want to continue Suboxone due to concerns about teeth and wants be on something else for pain  - Pain has tiff increasing - new sacral lesion found on scan end of Feb  - Has been seeing dentist, has 17 cavities - she is concerned that Suboxone is leaching vitamin D   - Has been swallowing suboxone tablets  - Has been years since any substance use        10/10/2023     1:00 PM 1/24/2024     1:42 PM 5/14/2024     1:00 PM    PHQ   PHQ-9 Total Score 2 2 5   Q9: Thoughts of better off dead/self-harm past 2 weeks Not at all Not at all Not at all           4/13/2023    12:57 PM 10/10/2023     1:00 PM 5/14/2024     1:00 PM   MOHINDER-7 SCORE   Total Score 3 (minimal anxiety)     Total Score 3 3 3       OBJECTIVE                                                    PHYSICAL EXAM:  There were no vitals taken for this visit.    GENERAL: alert and no distress  EYES: Eyes grossly normal to inspection  RESP: No respiratory distress, no coughing or wheezing  NEURO: mentation intact and speech normal  MENTAL STATUS EXAM  Appearance/Behavior: No appearant distress  Speech: Normal  Mood/Affect: anxious, tearful  Insight: Fair    LAB  No results found for any visits on 04/29/25.      HISTORY                                                    Problem list reviewed & adjusted, as indicated.  Patient Active Problem List   Diagnosis    Malignant neoplasm of lower-outer quadrant of right breast of female, estrogen receptor positive (H)    Asthma    Combined forms of age-related cataract of right eye    Diverticulitis of large intestine without perforation or abscess without bleeding    Episodic mood disorder    Hyperlipidemia    Pain medication agreement    Panic disorder without agoraphobia    Post traumatic stress disorder    Dehydration    Encephalopathy    Elevated troponin level not due to acute coronary syndrome    Acute kidney injury    Chronic pain due to neoplasm    Uncomplicated opioid dependence (H)    Hypokalemia    Non-traumatic rhabdomyolysis    Essential hypertension    Anemia    Nausea and vomiting, intractability of vomiting not specified, unspecified vomiting type         MEDICATION LIST (prior to visit)  Current Outpatient Medications   Medication Sig Dispense Refill    anastrozole (ARIMIDEX) 1 MG tablet Take 1 tablet by mouth daily. (Patient taking differently: Take 1 tablet by mouth daily. Pt tapering off this medication)       buprenorphine-naloxone (SUBOXONE) 8-2 MG SUBL sublingual tablet Place 1 tablet under the tongue 2 times daily. May also place 0.5-1 tablets daily as needed for severe pain. 90 tablet 0    lidocaine (LIDODERM) 5 % patch Place 1 patch over 12 hours onto the skin every 24 hours. To prevent lidocaine toxicity, patient should be patch free for 12 hrs daily. 30 patch 0    albuterol (PROAIR HFA;PROVENTIL HFA;VENTOLIN HFA) 90 mcg/actuation inhaler Inhale 1-2 puffs into the lungs every 4 hours as needed for shortness of breath / dyspnea or wheezing       ascorbic acid, vitamin C, (VITAMIN C) 1000 MG tablet Take 1,000 mg by mouth daily.      B,C/folic/zinc/copper ox/vit E (STRESS B-COMPLEX ORAL) [B,C/FOLIC/ZINC/COPPER OX/VIT E (STRESS B-COMPLEX ORAL)] Take 1 tablet by mouth daily. Stress supplement      cetirizine (ZYRTEC) 10 MG tablet Take 1 tablet (10 mg) by mouth daily 90 tablet 0    cholecalciferol 125 MCG (5000 UT) CAPS Take 1 capsule (5,000 Units) by mouth daily 90 capsule 0    cyclobenzaprine (FLEXERIL) 5 MG tablet Take 1 tablet (5 mg) by mouth 2 times daily as needed for muscle spasms 60 tablet 1    docusate sodium (COLACE) 100 MG capsule TAKE 1 CAPSULE(100 MG) BY MOUTH TWICE DAILY AS NEEDED FOR CONSTIPATION 60 capsule 0    dronabinol (MARINOL) 10 MG capsule Take 10 mg by mouth 2 times daily      ferrous sulfate (FEROSUL) 325 (65 Fe) MG tablet Take 1 tablet (325 mg) by mouth daily (with breakfast) 30 tablet 1    fluticasone propionate (FLONASE) 50 mcg/actuation nasal spray [FLUTICASONE PROPIONATE (FLONASE) 50 MCG/ACTUATION NASAL SPRAY] 1 spray into each nostril daily.       fluticasone-vilanterol (BREO ELLIPTA) 100-25 MCG/ACT inhaler Inhale 1 puff into the lungs daily      gabapentin (NEURONTIN) 300 MG capsule Take 300 mg by mouth 3 times daily      guaiFENesin (MUCINEX) 600 MG 12 hr tablet Take 600 mg by mouth 2 times daily      IBRANCE 125 MG tablet Take 125mg daily for 3 weeks, then off for 1 week      melatonin 10  mg Tab Take 10 mg by mouth nightly as needed for sleep.      melatonin 3 MG tablet Take 1 tablet (3 mg) by mouth nightly as needed for sleep 30 tablet 2    mirtazapine (REMERON) 15 MG tablet Take 1 tablet (15 mg) by mouth At Bedtime 30 tablet 1    naloxone (NARCAN) 4 MG/0.1ML nasal spray SPRAY 1 SPRAY INTO 1 NOSTRIL ALTERNATING NOSTRILS ONCE AS NEEDED FOR OPIOID REVERSAL EVERY 2-3 MINUTES UNTIL ASSISTANCE ARRIVES. 2 each 11    ondansetron (ZOFRAN) 8 MG tablet Take 8 mg by mouth 3 times daily as needed      polyethylene glycol (MIRALAX) 17 GM/Dose powder Take 17 g (1 capful) by mouth daily as needed for constipation 850 g 1    propranoloL (INDERAL) 10 MG tablet Take 10 mg by mouth 3 times daily as needed (anxiety or palpitations)      SYMBICORT 80-4.5 mcg/actuation inhaler Inhale 2 puffs into the lungs 2 times daily.      vilazodone (VIIBRYD) 10 MG TABS tablet       vitamin E 100 UNIT capsule Take 500 Units by mouth twice a week       No current facility-administered medications for this visit.       MEDICATION LIST (after visit)  Current Outpatient Medications   Medication Sig Dispense Refill    anastrozole (ARIMIDEX) 1 MG tablet Take 1 tablet by mouth daily. (Patient taking differently: Take 1 tablet by mouth daily. Pt tapering off this medication)      buprenorphine-naloxone (SUBOXONE) 8-2 MG SUBL sublingual tablet Place 1 tablet under the tongue 2 times daily. May also place 0.5-1 tablets daily as needed for severe pain. 90 tablet 0    lidocaine (LIDODERM) 5 % patch Place 1 patch over 12 hours onto the skin every 24 hours. To prevent lidocaine toxicity, patient should be patch free for 12 hrs daily. 30 patch 0    albuterol (PROAIR HFA;PROVENTIL HFA;VENTOLIN HFA) 90 mcg/actuation inhaler Inhale 1-2 puffs into the lungs every 4 hours as needed for shortness of breath / dyspnea or wheezing       ascorbic acid, vitamin C, (VITAMIN C) 1000 MG tablet Take 1,000 mg by mouth daily.      B,C/folic/zinc/copper ox/vit E  (STRESS B-COMPLEX ORAL) [B,C/FOLIC/ZINC/COPPER OX/VIT E (STRESS B-COMPLEX ORAL)] Take 1 tablet by mouth daily. Stress supplement      cetirizine (ZYRTEC) 10 MG tablet Take 1 tablet (10 mg) by mouth daily 90 tablet 0    cholecalciferol 125 MCG (5000 UT) CAPS Take 1 capsule (5,000 Units) by mouth daily 90 capsule 0    cyclobenzaprine (FLEXERIL) 5 MG tablet Take 1 tablet (5 mg) by mouth 2 times daily as needed for muscle spasms 60 tablet 1    docusate sodium (COLACE) 100 MG capsule TAKE 1 CAPSULE(100 MG) BY MOUTH TWICE DAILY AS NEEDED FOR CONSTIPATION 60 capsule 0    dronabinol (MARINOL) 10 MG capsule Take 10 mg by mouth 2 times daily      ferrous sulfate (FEROSUL) 325 (65 Fe) MG tablet Take 1 tablet (325 mg) by mouth daily (with breakfast) 30 tablet 1    fluticasone propionate (FLONASE) 50 mcg/actuation nasal spray [FLUTICASONE PROPIONATE (FLONASE) 50 MCG/ACTUATION NASAL SPRAY] 1 spray into each nostril daily.       fluticasone-vilanterol (BREO ELLIPTA) 100-25 MCG/ACT inhaler Inhale 1 puff into the lungs daily      gabapentin (NEURONTIN) 300 MG capsule Take 300 mg by mouth 3 times daily      guaiFENesin (MUCINEX) 600 MG 12 hr tablet Take 600 mg by mouth 2 times daily      IBRANCE 125 MG tablet Take 125mg daily for 3 weeks, then off for 1 week      melatonin 10 mg Tab Take 10 mg by mouth nightly as needed for sleep.      melatonin 3 MG tablet Take 1 tablet (3 mg) by mouth nightly as needed for sleep 30 tablet 2    mirtazapine (REMERON) 15 MG tablet Take 1 tablet (15 mg) by mouth At Bedtime 30 tablet 1    naloxone (NARCAN) 4 MG/0.1ML nasal spray SPRAY 1 SPRAY INTO 1 NOSTRIL ALTERNATING NOSTRILS ONCE AS NEEDED FOR OPIOID REVERSAL EVERY 2-3 MINUTES UNTIL ASSISTANCE ARRIVES. 2 each 11    ondansetron (ZOFRAN) 8 MG tablet Take 8 mg by mouth 3 times daily as needed      polyethylene glycol (MIRALAX) 17 GM/Dose powder Take 17 g (1 capful) by mouth daily as needed for constipation 850 g 1    propranoloL (INDERAL) 10 MG tablet  Take 10 mg by mouth 3 times daily as needed (anxiety or palpitations)      SYMBICORT 80-4.5 mcg/actuation inhaler Inhale 2 puffs into the lungs 2 times daily.      vilazodone (VIIBRYD) 10 MG TABS tablet       vitamin E 100 UNIT capsule Take 500 Units by mouth twice a week       No current facility-administered medications for this visit.         Allergies   Allergen Reactions    Iodinated Contrast Media [Iodinated Contrast Media] Unknown     Heart stopped and sick for multiple days after    Penicillins Swelling     Throat swelling as an infant    Tylenol [Acetaminophen] Other (See Comments)     With codeine only .Pt states gives her upset stomach for days.    Venlafaxine Hcl Er Nausea and Vomiting     Vomited 20 min after taking XR, OK with IR    Codeine Hives and Itching     I spent a total of 48 minutes on the day of the visit.   Time spent by me today doing chart review, history and exam, documentation and further activities per the note    Haley Wong [facility] Addiction Medicine  [facility]  [phone]

## 2025-04-29 NOTE — NURSING NOTE
Current patient location: [address]  [address]  [place] [address]    Is the patient currently in the state of [place]? YES    Visit mode: VIDEO    If the visit is dropped, the patient can be reconnected by:VIDEO VISIT: Text to cell phone:   Telephone Information:   Mobile [phone]    and VIDEO VISIT: Send to e-mail at: [email or web address]    Will anyone else be joining the visit? NO  (If patient encounters technical issues they should call [phone])    Are changes needed to the allergy or medication list? Yes Pt tapering off ARIMIDEX. Please also discuss SUBOXONE & teeth related concerns    Are refills needed on medications prescribed by this physician? YES    Pt reports refill needed for inhaler albuterol (PROAIR HFA;PROVENTIL HFA;VENTOLIN HFA) 90 mcg/actuation inhaler    Rooming Documentation:  Patient declined to complete questionnaire(s)    Reason for visit: RECHECK    Lyric ORDONEZ

## 2025-05-12 ENCOUNTER — PATIENT OUTREACH (OUTPATIENT)
Dept: CARE COORDINATION | Facility: CLINIC | Age: 70
End: 2025-05-12
Payer: COMMERCIAL

## 2025-05-12 NOTE — PROGRESS NOTES
Clinic Care Coordination Contact  Follow Up Progress Note    Patient spent a lot of time today discussing her irritation with a family member.  Had to redirect her several times.  She had questions about [employer]; referred her to the email sent by Gloria in March.  Her daughter is moving from [place] so they plan to get housing together.  Sent her [employer] and low income moving resources via [employer].       Assessment: Closer to meeting goal as she has a general idea of her housing plan.    Care Gaps:    Health Maintenance Due   Topic Date Due    DEXA  Never done    LIPID  Never done    ASTHMA ACTION PLAN  Never done    ASTHMA CONTROL TEST  Never done    DEPRESSION ACTION PLAN  Never done    MAMMO SCREENING  Never done    CONTROLLED SUBSTANCE AGREEMENT FOR CHRONIC PAIN MANAGEMENT  Never done    Pneumococcal Vaccine: 50+ Years (1 of 2 - PCV) Never done    ZOSTER IMMUNIZATION (1 of 2) Never done    HEPATITIS A IMMUNIZATION (1 of 2 - Risk 2-dose series) Never done    RSV VACCINE (1 - Risk 60-74 years 1-dose series) Never done    MEDICARE ANNUAL WELLNESS VISIT  Never done    FALL RISK ASSESSMENT  Never done    COVID-19 Vaccine (3 - Pfizer risk series) 11/24/2021    URINE DRUG SCREEN  01/22/2023    LUNG CANCER SCREENING  05/04/2023    BMP  05/15/2023    MOHINDER ASSESSMENT  05/14/2025    PHQ-9  05/14/2025    DIABETES SCREENING  05/15/2025         Care Plans  Care Plan: Housing Instability       Problem: SDOH LACK OF STABLE HOUSING       Goal: Establish Stable Housing       Start Date: 2/13/2025 Expected End Date: 7/16/2025    This Visit's Progress: 10%    Priority: High    Note:     Barriers: availability and affordability of housing  Strengths: strong advocate of self   Patient expressed understanding of goal: yes  Action steps to achieve this goal:  1. I will look into housing resources   2. I will apply for housing that I am eligible for   3. I will follow up with TRUPTI STEPHENS as needed                             Intervention/Education provided during outreach:   - Provided support and active empathetic listening and validation.   - Provided solution-focused therapeutic support.      Outreach Frequency: monthly, more frequently as needed      Plan:   Care Coordinator will follow up in one month.        EDY Jean  , Care Coordination  [facility]  [facility]  [phone]

## 2025-05-14 ENCOUNTER — TRANSFERRED RECORDS (OUTPATIENT)
Dept: HEALTH INFORMATION MANAGEMENT | Facility: CLINIC | Age: 70
End: 2025-05-14
Payer: COMMERCIAL

## 2025-05-27 ENCOUNTER — VIRTUAL VISIT (OUTPATIENT)
Dept: ADDICTION MEDICINE | Facility: CLINIC | Age: 70
End: 2025-05-27
Payer: COMMERCIAL

## 2025-05-27 VITALS — WEIGHT: 129 LBS | BODY MASS INDEX: 22.02 KG/M2 | HEIGHT: 64 IN

## 2025-05-27 DIAGNOSIS — F11.20 UNCOMPLICATED OPIOID DEPENDENCE (H): Primary | ICD-10-CM

## 2025-05-27 DIAGNOSIS — G89.3 CHRONIC PAIN DUE TO NEOPLASM: Chronic | ICD-10-CM

## 2025-05-27 PROCEDURE — G2211 COMPLEX E/M VISIT ADD ON: HCPCS | Performed by: FAMILY MEDICINE

## 2025-05-27 PROCEDURE — 98006 SYNCH AUDIO-VIDEO EST MOD 30: CPT | Performed by: FAMILY MEDICINE

## 2025-05-27 RX ORDER — BUPRENORPHINE HYDROCHLORIDE AND NALOXONE HYDROCHLORIDE DIHYDRATE 8; 2 MG/1; MG/1
TABLET SUBLINGUAL
Qty: 90 TABLET | Refills: 1 | Status: SHIPPED | OUTPATIENT
Start: 2025-05-27

## 2025-05-27 RX ORDER — LIDOCAINE 50 MG/G
1 PATCH TOPICAL EVERY 24 HOURS
Qty: 30 PATCH | Refills: 1 | Status: SHIPPED | OUTPATIENT
Start: 2025-05-27

## 2025-05-27 RX ORDER — BUPRENORPHINE HYDROCHLORIDE AND NALOXONE HYDROCHLORIDE 5.7; 1.4 MG/1; MG/1
TABLET, ORALLY DISINTEGRATING SUBLINGUAL
Qty: 90 TABLET | Refills: 1 | Status: SHIPPED | OUTPATIENT
Start: 2025-05-27

## 2025-05-27 ASSESSMENT — PAIN SCALES - GENERAL: PAINLEVEL_OUTOF10: SEVERE PAIN (10)

## 2025-05-27 NOTE — PROGRESS NOTES
[facility] Addiction Medicine    A/P                                                    ASSESSMENT/PLAN    1. Chronic pain due to neoplasm  2. Uncomplicated opioid dependence (H) (Primary)  Overall pain control needs improvement.  It's possible that dislike of the taste of Suboxone is contributing to less than 100% of the medication being dissolved and absorbed.  Discussed option of Zubsolv which has a different flavor (minty) and dissolves more quickly so may be more palatable - I think this option will help us maximize the use of buprenorphine in managing her symptoms and avoid early discontinuation of the medication.  Additionally, she has signification concerns about the dental effects of of Suboxone and is more comfortable with an option that dissolves more quickly, lessening contact with teeth.  We will continue Suboxone while awaiting Zubsolv prescription PA if needed.  If no PA is needed, will cancel Suboxone script.  Additionally, she will continue lidocaine patches for pain control as well.  - buprenorphine-naloxone (SUBOXONE) 8-2 MG SUBL sublingual tablet; Place 1 tablet under the tongue 2 times daily. May also place 0.5-1 tablets daily as needed for severe pain.  Dispense: 90 tablet; Refill: 1  - lidocaine (LIDODERM) 5 % patch; Place 1 patch over 12 hours onto the skin every 24 hours. To prevent lidocaine toxicity, patient should be patch free for 12 hrs daily.  Dispense: 30 patch; Refill: 1  - buprenorphine-naloxone (ZUBSOLV) 5.7-1.4 MG sublingual tablet; Place 1 tablet under the tongue 2 times daily. May also place 1 tablet daily as needed (pain).  Dispense: 90 tablet; Refill: 1        PDMP Review         Value Time User    State PDMP site checked  Yes 5/27/2025  3:40 PM Haley Wong DO              RTC  Return in about 8 weeks (around 7/25/2025) for in person - 7/25 @ 11:30am.    The longitudinal plan of care for the diagnosis(es)/condition(s) as documented were addressed during  this visit. Due to the added complexity in care, I will continue to support Jarrell in the subsequent management and with ongoing continuity of care.        SUBJECTIVE                                                        Video-Visit Details  Type of service:  Video Visit  Video Start Time: 3:34 PM  Video End Time: 4:09 PM  Originating Location (pt. Location): Home  Distant Location (provider location):  [facility]   Platform used for Video Visit: Radha    Jarrell Nicole is a 70 year old female who presents to clinic today for follow up      Plan from most recent office visit (4/29/25):  1. Chronic pain due to neoplasm (Primary)  Needs improvement.  Continue Suboxone for now, no dose change.  Reviewed her concerns about dental issues and bone density concerns of Suboxone at length.  Discussed importance of rinsing mouth after taking and avoiding brushing teeth for 1 hour after dose.  Discussed that all opioids carry risk of impacting bone density, this is not specific to Suboxone.  During our conversation it came up that she has been swallowing most of tablet rather than letting it dissolve.  I reviewed with there that buprenorphine is not absorbed through GI tract but rather through the muscosa so she will see improved pain control if she allows it dissolve completely.  She will make this change.  She would like to seek alternative pain management options.  Encouraged her to consider discussing with her oncology palliative care team.  She would like to see a pain clinic.  I suggested [facility] and [facility] or working with PCP for referral to [facility] as potential options.  Pain management will best be served by palliative care or pain medicine rather than addiction medicine, however, I was very clear to her that I hope she does not abruptly stop following up with me as the opioid withdrawal from Suboxone is miserable and I want to make sure we have a very clear plan for her if she  "decides to transition care or if we are co-managing care.  She agrees and will see me in 1 month.  - buprenorphine-naloxone (SUBOXONE) 8-2 MG SUBL sublingual tablet; Place 1 tablet under the tongue 2 times daily. May also place 0.5-1 tablets daily as needed for severe pain.  Dispense: 90 tablet; Refill: 0  - lidocaine (LIDODERM) 5 % patch; Place 1 patch over 12 hours onto the skin every 24 hours. To prevent lidocaine toxicity, patient should be patch free for 12 hrs daily.  Dispense: 30 patch; Refill: 0    TODAY'S VISIT  HPI May 27, 2025  - Pool at her apartment was supposed to open and it didn't - this is disappointing because it is helpful for her pain to be in the pool - this is a factor in her decision to move   - Having more pain today with the rain/weather changes  - Taking Suboxone under tongue, no change in pain control - plans to continue seeing me for pain management with Suboxone for now  - Really dislikes taste of Suboxone, wishes there was alternative flavor  - Reports issues getting her vitamin D from [facility]'s today so frustrated about that  - Having family challenges re: relationships with her children  - Showed me a necklace she made for her daughter  - Hurt that her daughter is not going with her to her next PET scan on 5/9  - Will go see another [facility] person who sees cancer patients  - Going to see eye doctor - thinks may have cataracts        10/10/2023     1:00 PM 1/24/2024     1:42 PM 5/14/2024     1:00 PM   PHQ   PHQ-9 Total Score 2 2 5   Q9: Thoughts of better off dead/self-harm past 2 weeks Not at all Not at all Not at all           4/13/2023    12:57 PM 10/10/2023     1:00 PM 5/14/2024     1:00 PM   MOHINDER-7 SCORE   Total Score 3 (minimal anxiety)     Total Score 3 3 3       OBJECTIVE                                                    PHYSICAL EXAM:  Ht 1.626 m (5' 4\")   Wt 58.5 kg (129 lb)   BMI 22.14 kg/m      GENERAL: alert and no distress  EYES: Eyes grossly normal to inspection  RESP: No " respiratory distress, no coughing or wheezing, speaking in full sentences  NEURO: mentation intact and speech normal  MENTAL STATUS EXAM  Appearance/Behavior: No appearant distress  Speech: Normal  Mood/Affect: anxious  Insight: adequate    LAB  No results found for any visits on 05/27/25.      HISTORY                                                    Problem list reviewed & adjusted, as indicated.  Patient Active Problem List   Diagnosis    Malignant neoplasm of lower-outer quadrant of right breast of female, estrogen receptor positive (H)    Asthma    Combined forms of age-related cataract of right eye    Diverticulitis of large intestine without perforation or abscess without bleeding    Episodic mood disorder    Hyperlipidemia    Pain medication agreement    Panic disorder without agoraphobia    Post traumatic stress disorder    Dehydration    Encephalopathy    Elevated troponin level not due to acute coronary syndrome    Acute kidney injury    Chronic pain due to neoplasm    Uncomplicated opioid dependence (H)    Hypokalemia    Non-traumatic rhabdomyolysis    Essential hypertension    Anemia    Nausea and vomiting, intractability of vomiting not specified, unspecified vomiting type         MEDICATION LIST (prior to visit)  Current Outpatient Medications   Medication Sig Dispense Refill    buprenorphine-naloxone (SUBOXONE) 8-2 MG SUBL sublingual tablet Place 1 tablet under the tongue 2 times daily. May also place 0.5-1 tablets daily as needed for severe pain. 90 tablet 1    buprenorphine-naloxone (ZUBSOLV) 5.7-1.4 MG sublingual tablet Place 1 tablet under the tongue 2 times daily. May also place 1 tablet daily as needed (pain). 90 tablet 1    lidocaine (LIDODERM) 5 % patch Place 1 patch over 12 hours onto the skin every 24 hours. To prevent lidocaine toxicity, patient should be patch free for 12 hrs daily. 30 patch 1    albuterol (PROAIR HFA;PROVENTIL HFA;VENTOLIN HFA) 90 mcg/actuation inhaler Inhale 1-2 puffs  into the lungs every 4 hours as needed for shortness of breath / dyspnea or wheezing       anastrozole (ARIMIDEX) 1 MG tablet Take 1 tablet by mouth daily. (Patient taking differently: Take 1 tablet by mouth daily. Pt tapering off this medication)      ascorbic acid, vitamin C, (VITAMIN C) 1000 MG tablet Take 1,000 mg by mouth daily.      B,C/folic/zinc/copper ox/vit E (STRESS B-COMPLEX ORAL) [B,C/FOLIC/ZINC/COPPER OX/VIT E (STRESS B-COMPLEX ORAL)] Take 1 tablet by mouth daily. Stress supplement      cetirizine (ZYRTEC) 10 MG tablet Take 1 tablet (10 mg) by mouth daily 90 tablet 0    cholecalciferol 125 MCG (5000 UT) CAPS Take 1 capsule (5,000 Units) by mouth daily 90 capsule 0    cyclobenzaprine (FLEXERIL) 5 MG tablet Take 1 tablet (5 mg) by mouth 2 times daily as needed for muscle spasms 60 tablet 1    docusate sodium (COLACE) 100 MG capsule TAKE 1 CAPSULE(100 MG) BY MOUTH TWICE DAILY AS NEEDED FOR CONSTIPATION 60 capsule 0    dronabinol (MARINOL) 10 MG capsule Take 10 mg by mouth 2 times daily      ferrous sulfate (FEROSUL) 325 (65 Fe) MG tablet Take 1 tablet (325 mg) by mouth daily (with breakfast) 30 tablet 1    fluticasone propionate (FLONASE) 50 mcg/actuation nasal spray [FLUTICASONE PROPIONATE (FLONASE) 50 MCG/ACTUATION NASAL SPRAY] 1 spray into each nostril daily.       fluticasone-vilanterol (BREO ELLIPTA) 100-25 MCG/ACT inhaler Inhale 1 puff into the lungs daily      gabapentin (NEURONTIN) 300 MG capsule Take 300 mg by mouth 3 times daily      guaiFENesin (MUCINEX) 600 MG 12 hr tablet Take 600 mg by mouth 2 times daily      IBRANCE 125 MG tablet Take 125mg daily for 3 weeks, then off for 1 week      melatonin 10 mg Tab Take 10 mg by mouth nightly as needed for sleep.      melatonin 3 MG tablet Take 1 tablet (3 mg) by mouth nightly as needed for sleep 30 tablet 2    mirtazapine (REMERON) 15 MG tablet Take 1 tablet (15 mg) by mouth At Bedtime 30 tablet 1    naloxone (NARCAN) 4 MG/0.1ML nasal spray SPRAY 1  SPRAY INTO 1 NOSTRIL ALTERNATING NOSTRILS ONCE AS NEEDED FOR OPIOID REVERSAL EVERY 2-3 MINUTES UNTIL ASSISTANCE ARRIVES. 2 each 11    ondansetron (ZOFRAN) 8 MG tablet Take 8 mg by mouth 3 times daily as needed      polyethylene glycol (MIRALAX) 17 GM/Dose powder Take 17 g (1 capful) by mouth daily as needed for constipation 850 g 1    propranoloL (INDERAL) 10 MG tablet Take 10 mg by mouth 3 times daily as needed (anxiety or palpitations)      SYMBICORT 80-4.5 mcg/actuation inhaler Inhale 2 puffs into the lungs 2 times daily.      vilazodone (VIIBRYD) 10 MG TABS tablet       vitamin E 100 UNIT capsule Take 500 Units by mouth twice a week       No current facility-administered medications for this visit.       MEDICATION LIST (after visit)  Current Outpatient Medications   Medication Sig Dispense Refill    buprenorphine-naloxone (SUBOXONE) 8-2 MG SUBL sublingual tablet Place 1 tablet under the tongue 2 times daily. May also place 0.5-1 tablets daily as needed for severe pain. 90 tablet 1    buprenorphine-naloxone (ZUBSOLV) 5.7-1.4 MG sublingual tablet Place 1 tablet under the tongue 2 times daily. May also place 1 tablet daily as needed (pain). 90 tablet 1    lidocaine (LIDODERM) 5 % patch Place 1 patch over 12 hours onto the skin every 24 hours. To prevent lidocaine toxicity, patient should be patch free for 12 hrs daily. 30 patch 1    albuterol (PROAIR HFA;PROVENTIL HFA;VENTOLIN HFA) 90 mcg/actuation inhaler Inhale 1-2 puffs into the lungs every 4 hours as needed for shortness of breath / dyspnea or wheezing       anastrozole (ARIMIDEX) 1 MG tablet Take 1 tablet by mouth daily. (Patient taking differently: Take 1 tablet by mouth daily. Pt tapering off this medication)      ascorbic acid, vitamin C, (VITAMIN C) 1000 MG tablet Take 1,000 mg by mouth daily.      B,C/folic/zinc/copper ox/vit E (STRESS B-COMPLEX ORAL) [B,C/FOLIC/ZINC/COPPER OX/VIT E (STRESS B-COMPLEX ORAL)] Take 1 tablet by mouth daily. Stress supplement       cetirizine (ZYRTEC) 10 MG tablet Take 1 tablet (10 mg) by mouth daily 90 tablet 0    cholecalciferol 125 MCG (5000 UT) CAPS Take 1 capsule (5,000 Units) by mouth daily 90 capsule 0    cyclobenzaprine (FLEXERIL) 5 MG tablet Take 1 tablet (5 mg) by mouth 2 times daily as needed for muscle spasms 60 tablet 1    docusate sodium (COLACE) 100 MG capsule TAKE 1 CAPSULE(100 MG) BY MOUTH TWICE DAILY AS NEEDED FOR CONSTIPATION 60 capsule 0    dronabinol (MARINOL) 10 MG capsule Take 10 mg by mouth 2 times daily      ferrous sulfate (FEROSUL) 325 (65 Fe) MG tablet Take 1 tablet (325 mg) by mouth daily (with breakfast) 30 tablet 1    fluticasone propionate (FLONASE) 50 mcg/actuation nasal spray [FLUTICASONE PROPIONATE (FLONASE) 50 MCG/ACTUATION NASAL SPRAY] 1 spray into each nostril daily.       fluticasone-vilanterol (BREO ELLIPTA) 100-25 MCG/ACT inhaler Inhale 1 puff into the lungs daily      gabapentin (NEURONTIN) 300 MG capsule Take 300 mg by mouth 3 times daily      guaiFENesin (MUCINEX) 600 MG 12 hr tablet Take 600 mg by mouth 2 times daily      IBRANCE 125 MG tablet Take 125mg daily for 3 weeks, then off for 1 week      melatonin 10 mg Tab Take 10 mg by mouth nightly as needed for sleep.      melatonin 3 MG tablet Take 1 tablet (3 mg) by mouth nightly as needed for sleep 30 tablet 2    mirtazapine (REMERON) 15 MG tablet Take 1 tablet (15 mg) by mouth At Bedtime 30 tablet 1    naloxone (NARCAN) 4 MG/0.1ML nasal spray SPRAY 1 SPRAY INTO 1 NOSTRIL ALTERNATING NOSTRILS ONCE AS NEEDED FOR OPIOID REVERSAL EVERY 2-3 MINUTES UNTIL ASSISTANCE ARRIVES. 2 each 11    ondansetron (ZOFRAN) 8 MG tablet Take 8 mg by mouth 3 times daily as needed      polyethylene glycol (MIRALAX) 17 GM/Dose powder Take 17 g (1 capful) by mouth daily as needed for constipation 850 g 1    propranoloL (INDERAL) 10 MG tablet Take 10 mg by mouth 3 times daily as needed (anxiety or palpitations)      SYMBICORT 80-4.5 mcg/actuation inhaler Inhale 2 puffs  into the lungs 2 times daily.      vilazodone (VIIBRYD) 10 MG TABS tablet       vitamin E 100 UNIT capsule Take 500 Units by mouth twice a week       No current facility-administered medications for this visit.         Allergies   Allergen Reactions    Iodinated Contrast Media [Iodinated Contrast Media] Unknown     Heart stopped and sick for multiple days after    Penicillins Swelling     Throat swelling as an infant    Tylenol [Acetaminophen] Other (See Comments)     With codeine only .Pt states gives her upset stomach for days.    Venlafaxine Hcl Er Nausea and Vomiting     Vomited 20 min after taking XR, OK with IR    Codeine Hives and Itching       Haley Wong, DO  [facility] Addiction Medicine  [facility]  [phone]

## 2025-05-27 NOTE — NURSING NOTE
Current patient location: [address]  [address]  [place] [address]    Is the patient currently in the state of [place]? YES    Visit mode: VIDEO    If the visit is dropped, the patient can be reconnected by:VIDEO VISIT: Text to cell phone:   Telephone Information:   Mobile [phone]       Will anyone else be joining the visit? NO  (If patient encounters technical issues they should call [phone]    Are changes needed to the allergy or medication list? No    Are refills needed on medications prescribed by this physician? YES SUBOXONE    Rooming Documentation:  Patient declined to complete questionnaire(s)    Reason for visit: RECHECK    Sudha ORDONEZ

## 2025-05-30 ENCOUNTER — TELEPHONE (OUTPATIENT)
Dept: ADDICTION MEDICINE | Facility: CLINIC | Age: 70
End: 2025-05-30
Payer: COMMERCIAL

## 2025-05-30 NOTE — TELEPHONE ENCOUNTER
"5/30/25: Reason for call:  Other   Patient called regarding (reason for call): call back  Additional comments: The pt reports that she started a new medication and is feeling \"really weird.\" Pt reports that she plans to continue taking it over the weekend, but would like a call back on Monday or first available time to discuss the medication and how she did over the weekend.     Phone number to reach patient:  Cell number on file:    Telephone Information:   Mobile [phone]       Best Time:  Monday around 10 am or first available time after that.     Can we leave a detailed message on this number?  YES    Travel screening: Not Applicable   "

## 2025-06-02 NOTE — TELEPHONE ENCOUNTER
Returned phone call to patient regarding her symptoms since starting new medication.    No answer at number on file. [facility] requesting call back to Dr. Wong's RN at [phone].    Awaiting returned phone call.

## 2025-06-11 ENCOUNTER — PATIENT OUTREACH (OUTPATIENT)
Dept: CARE COORDINATION | Facility: CLINIC | Age: 70
End: 2025-06-11
Payer: COMMERCIAL

## 2025-06-11 NOTE — PROGRESS NOTES
Clinic Care Coordination Contact  [facility]/Voicemail    Clinical Data: Care Coordinator Outreach    Outreach Documentation Number of Outreach Attempt   6/11/2025   3:57 PM 1       Left message on patient's voicemail with call back information and requested return call.      Plan: Care Coordinator will try to reach patient again in 10 business days.    TRIPP Rojas, LSW? Social Work Care Coordinator   [facility]  [email or web address]? [email or web address]    Phone: [phone]  she/her

## 2025-06-12 ENCOUNTER — TRANSFERRED RECORDS (OUTPATIENT)
Dept: HEALTH INFORMATION MANAGEMENT | Facility: CLINIC | Age: 70
End: 2025-06-12
Payer: COMMERCIAL

## 2025-07-01 ENCOUNTER — PATIENT OUTREACH (OUTPATIENT)
Dept: CARE COORDINATION | Facility: CLINIC | Age: 70
End: 2025-07-01
Payer: COMMERCIAL

## 2025-07-01 NOTE — PROGRESS NOTES
Clinic Care Coordination Contact  Follow Up Progress Note      Assessment: TRUPTI CC followed up with pt to check in. Pt asked for housing resources to be resent to her. She also asked if there were moving expense coverage to also be sent to her.     Care Gaps:    Health Maintenance Due   Topic Date Due    DEXA  Never done    LIPID  Never done    ASTHMA ACTION PLAN  Never done    ASTHMA CONTROL TEST  Never done    DEPRESSION ACTION PLAN  Never done    MAMMO SCREENING  Never done    CONTROLLED SUBSTANCE AGREEMENT FOR CHRONIC PAIN MANAGEMENT  Never done    PNEUMOCOCCAL VACCINE 50+ YEARS (1 of 2 - PCV) Never done    ZOSTER VACCINE (1 of 2) Never done    HEPATITIS A VACCINE (1 of 2 - Risk 2-dose series) Never done    RSV VACCINE (1 - Risk 60-74 years 1-dose series) Never done    MEDICARE ANNUAL WELLNESS VISIT  Never done    FALL RISK ASSESSMENT  Never done    COVID-19 VACCINE (3 - Pfizer risk series) 11/24/2021    URINE DRUG SCREEN  01/22/2023    LUNG CANCER SCREENING  05/04/2023    BMP  05/15/2023    MOHINDER ASSESSMENT  05/14/2025    PHQ-9  05/14/2025    DIABETES SCREENING  05/15/2025       Care Plans  Care Plan: Housing Instability       Problem: SDOH LACK OF STABLE HOUSING       Goal: Establish Stable Housing       Start Date: 2/13/2025 Expected End Date: 7/16/2025    This Visit's Progress: 50% Recent Progress: 10%    Priority: High    Note:     Barriers: availability and affordability of housing  Strengths: strong advocate of self   Patient expressed understanding of goal: yes  Action steps to achieve this goal:  1. I will look into housing resources   2. I will apply for housing that I am eligible for   3. I will follow up with TRUPTI STEPHENS as needed                                Intervention/Education provided during outreach:  [email or web address]    Plan:   Care Coordinator will follow up in one month     TRIPP Rojas, OSMANW? Social Work Care Coordinator   [facility]  [email or web address]?  [email or web address]    Phone: [phone]  she/her

## 2025-07-15 ENCOUNTER — VIRTUAL VISIT (OUTPATIENT)
Dept: ADDICTION MEDICINE | Facility: CLINIC | Age: 70
End: 2025-07-15
Payer: COMMERCIAL

## 2025-07-15 DIAGNOSIS — G89.3 CHRONIC PAIN DUE TO NEOPLASM: Primary | Chronic | ICD-10-CM

## 2025-07-15 DIAGNOSIS — F41.9 ANXIETY: ICD-10-CM

## 2025-07-15 DIAGNOSIS — F11.20 UNCOMPLICATED OPIOID DEPENDENCE (H): ICD-10-CM

## 2025-07-15 PROCEDURE — 98006 SYNCH AUDIO-VIDEO EST MOD 30: CPT | Performed by: FAMILY MEDICINE

## 2025-07-15 RX ORDER — LIDOCAINE 50 MG/G
1 PATCH TOPICAL EVERY 24 HOURS
Qty: 30 PATCH | Refills: 1 | Status: SHIPPED | OUTPATIENT
Start: 2025-07-15

## 2025-07-15 RX ORDER — BUPRENORPHINE HYDROCHLORIDE AND NALOXONE HYDROCHLORIDE 5.7; 1.4 MG/1; MG/1
TABLET, ORALLY DISINTEGRATING SUBLINGUAL
Qty: 120 TABLET | Refills: 0 | Status: SHIPPED | OUTPATIENT
Start: 2025-07-15

## 2025-07-15 RX ORDER — LORAZEPAM 0.5 MG/1
0.5 TABLET ORAL DAILY PRN
Qty: 8 TABLET | Refills: 0 | Status: SHIPPED | OUTPATIENT
Start: 2025-07-15 | End: 2025-08-14

## 2025-07-15 ASSESSMENT — PAIN SCALES - GENERAL: PAINLEVEL_OUTOF10: SEVERE PAIN (8)

## 2025-08-04 ENCOUNTER — PATIENT OUTREACH (OUTPATIENT)
Dept: CARE COORDINATION | Facility: CLINIC | Age: 70
End: 2025-08-04
Payer: COMMERCIAL

## 2025-08-14 DIAGNOSIS — F41.9 ANXIETY: ICD-10-CM

## 2025-08-14 RX ORDER — LORAZEPAM 0.5 MG/1
TABLET ORAL
Qty: 8 TABLET | Refills: 0 | Status: SHIPPED | OUTPATIENT
Start: 2025-08-14

## 2025-08-18 ENCOUNTER — PATIENT OUTREACH (OUTPATIENT)
Dept: CARE COORDINATION | Facility: CLINIC | Age: 70
End: 2025-08-18
Payer: COMMERCIAL

## 2025-08-20 ENCOUNTER — PATIENT OUTREACH (OUTPATIENT)
Dept: CARE COORDINATION | Facility: CLINIC | Age: 70
End: 2025-08-20
Payer: COMMERCIAL

## 2025-08-25 ENCOUNTER — TRANSFERRED RECORDS (OUTPATIENT)
Dept: HEALTH INFORMATION MANAGEMENT | Facility: CLINIC | Age: 70
End: 2025-08-25
Payer: COMMERCIAL